# Patient Record
Sex: MALE | Race: WHITE | Employment: FULL TIME | URBAN - NONMETROPOLITAN AREA
[De-identification: names, ages, dates, MRNs, and addresses within clinical notes are randomized per-mention and may not be internally consistent; named-entity substitution may affect disease eponyms.]

---

## 2017-10-27 LAB — PAIN MANAGEMENT DRUG PANEL INTERP, URINE: NORMAL

## 2019-07-08 ENCOUNTER — HOSPITAL ENCOUNTER (OUTPATIENT)
Dept: PHYSICAL THERAPY | Age: 49
Setting detail: THERAPIES SERIES
Discharge: HOME | End: 2019-07-08
Payer: COMMERCIAL

## 2019-07-08 PROCEDURE — 97750 PHYSICAL PERFORMANCE TEST: CPT

## 2019-07-08 NOTE — DISCHARGE SUMMARY
[facility]  DISCHARGE NOTE  PHYSICAL THERAPY      REFERRING PHYSICIAN:  Dr. Josiane Christopher  DIAGNOSIS:  spinal stenosis lumbar region at multiple levels  CLAIM #:  Not apllicable   DATE OF ONSET:  2006    Client in clinic for completion of functional capacity evaluation. Please refer to evaluation in chart for details. Client is discharged at this time. Thank you for allowing me to assist in the care of this patient.     Hernan Rodarte #[id number] 7/8/2019 1:15 PM

## 2019-08-02 PROBLEM — N17.9 ACUTE RENAL FAILURE (ARF) (HCC): Status: ACTIVE | Noted: 2019-08-02

## 2019-08-03 ENCOUNTER — HOSPITAL ENCOUNTER (INPATIENT)
Age: 49
LOS: 1 days | Discharge: HOME | DRG: 684 | End: 2019-08-04
Attending: HOSPITALIST | Admitting: HOSPITALIST
Payer: COMMERCIAL

## 2019-08-03 DIAGNOSIS — N17.9 ACUTE RENAL FAILURE, UNSPECIFIED ACUTE RENAL FAILURE TYPE (HCC): Primary | ICD-10-CM

## 2019-08-03 PROBLEM — F43.10 PTSD (POST-TRAUMATIC STRESS DISORDER): Status: ACTIVE | Noted: 2019-08-03

## 2019-08-03 PROBLEM — M54.50 CHRONIC LOW BACK PAIN: Status: ACTIVE | Noted: 2019-08-03

## 2019-08-03 PROBLEM — G89.29 CHRONIC LOW BACK PAIN: Status: ACTIVE | Noted: 2019-08-03

## 2019-08-03 LAB
ANION GAP SERPL CALCULATED.3IONS-SCNC: 18 MEQ/L (ref 8–16)
BUN BLDV-MCNC: 31 MG/DL (ref 7–22)
CALCIUM SERPL-MCNC: 8.3 MG/DL (ref 8.5–10.5)
CHLORIDE BLD-SCNC: 100 MEQ/L (ref 98–111)
CO2: 21 MEQ/L (ref 23–33)
CREAT SERPL-MCNC: 2.9 MG/DL (ref 0.4–1.2)
GFR SERPL CREATININE-BSD FRML MDRD: 23 ML/MIN/1.73M2
GLUCOSE BLD-MCNC: 196 MG/DL (ref 70–108)
GLUCOSE BLD-MCNC: 199 MG/DL (ref 70–108)
GLUCOSE BLD-MCNC: 213 MG/DL (ref 70–108)
GLUCOSE BLD-MCNC: 236 MG/DL (ref 70–108)
GLUCOSE BLD-MCNC: 244 MG/DL (ref 70–108)
GLUCOSE BLD-MCNC: 257 MG/DL (ref 70–108)
POTASSIUM SERPL-SCNC: 4 MEQ/L (ref 3.5–5.2)
SODIUM BLD-SCNC: 139 MEQ/L (ref 135–145)

## 2019-08-03 PROCEDURE — 94640 AIRWAY INHALATION TREATMENT: CPT

## 2019-08-03 PROCEDURE — 6370000000 HC RX 637 (ALT 250 FOR IP): Performed by: PHYSICIAN ASSISTANT

## 2019-08-03 PROCEDURE — 94760 N-INVAS EAR/PLS OXIMETRY 1: CPT

## 2019-08-03 PROCEDURE — 2580000003 HC RX 258: Performed by: HOSPITALIST

## 2019-08-03 PROCEDURE — 6360000002 HC RX W HCPCS: Performed by: HOSPITALIST

## 2019-08-03 PROCEDURE — 36415 COLL VENOUS BLD VENIPUNCTURE: CPT

## 2019-08-03 PROCEDURE — 6370000000 HC RX 637 (ALT 250 FOR IP): Performed by: HOSPITALIST

## 2019-08-03 PROCEDURE — 6370000000 HC RX 637 (ALT 250 FOR IP): Performed by: INTERNAL MEDICINE

## 2019-08-03 PROCEDURE — 1200000003 HC TELEMETRY R&B

## 2019-08-03 PROCEDURE — 99223 1ST HOSP IP/OBS HIGH 75: CPT | Performed by: HOSPITALIST

## 2019-08-03 PROCEDURE — 82948 REAGENT STRIP/BLOOD GLUCOSE: CPT

## 2019-08-03 PROCEDURE — 80048 BASIC METABOLIC PNL TOTAL CA: CPT

## 2019-08-03 RX ORDER — PANTOPRAZOLE SODIUM 40 MG/1
40 TABLET, DELAYED RELEASE ORAL
Status: DISCONTINUED | OUTPATIENT
Start: 2019-08-03 | End: 2019-08-03

## 2019-08-03 RX ORDER — DULOXETIN HYDROCHLORIDE 60 MG/1
60 CAPSULE, DELAYED RELEASE ORAL DAILY
Status: DISCONTINUED | OUTPATIENT
Start: 2019-08-03 | End: 2019-08-04 | Stop reason: HOSPADM

## 2019-08-03 RX ORDER — HYDROXYZINE HYDROCHLORIDE 25 MG/1
25 TABLET, FILM COATED ORAL 3 TIMES DAILY PRN
COMMUNITY
End: 2021-11-12

## 2019-08-03 RX ORDER — FAMOTIDINE 20 MG/1
20 TABLET, FILM COATED ORAL 2 TIMES DAILY
Status: DISCONTINUED | OUTPATIENT
Start: 2019-08-03 | End: 2019-08-04 | Stop reason: HOSPADM

## 2019-08-03 RX ORDER — ATORVASTATIN CALCIUM 80 MG/1
80 TABLET, FILM COATED ORAL DAILY
Status: DISCONTINUED | OUTPATIENT
Start: 2019-08-03 | End: 2019-08-03 | Stop reason: ALTCHOICE

## 2019-08-03 RX ORDER — GABAPENTIN 600 MG/1
800 TABLET ORAL 3 TIMES DAILY
COMMUNITY
End: 2021-11-12

## 2019-08-03 RX ORDER — GABAPENTIN 400 MG/1
800 CAPSULE ORAL 3 TIMES DAILY
Status: DISCONTINUED | OUTPATIENT
Start: 2019-08-03 | End: 2019-08-04 | Stop reason: HOSPADM

## 2019-08-03 RX ORDER — VILAZODONE HYDROCHLORIDE 40 MG/1
40 TABLET ORAL DAILY
Status: DISCONTINUED | OUTPATIENT
Start: 2019-08-03 | End: 2019-08-04 | Stop reason: HOSPADM

## 2019-08-03 RX ORDER — SODIUM CHLORIDE 0.9 % (FLUSH) 0.9 %
10 SYRINGE (ML) INJECTION EVERY 12 HOURS SCHEDULED
Status: DISCONTINUED | OUTPATIENT
Start: 2019-08-03 | End: 2019-08-04 | Stop reason: HOSPADM

## 2019-08-03 RX ORDER — VIT C/B6/B5/MAGNESIUM/HERB 173 50-5-6-5MG
CAPSULE ORAL DAILY
COMMUNITY
End: 2021-05-11

## 2019-08-03 RX ORDER — LISINOPRIL 20 MG/1
20 TABLET ORAL DAILY
Status: ON HOLD | COMMUNITY
End: 2019-08-04 | Stop reason: SDUPTHER

## 2019-08-03 RX ORDER — HEPARIN SODIUM 5000 [USP'U]/ML
5000 INJECTION, SOLUTION INTRAVENOUS; SUBCUTANEOUS EVERY 8 HOURS SCHEDULED
Status: DISCONTINUED | OUTPATIENT
Start: 2019-08-03 | End: 2019-08-04 | Stop reason: HOSPADM

## 2019-08-03 RX ORDER — PREGABALIN 75 MG/1
75 CAPSULE ORAL 2 TIMES DAILY
Status: DISCONTINUED | OUTPATIENT
Start: 2019-08-03 | End: 2019-08-03 | Stop reason: ALTCHOICE

## 2019-08-03 RX ORDER — TIZANIDINE 4 MG/1
4 TABLET ORAL EVERY 6 HOURS PRN
Status: DISCONTINUED | OUTPATIENT
Start: 2019-08-03 | End: 2019-08-04 | Stop reason: HOSPADM

## 2019-08-03 RX ORDER — HYDROXYZINE HYDROCHLORIDE 25 MG/1
25 TABLET, FILM COATED ORAL 3 TIMES DAILY PRN
Status: DISCONTINUED | OUTPATIENT
Start: 2019-08-03 | End: 2019-08-04 | Stop reason: HOSPADM

## 2019-08-03 RX ORDER — TAMSULOSIN HYDROCHLORIDE 0.4 MG/1
0.4 CAPSULE ORAL DAILY
Status: DISCONTINUED | OUTPATIENT
Start: 2019-08-03 | End: 2019-08-04 | Stop reason: HOSPADM

## 2019-08-03 RX ORDER — CHLORTHALIDONE 25 MG/1
25 TABLET ORAL DAILY
Status: DISCONTINUED | OUTPATIENT
Start: 2019-08-03 | End: 2019-08-03 | Stop reason: ALTCHOICE

## 2019-08-03 RX ORDER — DEXTROSE MONOHYDRATE 25 G/50ML
12.5 INJECTION, SOLUTION INTRAVENOUS PRN
Status: DISCONTINUED | OUTPATIENT
Start: 2019-08-03 | End: 2019-08-04 | Stop reason: HOSPADM

## 2019-08-03 RX ORDER — 0.9 % SODIUM CHLORIDE 0.9 %
2000 INTRAVENOUS SOLUTION INTRAVENOUS ONCE
Status: COMPLETED | OUTPATIENT
Start: 2019-08-03 | End: 2019-08-03

## 2019-08-03 RX ORDER — ACETAMINOPHEN 325 MG/1
650 TABLET ORAL EVERY 6 HOURS PRN
Status: DISCONTINUED | OUTPATIENT
Start: 2019-08-03 | End: 2019-08-04 | Stop reason: HOSPADM

## 2019-08-03 RX ORDER — NICOTINE POLACRILEX 4 MG
15 LOZENGE BUCCAL PRN
Status: DISCONTINUED | OUTPATIENT
Start: 2019-08-03 | End: 2019-08-04 | Stop reason: HOSPADM

## 2019-08-03 RX ORDER — DEXTROSE MONOHYDRATE 50 MG/ML
100 INJECTION, SOLUTION INTRAVENOUS PRN
Status: DISCONTINUED | OUTPATIENT
Start: 2019-08-03 | End: 2019-08-04 | Stop reason: HOSPADM

## 2019-08-03 RX ORDER — HYDROCODONE BITARTRATE AND ACETAMINOPHEN 5; 325 MG/1; MG/1
1 TABLET ORAL EVERY 6 HOURS PRN
Status: DISCONTINUED | OUTPATIENT
Start: 2019-08-03 | End: 2019-08-04 | Stop reason: HOSPADM

## 2019-08-03 RX ORDER — ONDANSETRON 2 MG/ML
4 INJECTION INTRAMUSCULAR; INTRAVENOUS EVERY 6 HOURS PRN
Status: DISCONTINUED | OUTPATIENT
Start: 2019-08-03 | End: 2019-08-04 | Stop reason: HOSPADM

## 2019-08-03 RX ORDER — FUROSEMIDE 40 MG/1
40 TABLET ORAL DAILY
Status: ON HOLD | COMMUNITY
End: 2019-08-04 | Stop reason: SDUPTHER

## 2019-08-03 RX ORDER — DULOXETIN HYDROCHLORIDE 60 MG/1
60 CAPSULE, DELAYED RELEASE ORAL DAILY
COMMUNITY
End: 2021-11-12

## 2019-08-03 RX ORDER — ALBUTEROL SULFATE 90 UG/1
2 AEROSOL, METERED RESPIRATORY (INHALATION) EVERY 4 HOURS PRN
Status: DISCONTINUED | OUTPATIENT
Start: 2019-08-03 | End: 2019-08-04 | Stop reason: HOSPADM

## 2019-08-03 RX ORDER — GLIPIZIDE 10 MG/1
10 TABLET ORAL DAILY
COMMUNITY
End: 2021-05-11

## 2019-08-03 RX ORDER — DIVALPROEX SODIUM 500 MG/1
1500 TABLET, DELAYED RELEASE ORAL 2 TIMES DAILY
Status: DISCONTINUED | OUTPATIENT
Start: 2019-08-03 | End: 2019-08-04 | Stop reason: HOSPADM

## 2019-08-03 RX ORDER — DIVALPROEX SODIUM 500 MG/1
1500 TABLET, DELAYED RELEASE ORAL 2 TIMES DAILY
COMMUNITY
End: 2021-05-11

## 2019-08-03 RX ORDER — SODIUM CHLORIDE 0.9 % (FLUSH) 0.9 %
10 SYRINGE (ML) INJECTION PRN
Status: DISCONTINUED | OUTPATIENT
Start: 2019-08-03 | End: 2019-08-04 | Stop reason: HOSPADM

## 2019-08-03 RX ORDER — VILAZODONE HYDROCHLORIDE 40 MG/1
40 TABLET ORAL DAILY
COMMUNITY
End: 2021-05-11

## 2019-08-03 RX ORDER — SODIUM CHLORIDE 9 MG/ML
INJECTION, SOLUTION INTRAVENOUS CONTINUOUS
Status: DISCONTINUED | OUTPATIENT
Start: 2019-08-03 | End: 2019-08-04 | Stop reason: HOSPADM

## 2019-08-03 RX ORDER — ASPIRIN 81 MG/1
81 TABLET ORAL DAILY
Status: DISCONTINUED | OUTPATIENT
Start: 2019-08-03 | End: 2019-08-04 | Stop reason: HOSPADM

## 2019-08-03 RX ADMIN — INSULIN LISPRO 1 UNITS: 100 INJECTION, SOLUTION INTRAVENOUS; SUBCUTANEOUS at 21:29

## 2019-08-03 RX ADMIN — Medication 2 PUFF: at 18:08

## 2019-08-03 RX ADMIN — GABAPENTIN 800 MG: 400 CAPSULE ORAL at 09:01

## 2019-08-03 RX ADMIN — PANTOPRAZOLE SODIUM 40 MG: 40 TABLET, DELAYED RELEASE ORAL at 05:23

## 2019-08-03 RX ADMIN — HEPARIN SODIUM 5000 UNITS: 5000 INJECTION, SOLUTION INTRAVENOUS; SUBCUTANEOUS at 21:29

## 2019-08-03 RX ADMIN — GABAPENTIN 800 MG: 400 CAPSULE ORAL at 21:27

## 2019-08-03 RX ADMIN — HEPARIN SODIUM 5000 UNITS: 5000 INJECTION, SOLUTION INTRAVENOUS; SUBCUTANEOUS at 15:15

## 2019-08-03 RX ADMIN — VILAZODONE HYDROCHLORIDE 40 MG: 40 TABLET ORAL at 09:02

## 2019-08-03 RX ADMIN — GABAPENTIN 800 MG: 400 CAPSULE ORAL at 15:13

## 2019-08-03 RX ADMIN — ASPIRIN 81 MG: 81 TABLET ORAL at 09:02

## 2019-08-03 RX ADMIN — HYDROCODONE BITARTRATE AND ACETAMINOPHEN 1 TABLET: 5; 325 TABLET ORAL at 21:27

## 2019-08-03 RX ADMIN — INSULIN LISPRO 6 UNITS: 100 INJECTION, SOLUTION INTRAVENOUS; SUBCUTANEOUS at 11:54

## 2019-08-03 RX ADMIN — FAMOTIDINE 20 MG: 20 TABLET ORAL at 21:27

## 2019-08-03 RX ADMIN — SODIUM CHLORIDE: 9 INJECTION, SOLUTION INTRAVENOUS at 06:33

## 2019-08-03 RX ADMIN — SODIUM CHLORIDE 2000 ML: 9 INJECTION, SOLUTION INTRAVENOUS at 03:33

## 2019-08-03 RX ADMIN — INSULIN LISPRO 2 UNITS: 100 INJECTION, SOLUTION INTRAVENOUS; SUBCUTANEOUS at 09:02

## 2019-08-03 RX ADMIN — DIVALPROEX SODIUM 1500 MG: 500 TABLET, DELAYED RELEASE ORAL at 16:19

## 2019-08-03 RX ADMIN — LINAGLIPTIN 5 MG: 5 TABLET, FILM COATED ORAL at 15:14

## 2019-08-03 RX ADMIN — INSULIN LISPRO 4 UNITS: 100 INJECTION, SOLUTION INTRAVENOUS; SUBCUTANEOUS at 16:19

## 2019-08-03 RX ADMIN — Medication 2 PUFF: at 08:04

## 2019-08-03 RX ADMIN — DULOXETINE HYDROCHLORIDE 60 MG: 60 CAPSULE, DELAYED RELEASE ORAL at 09:02

## 2019-08-03 RX ADMIN — DIVALPROEX SODIUM 1500 MG: 500 TABLET, DELAYED RELEASE ORAL at 09:01

## 2019-08-03 RX ADMIN — TAMSULOSIN HYDROCHLORIDE 0.4 MG: 0.4 CAPSULE ORAL at 09:01

## 2019-08-03 ASSESSMENT — PAIN - FUNCTIONAL ASSESSMENT
PAIN_FUNCTIONAL_ASSESSMENT: PREVENTS OR INTERFERES SOME ACTIVE ACTIVITIES AND ADLS
PAIN_FUNCTIONAL_ASSESSMENT: PREVENTS OR INTERFERES SOME ACTIVE ACTIVITIES AND ADLS

## 2019-08-03 ASSESSMENT — PAIN DESCRIPTION - DESCRIPTORS
DESCRIPTORS: STABBING

## 2019-08-03 ASSESSMENT — PAIN DESCRIPTION - PAIN TYPE
TYPE: CHRONIC PAIN

## 2019-08-03 ASSESSMENT — PAIN DESCRIPTION - PROGRESSION
CLINICAL_PROGRESSION: NOT CHANGED

## 2019-08-03 ASSESSMENT — PAIN DESCRIPTION - LOCATION
LOCATION: BACK

## 2019-08-03 ASSESSMENT — PAIN DESCRIPTION - FREQUENCY
FREQUENCY: CONTINUOUS

## 2019-08-03 ASSESSMENT — PAIN DESCRIPTION - DIRECTION
RADIATING_TOWARDS: BUTTOCKS
RADIATING_TOWARDS: BUTTOCKS

## 2019-08-03 ASSESSMENT — PAIN DESCRIPTION - ONSET
ONSET: ON-GOING
ONSET: ON-GOING

## 2019-08-03 ASSESSMENT — PAIN SCALES - GENERAL
PAINLEVEL_OUTOF10: 7
PAINLEVEL_OUTOF10: 7
PAINLEVEL_OUTOF10: 0
PAINLEVEL_OUTOF10: 7

## 2019-08-03 ASSESSMENT — PAIN DESCRIPTION - ORIENTATION
ORIENTATION: MID;LOWER

## 2019-08-03 NOTE — H&P
Take 60 mg by mouth daily   Yes Historical Provider, MD   furosemide (LASIX) 40 MG tablet Take 40 mg by mouth daily   Yes Historical Provider, MD   gabapentin (NEURONTIN) 600 MG tablet Take 800 mg by mouth 3 times daily. Yes Historical Provider, MD   glipiZIDE (GLUCOTROL) 10 MG tablet Take 10 mg by mouth daily   Yes Historical Provider, MD   lisinopril (PRINIVIL;ZESTRIL) 20 MG tablet Take 20 mg by mouth daily   Yes Historical Provider, MD   metFORMIN (GLUCOPHAGE) 1000 MG tablet Take 1,000 mg by mouth 2 times daily (with meals)   Yes Historical Provider, MD   Turmeric 500 MG CAPS Take by mouth daily   Yes Historical Provider, MD   vilazodone HCl (VILAZODONE HCL) 40 MG TABS Take 40 mg by mouth daily   Yes Historical Provider, MD   Mometasone Furo-Formoterol Fum (DULERA IN) Inhale into the lungs   Yes Historical Provider, MD   tiZANidine (ZANAFLEX) 4 MG tablet Take 4 mg by mouth every 6 hours as needed   Yes Historical Provider, MD   tamsulosin (FLOMAX) 0.4 MG capsule Take 0.4 mg by mouth daily   Yes Historical Provider, MD   pantoprazole (PROTONIX) 40 MG tablet Take 40 mg by mouth daily   Yes Historical Provider, MD   Multiple Vitamins-Minerals (MULTIVITAMIN PO) Take 1 tablet by mouth daily. Yes Historical Provider, MD   aspirin 81 MG tablet Take 81 mg by mouth daily. Yes Historical Provider, MD   hydrOXYzine (ATARAX) 25 MG tablet Take 25 mg by mouth 3 times daily as needed for Itching    Historical Provider, MD   OMEGA 3-6-9 FATTY ACIDS PO Take by mouth    Historical Provider, MD   B Complex-C (SUPER B COMPLEX PO) Take by mouth    Historical Provider, MD   pregabalin (LYRICA) 75 MG capsule Take 75 mg by mouth 2 times daily    Historical Provider, MD   chlorthalidone (HYGROTON) 25 MG tablet Take 25 mg by mouth daily    Historical Provider, MD   atorvastatin (LIPITOR) 80 MG tablet Take 80 mg by mouth daily. Historical Provider, MD   Ergocalciferol (VITAMIN D2 PO) Take 50,000 capsules by mouth once a week. Historical Provider, MD   albuterol (VENTOLIN HFA) 108 (90 BASE) MCG/ACT inhaler Inhale 2 puffs into the lungs every 4 hours as needed for Wheezing. Historical Provider, MD       Allergies:  Cleocin [clindamycin]; Ilosone [erythromycin]; Pcn [penicillins]; and Sulfa antibiotics    Social History:      The patient currently lives at home    TOBACCO:   reports that he quit smoking about 12 months ago. His smoking use included cigarettes. He smoked 0.50 packs per day. He has never used smokeless tobacco.  ETOH:   reports that he does not drink alcohol. Family History:      Reviewed in detail and Positive as follows:        Problem Relation Age of Onset    Diabetes Mother     Diabetes Father     Heart Disease Father     High Blood Pressure Father     High Cholesterol Father     Stroke Maternal Grandfather     Heart Disease Maternal Grandfather     Cancer Paternal Grandmother     Heart Disease Paternal Grandfather        Diet:  DIET CARB CONTROL;    REVIEW OF SYSTEMS:   Pertinent positives as noted in the HPI. All other systems reviewed and negative. PHYSICAL EXAM:    /78   Pulse 84   Temp 98.4 °F (36.9 °C) (Oral)   Resp 18   Ht 6' 1\" (1.854 m)   Wt (!) 356 lb 14.4 oz (161.9 kg)   SpO2 98%   BMI 47.09 kg/m²     General appearance:  No apparent distress, appears stated age and cooperative. HEENT:  Normal cephalic, atraumatic without obvious deformity. Pupils equal, round, and reactive to light. Extra ocular muscles intact. Conjunctivae/corneas clear. Neck: Supple, with full range of motion. No jugular venous distention. Trachea midline. Respiratory:  Normal respiratory effort. Clear to auscultation, bilaterally without Rales/Wheezes/Rhonchi. Cardiovascular:  Regular rate and rhythm with normal S1/S2 without murmurs, rubs or gallops. Abdomen: Soft, non-tender, non-distended with normal bowel sounds. Musculoskeletal:  No clubbing, cyanosis or edema bilaterally.   Full range of motion without deformity. Skin: Skin color, texture, turgor normal.  No rashes or lesions. Neurologic:  Neurovascularly intact without any focal sensory/motor deficits. Cranial nerves: II-XII intact, grossly non-focal.  Psychiatric:  Alert and oriented, thought content appropriate, normal insight  Capillary Refill: Brisk,< 3 seconds   Peripheral Pulses: +2 palpable, equal bilaterally       Labs:     No results for input(s): WBC, HGB, HCT, PLT in the last 72 hours. No results for input(s): NA, K, CL, CO2, BUN, CREATININE, CALCIUM, PHOS in the last 72 hours. Invalid input(s): MAGNES  No results for input(s): AST, ALT, BILIDIR, BILITOT, ALKPHOS in the last 72 hours. No results for input(s): INR in the last 72 hours. No results for input(s): Kim Callander in the last 72 hours. Urinalysis:      Lab Results   Component Value Date    NITRU Negative 08/12/2016    BLOODU Negative 08/12/2016    SPECGRAV 1.015 08/12/2016    GLUCOSEU NEGATIVE 07/07/2015       Intake & Output:  No intake/output data recorded. No intake/output data recorded. Radiology:       No orders to display        DVT prophylaxis: SQ Heparin    Code Status: Full Code      Disposition:Home    Active Hospital Problems    Diagnosis Date Noted    [facility] [N17.9] 08/02/2019     Priority: High    Chronic low back pain [M54.5, G89.29] 08/03/2019    PTSD (post-traumatic stress disorder) [F43.10] 08/03/2019    Hypertension [I10]     Hyperlipidemia [E78.5]     CAD (coronary artery disease) [I25.10]        PLAN:    1. Acute renal failure - possibly due to metformin. Hold nephrotoxic medications. IVF, repeat lab in AM.  2. PTSD, chronic back pain - continue home meds  3. HTN, HLD - stable. 4. CAD - chest pain free  5. DM2 - sliding scale insulin, diabetic diet        Thank you DOC Hall for the opportunity to be involved in this patient's care.     Electronically signed by Linda Ellis DO on 8/3/2019 at 3:45 AM

## 2019-08-03 NOTE — PROGRESS NOTES
Pt admitted to  [phone] via direct admit from [facility].   Complaints: Falling. IV none infusing into the antecubital right, condition patent and no redness at a rate of 0 mls/ hour with about 0 mls in the bag still. IV site free of s/s of infection or infiltration. Vital signs obtained. Assessment and data collection initiated. Two nurse skin assessment performed by Kush Salazar and Nasreen Montana RN. Oriented to room. Policies and procedures for 6K explained. All questions answered with no further questions at this time. Fall prevention and safety brochure discussed with patient. Bed alarm on. Call light in reach.

## 2019-08-04 VITALS
HEIGHT: 73 IN | HEART RATE: 87 BPM | OXYGEN SATURATION: 95 % | TEMPERATURE: 98.2 F | RESPIRATION RATE: 16 BRPM | WEIGHT: 315 LBS | BODY MASS INDEX: 41.75 KG/M2 | DIASTOLIC BLOOD PRESSURE: 64 MMHG | SYSTOLIC BLOOD PRESSURE: 107 MMHG

## 2019-08-04 LAB
ALBUMIN SERPL-MCNC: 3.6 G/DL (ref 3.5–5.1)
ALP BLD-CCNC: 58 U/L (ref 38–126)
ALT SERPL-CCNC: 33 U/L (ref 11–66)
ANION GAP SERPL CALCULATED.3IONS-SCNC: 10 MEQ/L (ref 8–16)
AST SERPL-CCNC: 18 U/L (ref 5–40)
BILIRUB SERPL-MCNC: 0.7 MG/DL (ref 0.3–1.2)
BILIRUBIN DIRECT: < 0.2 MG/DL (ref 0–0.3)
BUN BLDV-MCNC: 12 MG/DL (ref 7–22)
CALCIUM SERPL-MCNC: 8.4 MG/DL (ref 8.5–10.5)
CHLORIDE BLD-SCNC: 103 MEQ/L (ref 98–111)
CO2: 28 MEQ/L (ref 23–33)
CREAT SERPL-MCNC: 1.2 MG/DL (ref 0.4–1.2)
ERYTHROCYTE [DISTWIDTH] IN BLOOD BY AUTOMATED COUNT: 13.2 % (ref 11.5–14.5)
ERYTHROCYTE [DISTWIDTH] IN BLOOD BY AUTOMATED COUNT: 45 FL (ref 35–45)
GFR SERPL CREATININE-BSD FRML MDRD: 64 ML/MIN/1.73M2
GLUCOSE BLD-MCNC: 133 MG/DL (ref 70–108)
GLUCOSE BLD-MCNC: 155 MG/DL (ref 70–108)
GLUCOSE BLD-MCNC: 182 MG/DL (ref 70–108)
HCT VFR BLD CALC: 36.6 % (ref 42–52)
HEMOGLOBIN: 12.1 GM/DL (ref 14–18)
MCH RBC QN AUTO: 30.9 PG (ref 26–33)
MCHC RBC AUTO-ENTMCNC: 33.1 GM/DL (ref 32.2–35.5)
MCV RBC AUTO: 93.6 FL (ref 80–94)
PLATELET # BLD: 109 THOU/MM3 (ref 130–400)
PMV BLD AUTO: 9.7 FL (ref 9.4–12.4)
POTASSIUM REFLEX MAGNESIUM: 4.5 MEQ/L (ref 3.5–5.2)
RBC # BLD: 3.91 MILL/MM3 (ref 4.7–6.1)
SODIUM BLD-SCNC: 141 MEQ/L (ref 135–145)
TOTAL PROTEIN: 5.7 G/DL (ref 6.1–8)
WBC # BLD: 4.3 THOU/MM3 (ref 4.8–10.8)

## 2019-08-04 PROCEDURE — 80076 HEPATIC FUNCTION PANEL: CPT

## 2019-08-04 PROCEDURE — 6360000002 HC RX W HCPCS: Performed by: HOSPITALIST

## 2019-08-04 PROCEDURE — 36415 COLL VENOUS BLD VENIPUNCTURE: CPT

## 2019-08-04 PROCEDURE — 6370000000 HC RX 637 (ALT 250 FOR IP): Performed by: INTERNAL MEDICINE

## 2019-08-04 PROCEDURE — 82948 REAGENT STRIP/BLOOD GLUCOSE: CPT

## 2019-08-04 PROCEDURE — 6370000000 HC RX 637 (ALT 250 FOR IP): Performed by: HOSPITALIST

## 2019-08-04 PROCEDURE — 85027 COMPLETE CBC AUTOMATED: CPT

## 2019-08-04 PROCEDURE — 2580000003 HC RX 258: Performed by: HOSPITALIST

## 2019-08-04 PROCEDURE — 80053 COMPREHEN METABOLIC PANEL: CPT

## 2019-08-04 PROCEDURE — 99239 HOSP IP/OBS DSCHRG MGMT >30: CPT | Performed by: INTERNAL MEDICINE

## 2019-08-04 PROCEDURE — 94640 AIRWAY INHALATION TREATMENT: CPT

## 2019-08-04 RX ORDER — LISINOPRIL 20 MG/1
20 TABLET ORAL DAILY
Qty: 30 TABLET | Refills: 3 | Status: SHIPPED | OUTPATIENT
Start: 2019-08-08

## 2019-08-04 RX ORDER — FUROSEMIDE 40 MG/1
40 TABLET ORAL DAILY
Qty: 60 TABLET | Refills: 3 | Status: SHIPPED | OUTPATIENT
Start: 2019-08-06 | End: 2021-05-11

## 2019-08-04 RX ORDER — CHLORTHALIDONE 25 MG/1
25 TABLET ORAL DAILY
Qty: 30 TABLET | Refills: 3 | Status: SHIPPED | OUTPATIENT
Start: 2019-08-07 | End: 2021-05-11

## 2019-08-04 RX ADMIN — LINAGLIPTIN 5 MG: 5 TABLET, FILM COATED ORAL at 07:59

## 2019-08-04 RX ADMIN — TAMSULOSIN HYDROCHLORIDE 0.4 MG: 0.4 CAPSULE ORAL at 07:59

## 2019-08-04 RX ADMIN — DULOXETINE HYDROCHLORIDE 60 MG: 60 CAPSULE, DELAYED RELEASE ORAL at 07:59

## 2019-08-04 RX ADMIN — FAMOTIDINE 20 MG: 20 TABLET ORAL at 07:59

## 2019-08-04 RX ADMIN — SODIUM CHLORIDE: 9 INJECTION, SOLUTION INTRAVENOUS at 07:55

## 2019-08-04 RX ADMIN — GABAPENTIN 800 MG: 400 CAPSULE ORAL at 13:46

## 2019-08-04 RX ADMIN — HEPARIN SODIUM 5000 UNITS: 5000 INJECTION, SOLUTION INTRAVENOUS; SUBCUTANEOUS at 13:46

## 2019-08-04 RX ADMIN — INSULIN LISPRO 2 UNITS: 100 INJECTION, SOLUTION INTRAVENOUS; SUBCUTANEOUS at 11:13

## 2019-08-04 RX ADMIN — GABAPENTIN 800 MG: 400 CAPSULE ORAL at 07:59

## 2019-08-04 RX ADMIN — DIVALPROEX SODIUM 1500 MG: 500 TABLET, DELAYED RELEASE ORAL at 07:59

## 2019-08-04 RX ADMIN — ASPIRIN 81 MG: 81 TABLET ORAL at 07:59

## 2019-08-04 RX ADMIN — Medication 2 PUFF: at 09:12

## 2019-08-04 RX ADMIN — VILAZODONE HYDROCHLORIDE 40 MG: 40 TABLET ORAL at 07:59

## 2019-08-04 RX ADMIN — HEPARIN SODIUM 5000 UNITS: 5000 INJECTION, SOLUTION INTRAVENOUS; SUBCUTANEOUS at 06:53

## 2019-08-04 ASSESSMENT — PAIN DESCRIPTION - FREQUENCY: FREQUENCY: CONTINUOUS

## 2019-08-04 ASSESSMENT — PAIN DESCRIPTION - PROGRESSION

## 2019-08-04 ASSESSMENT — PAIN DESCRIPTION - LOCATION: LOCATION: BACK

## 2019-08-04 ASSESSMENT — PAIN DESCRIPTION - DESCRIPTORS: DESCRIPTORS: ACHING

## 2019-08-04 ASSESSMENT — PAIN DESCRIPTION - ORIENTATION: ORIENTATION: MID;LOWER

## 2019-08-04 ASSESSMENT — PAIN DESCRIPTION - PAIN TYPE: TYPE: CHRONIC PAIN

## 2019-08-04 ASSESSMENT — PAIN SCALES - GENERAL: PAINLEVEL_OUTOF10: 5

## 2019-08-04 NOTE — DISCHARGE SUMMARY
(ARF) ([facility] [N17.9] 08/02/2019    Hypertension [I10]     Hyperlipidemia [E78.5]     CAD (coronary artery disease) [I25.10]        PLAN:    1. Acute renal failure - multifactorial- prerenal sec to very high BS, plus being on lisinopril, lasix chorthalidone and metformin- now resolved- will add back each medicines one by one rather starting all together- pcp f/u closely- bmp added at discharge- results to pcp  2. PTSD, chronic back pain - continue home meds  3. HTN, HLD - stable. 4. CAD - chest pain free  5. DM2 -new onset dx three week ago- changed metformin to linagliptin- ambulatory ref to be made by pcp for DM education- will see pcp in few days time for close follow up    Stable for discharge    Discharge time:- 35mins        Thank you Debra Merchant for the opportunity to be involved in this patient's care.     Electronically signed by Saskia Sanders MD on 8/4/2019 at 2:53 PM

## 2019-08-04 NOTE — PROGRESS NOTES
Discharge teaching and instructions for diagnosis/procedure of ARF completed with patient using teachback method. AVS reviewed. Printed prescriptions given to patient. Patient voiced understanding regarding prescriptions, follow up appointments, and care of self at home.  Discharged ambulatory to  home with support per family

## 2019-11-18 ENCOUNTER — TELEPHONE (OUTPATIENT)
Dept: BARIATRICS/WEIGHT MGMT | Age: 49
End: 2019-11-18

## 2020-05-26 ENCOUNTER — TELEPHONE (OUTPATIENT)
Dept: CARDIOLOGY CLINIC | Age: 50
End: 2020-05-26

## 2020-05-26 NOTE — TELEPHONE ENCOUNTER
Spoke with patient to schedule NP appt for CP and SOB referred by Dr. Claudell Curt. Pt wanted to hold off on appt at this time and will call back if he is interested.

## 2021-05-11 ENCOUNTER — HOSPITAL ENCOUNTER (OUTPATIENT)
Dept: WOUND CARE | Age: 51
Discharge: HOME | End: 2021-05-11
Payer: MEDICAID

## 2021-05-11 VITALS
BODY MASS INDEX: 41.75 KG/M2 | WEIGHT: 315 LBS | HEART RATE: 85 BPM | OXYGEN SATURATION: 96 % | SYSTOLIC BLOOD PRESSURE: 134 MMHG | TEMPERATURE: 98 F | HEIGHT: 73 IN | RESPIRATION RATE: 18 BRPM | DIASTOLIC BLOOD PRESSURE: 80 MMHG

## 2021-05-11 PROCEDURE — 11042 DBRDMT SUBQ TIS 1ST 20SQCM/<: CPT

## 2021-05-11 RX ORDER — VANCOMYCIN HYDROCHLORIDE 250 MG/1
250 CAPSULE ORAL 4 TIMES DAILY
COMMUNITY
End: 2021-05-25

## 2021-05-11 RX ORDER — CEFDINIR 300 MG/1
300 CAPSULE ORAL 2 TIMES DAILY
COMMUNITY
End: 2021-05-25 | Stop reason: ALTCHOICE

## 2021-05-11 RX ORDER — ISOSORBIDE MONONITRATE 60 MG/1
60 TABLET, EXTENDED RELEASE ORAL DAILY
COMMUNITY

## 2021-05-11 RX ORDER — INSULIN GLARGINE 100 [IU]/ML
20 INJECTION, SOLUTION SUBCUTANEOUS DAILY
COMMUNITY
End: 2022-07-12

## 2021-05-11 RX ORDER — CHLORHEXIDINE GLUCONATE 4 G/100ML
SOLUTION TOPICAL DAILY PRN
COMMUNITY
End: 2021-11-12

## 2021-05-11 RX ORDER — LAMOTRIGINE 25 MG/1
25 TABLET ORAL 2 TIMES DAILY
COMMUNITY
End: 2021-11-12

## 2021-05-11 RX ORDER — BUPROPION HYDROCHLORIDE 300 MG/1
TABLET ORAL EVERY MORNING
COMMUNITY
End: 2022-07-12

## 2021-05-11 RX ORDER — NAPROXEN 500 MG/1
500 TABLET ORAL 2 TIMES DAILY WITH MEALS
COMMUNITY
End: 2021-11-12

## 2021-05-11 RX ORDER — CETIRIZINE HYDROCHLORIDE 10 MG/1
10 TABLET ORAL DAILY
COMMUNITY
End: 2022-07-12

## 2021-05-11 ASSESSMENT — PAIN SCALES - GENERAL: PAINLEVEL_OUTOF10: 6

## 2021-05-11 ASSESSMENT — PAIN DESCRIPTION - PAIN TYPE: TYPE: ACUTE PAIN

## 2021-05-11 NOTE — PROGRESS NOTES
Teaching Consultation Note:    I was present with the resident during the visit. I discussed the case with the resident and agree with the findings and the plan as documented in the residents note.     Thank you for the consultation    Artist Briseyda HANSEN, [address] [facility]

## 2021-05-11 NOTE — PLAN OF CARE
Problem: Wound:  Goal: Will show signs of wound healing; wound closure and no evidence of infection  Description: Will show signs of wound healing; wound closure and no evidence of infection  Outcome: Ongoing   Pt. Seen today for right foot wound see AVS for orders. Follow up in 2 weeks. Care plan reviewed with patient. Patient verbalize understanding of the plan of care and contribute to goal setting.

## 2021-05-11 NOTE — PROGRESS NOTES
[facility] [address] f: [phone] f: [phone] p: [phone] [email or web address]      Ordering Center:   [place] [address] [address] [address] [phone]  WOUND CARE Dept: [phone]   Angela Snider NUMBER [phone]    Patient Information:      Yovana Benedict  [address] [facility] [address] [facility] [address] [address] [phone]   : 1970  AGE: 45 y.o. GENDER: male   EPISODE DATE: 2021    Insurance:      PRIMARY INSURANCE:  Plan: BCBS - [place] PPO  Coverage: BCBS  Effective Date: 2015  Group Number: [unfilled]  Subscriber Number: [id number] - (BX Traditional)    Payor/Plan Subscr  Sex Relation Sub. Ins. ID Effective Group Num   1. [id number] Tray Dominguezaham 1970 Male Self [id number] 1/1/15 [id number]                                   [address]       Patient Wound Information:      Problem List Items Addressed This Visit     None          WOUNDS REQUIRING DRESSING SUPPLIES:     Wound 21 Foot Right;Plantar #1 (Active)   Wound Image   21 1354   Wound Etiology Diabetic 21 1354   Dressing Status Intact; Old drainage noted 21 1354   Wound Cleansed Cleansed with saline 21 1354   Wound Length (cm) 1.1 cm 21 1354   Wound Width (cm) 0.9 cm 21 1354   Wound Depth (cm) 0.5 cm 21 1354   Wound Surface Area (cm^2) 0.99 cm^2 21 1354   Wound Volume (cm^3) 0.5 cm^3 21 1354   Undermining Starts ___ O'Clock 12 21 1354   Undermining Ends___ O'Clock 12 21 1354   Undermining Maxium Distance (cm) .2 21   Wound Assessment Pink/red;Granulation tissue;Pale granulation tissue 21   Drainage Amount Moderate 21   Drainage Description Serosanguinous 21   Odor None 21   Julisa-wound Assessment Dry/flaky; Intact; Hyperkeratosis (callous) 21   Margins Unattached edges 05/11/21 1354   Wound Thickness Description not for Pressure Injury Full thickness 05/11/21 1354   Number of days: 0          Supplies Requested :      WOUND #: 1   PRIMARY DRESSING:  Alginate with silver pad   Cover and Secure with: ABD pad  Bulky roll gauze     FREQUENCY OF DRESSING CHANGES:  Daily       ADDITIONAL ITEMS:  [] Gloves Small  [] Gloves Medium [x] Gloves Large [] Gloves XLarge  [] Tape 1\" [x] Tape 2\" [] Tape 3\"  [] Medipore Tape  [x] Saline  [] Skin Prep   [] Adhesive Remover   [] Cotton Tip Applicators   [] Other:    Patient Wound(s) Debrided: [x] Yes if yes please add date 05/11/2021   [] No    Debribement Type: Excisional/Sharp    Patient currently being seen by Home Health: [] Yes   [x] No    Duration for needed supplies:  []15  []30  []60  [x]90 Days    Electronically signed by Nam Collins RN on 5/11/2021 at 2:53 PM     Provider Information:      PROVIDER'S NAME: Narciso Xavier DPM    NPI: [id number]
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
## 2021-05-25 ENCOUNTER — HOSPITAL ENCOUNTER (OUTPATIENT)
Dept: WOUND CARE | Age: 51
Discharge: HOME | End: 2021-05-25
Payer: MEDICAID

## 2021-05-25 VITALS
DIASTOLIC BLOOD PRESSURE: 73 MMHG | SYSTOLIC BLOOD PRESSURE: 132 MMHG | TEMPERATURE: 98.6 F | RESPIRATION RATE: 18 BRPM | HEART RATE: 94 BPM | OXYGEN SATURATION: 95 %

## 2021-05-25 DIAGNOSIS — Z79.4 TYPE 2 DIABETES MELLITUS WITHOUT COMPLICATION, WITH LONG-TERM CURRENT USE OF INSULIN (HCC): ICD-10-CM

## 2021-05-25 DIAGNOSIS — E11.9 TYPE 2 DIABETES MELLITUS WITHOUT COMPLICATION, WITH LONG-TERM CURRENT USE OF INSULIN (HCC): ICD-10-CM

## 2021-05-25 DIAGNOSIS — L97.512 FOOT ULCERATION, RIGHT, WITH FAT LAYER EXPOSED (HCC): Primary | ICD-10-CM

## 2021-05-25 PROCEDURE — 11042 DBRDMT SUBQ TIS 1ST 20SQCM/<: CPT

## 2021-05-25 RX ORDER — GINSENG 100 MG
CAPSULE ORAL ONCE
Status: CANCELLED | OUTPATIENT
Start: 2021-05-25 | End: 2021-05-25

## 2021-05-25 RX ORDER — BETAMETHASONE DIPROPIONATE 0.05 %
OINTMENT (GRAM) TOPICAL ONCE
Status: CANCELLED | OUTPATIENT
Start: 2021-05-25 | End: 2021-05-25

## 2021-05-25 RX ORDER — CLOBETASOL PROPIONATE 0.5 MG/G
OINTMENT TOPICAL ONCE
Status: CANCELLED | OUTPATIENT
Start: 2021-05-25 | End: 2021-05-25

## 2021-05-25 RX ORDER — LIDOCAINE HYDROCHLORIDE 20 MG/ML
JELLY TOPICAL ONCE
Status: CANCELLED | OUTPATIENT
Start: 2021-05-25 | End: 2021-05-25

## 2021-05-25 RX ORDER — BACITRACIN, NEOMYCIN, POLYMYXIN B 400; 3.5; 5 [USP'U]/G; MG/G; [USP'U]/G
OINTMENT TOPICAL ONCE
Status: CANCELLED | OUTPATIENT
Start: 2021-05-25 | End: 2021-05-25

## 2021-05-25 RX ORDER — BACITRACIN ZINC AND POLYMYXIN B SULFATE 500; 1000 [USP'U]/G; [USP'U]/G
OINTMENT TOPICAL ONCE
Status: CANCELLED | OUTPATIENT
Start: 2021-05-25 | End: 2021-05-25

## 2021-05-25 RX ORDER — LIDOCAINE 40 MG/G
CREAM TOPICAL ONCE
Status: CANCELLED | OUTPATIENT
Start: 2021-05-25 | End: 2021-05-25

## 2021-05-25 RX ORDER — GENTAMICIN SULFATE 1 MG/G
OINTMENT TOPICAL ONCE
Status: CANCELLED | OUTPATIENT
Start: 2021-05-25 | End: 2021-05-25

## 2021-05-25 RX ORDER — LIDOCAINE 50 MG/G
OINTMENT TOPICAL ONCE
Status: CANCELLED | OUTPATIENT
Start: 2021-05-25 | End: 2021-05-25

## 2021-05-25 RX ORDER — LIDOCAINE HYDROCHLORIDE 40 MG/ML
SOLUTION TOPICAL ONCE
Status: CANCELLED | OUTPATIENT
Start: 2021-05-25 | End: 2021-05-25

## 2021-05-25 ASSESSMENT — PAIN DESCRIPTION - PAIN TYPE: TYPE: ACUTE PAIN

## 2021-05-25 NOTE — PLAN OF CARE
Problem: Wound:  Goal: Will show signs of wound healing; wound closure and no evidence of infection  Description: Will show signs of wound healing; wound closure and no evidence of infection  Outcome: Ongoing   Pt. Seen today for right foot wound see AVS for new orders. Pre-op paperwork signed. Follow up in 2 weeks. Care plan reviewed with patient. Patient verbalize understanding of the plan of care and contribute to goal setting.
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
## 2021-06-29 ENCOUNTER — TELEPHONE (OUTPATIENT)
Dept: WOUND CARE | Age: 51
End: 2021-06-29

## 2021-06-29 ENCOUNTER — HOSPITAL ENCOUNTER (OUTPATIENT)
Dept: WOUND CARE | Age: 51
Discharge: HOME | End: 2021-06-29
Payer: MEDICAID

## 2021-06-29 VITALS
SYSTOLIC BLOOD PRESSURE: 148 MMHG | HEART RATE: 82 BPM | OXYGEN SATURATION: 95 % | TEMPERATURE: 97.4 F | DIASTOLIC BLOOD PRESSURE: 74 MMHG | RESPIRATION RATE: 18 BRPM

## 2021-06-29 DIAGNOSIS — L97.512 FOOT ULCERATION, RIGHT, WITH FAT LAYER EXPOSED (HCC): ICD-10-CM

## 2021-06-29 DIAGNOSIS — Z79.4 TYPE 2 DIABETES MELLITUS WITHOUT COMPLICATION, WITH LONG-TERM CURRENT USE OF INSULIN (HCC): ICD-10-CM

## 2021-06-29 DIAGNOSIS — T81.31XA DEHISCENCE OF OPERATIVE WOUND, INITIAL ENCOUNTER: ICD-10-CM

## 2021-06-29 DIAGNOSIS — E11.9 TYPE 2 DIABETES MELLITUS WITHOUT COMPLICATION, WITH LONG-TERM CURRENT USE OF INSULIN (HCC): ICD-10-CM

## 2021-06-29 DIAGNOSIS — S90.129A CONTUSION OF FIFTH TOE: Primary | ICD-10-CM

## 2021-06-29 PROCEDURE — 87186 SC STD MICRODIL/AGAR DIL: CPT

## 2021-06-29 PROCEDURE — 87070 CULTURE OTHR SPECIMN AEROBIC: CPT

## 2021-06-29 PROCEDURE — 87147 CULTURE TYPE IMMUNOLOGIC: CPT

## 2021-06-29 PROCEDURE — 11042 DBRDMT SUBQ TIS 1ST 20SQCM/<: CPT

## 2021-06-29 PROCEDURE — 87077 CULTURE AEROBIC IDENTIFY: CPT

## 2021-06-29 PROCEDURE — 87205 SMEAR GRAM STAIN: CPT

## 2021-06-29 PROCEDURE — 87075 CULTR BACTERIA EXCEPT BLOOD: CPT

## 2021-06-29 RX ORDER — DOXYCYCLINE HYCLATE 100 MG
100 TABLET ORAL 2 TIMES DAILY
Qty: 28 TABLET | Refills: 0 | Status: SHIPPED | OUTPATIENT
Start: 2021-06-29 | End: 2021-07-13

## 2021-06-29 RX ORDER — LIDOCAINE 40 MG/G
CREAM TOPICAL ONCE
Status: CANCELLED | OUTPATIENT
Start: 2021-06-29 | End: 2021-06-29

## 2021-06-29 RX ORDER — GENTAMICIN SULFATE 1 MG/G
OINTMENT TOPICAL ONCE
Status: CANCELLED | OUTPATIENT
Start: 2021-06-29 | End: 2021-06-29

## 2021-06-29 RX ORDER — SODIUM HYPOCHLORITE 1.25 MG/ML
SOLUTION TOPICAL DAILY
Qty: 1 BOTTLE | Refills: 2 | Status: SHIPPED | OUTPATIENT
Start: 2021-06-29 | End: 2022-07-12

## 2021-06-29 RX ORDER — BACITRACIN ZINC AND POLYMYXIN B SULFATE 500; 1000 [USP'U]/G; [USP'U]/G
OINTMENT TOPICAL ONCE
Status: CANCELLED | OUTPATIENT
Start: 2021-06-29 | End: 2021-06-29

## 2021-06-29 RX ORDER — BETAMETHASONE DIPROPIONATE 0.05 %
OINTMENT (GRAM) TOPICAL ONCE
Status: CANCELLED | OUTPATIENT
Start: 2021-06-29 | End: 2021-06-29

## 2021-06-29 RX ORDER — LIDOCAINE HYDROCHLORIDE 20 MG/ML
JELLY TOPICAL ONCE
Status: CANCELLED | OUTPATIENT
Start: 2021-06-29 | End: 2021-06-29

## 2021-06-29 RX ORDER — BACITRACIN, NEOMYCIN, POLYMYXIN B 400; 3.5; 5 [USP'U]/G; MG/G; [USP'U]/G
OINTMENT TOPICAL ONCE
Status: CANCELLED | OUTPATIENT
Start: 2021-06-29 | End: 2021-06-29

## 2021-06-29 RX ORDER — LIDOCAINE 50 MG/G
OINTMENT TOPICAL ONCE
Status: CANCELLED | OUTPATIENT
Start: 2021-06-29 | End: 2021-06-29

## 2021-06-29 RX ORDER — GINSENG 100 MG
CAPSULE ORAL ONCE
Status: CANCELLED | OUTPATIENT
Start: 2021-06-29 | End: 2021-06-29

## 2021-06-29 RX ORDER — LIDOCAINE HYDROCHLORIDE 40 MG/ML
SOLUTION TOPICAL ONCE
Status: CANCELLED | OUTPATIENT
Start: 2021-06-29 | End: 2021-06-29

## 2021-06-29 RX ORDER — CLOBETASOL PROPIONATE 0.5 MG/G
OINTMENT TOPICAL ONCE
Status: CANCELLED | OUTPATIENT
Start: 2021-06-29 | End: 2021-06-29

## 2021-06-29 NOTE — TELEPHONE ENCOUNTER
Spoke to [address] [place] [address] regarding home health referral. Unable to accept patient due to insurance coverage.  [facility] looking into if they can accept patient. Will return call in morning. Will await their return call.

## 2021-06-29 NOTE — PROGRESS NOTES
Teaching Note:    I was present with the resident during the visit. I discussed the case with the resident and agree with the findings and the plan as documented in the residents note.     JADE Albright DPNICO, [address] [facility] Residency [facility]

## 2021-06-29 NOTE — PLAN OF CARE
Problem: Wound:  Goal: Will show signs of wound healing; wound closure and no evidence of infection  Description: Will show signs of wound healing; wound closure and no evidence of infection  Outcome: Ongoing     Patient presents to wound clinic for right foot wound. See AVS for discharge instructions. Follow up visit:   1 Week on Tuesday July 6th at 3:00 pm     Care plan reviewed with patient. Patient verbalize understanding of the plan of care and contribute to goal setting.
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
## 2021-06-30 ENCOUNTER — TELEPHONE (OUTPATIENT)
Dept: WOUND CARE | Age: 51
End: 2021-06-30

## 2021-07-03 LAB
AEROBIC CULTURE: ABNORMAL
ANAEROBIC CULTURE: ABNORMAL
GRAM STAIN RESULT: ABNORMAL
ORGANISM: ABNORMAL

## 2021-07-06 ENCOUNTER — HOSPITAL ENCOUNTER (OUTPATIENT)
Dept: WOUND CARE | Age: 51
Discharge: HOME | End: 2021-07-06
Payer: MEDICAID

## 2021-07-06 VITALS
RESPIRATION RATE: 16 BRPM | TEMPERATURE: 98.3 F | HEART RATE: 97 BPM | DIASTOLIC BLOOD PRESSURE: 87 MMHG | OXYGEN SATURATION: 97 % | SYSTOLIC BLOOD PRESSURE: 145 MMHG

## 2021-07-06 DIAGNOSIS — E11.9 TYPE 2 DIABETES MELLITUS WITHOUT COMPLICATION, WITH LONG-TERM CURRENT USE OF INSULIN (HCC): ICD-10-CM

## 2021-07-06 DIAGNOSIS — Z79.4 TYPE 2 DIABETES MELLITUS WITHOUT COMPLICATION, WITH LONG-TERM CURRENT USE OF INSULIN (HCC): ICD-10-CM

## 2021-07-06 DIAGNOSIS — L97.512 FOOT ULCERATION, RIGHT, WITH FAT LAYER EXPOSED (HCC): Primary | ICD-10-CM

## 2021-07-06 PROCEDURE — 11042 DBRDMT SUBQ TIS 1ST 20SQCM/<: CPT

## 2021-07-06 PROCEDURE — 11045 DBRDMT SUBQ TISS EACH ADDL: CPT

## 2021-07-06 RX ORDER — BACITRACIN, NEOMYCIN, POLYMYXIN B 400; 3.5; 5 [USP'U]/G; MG/G; [USP'U]/G
OINTMENT TOPICAL ONCE
Status: CANCELLED | OUTPATIENT
Start: 2021-07-06 | End: 2021-07-06

## 2021-07-06 RX ORDER — CLOBETASOL PROPIONATE 0.5 MG/G
OINTMENT TOPICAL ONCE
Status: CANCELLED | OUTPATIENT
Start: 2021-07-06 | End: 2021-07-06

## 2021-07-06 RX ORDER — GINSENG 100 MG
CAPSULE ORAL ONCE
Status: CANCELLED | OUTPATIENT
Start: 2021-07-06 | End: 2021-07-06

## 2021-07-06 RX ORDER — GENTAMICIN SULFATE 1 MG/G
OINTMENT TOPICAL ONCE
Status: CANCELLED | OUTPATIENT
Start: 2021-07-06 | End: 2021-07-06

## 2021-07-06 RX ORDER — CIPROFLOXACIN 500 MG/1
500 TABLET, FILM COATED ORAL 2 TIMES DAILY
Qty: 14 TABLET | Refills: 0 | Status: SHIPPED | OUTPATIENT
Start: 2021-07-06 | End: 2021-07-13 | Stop reason: ALTCHOICE

## 2021-07-06 RX ORDER — BETAMETHASONE DIPROPIONATE 0.05 %
OINTMENT (GRAM) TOPICAL ONCE
Status: CANCELLED | OUTPATIENT
Start: 2021-07-06 | End: 2021-07-06

## 2021-07-06 RX ORDER — DOXYCYCLINE HYCLATE 100 MG
100 TABLET ORAL 2 TIMES DAILY
Qty: 14 TABLET | Refills: 0 | Status: SHIPPED | OUTPATIENT
Start: 2021-07-06 | End: 2021-07-13

## 2021-07-06 RX ORDER — LIDOCAINE HYDROCHLORIDE 20 MG/ML
JELLY TOPICAL ONCE
Status: CANCELLED | OUTPATIENT
Start: 2021-07-06 | End: 2021-07-06

## 2021-07-06 RX ORDER — LIDOCAINE 50 MG/G
OINTMENT TOPICAL ONCE
Status: CANCELLED | OUTPATIENT
Start: 2021-07-06 | End: 2021-07-06

## 2021-07-06 RX ORDER — LIDOCAINE 40 MG/G
CREAM TOPICAL ONCE
Status: CANCELLED | OUTPATIENT
Start: 2021-07-06 | End: 2021-07-06

## 2021-07-06 RX ORDER — LIDOCAINE HYDROCHLORIDE 40 MG/ML
SOLUTION TOPICAL ONCE
Status: CANCELLED | OUTPATIENT
Start: 2021-07-06 | End: 2021-07-06

## 2021-07-06 RX ORDER — BACITRACIN ZINC AND POLYMYXIN B SULFATE 500; 1000 [USP'U]/G; [USP'U]/G
OINTMENT TOPICAL ONCE
Status: CANCELLED | OUTPATIENT
Start: 2021-07-06 | End: 2021-07-06

## 2021-07-06 ASSESSMENT — PAIN DESCRIPTION - ORIENTATION: ORIENTATION: RIGHT

## 2021-07-06 ASSESSMENT — PAIN DESCRIPTION - PAIN TYPE: TYPE: ACUTE PAIN

## 2021-07-06 ASSESSMENT — PAIN SCALES - GENERAL: PAINLEVEL_OUTOF10: 2

## 2021-07-06 ASSESSMENT — PAIN DESCRIPTION - LOCATION: LOCATION: FOOT

## 2021-07-06 NOTE — PROGRESS NOTES
Teaching Note:    I was present with the resident during the visit. I discussed the case with the resident and agree with the findings and the plan as documented in the residents note.     Yady Smith DPM DPM, [address] [facility] Residency [facility]
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
## 2021-07-06 NOTE — PLAN OF CARE
Problem: Wound:  Goal: Will show signs of wound healing; wound closure and no evidence of infection  Description: Will show signs of wound healing; wound closure and no evidence of infection  Outcome: Ongoing   Patient presents to wound clinic for follow up of right foot wound. Prescription for Bactrim antibiotic sent into pharmacy per provider, patient to take this in addition to the Doxycycline. Debridement done today. Non-weight bearing to right foot. Wound vac ordered today- home health to apply once received. Discharge instructions/dressing orders faxed to [facility]. Right foot- Paint incision line with betadine. Apply dakins moistened gauze to wound bed. Cover with ABD. Wrap with kerlix then ace wrap. Change daily. Follow up visit:  1 Week on Tuesday July 13th at 1:00 pm.    Care plan reviewed with patient and . Patient and  verbalize understanding of the plan of care and contribute to goal setting.

## 2021-07-07 ENCOUNTER — TELEPHONE (OUTPATIENT)
Dept: WOUND CARE | Age: 51
End: 2021-07-07

## 2021-07-07 NOTE — TELEPHONE ENCOUNTER
[employer] called and said they [employer] is not in-network. Called [facility] and negative pressure wound therapy in not covered in [place]. Notified Dr. Vincent Barber and he wanted [employer] contacted to see if they could do a hayde case for coverage. Called Mita back from [facility] and asked regarding hayde case. She will reach out to her supervisor and call back. Notified patient.

## 2021-07-09 ENCOUNTER — TELEPHONE (OUTPATIENT)
Dept: WOUND CARE | Age: 51
End: 2021-07-09

## 2021-07-09 NOTE — TELEPHONE ENCOUNTER
----- Message from Lisset Jin sent at 7/9/2021  1:21 PM EDT -----  Regarding: Update  Rodrigo Jimenez from [facility] called and satted the patient reported he had fallen last night and now has discomfort in his leg.  [address] # for [facility] [phone]

## 2021-07-09 NOTE — TELEPHONE ENCOUNTER
Telephone call placed to [facility], and she stated patient had a fall. He felt weak and nauseated. He also reported to [facility] that his calf hurt. Updated [facility] that we have a scheduled visit with Dr. Shayla Daniel on Tuesday 7/13/21, also explained if the patient needs to be monitored and if continues to worsen he needs to go to urgent care. Dr Shayla Daniel is not able to address his weakness and Nausea.

## 2021-07-13 ENCOUNTER — HOSPITAL ENCOUNTER (OUTPATIENT)
Dept: WOUND CARE | Age: 51
Discharge: HOME | End: 2021-07-13
Payer: MEDICAID

## 2021-07-13 VITALS
SYSTOLIC BLOOD PRESSURE: 148 MMHG | TEMPERATURE: 97.2 F | HEART RATE: 83 BPM | OXYGEN SATURATION: 97 % | RESPIRATION RATE: 16 BRPM | DIASTOLIC BLOOD PRESSURE: 83 MMHG

## 2021-07-13 PROCEDURE — 11042 DBRDMT SUBQ TIS 1ST 20SQCM/<: CPT

## 2021-07-13 PROCEDURE — 11045 DBRDMT SUBQ TISS EACH ADDL: CPT

## 2021-07-13 PROCEDURE — 99213 OFFICE O/P EST LOW 20 MIN: CPT

## 2021-07-13 ASSESSMENT — PAIN SCALES - GENERAL: PAINLEVEL_OUTOF10: 3

## 2021-07-13 NOTE — PLAN OF CARE
Problem: Wound:  Goal: Will show signs of wound healing; wound closure and no evidence of infection  Description: Will show signs of wound healing; wound closure and no evidence of infection  Outcome: Ongoing  Note: Patient seen for right foot wound. Patient is current with homehealth. Paper work for cardinal wound vac faxed today. Home health to apply when patient receives wound vac. See AVS for discharge instructions. Follow up 2 weeks. Care plan reviewed with patient. Patient verbalize understanding of the plan of care and contribute to goal setting.

## 2021-07-13 NOTE — PROGRESS NOTES
Teaching Note:    I was present with the resident during the visit. I discussed the case with the resident and agree with the findings and the plan as documented in the residents note.     JADE Albright, [address] [place] [address] Surgery       [facility]
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
## 2021-07-19 ENCOUNTER — TELEPHONE (OUTPATIENT)
Dept: WOUND CARE | Age: 51
End: 2021-07-19

## 2021-07-19 NOTE — TELEPHONE ENCOUNTER
----- Message from Chapo Coburn sent at 7/19/2021  7:52 AM EDT -----  Regarding: Update  [facility] called and stated the cardinal wound vac came in, but only has three options for suction. She put it on 120 continuous and wanted to let us know.  [facility] # [phone]

## 2021-07-19 NOTE — TELEPHONE ENCOUNTER
Attempt made to contact Prachi Haro regarding wound vac setting. Okay to run wound vac at 120 instead of 125.

## 2021-07-27 ENCOUNTER — HOSPITAL ENCOUNTER (OUTPATIENT)
Dept: WOUND CARE | Age: 51
Discharge: HOME | End: 2021-07-27
Payer: MEDICAID

## 2021-07-27 VITALS
HEART RATE: 100 BPM | RESPIRATION RATE: 18 BRPM | DIASTOLIC BLOOD PRESSURE: 96 MMHG | SYSTOLIC BLOOD PRESSURE: 145 MMHG | TEMPERATURE: 96.6 F | OXYGEN SATURATION: 97 %

## 2021-07-27 DIAGNOSIS — L97.512 FOOT ULCERATION, RIGHT, WITH FAT LAYER EXPOSED (HCC): ICD-10-CM

## 2021-07-27 DIAGNOSIS — Z79.4 TYPE 2 DIABETES MELLITUS WITHOUT COMPLICATION, WITH LONG-TERM CURRENT USE OF INSULIN (HCC): Primary | ICD-10-CM

## 2021-07-27 DIAGNOSIS — E11.9 TYPE 2 DIABETES MELLITUS WITHOUT COMPLICATION, WITH LONG-TERM CURRENT USE OF INSULIN (HCC): Primary | ICD-10-CM

## 2021-07-27 PROCEDURE — 99213 OFFICE O/P EST LOW 20 MIN: CPT

## 2021-07-27 PROCEDURE — 97597 DBRDMT OPN WND 1ST 20 CM/<: CPT

## 2021-07-27 RX ORDER — GENTAMICIN SULFATE 1 MG/G
OINTMENT TOPICAL ONCE
Status: CANCELLED | OUTPATIENT
Start: 2021-07-27 | End: 2021-07-27

## 2021-07-27 RX ORDER — BETAMETHASONE DIPROPIONATE 0.05 %
OINTMENT (GRAM) TOPICAL ONCE
Status: CANCELLED | OUTPATIENT
Start: 2021-07-27 | End: 2021-07-27

## 2021-07-27 RX ORDER — LIDOCAINE 50 MG/G
OINTMENT TOPICAL ONCE
Status: CANCELLED | OUTPATIENT
Start: 2021-07-27 | End: 2021-07-27

## 2021-07-27 RX ORDER — BACITRACIN ZINC AND POLYMYXIN B SULFATE 500; 1000 [USP'U]/G; [USP'U]/G
OINTMENT TOPICAL ONCE
Status: CANCELLED | OUTPATIENT
Start: 2021-07-27 | End: 2021-07-27

## 2021-07-27 RX ORDER — LIDOCAINE HYDROCHLORIDE 20 MG/ML
JELLY TOPICAL ONCE
Status: CANCELLED | OUTPATIENT
Start: 2021-07-27 | End: 2021-07-27

## 2021-07-27 RX ORDER — BACITRACIN, NEOMYCIN, POLYMYXIN B 400; 3.5; 5 [USP'U]/G; MG/G; [USP'U]/G
OINTMENT TOPICAL ONCE
Status: CANCELLED | OUTPATIENT
Start: 2021-07-27 | End: 2021-07-27

## 2021-07-27 RX ORDER — LIDOCAINE 40 MG/G
CREAM TOPICAL ONCE
Status: CANCELLED | OUTPATIENT
Start: 2021-07-27 | End: 2021-07-27

## 2021-07-27 RX ORDER — CLOBETASOL PROPIONATE 0.5 MG/G
OINTMENT TOPICAL ONCE
Status: CANCELLED | OUTPATIENT
Start: 2021-07-27 | End: 2021-07-27

## 2021-07-27 RX ORDER — LIDOCAINE HYDROCHLORIDE 40 MG/ML
SOLUTION TOPICAL ONCE
Status: CANCELLED | OUTPATIENT
Start: 2021-07-27 | End: 2021-07-27

## 2021-07-27 RX ORDER — GINSENG 100 MG
CAPSULE ORAL ONCE
Status: CANCELLED | OUTPATIENT
Start: 2021-07-27 | End: 2021-07-27

## 2021-07-27 ASSESSMENT — PAIN DESCRIPTION - ORIENTATION: ORIENTATION: RIGHT

## 2021-07-27 ASSESSMENT — PAIN DESCRIPTION - PAIN TYPE: TYPE: ACUTE PAIN

## 2021-07-27 ASSESSMENT — PAIN DESCRIPTION - LOCATION: LOCATION: FOOT

## 2021-07-27 ASSESSMENT — PAIN SCALES - GENERAL: PAINLEVEL_OUTOF10: 3

## 2021-07-27 NOTE — PROGRESS NOTES
 TONSILLECTOMY         FAMILY HISTORY    Family History   Problem Relation Age of Onset    Diabetes Mother     Diabetes Father     Heart Disease Father     High Blood Pressure Father     High Cholesterol Father     Stroke Maternal Grandfather     Heart Disease Maternal Grandfather     Cancer Paternal Grandmother     Heart Disease Paternal Grandfather        SOCIAL HISTORY    Social History     Tobacco Use    Smoking status: Current Every Day Smoker     Packs/day: 1.00     Types: Cigarettes    Smokeless tobacco: Never Used   Vaping Use    Vaping Use: Never used   Substance Use Topics    Alcohol use: No     Alcohol/week: 30.0 standard drinks     Types: 30 Cans of beer per week     Comment: 1x Week; quit 7/8/2015    Drug use: No       ALLERGIES    Allergies   Allergen Reactions    Metformin And Related      Kidney failure    Cleocin [Clindamycin]     Ilosone [Erythromycin]     Pcn [Penicillins]     Sulfa Antibiotics        MEDICATIONS    Current Outpatient Medications on File Prior to Encounter   Medication Sig Dispense Refill    buPROPion (WELLBUTRIN XL) 300 MG extended release tablet Take 300 mg by mouth every morning      cetirizine (ZYRTEC) 10 MG tablet Take 10 mg by mouth daily      insulin aspart (NOVOLOG) 100 UNIT/ML injection cartridge Inject into the skin 3 times daily (before meals) Sliding scale      insulin glargine (LANTUS) 100 UNIT/ML injection vial Inject 50 Units into the skin daily      isosorbide mononitrate (IMDUR) 60 MG extended release tablet Take 60 mg by mouth daily      lamoTRIgine (LAMICTAL) 25 MG tablet Take 25 mg by mouth 2 times daily      naproxen (NAPROSYN) 500 MG tablet Take 500 mg by mouth 2 times daily (with meals)      lisinopril (PRINIVIL;ZESTRIL) 20 MG tablet Take 1 tablet by mouth daily (Patient taking differently: Take 30 mg by mouth daily ) 30 tablet 3    DULoxetine (CYMBALTA) 60 MG extended release capsule Take 60 mg by mouth daily      gabapentin (NEURONTIN) 600 MG tablet Take 800 mg by mouth 3 times daily.  tamsulosin (FLOMAX) 0.4 MG capsule Take 0.4 mg by mouth daily      pantoprazole (PROTONIX) 40 MG tablet Take 40 mg by mouth daily      atorvastatin (LIPITOR) 80 MG tablet Take 80 mg by mouth daily.  aspirin 81 MG tablet Take 81 mg by mouth daily.  sodium hypochlorite (DAKINS) 0.125 % SOLN external solution Apply topically daily 1 Bottle 2    chlorhexidine (HIBICLENS) 4 % external liquid Apply topically daily as needed Apply topically daily as needed.  hydrOXYzine (ATARAX) 25 MG tablet Take 25 mg by mouth 3 times daily as needed for Itching      albuterol (VENTOLIN HFA) 108 (90 BASE) MCG/ACT inhaler Inhale 2 puffs into the lungs every 4 hours as needed for Wheezing. No current facility-administered medications on file prior to encounter. REVIEW OF SYSTEMS    Pertinent items are noted in HPI. Objective:      BP (!) 145/96   Pulse 100   Temp 96.6 °F (35.9 °C) (Infrared)   Resp 18   SpO2 97%     Wt Readings from Last 3 Encounters:   05/11/21 (!) 360 lb (163.3 kg)   08/04/19 (!) 362 lb 6.4 oz (164.4 kg)   08/23/16 (!) 305 lb (138.3 kg)       PHYSICAL EXAM    General Appearance: alert and oriented to person, place and time, well-developed and well-nourished, in no acute distress    Vascular: Dorsalis pedis and posterior tibial pulses are palpable bilaterally. Skin temperature is warm to warm from proximal tibial tuberosity to distal digits. CFT brisk to exposed digits. Edema present to bilateral LE.      Dermatologic:  Presence of debrided right foot TMA dehiscence wound noted to the distal aspect of the right foot. Base is comprised of bright red granulation; scant serous drainage noted. Some epibole noted to the wound edges, but the edges appear viable. Extensive amount of maceration noted to the periwound area, mostly to the plantar flap. No erythema noted.  No purulent drainage, malodor, or probe to bone noted. Upon debridement there is healthy, bleeding tissue present. Some moderate nonpitting edema noted to the right foot.     Neurovascular: Light touch sensation grossly diminished at the feet bilaterally.      Musculoskeletal: Muscle strength deferred. Gastro-soleal equinus present bilaterally.  Transmetatarsal amputation to right.  Pain on palpation to fifth digit on the left. Wound 06/16/21 Foot Anterior;Right (Active)   Wound Image   07/27/21 1341   Wound Etiology Non-Healing Surgical 07/27/21 1341   Dressing Status Intact; Old drainage noted 07/27/21 1341   Wound Cleansed Cleansed with saline 07/27/21 1341   Dressing/Treatment Gauze dressing/dressing sponge;Moisten with saline;ABD;Roll gauze; Ace wrap 07/06/21 1507   Offloading for Diabetic Foot Ulcers Yes (type) 07/27/21 1341   Wound Length (cm) 1.8 cm 07/27/21 1341   Wound Width (cm) 3.8 cm 07/27/21 1341   Wound Depth (cm) 0.7 cm 07/27/21 1341   Wound Surface Area (cm^2) 6.84 cm^2 07/27/21 1341   Change in Wound Size % (l*w) 90.79 07/27/21 1341   Wound Volume (cm^3) 4.788 cm^3 07/27/21 1341   Wound Healing % 96 07/27/21 1341   Wound Assessment Slough;Granulation tissue 07/27/21 1341   Drainage Amount Large 07/27/21 1341   Drainage Description Yellow;Serosanguinous 07/27/21 1341   Odor Malodorous/putrid 07/27/21 1341   Julisa-wound Assessment Maceration 07/27/21 1341   Margins Attached edges 07/27/21 1341   Wound Thickness Description not for Pressure Injury Full thickness 07/27/21 1341   Number of days: 41       LABS      CBC:   Lab Results   Component Value Date    WBC 4.3 08/04/2019    HGB 12.1 08/04/2019    HCT 36.6 08/04/2019    MCV 93.6 08/04/2019     08/04/2019     BMP:   Lab Results   Component Value Date     08/04/2019    K 4.0 06/16/2021    K 4.5 08/04/2019     08/04/2019    CO2 28 08/04/2019    BUN 12 08/04/2019    CREATININE 1.2 08/04/2019     PT/INR:   Lab Results   Component Value Date    INR 1.04 04/21/2017 Prealbumin: No results found for: PREALBUMIN  Albumin:  Lab Results   Component Value Date    LABALBU 3.6 08/04/2019     Sed Rate:No results found for: Camillo Cogan  CRP: No results found for: CRP  Micro:   Lab Results   Component Value Date    BC No growth-preliminary  No growth   04/21/2017      Hemoglobin A1C: No results found for: LABA1C    Assessment:     Ulcer Identification:  Ulcer Type: diabetic and non-healing surgical  Contributing Factors: edema, diabetes, poor glucose control, chronic pressure, shear force, obesity and non-adherence    Wound: External surgical dehiscence    Depth of Diabetic/Pressure/Non Pressure Ulcers or Wound:  Wound, full thickness    Patient Active Problem List   Diagnosis Code    Hypertension I10    Hyperlipidemia E78.5    CAD (coronary artery disease) I25.10    Family history of premature coronary heart disease Z82.49    Dizziness R42    Acute renal failure (ARF) ([facility]) N17.9    Chronic low back pain M54.5, G89.29    PTSD (post-traumatic stress disorder) F43.10    Foot ulceration, right, with fat layer exposed ([facility] [address] L97.512    Type 2 diabetes mellitus without complication, with long-term current use of insulin ([facility]) E11.9, Z79.4       Procedure Note  Indications:  Based on my examination of this patient's wound(s)/ulcer(s) today, debridement is required to promote healing and evaluate the extent healing. Performed by: Aleksnadar Lindo DPM    Consent obtained: Yes    Time out taken:  Yes    Pain control: None needed      Debridement:Non-excisional Debridement    Using curette the wound(s)/ulcer(s) was/were sharply debrided down through and including the removal of epidermis, dermis, subcutaneous tissue and muscle/fascia.         Devitalized Tissue Debrided:  fibrin, biofilm, slough and callus    Pre Debridement Measurements:  Are located in the Wound/Ulcer Documentation Flow Sheet    Wound/Ulcer #: 1    Post Debridement Measurements:    Wound/Ulcer Descriptions are listed under Physical Exam above. Wound/Ulcer Descriptions are Pre Debridement except measurements    Percent of Wound/Ulcer Debrided: 100%    Total Surface Area Debrided:  6.84 sq cm     Bleeding:  None    Hemostasis Achieved:  not needed    Procedural Pain:  0  / 10     Post Procedural Pain:  0 / 10     Response to treatment:  Well tolerated by patient. Plan:     Patient examined and evaluated  Debrided wound today; see procedure note above  Discussed with the patient that he has excellent granulation tissue, but that he also has a very macerated periwound area; we will have him on a wound VAC vacation for one week to allow maceration to subside. Nursing will apply Betadine to the periwound area to dry it out and apply collagen to the wound every Monday, Wednesday, and Friday. Continue with NWB to RLE  Elevate the RLE above the level of the heart to minimize edema  Discussed with the patient to loosen the top strap of his CAM boot to prevent irritation to the anterior lower leg    Treatment: No orders of the defined types were placed in this encounter. Antibiotics: No    Follow up: 1 week    Please see attached Discharge Instructions    Written patient dismissal instructions given to patient and signed by patient or POA. Discharge Instructions       Visit Discharge/Physician Orders:  - No weight to right foot at all. Use knee scooter or wheelchair to help get around. - Wound vac through [employer]. Bring one canister and one dressing change kit to each appointment. - Keep right foot elevated above level of your heart throughout the day. - Hold wound vac for 1 week. - Wound debrided today.     Home Care: [facility]     Wound Location: Right foot      Dressing orders:      1) Gather wound care supplies and arrange on clean table.      2) Wash your hands with soap and water or use alcohol based hand  for 20 seconds (sing \"Happy Birthday\" twice).      3) Cleanse wounds with normal saline or wound cleanser and gauze. Pat dry with clean gauze.    4) Right foot- Apply betadine to angel-wound macerated skin. Apply collagen to wound bed and moisten with 1-2 drops of saline. Cover with dry gauze and ABD pad. Wrap with kerlix and ace bandage from base of toes to 1-2 inches below bend of knee. Home Health to change every other day.    -Wear offloading boot at all times, even at bedtime (okay to loosen straps at bedtime)     Keep all dressings clean & dry.     Do not shower, take baths or get wound wet, unless otherwise instructed by your Wound Care doctor.      Follow up visit:  1 Week on Tuesday August 3rd at 3:15 pm     Keep next scheduled appointment. Please give 24 hour notice if unable to keep appointment. [phone]     If you experience any of the following, please call the Wound Care Service during business hours: Monday through Friday 8:00 am - 4:30 pm  ([phone]). *Increase in pain              *Temperature over 101              *Increase in drainage from your wound or a foul odor              *Uncontrolled swelling              *Need for compression bandage changes due to slippage, breakthrough drainage     If you need medical attention outside of business hours, please contact your Primary Care Doctor or go to the nearest emergency room.          Electronically signed by Herbert James DPM on 7/27/2021 at 2:25 PM

## 2021-07-27 NOTE — PROGRESS NOTES
Teaching Note:    I was present with the resident during the visit. I discussed the case with the resident and agree with the findings and the plan as documented in the residents note.     JADE Albright, [address] [place] [address] Surgery       [facility]

## 2021-08-03 ENCOUNTER — HOSPITAL ENCOUNTER (OUTPATIENT)
Dept: WOUND CARE | Age: 51
Discharge: HOME | End: 2021-08-03
Payer: MEDICAID

## 2021-08-03 VITALS
OXYGEN SATURATION: 96 % | TEMPERATURE: 96.5 F | SYSTOLIC BLOOD PRESSURE: 143 MMHG | RESPIRATION RATE: 18 BRPM | HEART RATE: 110 BPM | DIASTOLIC BLOOD PRESSURE: 65 MMHG

## 2021-08-03 DIAGNOSIS — L97.512 FOOT ULCERATION, RIGHT, WITH FAT LAYER EXPOSED (HCC): ICD-10-CM

## 2021-08-03 DIAGNOSIS — E11.9 TYPE 2 DIABETES MELLITUS WITHOUT COMPLICATION, WITH LONG-TERM CURRENT USE OF INSULIN (HCC): ICD-10-CM

## 2021-08-03 DIAGNOSIS — Z79.4 TYPE 2 DIABETES MELLITUS WITHOUT COMPLICATION, WITH LONG-TERM CURRENT USE OF INSULIN (HCC): ICD-10-CM

## 2021-08-03 PROCEDURE — 97597 DBRDMT OPN WND 1ST 20 CM/<: CPT

## 2021-08-03 PROCEDURE — 6370000000 HC RX 637 (ALT 250 FOR IP): Performed by: PODIATRIST

## 2021-08-03 RX ORDER — LIDOCAINE HYDROCHLORIDE 20 MG/ML
JELLY TOPICAL ONCE
Status: COMPLETED | OUTPATIENT
Start: 2021-08-03 | End: 2021-08-03

## 2021-08-03 RX ADMIN — LIDOCAINE HYDROCHLORIDE: 20 JELLY TOPICAL at 14:19
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
## 2021-08-03 NOTE — PLAN OF CARE
Problem: Wound:  Goal: Will show signs of wound healing; wound closure and no evidence of infection  Description: Will show signs of wound healing; wound closure and no evidence of infection  Outcome: Ongoing   Pt. Seen today for right foot wound see AVS for new orders. Follow up in 4 weeks. Care plan reviewed with patient. Patient verbalize understanding of the plan of care and contribute to goal setting.

## 2021-08-03 NOTE — PROGRESS NOTES
Teaching Note:    I was present with the resident during the visit. I discussed the case with the resident and agree with the findings and the plan as documented in the residents note.     JADE Albright, [address] [place] [address] Surgery       [facility]

## 2021-08-31 ENCOUNTER — HOSPITAL ENCOUNTER (OUTPATIENT)
Dept: WOUND CARE | Age: 51
Discharge: HOME | End: 2021-08-31
Payer: MEDICAID

## 2021-08-31 VITALS
SYSTOLIC BLOOD PRESSURE: 116 MMHG | RESPIRATION RATE: 18 BRPM | TEMPERATURE: 96.3 F | HEART RATE: 81 BPM | DIASTOLIC BLOOD PRESSURE: 62 MMHG | OXYGEN SATURATION: 95 %

## 2021-08-31 DIAGNOSIS — E11.9 TYPE 2 DIABETES MELLITUS WITHOUT COMPLICATION, WITH LONG-TERM CURRENT USE OF INSULIN (HCC): ICD-10-CM

## 2021-08-31 DIAGNOSIS — Z79.4 TYPE 2 DIABETES MELLITUS WITHOUT COMPLICATION, WITH LONG-TERM CURRENT USE OF INSULIN (HCC): ICD-10-CM

## 2021-08-31 DIAGNOSIS — L97.512 FOOT ULCERATION, RIGHT, WITH FAT LAYER EXPOSED (HCC): Primary | ICD-10-CM

## 2021-08-31 PROCEDURE — 97597 DBRDMT OPN WND 1ST 20 CM/<: CPT

## 2021-08-31 RX ORDER — VILAZODONE HYDROCHLORIDE 40 MG/1
10 TABLET ORAL DAILY
COMMUNITY
End: 2021-09-14

## 2021-08-31 RX ORDER — DOXYCYCLINE HYCLATE 100 MG
100 TABLET ORAL 2 TIMES DAILY
COMMUNITY
End: 2021-11-12
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
## 2021-08-31 NOTE — PLAN OF CARE
Problem: Wound:  Goal: Will show signs of wound healing; wound closure and no evidence of infection  Description: Will show signs of wound healing; wound closure and no evidence of infection  Outcome: Ongoing   Pt. Seen today for right foot wound see AVS for new orders. Follow up in 2 weeks. Care plan reviewed with patient and friend. Patient and friend verbalize understanding of the plan of care and contribute to goal setting.

## 2021-08-31 NOTE — PROGRESS NOTES
Teaching Note:    I was present with the resident during the visit. I discussed the case with the resident and agree with the findings and the plan as documented in the residents note.     JADE Albright, [address] [place] [address] Surgery       [facility]

## 2021-09-01 ENCOUNTER — TELEPHONE (OUTPATIENT)
Dept: WOUND CARE | Age: 51
End: 2021-09-01

## 2021-09-01 NOTE — TELEPHONE ENCOUNTER
----- Message from Margurite Hodgkins sent at 9/1/2021  8:18 AM EDT -----  Regarding: [facility] Referral  Evita Alford from [facility] said they will need information as the patient was discharged from them while in the [facility] and he will be a new referral. [address] # [phone]

## 2021-09-14 ENCOUNTER — HOSPITAL ENCOUNTER (OUTPATIENT)
Dept: WOUND CARE | Age: 51
Discharge: HOME | End: 2021-09-14
Payer: MEDICAID

## 2021-09-14 VITALS
SYSTOLIC BLOOD PRESSURE: 151 MMHG | RESPIRATION RATE: 18 BRPM | OXYGEN SATURATION: 94 % | TEMPERATURE: 97.2 F | HEART RATE: 82 BPM | DIASTOLIC BLOOD PRESSURE: 77 MMHG

## 2021-09-14 DIAGNOSIS — E11.9 TYPE 2 DIABETES MELLITUS WITHOUT COMPLICATION, WITH LONG-TERM CURRENT USE OF INSULIN (HCC): ICD-10-CM

## 2021-09-14 DIAGNOSIS — Z79.4 TYPE 2 DIABETES MELLITUS WITHOUT COMPLICATION, WITH LONG-TERM CURRENT USE OF INSULIN (HCC): ICD-10-CM

## 2021-09-14 DIAGNOSIS — L97.512 FOOT ULCERATION, RIGHT, WITH FAT LAYER EXPOSED (HCC): ICD-10-CM

## 2021-09-14 PROCEDURE — 99213 OFFICE O/P EST LOW 20 MIN: CPT

## 2021-09-14 RX ORDER — VILAZODONE HYDROCHLORIDE 10 MG/1
40 TABLET ORAL DAILY
COMMUNITY
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
## 2021-09-14 NOTE — PLAN OF CARE
Problem: Wound:  Goal: Will show signs of wound healing; wound closure and no evidence of infection  Description: Will show signs of wound healing; wound closure and no evidence of infection  Outcome: Ongoing     Patient presents to wound clinic for foot wound. No s/s of infection noted. See avs for discharge instructions. Follow up visit: Discharge from wound clinic. Follow up in [address] in 3 weeks, Call for an appointment. Bring wound care supplies with you to your appointment there    Care plan reviewed with patient. Patient verbalize understanding of the plan of care and contribute to goal setting.

## 2021-09-14 NOTE — PROGRESS NOTES
Teaching Note:    I was present with the resident during the visit. I discussed the case with the resident and agree with the findings and the plan as documented in the residents note.     Merari Mckeon DPM, [address] [place] [address] Surgery       [facility]

## 2021-10-07 ENCOUNTER — OFFICE VISIT (OUTPATIENT)
Dept: ENT CLINIC | Age: 51
End: 2021-10-07
Payer: MEDICAID

## 2021-10-07 VITALS
WEIGHT: 315 LBS | HEART RATE: 60 BPM | TEMPERATURE: 98.4 F | RESPIRATION RATE: 14 BRPM | HEIGHT: 73 IN | BODY MASS INDEX: 41.75 KG/M2 | DIASTOLIC BLOOD PRESSURE: 64 MMHG | SYSTOLIC BLOOD PRESSURE: 130 MMHG

## 2021-10-07 DIAGNOSIS — H69.83 DYSFUNCTION OF BOTH EUSTACHIAN TUBES: ICD-10-CM

## 2021-10-07 DIAGNOSIS — H60.333 ACUTE SWIMMER'S EAR OF BOTH SIDES: Primary | ICD-10-CM

## 2021-10-07 DIAGNOSIS — H61.23 BILATERAL IMPACTED CERUMEN: ICD-10-CM

## 2021-10-07 DIAGNOSIS — H91.90 HEARING LOSS, UNSPECIFIED HEARING LOSS TYPE, UNSPECIFIED LATERALITY: ICD-10-CM

## 2021-10-07 PROCEDURE — G8484 FLU IMMUNIZE NO ADMIN: HCPCS | Performed by: OTOLARYNGOLOGY

## 2021-10-07 PROCEDURE — G8417 CALC BMI ABV UP PARAM F/U: HCPCS | Performed by: OTOLARYNGOLOGY

## 2021-10-07 PROCEDURE — 3017F COLORECTAL CA SCREEN DOC REV: CPT | Performed by: OTOLARYNGOLOGY

## 2021-10-07 PROCEDURE — 99203 OFFICE O/P NEW LOW 30 MIN: CPT | Performed by: OTOLARYNGOLOGY

## 2021-10-07 PROCEDURE — G8427 DOCREV CUR MEDS BY ELIG CLIN: HCPCS | Performed by: OTOLARYNGOLOGY

## 2021-10-07 PROCEDURE — 4004F PT TOBACCO SCREEN RCVD TLK: CPT | Performed by: OTOLARYNGOLOGY

## 2021-10-07 PROCEDURE — 69210 REMOVE IMPACTED EAR WAX UNI: CPT | Performed by: OTOLARYNGOLOGY

## 2021-10-07 PROCEDURE — 4130F TOPICAL PREP RX AOE: CPT | Performed by: OTOLARYNGOLOGY

## 2021-10-07 RX ORDER — TRIAMCINOLONE ACETONIDE 0.25 MG/G
OINTMENT TOPICAL
Qty: 15 G | Refills: 1 | Status: SHIPPED | OUTPATIENT
Start: 2021-10-07 | End: 2021-10-14
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
## 2021-10-14 ENCOUNTER — TELEPHONE (OUTPATIENT)
Dept: ENT CLINIC | Age: 51
End: 2021-10-14

## 2021-10-14 DIAGNOSIS — H69.83 DYSFUNCTION OF BOTH EUSTACHIAN TUBES: ICD-10-CM

## 2021-10-14 DIAGNOSIS — H91.90 HEARING LOSS, UNSPECIFIED HEARING LOSS TYPE, UNSPECIFIED LATERALITY: Primary | ICD-10-CM

## 2021-10-14 NOTE — TELEPHONE ENCOUNTER
I spoke with Dr. Hilton Priest. This is something that can take time to resolve and frequently resolves without treatment. We would like to get an audiogram in the next couple of weeks to further assess. Follow up afterward for results.  Order placed

## 2021-10-14 NOTE — TELEPHONE ENCOUNTER
Called patient and informed. He verbalized understanding. He would like to get that scheduled. Informed patient we will get the hearing test and follow up scheduled and give him a call. He stated he would like a Thursday afternoon if possible.

## 2021-10-14 NOTE — TELEPHONE ENCOUNTER
Patient called and stated that he is still having ear pain. He stated that the left ear pain is going down into the neck. Patient stated that it feels about the same, plugged and popping.    Asked patient if he was using the cream and he stated he has been using the cream.

## 2021-11-12 ENCOUNTER — OFFICE VISIT (OUTPATIENT)
Dept: BARIATRICS/WEIGHT MGMT | Age: 51
End: 2021-11-12
Payer: MEDICAID

## 2021-11-12 VITALS
SYSTOLIC BLOOD PRESSURE: 120 MMHG | HEIGHT: 74 IN | BODY MASS INDEX: 40.43 KG/M2 | HEART RATE: 80 BPM | TEMPERATURE: 97.9 F | WEIGHT: 315 LBS | DIASTOLIC BLOOD PRESSURE: 84 MMHG

## 2021-11-12 DIAGNOSIS — F43.10 PTSD (POST-TRAUMATIC STRESS DISORDER): ICD-10-CM

## 2021-11-12 DIAGNOSIS — F10.11 HISTORY OF ALCOHOL ABUSE: ICD-10-CM

## 2021-11-12 DIAGNOSIS — F41.9 ANXIETY: ICD-10-CM

## 2021-11-12 DIAGNOSIS — F32.A DEPRESSION, UNSPECIFIED DEPRESSION TYPE: ICD-10-CM

## 2021-11-12 DIAGNOSIS — I10 HYPERTENSION, UNSPECIFIED TYPE: ICD-10-CM

## 2021-11-12 DIAGNOSIS — E11.9 TYPE 2 DIABETES MELLITUS WITHOUT COMPLICATION, WITH LONG-TERM CURRENT USE OF INSULIN (HCC): ICD-10-CM

## 2021-11-12 DIAGNOSIS — J44.9 CHRONIC OBSTRUCTIVE PULMONARY DISEASE, UNSPECIFIED COPD TYPE (HCC): ICD-10-CM

## 2021-11-12 DIAGNOSIS — G47.30 SLEEP APNEA, UNSPECIFIED TYPE: ICD-10-CM

## 2021-11-12 DIAGNOSIS — J45.909 UNCOMPLICATED ASTHMA, UNSPECIFIED ASTHMA SEVERITY, UNSPECIFIED WHETHER PERSISTENT: ICD-10-CM

## 2021-11-12 DIAGNOSIS — M54.50 CHRONIC BILATERAL LOW BACK PAIN, UNSPECIFIED WHETHER SCIATICA PRESENT: ICD-10-CM

## 2021-11-12 DIAGNOSIS — I25.10 CORONARY ARTERY DISEASE INVOLVING NATIVE CORONARY ARTERY OF NATIVE HEART WITHOUT ANGINA PECTORIS: ICD-10-CM

## 2021-11-12 DIAGNOSIS — Z79.4 TYPE 2 DIABETES MELLITUS WITHOUT COMPLICATION, WITH LONG-TERM CURRENT USE OF INSULIN (HCC): ICD-10-CM

## 2021-11-12 DIAGNOSIS — E78.5 HYPERLIPIDEMIA, UNSPECIFIED HYPERLIPIDEMIA TYPE: ICD-10-CM

## 2021-11-12 DIAGNOSIS — G89.29 CHRONIC BILATERAL LOW BACK PAIN, UNSPECIFIED WHETHER SCIATICA PRESENT: ICD-10-CM

## 2021-11-12 DIAGNOSIS — E66.01 MORBID OBESITY (HCC): Primary | ICD-10-CM

## 2021-11-12 PROCEDURE — G8417 CALC BMI ABV UP PARAM F/U: HCPCS | Performed by: SURGERY

## 2021-11-12 PROCEDURE — G8926 SPIRO NO PERF OR DOC: HCPCS | Performed by: SURGERY

## 2021-11-12 PROCEDURE — 3046F HEMOGLOBIN A1C LEVEL >9.0%: CPT | Performed by: SURGERY

## 2021-11-12 PROCEDURE — 3017F COLORECTAL CA SCREEN DOC REV: CPT | Performed by: SURGERY

## 2021-11-12 PROCEDURE — 99203 OFFICE O/P NEW LOW 30 MIN: CPT | Performed by: SURGERY

## 2021-11-12 PROCEDURE — 3023F SPIROM DOC REV: CPT | Performed by: SURGERY

## 2021-11-12 PROCEDURE — 2022F DILAT RTA XM EVC RTNOPTHY: CPT | Performed by: SURGERY

## 2021-11-12 PROCEDURE — G8484 FLU IMMUNIZE NO ADMIN: HCPCS | Performed by: SURGERY

## 2021-11-12 PROCEDURE — 4004F PT TOBACCO SCREEN RCVD TLK: CPT | Performed by: SURGERY

## 2021-11-12 PROCEDURE — G8427 DOCREV CUR MEDS BY ELIG CLIN: HCPCS | Performed by: SURGERY

## 2021-11-12 RX ORDER — RAMELTEON 8 MG/1
8 TABLET ORAL NIGHTLY
COMMUNITY

## 2021-11-12 RX ORDER — UMECLIDINIUM BROMIDE AND VILANTEROL TRIFENATATE 62.5; 25 UG/1; UG/1
1 POWDER RESPIRATORY (INHALATION) DAILY
COMMUNITY
End: 2022-07-12

## 2021-11-12 RX ORDER — MULTIVITAMIN,THERAPEUTIC
1 TABLET ORAL DAILY
COMMUNITY

## 2021-11-13 ASSESSMENT — ENCOUNTER SYMPTOMS
CHEST TIGHTNESS: 1
RHINORRHEA: 0
FACIAL SWELLING: 0
SHORTNESS OF BREATH: 1
STRIDOR: 0
NAUSEA: 0
RECTAL PAIN: 0
ABDOMINAL PAIN: 1
APNEA: 1
EYE DISCHARGE: 0
BLOOD IN STOOL: 0
EYE REDNESS: 0
EYE ITCHING: 1
ABDOMINAL DISTENTION: 0
BACK PAIN: 1
COUGH: 1
EYE PAIN: 0
VOICE CHANGE: 0
DIARRHEA: 1
WHEEZING: 1
VOMITING: 0
SINUS PRESSURE: 0
TROUBLE SWALLOWING: 1
ANAL BLEEDING: 0
SORE THROAT: 0
PHOTOPHOBIA: 0
CONSTIPATION: 0
COLOR CHANGE: 0
ALLERGIC/IMMUNOLOGIC NEGATIVE: 1
CHOKING: 1
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
## 2021-12-21 NOTE — PROGRESS NOTES
Patient is a 45 y.o. male seen for   initial  MNT visit for  pre op bariatric surgery desires sleeve     BMI: Body mass index is 48.28 kg/m². Obesity Classification: Class III    Weight History: Wt Readings from Last 3 Encounters:   12/22/21 (!) 371 lb (168.3 kg)   11/12/21 (!) 360 lb (163.3 kg)   10/07/21 (!) 370 lb 9.6 oz (168.1 kg)     Pt is up 11 lbs from initial visit with Dr Edilia Fish last month  (+)Smoker- has started patches last couple days- states plans to quit after first year, Requires heart and pulmonary clearances. Has CPAP machine seen in July 2021 by Dr Maura Gan and compliant, hx of COPD. Dr Ignacio Daniel is heart doctor- states needs a heart cath but states needs foot infection healed first before MD will do this  Colonoscopy with Dr Primo Jordan January 26th, 2022  Pt going to [facility] [place] \"as needed\" and is in between psychiatrist thru [address] [place] [address] according to  Yocasta Farnsworth that was in office visit with patient  Involved with Pain Management at [facility] for neuropathy, and  back  Hx of DM and checks glucose once a day and average is ~ 110-130    Patient is taking a Multivitamin daily:  Pt taking MVI pill form once a day- Mens One A Day, Vitamin C 1,000 mg BID    Describe your current weight: increase pain cant move as much. How does your weight affect your daily activities? concern over medical problems, lack of energy . Patient's lowest adult weight was 225 lbs at age early 28's. The lowest weight was achieved through younger age, alcoholism, poor eating.   ~ 250 lbs six years ago and then quit drinking gained 100 lbs in three months    Patient's highest adult weight was 394 lbs at age 47. Patient was at his highest weight for 1 year then started drinking again June 2020 and lost 35 lbs. Patient's triggers/known causes to his highest weight are eating habits, addiction history. Patient has participated in the following weight loss programs: attempts on own only. Patient has not participated in meal replacement/liquid diets. Patient has not participated in weight loss medications. Patient is not lactose intolerant. Patient does not have Nondenominational/cultural food concerns. Patient does not have food allergies. Patient dines out to a sit down restaurant 0 times per month. Patient has fast food or picks up carry out 1-2 times per month. Grocery Shopping in household done by: self  Cooking in household done by: self  . No food stamps. Lives by self. Goes to [facility] five days a week 9a- 3:00pm  \"I don't have a set time\"- no sleep schedule- may wake up between 2am-7am  24 hour recall/food frequency chart:  Breakfast: yes. 8am- didn't eat breakfast this am- usually has  bowl of oatmeal with butter and raspberry preserves in water  Snack: no.   Lunch: yes. ~ 12:00pm- yesterday had pizza at center or makes foods at home- frozen meals  Snack: no.   Dinner: yes. 5-9p- Chicken thighs, roasted red potatoes or steak, hamburger, green beans. Does like to cook  Snack: yes. Increased snacking \"it is when I eat a lot\"- frozen pizza and bowl of cereal- states started this two weeks ago    Drinks throughout the day: water- one gallon or greater per day with sugar free flavor packets,  One mountain dew can per day, has a soda stream at home. No coffee or tea    Do you drink alcohol? Hx of Alcohol Abuse- alcoholic since age 22 - now three months sober- two years prior was 5 years sober. Patient does not meet the criteria for binge eating disorder. Patient does not have grazing. Patient does not have night eating. Patient does have a history of emotional eating or eating out of boredom- pt thinks may eat out of boredom    It does take an unusually large amount of food for the patient to feel comfortably full.   Patient describes self as average eater      Patient describes level of activity as sedentary- supposed to be in wheel chair d/t foot infection but is walking in office. Provided pt with chair exercise booklet      Assessment  Nutritional Needs: Ramo Echols = 2556 kcal x 1.2 (activity factor)= 3067 kcal - 500-1000 calories/day  (for 1-2 lb weight loss/week)= 9281-2379 calories per day  Food recall reveals increased processed foods later in evening. Anticipate increase oral intake as now sober. Pt shows openness to working on nutrition changes but also recognizes challenges with smoking and addiction hx    PES Statement:  Overweight/Obesity related to lack of exercise, sedentary lifestyle, unhealthy eating habits, and unsuccessful diet attempts as evidenced by  Body mass index is 48.28 kg/m². .    Surgery  Surgical procedure desired: gastric sleeve  Patient's greatest concern about having surgery is: No concerns. Patient describes the motivation for weight loss surgery to be: \"all health stuff\"  , less pain, reduce DM. The expectations following the surgery were reviewed in detail with patient today and patient made aware will require to eliminate carbonated beverages, aim for regular meal times, monitor portion sizes, and balanced meals. .   he does feel he can deal with the dietary restrictions. Patient states he does understand the consequences of not complying with post-op food guidelines. Patient states he understands the long term changes in food intake that will be necessary for all occasions after surgery for the rest of her life. Patient is deemed nutritionally appropriate to proceed if able to show progress towards nutrition behavior changes discussed today. Other concerns including smoking and hx of alcohol abuse as may determine if patient candidate for bariatric surgery. Further evaluation with Dr Stanly Scheuermann pending yet. Plan  Patient will begin working on recommendations from Pre-Weight Loss Surgery Behavior Change Goal Sheet that was reviewed today to assist with weight loss and establish a  long term healthy eating pattern.   Individual goals set with patient to be reviewed at monthly office visits. Weight loss goal of 3-5% from initial weight at first visit with Dr Sarah Mir reviewed with patient to achieve over 6 month period per insurance requirements. Initial weight was: 360 lbs   3-5% Weight Loss goal will be minimum of: 11-18 lbs weight loss. Plan/Recommendations:    Educational handouts provided and reviewed with patient as follows: Online Support Groups, My Plate Picture Method, Portion Size Guide, Food Journal    -  Patient Instructions   Goals:  1. I will get the lab work done that is mailed to my mailing address before next office visit. You will need to fast 10-12 hours for this (no food or drink besides water). 2  I will aim for at least 64 oz of water each day (= 8 cups per day or four bottled morrissey)  3. I will use my food journal to record meal times, serving sizes and bring back to next dietitian visit. 4   I will increase my physical activity by starting chair exercises 15-20 minutes every day- see booklet given to you today    Initial weight loss goal of 3-5% is recommended over 6 month period. This is a minimum of: 11-18 lbs from your weight when initially met with physician of: 360 lbs. -Followup visit: 4 weeks with dietitian.        Sherry Young, NISHA, LD   Dietitian- Weight Management [address] [place] [address]

## 2021-12-22 ENCOUNTER — OFFICE VISIT (OUTPATIENT)
Dept: BARIATRICS/WEIGHT MGMT | Age: 51
End: 2021-12-22

## 2021-12-22 VITALS — BODY MASS INDEX: 40.43 KG/M2 | HEIGHT: 74 IN | WEIGHT: 315 LBS

## 2021-12-22 DIAGNOSIS — E66.01 MORBID OBESITY WITH BMI OF 45.0-49.9, ADULT (HCC): Primary | ICD-10-CM

## 2021-12-22 NOTE — PATIENT INSTRUCTIONS
Goals: 1. I will get the lab work done that is mailed to my mailing address before next office visit. You will need to fast 10-12 hours for this (no food or drink besides water). 2  I will aim for at least 64 oz of water each day (= 8 cups per day or four bottled morrissey)  3. I will use my food journal to record meal times, serving sizes and bring back to next dietitian visit. 4   I will increase my physical activity by starting chair exercises 15-20 minutes every day- see booklet given to you today    Initial weight loss goal of 3-5% is recommended over 6 month period. This is a minimum of: 11-18 lbs from your weight when initially met with physician of: 360 lbs.

## 2021-12-23 DIAGNOSIS — Z01.818 PRE-OP TESTING: ICD-10-CM

## 2021-12-23 DIAGNOSIS — E11.9 TYPE 2 DIABETES MELLITUS WITHOUT COMPLICATION, WITH LONG-TERM CURRENT USE OF INSULIN (HCC): ICD-10-CM

## 2021-12-23 DIAGNOSIS — E78.5 HYPERLIPIDEMIA, UNSPECIFIED HYPERLIPIDEMIA TYPE: ICD-10-CM

## 2021-12-23 DIAGNOSIS — Z13.21 MALNUTRITION SCREEN: ICD-10-CM

## 2021-12-23 DIAGNOSIS — E66.01 MORBID OBESITY WITH BMI OF 45.0-49.9, ADULT (HCC): Primary | ICD-10-CM

## 2021-12-23 DIAGNOSIS — Z79.4 TYPE 2 DIABETES MELLITUS WITHOUT COMPLICATION, WITH LONG-TERM CURRENT USE OF INSULIN (HCC): ICD-10-CM

## 2021-12-23 DIAGNOSIS — I10 HYPERTENSION, UNSPECIFIED TYPE: ICD-10-CM

## 2022-01-07 LAB
ALBUMIN SERPL-MCNC: 4.1 G/DL (ref 3.5–5)
ALBUMIN/GLOBULIN RATIO: 1.6 (ref 1.2–1.5)
ALK PHOSPHATASE: 118 U/L (ref 38–126)
ALT SERPL-CCNC: 27 U/L (ref 10–40)
AST SERPL-CCNC: 20 U/L (ref 10–42)
BASOPHILS # BLD: 0.8 % (ref 0–3)
BILIRUB SERPL-MCNC: 0.9 MG/DL (ref 0.3–1.2)
BUN BLDV-MCNC: 14 MG/DL (ref 7–22)
CALCIUM SERPL-MCNC: 9.5 MG/DL (ref 8.4–10.2)
CHLORIDE BLD-SCNC: 100 MEQ/L (ref 98–107)
CHOLESTEROL: 162 MG/DL (ref 0–200)
CO2: 27 MEQ/L (ref 22–31)
CREAT SERPL-MCNC: 0.9 MG/DL (ref 0.4–1.1)
EOSINOPHIL # BLD: 2.6 % (ref 0–4)
GFR SERPL CREATININE-BSD FRML MDRD: >=60 ML/MIN/{1.73_M2}
GLOBULIN: 2.6 G/DL (ref 2.9–3.3)
GLUCOSE: 107 MG/DL (ref 70–126)
HCT VFR BLD CALC: 43.6 % (ref 42–52)
HDLC SERPL-MCNC: 57 MG/DL (ref 40–60)
HEMOGLOBIN: 15 G/DL (ref 14–18)
INR BLD: 1 (ref 0.8–1.2)
IRON SATURATION: 26 % (ref 20–55)
IRON: 90 UG/DL (ref 65–175)
LDL CHOLESTEROL: 89 MG/DL (ref 0–100)
LYMPHOCYTES # BLD: 17.5 % (ref 17.6–49.6)
MCH RBC QN AUTO: 30.1 PG (ref 28–32)
MCHC RBC AUTO-ENTMCNC: 34.3 G/DL (ref 33–37)
MCV RBC AUTO: 87.6 FL (ref 80–94)
MONOCYTES # BLD: 6.6 % (ref 4.1–12.4)
MORPHOLOGY: NORMAL
NON-HDL CHOLESTEROL: 105 MG/DL (ref 0–130)
PARTIAL THROMBOPLASTIN TIME: 31.1 SEC (ref 21.1–34.3)
PDW BLD-RTO: 14 % (ref 11.5–14.5)
PLATELET # BLD: 210 THOU/CUMM (ref 130–400)
PLATELET ESTIMATE: ADEQUATE
POTASSIUM SERPL-SCNC: 4.4 MEQ/L (ref 3.5–5.1)
PT: 12.2 SEC (ref 9.8–13.6)
RBC: 4.97 MIL/CUMM (ref 4.7–6.1)
SCAN OF BLOOD SMEAR: YES
SEG NEUTROPHILS: 72.5 % (ref 39.4–72.5)
SODIUM BLD-SCNC: 138 MEQ/L (ref 136–145)
TOTAL IRON BINDING CAPACITY: 349 UG/DL (ref 250–450)
TOTAL PROTEIN: 6.7 G/DL (ref 6.4–8.3)
TRIGL SERPL-MCNC: 81 MG/DL (ref 40–150)
TSH SERPL DL<=0.05 MIU/L-ACNC: 1.35 UIU/ML (ref 0.49–4.67)
WBC: 8.5 THOU/CUMM (ref 4.8–10.8)

## 2022-01-09 LAB
AVERAGE GLUCOSE: 123 MG/DL (ref 70–126)
FERRITIN: 82 NG/ML (ref 22–322)
HBA1C MFR BLD: 6.1 % (ref 4.4–6.4)
PREALBUMIN: 20.6 MG/DL (ref 20–40)
VITAMIN B-12: 351 PG/ML (ref 211–911)
VITAMIN D 25-HYDROXY: 26 NG/ML (ref 30–100)

## 2022-01-10 LAB — PARATHYROID HORMONE INTACT: 49.2 PG/ML (ref 15–65)

## 2022-01-16 LAB
3-OH-COTININE: 88 NG/ML
COTININE: 288 NG/ML
Lab: <0.02 MG/L (ref 0–0.1)
NICOTINE AND METABOLITES: 5 NG/ML
PROSTATE SPECIFIC ANTIGEN FREE: NORMAL NG/ML
PROSTATE SPECIFIC ANTIGEN PERCENT FREE: NORMAL %
PROSTATE SPECIFIC ANTIGEN: 0.7 NG/ML (ref 0–4)
VITAMIN A LEVEL: 0.53 MG/L (ref 0.3–1.2)
VITAMIN A, INTERP: NORMAL

## 2022-01-18 NOTE — PROGRESS NOTES
Assessment: Patient is a 45 y.o. male seen for   month one  follow up MNT visit for  pre op bariatric surgery desires sleeve    Vitals from current and previous visits:  Vitals 6/61/3655   SYSTOLIC 384   DIASTOLIC 60   Site Right Upper Arm   Position Sitting   Cuff Size Large Adult   Pulse 88   Temp 97.9   Resp    SpO2    Weight 369 lb   Height 6' 1.5\"   Body mass index 48.02 kg/m2   Pain Level        Initial weight at start of Weight Management Program was: 360 lbs     Victor Manuel Stewart lost 2 lbs from RD visit  -Weight goal: lose weight.     -Nutritionally relevant labs: initial labs- HA1C-6.1%, Ferritin-82, ,iron-90. Folate level? Urine Drug Screen? Lab Results   Component Value Date/Time    LABA1C 6.1 01/07/2022 09:51 AM    GLUCOSE 107 01/07/2022 09:51 AM    GLUCOSE 155 (H) 08/04/2019 05:46 AM    GLUCOSE 236 (H) 08/03/2019 03:38 AM    CHOL 162 01/07/2022 09:51 AM    CHOL 176 09/24/2014 04:46 AM    HDL 57 01/07/2022 09:51 AM    LDLCALC 83 09/24/2014 04:46 AM    TRIG 81 01/07/2022 09:51 AM                  Lab Results   Component Value Date    VITD25 26 01/07/2022     - Is patient taking daily Multivitamin:   Pt taking MVI pill form once a day- Mens One A Day, Vitamin C 1,000 mg BID  5,000IUs D3 for low Vitamin D level- pt taking this     Lives by self. Goes to [facility] five days a week 9a- 3:00pm  Reports meal times still difficult. no sleep schedule- may wake up between 2am-7am    -Food Recall: Pt had food journal in office for review today  Breakfast: 8am- oatmeal with strawberry perserves.   Lunch: chicken sandwich or chicken thighs, mashed potatoes, green beans or three slices pizza    Dinner: corn chowder soup or chicken and rice or beef and bean burritos or salmon burger times two with mashed potatoes  Snacks: two butterscotch cookies in evening or no snacks  Main Beverages: water- one gallon or greater per day with sugar free flavor packets,  Stopped the soda stream.  One regular mountain dew can per day or two cans ,No coffee or tea   Last visit: Hx of Alcohol Abuse- alcoholic since age 22 - now three months sober- two years prior was 5 years sober. Today patient reports relapse with alcohol and has drank 15 beers at one time since last in office.    -Impression of Dietary Intake: increased carbohydrate foods at times, limiting snacks. - Pt  is working towards avoiding high fat/high sugar foods. Pt  is working towards  including protein at meals and snacks. Exercise:    -Physical Activity is: Today patient in wheel chair as has ongoing foot infection. States should be finding out about having foot surgery in the next week. May end up in a SNF after surgery. Pt was able to start some chair exercises that was reviewed last visit. Encouraged pt to continue these 10 minutes twice a day      Nutrition Diagnosis: Not ready for diet/lifestyle change related to competing values for behavior change as evidenced by relapse with alcohol since seen by RD last visit. Intervention:  Reviewed with patient and  in room today that primary focus of patient's  health at this time is to abstain from alcohol and quit smoking to be considered a bariatric surgery candidate. Pt must demonstrate alcohol free for 12 months prior to rejoining program.  Must have documentation from program/cousnelor of completing program.    Did review importance of three regular meal times, and including protein foods with meals. Pt able to join any future support groups in Weight Management if desires and info on February and March Virtual support group given to patient. Questions answered. -  Patient Instructions   Goals:  1. Nutrition Goal:  Continue to aim for regular meal times- choose lean protein foods first at meals, followed by vegetables and fruit, then starch food last if still hungry   2. Water Goal:  Great job with water intake at least 64 oz or greater.     3. Exercise Goal:  Continue chair exercises 10 minutes twice a day  Can join [employer] as you want to.        Vaughn Molina, NISHA, LD,   Dietitian- Weight Management [address]

## 2022-01-19 ENCOUNTER — OFFICE VISIT (OUTPATIENT)
Dept: BARIATRICS/WEIGHT MGMT | Age: 52
End: 2022-01-19
Payer: MEDICAID

## 2022-01-19 ENCOUNTER — OFFICE VISIT (OUTPATIENT)
Dept: BARIATRICS/WEIGHT MGMT | Age: 52
End: 2022-01-19

## 2022-01-19 VITALS
TEMPERATURE: 97.9 F | WEIGHT: 315 LBS | SYSTOLIC BLOOD PRESSURE: 122 MMHG | BODY MASS INDEX: 40.43 KG/M2 | HEART RATE: 88 BPM | DIASTOLIC BLOOD PRESSURE: 60 MMHG | HEIGHT: 74 IN

## 2022-01-19 DIAGNOSIS — I25.10 CORONARY ARTERY DISEASE INVOLVING NATIVE CORONARY ARTERY OF NATIVE HEART WITHOUT ANGINA PECTORIS: ICD-10-CM

## 2022-01-19 DIAGNOSIS — I10 HYPERTENSION, UNSPECIFIED TYPE: ICD-10-CM

## 2022-01-19 DIAGNOSIS — Z79.4 TYPE 2 DIABETES MELLITUS WITHOUT COMPLICATION, WITH LONG-TERM CURRENT USE OF INSULIN (HCC): ICD-10-CM

## 2022-01-19 DIAGNOSIS — E66.01 MORBID OBESITY WITH BMI OF 45.0-49.9, ADULT (HCC): Primary | ICD-10-CM

## 2022-01-19 DIAGNOSIS — E11.9 TYPE 2 DIABETES MELLITUS WITHOUT COMPLICATION, WITH LONG-TERM CURRENT USE OF INSULIN (HCC): ICD-10-CM

## 2022-01-19 DIAGNOSIS — M54.50 CHRONIC BILATERAL LOW BACK PAIN, UNSPECIFIED WHETHER SCIATICA PRESENT: ICD-10-CM

## 2022-01-19 DIAGNOSIS — E78.5 HYPERLIPIDEMIA, UNSPECIFIED HYPERLIPIDEMIA TYPE: ICD-10-CM

## 2022-01-19 DIAGNOSIS — F41.9 ANXIETY: ICD-10-CM

## 2022-01-19 DIAGNOSIS — G89.29 CHRONIC BILATERAL LOW BACK PAIN, UNSPECIFIED WHETHER SCIATICA PRESENT: ICD-10-CM

## 2022-01-19 DIAGNOSIS — F10.11 HISTORY OF ALCOHOL ABUSE: ICD-10-CM

## 2022-01-19 DIAGNOSIS — F43.10 PTSD (POST-TRAUMATIC STRESS DISORDER): ICD-10-CM

## 2022-01-19 DIAGNOSIS — G47.30 SLEEP APNEA, UNSPECIFIED TYPE: ICD-10-CM

## 2022-01-19 DIAGNOSIS — F32.A DEPRESSION, UNSPECIFIED DEPRESSION TYPE: ICD-10-CM

## 2022-01-19 DIAGNOSIS — F17.200 SMOKER: ICD-10-CM

## 2022-01-19 DIAGNOSIS — J44.9 CHRONIC OBSTRUCTIVE PULMONARY DISEASE, UNSPECIFIED COPD TYPE (HCC): ICD-10-CM

## 2022-01-19 DIAGNOSIS — J45.909 UNCOMPLICATED ASTHMA, UNSPECIFIED ASTHMA SEVERITY, UNSPECIFIED WHETHER PERSISTENT: ICD-10-CM

## 2022-01-19 PROCEDURE — 99214 OFFICE O/P EST MOD 30 MIN: CPT | Performed by: PHYSICIAN ASSISTANT

## 2022-01-19 PROCEDURE — G8484 FLU IMMUNIZE NO ADMIN: HCPCS | Performed by: PHYSICIAN ASSISTANT

## 2022-01-19 PROCEDURE — 3044F HG A1C LEVEL LT 7.0%: CPT | Performed by: PHYSICIAN ASSISTANT

## 2022-01-19 PROCEDURE — 3023F SPIROM DOC REV: CPT | Performed by: PHYSICIAN ASSISTANT

## 2022-01-19 PROCEDURE — G8417 CALC BMI ABV UP PARAM F/U: HCPCS | Performed by: PHYSICIAN ASSISTANT

## 2022-01-19 PROCEDURE — 2022F DILAT RTA XM EVC RTNOPTHY: CPT | Performed by: PHYSICIAN ASSISTANT

## 2022-01-19 PROCEDURE — 4004F PT TOBACCO SCREEN RCVD TLK: CPT | Performed by: PHYSICIAN ASSISTANT

## 2022-01-19 PROCEDURE — G8427 DOCREV CUR MEDS BY ELIG CLIN: HCPCS | Performed by: PHYSICIAN ASSISTANT

## 2022-01-19 PROCEDURE — 3017F COLORECTAL CA SCREEN DOC REV: CPT | Performed by: PHYSICIAN ASSISTANT

## 2022-01-19 RX ORDER — FUROSEMIDE 20 MG/1
TABLET ORAL
COMMUNITY
Start: 2021-12-28

## 2022-01-19 RX ORDER — POTASSIUM CHLORIDE 1500 MG/1
TABLET, FILM COATED, EXTENDED RELEASE ORAL
COMMUNITY
Start: 2021-12-28

## 2022-01-19 RX ORDER — MULTIVIT WITH MINERALS/LUTEIN
1000 TABLET ORAL 2 TIMES DAILY
COMMUNITY
End: 2022-07-12

## 2022-01-19 RX ORDER — PREGABALIN 150 MG/1
CAPSULE ORAL
COMMUNITY
Start: 2022-01-06

## 2022-01-19 RX ORDER — NICOTINE 21 MG/24HR
PATCH, TRANSDERMAL 24 HOURS TRANSDERMAL
COMMUNITY
Start: 2021-10-14 | End: 2022-07-12

## 2022-01-19 RX ORDER — NICOTINE POLACRILEX 2 MG/1
LOZENGE ORAL
COMMUNITY
Start: 2021-11-21 | End: 2022-07-12

## 2022-01-19 RX ORDER — DICYCLOMINE HYDROCHLORIDE 10 MG/1
CAPSULE ORAL
COMMUNITY
Start: 2021-11-30
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
## 2022-01-19 NOTE — PATIENT INSTRUCTIONS
Goals: 1. Nutrition Goal:  Continue to aim for regular meal times- choose lean protein foods first at meals, followed by vegetables and fruit, then starch food last if still hungry   2. Water Goal:  Great job with water intake at least 64 oz or greater. 3. Exercise Goal:  Continue chair exercises 10 minutes twice a day  Can join [employer] as you want to.

## 2022-01-19 NOTE — PATIENT INSTRUCTIONS
· Behavior modification discussed in detail in regards to dietary habits. · Nutritional education occurred during visit. Continue following recommendations  per dietitian. · Improvement in fitness/exercise discussed with patient and the need for this  with/without surgery. · [address]- 2/8/22- to evaluate for lymphedema. · Cardiac Clearance needed prior to surgery- Mariya 1/24/22  · Pulmonary Clearance needed prior to surgery- 2/24/22 with Dr. Andrade Quinones  · Psychology evaluation with Dr. Nati Marina  · Physical Therapy referral  · EGD prior to any surgical intervention- Dr. Kathy Jacobs- will send referral  · Colonoscopy scheduled for 1/26/22- Dr. Kathy Jacobs  · Encouraged to attend support groups. · Goal to lose 3-5% TBW prior to surgery. · Seca scale next month  · Initial labs completed and reviewed   · Vit D 26- to start 5,000IU Vit D3 daily  · A1C 6.1- must remain < 8 prior to surgery  · Drug screen to be completed asap  · Nicotine pending. Discussed risks of nicotine a/w bariatric surgery. Must be nicotine free at least 90 days prior to surgery. Avoid nicotine life long post-op. · [facility] completed at [facility]- will call for result. · Will need to be off work for 3-4 weeks post-bariatric surgery. · No lifting/pushing/pulling over 20# for 4 weeks post-op  · No NSAIDS 10 days prior to bariatric surgery. Avoid NSAID use post-op  · Discussed Lovenox post-op bariatric surgery  · Awaiting LOMN- patient to call PCP  · Discuss getting back on medication for alcoholism  · Will continue following with multi-disciplinary team in preparation for bariatric surgery with the expectation of lifelong follow-up post-operatively.

## 2022-07-06 ENCOUNTER — TELEPHONE (OUTPATIENT)
Dept: WOUND CARE | Age: 52
End: 2022-07-06

## 2022-07-06 NOTE — TELEPHONE ENCOUNTER
----- Message from Cathie Kent sent at 7/6/2022  1:05 PM EDT -----  [phone] PADMINI @ DR MARGO FAROO[facility] OFFICE LEFT VOICE MAIL THAT SIMI WILL NEED TO BE PUT ON FOR POST SURGERY WOUND. SAYS SHE WILL FAX OVER THE NOTE WHEN DR CHAO IS DONE WITH IT. PT CAN BE CALLED TO SCHEDULE [phone]

## 2022-07-12 ENCOUNTER — HOSPITAL ENCOUNTER (OUTPATIENT)
Dept: WOUND CARE | Age: 52
Discharge: HOME | End: 2022-07-12
Payer: MEDICARE

## 2022-07-12 VITALS
SYSTOLIC BLOOD PRESSURE: 132 MMHG | DIASTOLIC BLOOD PRESSURE: 72 MMHG | HEART RATE: 84 BPM | OXYGEN SATURATION: 95 % | TEMPERATURE: 98.2 F

## 2022-07-12 DIAGNOSIS — E11.9 TYPE 2 DIABETES MELLITUS WITHOUT COMPLICATION, WITH LONG-TERM CURRENT USE OF INSULIN (HCC): Primary | ICD-10-CM

## 2022-07-12 DIAGNOSIS — L97.512 FOOT ULCERATION, RIGHT, WITH FAT LAYER EXPOSED (HCC): ICD-10-CM

## 2022-07-12 DIAGNOSIS — Z79.4 TYPE 2 DIABETES MELLITUS WITHOUT COMPLICATION, WITH LONG-TERM CURRENT USE OF INSULIN (HCC): Primary | ICD-10-CM

## 2022-07-12 PROCEDURE — 97597 DBRDMT OPN WND 1ST 20 CM/<: CPT

## 2022-07-12 RX ORDER — DOXYCYCLINE HYCLATE 100 MG/1
100 CAPSULE ORAL 2 TIMES DAILY
Status: ON HOLD | COMMUNITY
End: 2022-07-26 | Stop reason: HOSPADM

## 2022-07-12 RX ORDER — CEPHALEXIN 250 MG/1
250 CAPSULE ORAL DAILY
Status: ON HOLD | COMMUNITY
End: 2022-07-26 | Stop reason: HOSPADM

## 2022-07-12 RX ORDER — BUPROPION HYDROCHLORIDE 200 MG/1
200 TABLET, EXTENDED RELEASE ORAL 2 TIMES DAILY
COMMUNITY

## 2022-07-12 RX ORDER — AMITRIPTYLINE HYDROCHLORIDE 25 MG/1
25 TABLET, FILM COATED ORAL NIGHTLY
COMMUNITY

## 2022-07-12 RX ORDER — MULTIVIT WITH MINERALS/LUTEIN
1000 TABLET ORAL 2 TIMES DAILY
COMMUNITY

## 2022-07-12 NOTE — PLAN OF CARE
Problem: Wound:  Goal: Will show signs of wound healing; wound closure and no evidence of infection  Description: Will show signs of wound healing; wound closure and no evidence of infection  Outcome: Progressing   Patient presents to wound clinic for follow up of right foot wound. Wound debrided today. Instructed patient to stay off of right foot and continue surgical boot. Patient to complete antibiotics as prescribed. Dressing supplies ordered through [facility]. Referral to [facility] for dressing changes. Right foot- Apply Afsaneh collagen to wound and moisten with saline. Pack with saline moist gauze. Cover with ABD pad. Wrap with roll gauze and ace bandage. Change every other day. Follow up visit: Schedule appointment at [facility] office next week. Care plan reviewed with patient. Patient verbalize understanding of the plan of care and contribute to goal setting.

## 2022-07-12 NOTE — PROGRESS NOTES
Foot Ulcers Offloading ordered; Offloading boot 07/12/22 1435   Wound Length (cm) 3 cm 07/12/22 1435   Wound Width (cm) 0.6 cm 07/12/22 1435   Wound Depth (cm) 2.7 cm 07/12/22 1435   Wound Surface Area (cm^2) 1.8 cm^2 07/12/22 1435   Change in Wound Size % (l*w) 97.58 07/12/22 1435   Wound Volume (cm^3) 4.86 cm^3 07/12/22 1435   Wound Healing % 96 07/12/22 1435   Wound Assessment Granulation tissue;Pale granulation tissue;Slough 07/12/22 1435   Drainage Amount Moderate 07/12/22 1435   Drainage Description Serosanguinous 07/12/22 1435   Odor None 07/12/22 1435   Julisa-wound Assessment Maceration; Hyperkeratosis (callous) 07/12/22 1435   Margins Attached edges 07/12/22 1435   Wound Thickness Description not for Pressure Injury Full thickness 07/12/22 1435   Number of days: 391          Supplies Requested :      WOUND #: 1   PRIMARY DRESSING:  Collagen - Afsaneh   Cover and Secure with: 2X2 gauze pad  ABD pad  Bulky roll gauze  Ace wrap     FREQUENCY OF DRESSING CHANGES:  Three times per week       ADDITIONAL ITEMS:  [] Gloves Small  [x] Gloves Medium [] Gloves Large [] Gloves XLarge  [] Tape 1\" [x] Tape 2\" [] Tape 3\"  [x] Medipore Tape  [x] Saline  [] Skin Prep   [] Adhesive Remover   [x] Cotton Tip Applicators   [] Other:    Patient Wound(s) Debrided: [x] Yes 7/12/22   [] No    Debribement Type: Excisional/Sharp    Patient currently being seen by Home Health: [] Yes   [x] No    Duration for needed supplies:  []15  [x]30  []60  []90 Days    Electronically signed by Shayy Moreira RN on 7/12/2022 at 3:46 PM     Provider Information:      PROVIDER'S NAME: Adrian Benavides DPM    NPI: [id number]
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
## 2022-07-12 NOTE — PROGRESS NOTES
Teaching Note:    I was present with the resident during the visit. I discussed the case with the resident and agree with the findings and the plan as documented in the residents note.     Eliecer Zazueta DPM, [address] [place] [address] Surgery       [facility]

## 2022-07-14 ENCOUNTER — TELEPHONE (OUTPATIENT)
Dept: WOUND CARE | Age: 52
End: 2022-07-14

## 2022-07-14 DIAGNOSIS — Z79.4 TYPE 2 DIABETES MELLITUS WITHOUT COMPLICATION, WITH LONG-TERM CURRENT USE OF INSULIN (HCC): ICD-10-CM

## 2022-07-14 DIAGNOSIS — L97.512 FOOT ULCERATION, RIGHT, WITH FAT LAYER EXPOSED (HCC): ICD-10-CM

## 2022-07-14 DIAGNOSIS — E11.9 TYPE 2 DIABETES MELLITUS WITHOUT COMPLICATION, WITH LONG-TERM CURRENT USE OF INSULIN (HCC): ICD-10-CM

## 2022-07-14 NOTE — TELEPHONE ENCOUNTER
[facility] home health called regarding patients referral and wanted to clarify when patient will be seen in the office. Instructed nurse that [facility] and phone number given to call there to clarify what date the patient will be seen. [facility] wants to cordinate the visits with the date of service ie: M,W,F or T,TH,SAT.

## 2022-07-18 ENCOUNTER — TELEPHONE (OUTPATIENT)
Dept: WOUND CARE | Age: 52
End: 2022-07-18

## 2022-07-19 ENCOUNTER — APPOINTMENT (OUTPATIENT)
Dept: GENERAL RADIOLOGY | Age: 52
DRG: 617 | End: 2022-07-19
Attending: INTERNAL MEDICINE
Payer: MEDICARE

## 2022-07-19 ENCOUNTER — APPOINTMENT (OUTPATIENT)
Dept: MRI IMAGING | Age: 52
DRG: 617 | End: 2022-07-19
Attending: INTERNAL MEDICINE
Payer: MEDICARE

## 2022-07-19 ENCOUNTER — HOSPITAL ENCOUNTER (INPATIENT)
Dept: WOUND CARE | Age: 52
LOS: 6 days | Discharge: HOME HEALTH | DRG: 617 | End: 2022-07-26
Attending: INTERNAL MEDICINE | Admitting: INTERNAL MEDICINE
Payer: MEDICARE

## 2022-07-19 DIAGNOSIS — M86.9 OSTEOMYELITIS OF RIGHT FOOT, UNSPECIFIED TYPE (HCC): ICD-10-CM

## 2022-07-19 PROBLEM — L03.115 CELLULITIS OF RIGHT FOOT: Status: ACTIVE | Noted: 2022-07-19

## 2022-07-19 LAB
ANION GAP SERPL CALCULATED.3IONS-SCNC: 15 MEQ/L (ref 8–16)
BASOPHILS # BLD: 0.6 %
BASOPHILS ABSOLUTE: 0.1 THOU/MM3 (ref 0–0.1)
BUN BLDV-MCNC: 13 MG/DL (ref 7–22)
CALCIUM SERPL-MCNC: 9.8 MG/DL (ref 8.5–10.5)
CHLORIDE BLD-SCNC: 99 MEQ/L (ref 98–111)
CO2: 25 MEQ/L (ref 23–33)
CREAT SERPL-MCNC: 0.8 MG/DL (ref 0.4–1.2)
EOSINOPHIL # BLD: 2.7 %
EOSINOPHILS ABSOLUTE: 0.3 THOU/MM3 (ref 0–0.4)
ERYTHROCYTE [DISTWIDTH] IN BLOOD BY AUTOMATED COUNT: 12.7 % (ref 11.5–14.5)
ERYTHROCYTE [DISTWIDTH] IN BLOOD BY AUTOMATED COUNT: 43.6 FL (ref 35–45)
GFR SERPL CREATININE-BSD FRML MDRD: > 90 ML/MIN/1.73M2
GLUCOSE BLD-MCNC: 104 MG/DL (ref 70–108)
GLUCOSE BLD-MCNC: 98 MG/DL (ref 70–108)
HCT VFR BLD CALC: 49.5 % (ref 42–52)
HEMOGLOBIN: 16.5 GM/DL (ref 14–18)
IMMATURE GRANS (ABS): 0.04 THOU/MM3 (ref 0–0.07)
IMMATURE GRANULOCYTES: 0.4 %
LYMPHOCYTES # BLD: 21.3 %
LYMPHOCYTES ABSOLUTE: 2 THOU/MM3 (ref 1–4.8)
MCH RBC QN AUTO: 31.4 PG (ref 26–33)
MCHC RBC AUTO-ENTMCNC: 33.3 GM/DL (ref 32.2–35.5)
MCV RBC AUTO: 94.3 FL (ref 80–94)
MONOCYTES # BLD: 6.3 %
MONOCYTES ABSOLUTE: 0.6 THOU/MM3 (ref 0.4–1.3)
NUCLEATED RED BLOOD CELLS: 0 /100 WBC
PLATELET # BLD: 232 THOU/MM3 (ref 130–400)
PMV BLD AUTO: 10.4 FL (ref 9.4–12.4)
POTASSIUM SERPL-SCNC: 4.5 MEQ/L (ref 3.5–5.2)
RBC # BLD: 5.25 MILL/MM3 (ref 4.7–6.1)
SEG NEUTROPHILS: 68.7 %
SEGMENTED NEUTROPHILS ABSOLUTE COUNT: 6.5 THOU/MM3 (ref 1.8–7.7)
SODIUM BLD-SCNC: 139 MEQ/L (ref 135–145)
WBC # BLD: 9.4 THOU/MM3 (ref 4.8–10.8)

## 2022-07-19 PROCEDURE — 82948 REAGENT STRIP/BLOOD GLUCOSE: CPT

## 2022-07-19 PROCEDURE — 6360000002 HC RX W HCPCS: Performed by: STUDENT IN AN ORGANIZED HEALTH CARE EDUCATION/TRAINING PROGRAM

## 2022-07-19 PROCEDURE — 97597 DBRDMT OPN WND 1ST 20 CM/<: CPT

## 2022-07-19 PROCEDURE — A9579 GAD-BASE MR CONTRAST NOS,1ML: HCPCS

## 2022-07-19 PROCEDURE — 36415 COLL VENOUS BLD VENIPUNCTURE: CPT

## 2022-07-19 PROCEDURE — 87205 SMEAR GRAM STAIN: CPT

## 2022-07-19 PROCEDURE — 87075 CULTR BACTERIA EXCEPT BLOOD: CPT

## 2022-07-19 PROCEDURE — 87077 CULTURE AEROBIC IDENTIFY: CPT

## 2022-07-19 PROCEDURE — 2580000003 HC RX 258: Performed by: STUDENT IN AN ORGANIZED HEALTH CARE EDUCATION/TRAINING PROGRAM

## 2022-07-19 PROCEDURE — 87070 CULTURE OTHR SPECIMN AEROBIC: CPT

## 2022-07-19 PROCEDURE — G0379 DIRECT REFER HOSPITAL OBSERV: HCPCS

## 2022-07-19 PROCEDURE — G0378 HOSPITAL OBSERVATION PER HR: HCPCS

## 2022-07-19 PROCEDURE — 87186 SC STD MICRODIL/AGAR DIL: CPT

## 2022-07-19 PROCEDURE — 87147 CULTURE TYPE IMMUNOLOGIC: CPT

## 2022-07-19 PROCEDURE — 96365 THER/PROPH/DIAG IV INF INIT: CPT | Performed by: PODIATRIST

## 2022-07-19 PROCEDURE — 99223 1ST HOSP IP/OBS HIGH 75: CPT | Performed by: INTERNAL MEDICINE

## 2022-07-19 PROCEDURE — 96366 THER/PROPH/DIAG IV INF ADDON: CPT | Performed by: PODIATRIST

## 2022-07-19 PROCEDURE — 73720 MRI LWR EXTREMITY W/O&W/DYE: CPT

## 2022-07-19 PROCEDURE — 85025 COMPLETE CBC W/AUTO DIFF WBC: CPT

## 2022-07-19 PROCEDURE — 80048 BASIC METABOLIC PNL TOTAL CA: CPT

## 2022-07-19 PROCEDURE — 6370000000 HC RX 637 (ALT 250 FOR IP)

## 2022-07-19 PROCEDURE — 6370000000 HC RX 637 (ALT 250 FOR IP): Performed by: STUDENT IN AN ORGANIZED HEALTH CARE EDUCATION/TRAINING PROGRAM

## 2022-07-19 PROCEDURE — 6360000004 HC RX CONTRAST MEDICATION

## 2022-07-19 PROCEDURE — 73630 X-RAY EXAM OF FOOT: CPT

## 2022-07-19 RX ORDER — SODIUM CHLORIDE 9 MG/ML
INJECTION, SOLUTION INTRAVENOUS PRN
Status: DISCONTINUED | OUTPATIENT
Start: 2022-07-19 | End: 2022-07-26 | Stop reason: HOSPADM

## 2022-07-19 RX ORDER — BUPROPION HYDROCHLORIDE 100 MG/1
200 TABLET, EXTENDED RELEASE ORAL 2 TIMES DAILY
Status: DISCONTINUED | OUTPATIENT
Start: 2022-07-19 | End: 2022-07-26 | Stop reason: HOSPADM

## 2022-07-19 RX ORDER — INSULIN LISPRO 100 [IU]/ML
0-3 INJECTION, SOLUTION INTRAVENOUS; SUBCUTANEOUS NIGHTLY
Status: DISCONTINUED | OUTPATIENT
Start: 2022-07-19 | End: 2022-07-26 | Stop reason: HOSPADM

## 2022-07-19 RX ORDER — DEXTROSE MONOHYDRATE 50 MG/ML
100 INJECTION, SOLUTION INTRAVENOUS PRN
Status: DISCONTINUED | OUTPATIENT
Start: 2022-07-19 | End: 2022-07-26 | Stop reason: HOSPADM

## 2022-07-19 RX ORDER — PREGABALIN 75 MG/1
150 CAPSULE ORAL 2 TIMES DAILY
Status: DISCONTINUED | OUTPATIENT
Start: 2022-07-19 | End: 2022-07-26 | Stop reason: HOSPADM

## 2022-07-19 RX ORDER — AMITRIPTYLINE HYDROCHLORIDE 25 MG/1
25 TABLET, FILM COATED ORAL NIGHTLY
Status: DISCONTINUED | OUTPATIENT
Start: 2022-07-19 | End: 2022-07-26 | Stop reason: HOSPADM

## 2022-07-19 RX ORDER — SODIUM CHLORIDE 0.9 % (FLUSH) 0.9 %
5-40 SYRINGE (ML) INJECTION PRN
Status: DISCONTINUED | OUTPATIENT
Start: 2022-07-19 | End: 2022-07-26 | Stop reason: HOSPADM

## 2022-07-19 RX ORDER — SODIUM CHLORIDE 9 MG/ML
INJECTION, SOLUTION INTRAVENOUS CONTINUOUS
Status: DISCONTINUED | OUTPATIENT
Start: 2022-07-19 | End: 2022-07-26 | Stop reason: HOSPADM

## 2022-07-19 RX ORDER — OXYCODONE HYDROCHLORIDE AND ACETAMINOPHEN 5; 325 MG/1; MG/1
1 TABLET ORAL EVERY 4 HOURS PRN
Status: DISCONTINUED | OUTPATIENT
Start: 2022-07-19 | End: 2022-07-26 | Stop reason: HOSPADM

## 2022-07-19 RX ORDER — POLYETHYLENE GLYCOL 3350 17 G/17G
17 POWDER, FOR SOLUTION ORAL DAILY PRN
Status: DISCONTINUED | OUTPATIENT
Start: 2022-07-19 | End: 2022-07-26 | Stop reason: HOSPADM

## 2022-07-19 RX ORDER — DICYCLOMINE HYDROCHLORIDE 10 MG/1
10 CAPSULE ORAL DAILY PRN
Status: DISCONTINUED | OUTPATIENT
Start: 2022-07-19 | End: 2022-07-26 | Stop reason: HOSPADM

## 2022-07-19 RX ORDER — ACETAMINOPHEN 325 MG/1
650 TABLET ORAL EVERY 6 HOURS PRN
Status: DISCONTINUED | OUTPATIENT
Start: 2022-07-19 | End: 2022-07-26 | Stop reason: HOSPADM

## 2022-07-19 RX ORDER — ONDANSETRON 4 MG/1
4 TABLET, ORALLY DISINTEGRATING ORAL EVERY 8 HOURS PRN
Status: DISCONTINUED | OUTPATIENT
Start: 2022-07-19 | End: 2022-07-26 | Stop reason: HOSPADM

## 2022-07-19 RX ORDER — ISOSORBIDE MONONITRATE 60 MG/1
60 TABLET, EXTENDED RELEASE ORAL DAILY
Status: DISCONTINUED | OUTPATIENT
Start: 2022-07-20 | End: 2022-07-26 | Stop reason: HOSPADM

## 2022-07-19 RX ORDER — ACETAMINOPHEN 650 MG/1
650 SUPPOSITORY RECTAL EVERY 6 HOURS PRN
Status: DISCONTINUED | OUTPATIENT
Start: 2022-07-19 | End: 2022-07-26 | Stop reason: HOSPADM

## 2022-07-19 RX ORDER — ENOXAPARIN SODIUM 100 MG/ML
60 INJECTION SUBCUTANEOUS 2 TIMES DAILY
Status: DISCONTINUED | OUTPATIENT
Start: 2022-07-19 | End: 2022-07-23

## 2022-07-19 RX ORDER — ATORVASTATIN CALCIUM 40 MG/1
40 TABLET, FILM COATED ORAL NIGHTLY
Status: DISCONTINUED | OUTPATIENT
Start: 2022-07-20 | End: 2022-07-26 | Stop reason: HOSPADM

## 2022-07-19 RX ORDER — ASPIRIN 81 MG/1
81 TABLET ORAL DAILY
Status: DISCONTINUED | OUTPATIENT
Start: 2022-07-20 | End: 2022-07-26 | Stop reason: HOSPADM

## 2022-07-19 RX ORDER — TAMSULOSIN HYDROCHLORIDE 0.4 MG/1
0.4 CAPSULE ORAL DAILY
Status: DISCONTINUED | OUTPATIENT
Start: 2022-07-20 | End: 2022-07-26 | Stop reason: HOSPADM

## 2022-07-19 RX ORDER — VILAZODONE HYDROCHLORIDE 40 MG/1
40 TABLET ORAL DAILY
Status: DISCONTINUED | OUTPATIENT
Start: 2022-07-20 | End: 2022-07-26 | Stop reason: HOSPADM

## 2022-07-19 RX ORDER — SODIUM CHLORIDE 0.9 % (FLUSH) 0.9 %
5-40 SYRINGE (ML) INJECTION EVERY 12 HOURS SCHEDULED
Status: DISCONTINUED | OUTPATIENT
Start: 2022-07-19 | End: 2022-07-26 | Stop reason: HOSPADM

## 2022-07-19 RX ORDER — INSULIN LISPRO 100 [IU]/ML
0-6 INJECTION, SOLUTION INTRAVENOUS; SUBCUTANEOUS
Status: DISCONTINUED | OUTPATIENT
Start: 2022-07-20 | End: 2022-07-26 | Stop reason: HOSPADM

## 2022-07-19 RX ORDER — LANOLIN ALCOHOL/MO/W.PET/CERES
5 CREAM (GRAM) TOPICAL NIGHTLY PRN
Status: DISCONTINUED | OUTPATIENT
Start: 2022-07-19 | End: 2022-07-26 | Stop reason: HOSPADM

## 2022-07-19 RX ORDER — ONDANSETRON 2 MG/ML
4 INJECTION INTRAMUSCULAR; INTRAVENOUS EVERY 6 HOURS PRN
Status: DISCONTINUED | OUTPATIENT
Start: 2022-07-19 | End: 2022-07-26 | Stop reason: HOSPADM

## 2022-07-19 RX ORDER — ALBUTEROL SULFATE 90 UG/1
2 AEROSOL, METERED RESPIRATORY (INHALATION) EVERY 4 HOURS PRN
Status: DISCONTINUED | OUTPATIENT
Start: 2022-07-19 | End: 2022-07-26 | Stop reason: HOSPADM

## 2022-07-19 RX ORDER — OXYCODONE HYDROCHLORIDE AND ACETAMINOPHEN 5; 325 MG/1; MG/1
2 TABLET ORAL EVERY 4 HOURS PRN
Status: DISCONTINUED | OUTPATIENT
Start: 2022-07-19 | End: 2022-07-26 | Stop reason: HOSPADM

## 2022-07-19 RX ORDER — PANTOPRAZOLE SODIUM 40 MG/1
40 TABLET, DELAYED RELEASE ORAL DAILY
Status: DISCONTINUED | OUTPATIENT
Start: 2022-07-20 | End: 2022-07-26 | Stop reason: HOSPADM

## 2022-07-19 RX ADMIN — SODIUM CHLORIDE, PRESERVATIVE FREE 10 ML: 5 INJECTION INTRAVENOUS at 20:20

## 2022-07-19 RX ADMIN — ACETAMINOPHEN 325MG 650 MG: 325 TABLET ORAL at 20:17

## 2022-07-19 RX ADMIN — CEFEPIME 2000 MG: 2 INJECTION, POWDER, FOR SOLUTION INTRAVENOUS at 20:27

## 2022-07-19 RX ADMIN — AMITRIPTYLINE HYDROCHLORIDE 25 MG: 25 TABLET, FILM COATED ORAL at 22:30

## 2022-07-19 RX ADMIN — SODIUM CHLORIDE: 9 INJECTION, SOLUTION INTRAVENOUS at 20:23

## 2022-07-19 RX ADMIN — PREGABALIN 150 MG: 75 CAPSULE ORAL at 20:20

## 2022-07-19 RX ADMIN — VANCOMYCIN HYDROCHLORIDE 1000 MG: 1 INJECTION, POWDER, LYOPHILIZED, FOR SOLUTION INTRAVENOUS at 22:35

## 2022-07-19 RX ADMIN — BUPROPION HYDROCHLORIDE 200 MG: 100 TABLET, FILM COATED, EXTENDED RELEASE ORAL at 22:29

## 2022-07-19 RX ADMIN — OXYCODONE AND ACETAMINOPHEN 2 TABLET: 5; 325 TABLET ORAL at 22:29

## 2022-07-19 RX ADMIN — GADOTERIDOL 20 ML: 279.3 INJECTION, SOLUTION INTRAVENOUS at 19:57

## 2022-07-19 ASSESSMENT — PAIN SCALES - GENERAL
PAINLEVEL_OUTOF10: 2
PAINLEVEL_OUTOF10: 8
PAINLEVEL_OUTOF10: 7
PAINLEVEL_OUTOF10: 8
PAINLEVEL_OUTOF10: 8

## 2022-07-19 ASSESSMENT — PAIN DESCRIPTION - PAIN TYPE
TYPE: SURGICAL PAIN;CHRONIC PAIN

## 2022-07-19 ASSESSMENT — PAIN DESCRIPTION - FREQUENCY
FREQUENCY: INTERMITTENT

## 2022-07-19 ASSESSMENT — PAIN DESCRIPTION - LOCATION
LOCATION: FOOT;BACK
LOCATION: FOOT;BACK
LOCATION: BACK
LOCATION: FOOT;BACK

## 2022-07-19 ASSESSMENT — PAIN DESCRIPTION - DESCRIPTORS
DESCRIPTORS: ACHING

## 2022-07-19 ASSESSMENT — PAIN DESCRIPTION - ORIENTATION
ORIENTATION: RIGHT;LOWER

## 2022-07-19 ASSESSMENT — PAIN DESCRIPTION - ONSET
ONSET: GRADUAL

## 2022-07-19 NOTE — PLAN OF CARE
Problem: Wound:  Goal: Will show signs of wound healing; wound closure and no evidence of infection  Description: Will show signs of wound healing; wound closure and no evidence of infection  Outcome: Progressing   Patient seen for right foot wound. Patient presents with increased green/brown drainage, increased redness and swelling. Patient admitted for IV antibiotics. Follow up to be determined after discharge. See AVS for order changes. Care plan reviewed with patient. Patient verbalize understanding of the plan of care and contribute to goal setting.

## 2022-07-19 NOTE — H&P
Teaching H&P Note:    I was present with the resident during the visit. Patient not doing well and failing Out patient therapy. Having Fever and Chills and increased drainage the patient is needing  admission for IV antibiotics and work up for Osteomyelitis. I discussed the case with the resident and agree with the findings and the plan as documented in the residents note.     JADE Albright, [address] [place] [address] Surgery       [facility]
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
## 2022-07-19 NOTE — PROGRESS NOTES
Indication:  Bone and Joint Infection  Current regimen:  cefepime 1000 mg every 12 hours    Recent Labs     07/19/22  1449   WBC 9.4   BUN 13     Est CrCl: 183 ml/min  Plan: Change to cefepime 2000 mg every 8 hours  per Clinical Guidance Document.     Cristofer ParksD, [facility] 7/19/2022 6:11 PM

## 2022-07-19 NOTE — PROGRESS NOTES
Pt admitted to  5K5 via in a wheelchair from direct admit: from wound care . Complaints: foot wound  . IV none. Vital signs obtained. Assessment and data collection initiated. Two nurse skin assessment performed by Shantell Barillas and Carlos GRIFFIN. Oriented to room. Policies and procedures for 5 explained. All questions answered with no further questions at this time. Fall prevention and safety brochure discussed with patient. Bed alarm on. Call light in reach. Oriented to room. Fede Cardenas RN, RN 7/19/2022 3:05 PM     Explained patients right to have family, representative or physician notified of their admission. Patient has Declined for physician to be notified. Patient has Declined for family/representative to be notified.

## 2022-07-19 NOTE — DISCHARGE INSTRUCTIONS
Visit Discharge/Physician Orders: - Patient to be admitted today 07/19/2022   - Keep blood sugars well controlled to help with wound healing.  - Elevate lower leg/feet to decrease swelling.  - Wound debrided today. - Stay off of right foot. Continue surgical boot. - Dressing supplies ordered through [facility] and will be delivered to your home. Call if you do not receive. Home Care: [facility]     Wound Location: Right foot     Dressing orders:     1) Gather wound care supplies and arrange on clean table. 2) Wash your hands with soap and water or use alcohol based hand  for 20 seconds (sing \"Happy Birthday\" twice). 3) Cleanse wounds with normal saline or wound cleanser and gauze. Pat dry with clean gauze. 4) Right foot-  * in clinic today mesalt packed into wound, covered with ABD and wrapped with kerlix and secures with tape    -You can transition into the juxta lite once received and discontinue ace bandage to the leg. Keep all dressings clean & dry. Do not shower, take baths or get wound wet, unless otherwise instructed by your Wound Care doctor. Follow up visit: after discharged from hospital     Keep next scheduled appointment. Please give 24 hour notice if unable to keep appointment. [phone]     If you experience any of the following, please call the Wound Care Service during business hours: Monday through Friday 8:00 am - 4:30 pm  ([phone]). *Increase in pain              *Temperature over 101              *Increase in drainage from your wound or a foul odor              *Uncontrolled swelling              *Need for compression bandage changes due to slippage, breakthrough drainage     If you need medical attention outside of business hours, please contact your Primary Care Doctor or go to the nearest emergency room.

## 2022-07-19 NOTE — PROGRESS NOTES
Frances Bro R.N. has reviewed and agrees with Milton Hamilton LPN's shift   Assessment.     Pat Mauricio RN  7/19/2022
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
## 2022-07-20 LAB
ANION GAP SERPL CALCULATED.3IONS-SCNC: 10 MEQ/L (ref 8–16)
BASOPHILS # BLD: 0.6 %
BASOPHILS ABSOLUTE: 0 THOU/MM3 (ref 0–0.1)
BUN BLDV-MCNC: 14 MG/DL (ref 7–22)
CALCIUM SERPL-MCNC: 9.1 MG/DL (ref 8.5–10.5)
CHLORIDE BLD-SCNC: 101 MEQ/L (ref 98–111)
CO2: 25 MEQ/L (ref 23–33)
CREAT SERPL-MCNC: 0.9 MG/DL (ref 0.4–1.2)
EOSINOPHIL # BLD: 3.8 %
EOSINOPHILS ABSOLUTE: 0.3 THOU/MM3 (ref 0–0.4)
ERYTHROCYTE [DISTWIDTH] IN BLOOD BY AUTOMATED COUNT: 12.6 % (ref 11.5–14.5)
ERYTHROCYTE [DISTWIDTH] IN BLOOD BY AUTOMATED COUNT: 43.6 FL (ref 35–45)
GFR SERPL CREATININE-BSD FRML MDRD: 88 ML/MIN/1.73M2
GLUCOSE BLD-MCNC: 105 MG/DL (ref 70–108)
GLUCOSE BLD-MCNC: 110 MG/DL (ref 70–108)
GLUCOSE BLD-MCNC: 118 MG/DL (ref 70–108)
GLUCOSE BLD-MCNC: 94 MG/DL (ref 70–108)
GLUCOSE BLD-MCNC: 95 MG/DL (ref 70–108)
HCT VFR BLD CALC: 48.4 % (ref 42–52)
HEMOGLOBIN: 15.9 GM/DL (ref 14–18)
IMMATURE GRANS (ABS): 0.03 THOU/MM3 (ref 0–0.07)
IMMATURE GRANULOCYTES: 0.4 %
LYMPHOCYTES # BLD: 26.2 %
LYMPHOCYTES ABSOLUTE: 1.9 THOU/MM3 (ref 1–4.8)
MCH RBC QN AUTO: 30.9 PG (ref 26–33)
MCHC RBC AUTO-ENTMCNC: 32.9 GM/DL (ref 32.2–35.5)
MCV RBC AUTO: 94 FL (ref 80–94)
MONOCYTES # BLD: 8.6 %
MONOCYTES ABSOLUTE: 0.6 THOU/MM3 (ref 0.4–1.3)
NUCLEATED RED BLOOD CELLS: 0 /100 WBC
PLATELET # BLD: 202 THOU/MM3 (ref 130–400)
PMV BLD AUTO: 10.1 FL (ref 9.4–12.4)
POTASSIUM REFLEX MAGNESIUM: 4.1 MEQ/L (ref 3.5–5.2)
RBC # BLD: 5.15 MILL/MM3 (ref 4.7–6.1)
SEG NEUTROPHILS: 60.4 %
SEGMENTED NEUTROPHILS ABSOLUTE COUNT: 4.3 THOU/MM3 (ref 1.8–7.7)
SODIUM BLD-SCNC: 136 MEQ/L (ref 135–145)
WBC # BLD: 7.2 THOU/MM3 (ref 4.8–10.8)

## 2022-07-20 PROCEDURE — 85025 COMPLETE CBC W/AUTO DIFF WBC: CPT

## 2022-07-20 PROCEDURE — 6360000002 HC RX W HCPCS: Performed by: STUDENT IN AN ORGANIZED HEALTH CARE EDUCATION/TRAINING PROGRAM

## 2022-07-20 PROCEDURE — 6370000000 HC RX 637 (ALT 250 FOR IP)

## 2022-07-20 PROCEDURE — 99232 SBSQ HOSP IP/OBS MODERATE 35: CPT | Performed by: PHYSICIAN ASSISTANT

## 2022-07-20 PROCEDURE — 2580000003 HC RX 258: Performed by: STUDENT IN AN ORGANIZED HEALTH CARE EDUCATION/TRAINING PROGRAM

## 2022-07-20 PROCEDURE — 96372 THER/PROPH/DIAG INJ SC/IM: CPT | Performed by: PODIATRIST

## 2022-07-20 PROCEDURE — 87040 BLOOD CULTURE FOR BACTERIA: CPT

## 2022-07-20 PROCEDURE — 96376 TX/PRO/DX INJ SAME DRUG ADON: CPT | Performed by: PODIATRIST

## 2022-07-20 PROCEDURE — 82948 REAGENT STRIP/BLOOD GLUCOSE: CPT

## 2022-07-20 PROCEDURE — 80048 BASIC METABOLIC PNL TOTAL CA: CPT

## 2022-07-20 PROCEDURE — 36415 COLL VENOUS BLD VENIPUNCTURE: CPT

## 2022-07-20 PROCEDURE — 6370000000 HC RX 637 (ALT 250 FOR IP): Performed by: STUDENT IN AN ORGANIZED HEALTH CARE EDUCATION/TRAINING PROGRAM

## 2022-07-20 PROCEDURE — 1200000000 HC SEMI PRIVATE

## 2022-07-20 RX ADMIN — ENOXAPARIN SODIUM 60 MG: 100 INJECTION SUBCUTANEOUS at 21:59

## 2022-07-20 RX ADMIN — LISINOPRIL 30 MG: 20 TABLET ORAL at 08:02

## 2022-07-20 RX ADMIN — ATORVASTATIN CALCIUM 40 MG: 40 TABLET, FILM COATED ORAL at 19:50

## 2022-07-20 RX ADMIN — PANTOPRAZOLE SODIUM 40 MG: 40 TABLET, DELAYED RELEASE ORAL at 08:03

## 2022-07-20 RX ADMIN — METOPROLOL TARTRATE 25 MG: 25 TABLET, FILM COATED ORAL at 08:03

## 2022-07-20 RX ADMIN — OXYCODONE AND ACETAMINOPHEN 2 TABLET: 5; 325 TABLET ORAL at 10:25

## 2022-07-20 RX ADMIN — PREGABALIN 150 MG: 75 CAPSULE ORAL at 08:02

## 2022-07-20 RX ADMIN — ENOXAPARIN SODIUM 60 MG: 100 INJECTION SUBCUTANEOUS at 08:01

## 2022-07-20 RX ADMIN — HYOSCYAMINE SULFATE: 16 SOLUTION at 17:16

## 2022-07-20 RX ADMIN — OXYCODONE AND ACETAMINOPHEN 2 TABLET: 5; 325 TABLET ORAL at 19:50

## 2022-07-20 RX ADMIN — ASPIRIN 81 MG: 81 TABLET, COATED ORAL at 08:03

## 2022-07-20 RX ADMIN — TAMSULOSIN HYDROCHLORIDE 0.4 MG: 0.4 CAPSULE ORAL at 08:03

## 2022-07-20 RX ADMIN — VILAZODONE HYDROCHLORIDE 40 MG: 40 TABLET ORAL at 08:03

## 2022-07-20 RX ADMIN — CEFEPIME 2000 MG: 2 INJECTION, POWDER, FOR SOLUTION INTRAVENOUS at 21:54

## 2022-07-20 RX ADMIN — BUPROPION HYDROCHLORIDE 200 MG: 100 TABLET, FILM COATED, EXTENDED RELEASE ORAL at 21:54

## 2022-07-20 RX ADMIN — CEFEPIME 2000 MG: 2 INJECTION, POWDER, FOR SOLUTION INTRAVENOUS at 04:28

## 2022-07-20 RX ADMIN — CEFEPIME 2000 MG: 2 INJECTION, POWDER, FOR SOLUTION INTRAVENOUS at 14:20

## 2022-07-20 RX ADMIN — BUPROPION HYDROCHLORIDE 200 MG: 100 TABLET, FILM COATED, EXTENDED RELEASE ORAL at 08:02

## 2022-07-20 RX ADMIN — PREGABALIN 150 MG: 75 CAPSULE ORAL at 19:49

## 2022-07-20 RX ADMIN — OXYCODONE AND ACETAMINOPHEN 2 TABLET: 5; 325 TABLET ORAL at 04:24

## 2022-07-20 RX ADMIN — ISOSORBIDE MONONITRATE 60 MG: 60 TABLET, EXTENDED RELEASE ORAL at 08:03

## 2022-07-20 RX ADMIN — AMITRIPTYLINE HYDROCHLORIDE 25 MG: 25 TABLET, FILM COATED ORAL at 19:49

## 2022-07-20 ASSESSMENT — PAIN DESCRIPTION - ORIENTATION
ORIENTATION: RIGHT;LOWER

## 2022-07-20 ASSESSMENT — PAIN DESCRIPTION - ONSET
ONSET: GRADUAL
ONSET: GRADUAL

## 2022-07-20 ASSESSMENT — PAIN DESCRIPTION - PAIN TYPE
TYPE: SURGICAL PAIN;CHRONIC PAIN
TYPE: SURGICAL PAIN;CHRONIC PAIN

## 2022-07-20 ASSESSMENT — PAIN DESCRIPTION - DESCRIPTORS
DESCRIPTORS: ACHING
DESCRIPTORS: ACHING

## 2022-07-20 ASSESSMENT — PAIN DESCRIPTION - LOCATION
LOCATION: FOOT;BACK

## 2022-07-20 ASSESSMENT — PAIN DESCRIPTION - FREQUENCY
FREQUENCY: INTERMITTENT
FREQUENCY: INTERMITTENT

## 2022-07-20 ASSESSMENT — PAIN SCALES - GENERAL
PAINLEVEL_OUTOF10: 7
PAINLEVEL_OUTOF10: 2
PAINLEVEL_OUTOF10: 2
PAINLEVEL_OUTOF10: 7
PAINLEVEL_OUTOF10: 2

## 2022-07-20 NOTE — CARE COORDINATION
7/20/22, 12:59 PM EDT  DISCHARGE PLANNING EVALUATION:    Galileo Leong       Admitted: 7/19/2022/ [address] [place] [address] day: 0   Location: [facility] Reason for admit: Cellulitis of right foot [L03.115]  Osteomyelitis (HCC) [M86.9]   PMH:  has a past medical history of Acute renal failure (ARF) ([facility] Alcohol abuse, Anxiety, Arthritis, Asthma, CAD (coronary artery disease), COPD (chronic obstructive pulmonary disease) ([facility] Depression, Diabetes mellitus ([facility] Family history of premature coronary heart disease, Hyperlipidemia, Hypertension, [facility] and Psychiatric problem. Procedure: N/A  MRI of right foot:  1. Osteomyelitis in the 5th metatarsal stump. Mild osteomyelitis at the    distal end of the 4th metatarsal stump. 2. Cellulitis. Soft tissue ulcer at the stump. No drainable rim-enhancing    fluid collection. Barriers to Discharge:  Patient was a direct admit from the [address] [place] [address]. Podiatry and ID consulted, IV fluids, Maxipime q 8 hr., Lovenox, DM management, IV Vancomycin x 1 dose, wound care with Dakins solution, prn pain medications and Zofran, blood cultures, heel weight bearing to right lower extremity. PCP: MICHELLE Wallace CNP  Readmission Risk Score: 11.5%  Patient's Healthcare Decision Maker: Named in [address] [place] [address]    Patient Goals/Plan/Treatment Preferences: Met with Lucy Ling. He resides at home alone. Lucy Ling verifies his insurance and PCP. He is able to afford his medications and has the DME needed-walker, wheelchair, electric scooter, crutches and waking boot. He will have transportation to home. Barbara Burnett states he is current with [facility]. Barbara Burnett states he plans to return home at discharge and resume services through [address] [place] [address]. [facility] [address] [facility].   Transportation/Food Security/Housekeeping Addressed:  No issues identified.

## 2022-07-20 NOTE — PROGRESS NOTES
Hospitalist Progress Note      Patient:  Sonia Del Rio    Unit/Bed:5K-05/005-A  YOB: 1970  MRN: [id number]   Acct: [de-identified]   PCP: MICHELLE Hughes CNP  Date of Admission: 7/19/2022    Assessment/Plan:    Osteomyelitis, Right Foot: Podiatry & ID consulted. A&A growing Staph Aureus (coag positive). IV ABX continued. MRI right foot showed OM of stump. Likely will need surgery. Essential HTN: BP controlled. Continue home medications. NIDDMII: Controlled. Lastest Hgb A1c 5.5, continue SSI in hospital and will restart home regimen at [place]. Polyneuropathy: Lyrica. HLD: Lipitor. GERD: PPI. Anxiety: Antonio Cook, Bupropion    Chief Complaint: Fever, chills, foot ulcer     Initial H and P:-    Initial H&P \"52 y.o. male with a PMHx of HTN, DM2, HLD, peripheral neuropathy, GERD, depression, and anxiety who presented to [address] [place] [address] for worsening right foot wound with associated fever, chills, and feeling sick over the weekend. Patient has a history of diabetic foot ulcer requiring right transmetatarsal amputation. Patient has had open wound since his surgery about a year ago. He was treated with Keflex/doxycycline however he had persistent wound that failed to close. Over the weekend, he developed fever, chills and nausea. He states that yesterday he noticed purulent greenish discharge from his right foot wound. He was seen today at the wound clinic and decision was made to admit patient for further treatment. Hospitalist was contacted for admission. Please refer to A&P for further details. \"     Subjective (past 24 hours):   No acute events overnight. Pt states that his pain is relatively controlled. Pt has no issues or concerns at this time. Pt is anticipating surgery. Past medical history, family history, social history and allergies reviewed again and is unchanged since admission.     ROS (All review of systems completed. Pertinent positives noted. Otherwise All other systems reviewed and negative.)     Medications:  Reviewed    Infusion Medications    sodium chloride      sodium chloride 75 mL/hr at 07/19/22 2023    dextrose       Scheduled Medications    Sodium Hypochlorite   Irrigation Daily    sodium chloride flush  5-40 mL IntraVENous 2 times per day    enoxaparin  60 mg SubCUTAneous BID    cefepime  2,000 mg IntraVENous Q8H    amitriptyline  25 mg Oral Nightly    aspirin  81 mg Oral Daily    atorvastatin  40 mg Oral Nightly    buPROPion  200 mg Oral BID    isosorbide mononitrate  60 mg Oral Daily    lisinopril  30 mg Oral Daily    metoprolol tartrate  25 mg Oral Daily    pantoprazole  40 mg Oral Daily    pregabalin  150 mg Oral BID    tamsulosin  0.4 mg Oral Daily    vilazodone HCl  40 mg Oral Daily    insulin lispro  0-6 Units SubCUTAneous TID WC    insulin lispro  0-3 Units SubCUTAneous Nightly     PRN Meds: sodium chloride flush, sodium chloride, ondansetron **OR** ondansetron, polyethylene glycol, acetaminophen **OR** acetaminophen, albuterol sulfate HFA, dicyclomine, melatonin, glucose, dextrose bolus **OR** dextrose bolus, glucagon (rDNA), dextrose, oxyCODONE-acetaminophen **OR** oxyCODONE-acetaminophen      Intake/Output Summary (Last 24 hours) at 7/20/2022 1259  Last data filed at 7/20/2022 0603  Gross per 24 hour   Intake 260 ml   Output 1250 ml   Net -990 ml       Diet:  ADULT DIET; Regular; 4 carb choices (60 gm/meal)    Exam:  /60   Pulse 77   Temp 98.1 °F (36.7 °C) (Oral)   Resp 16   Ht 6' 1\" (1.854 m)   Wt (!) 396 lb (179.6 kg)   SpO2 95%   BMI 52.25 kg/m²   General appearance: No apparent distress, appears stated age and cooperative. HEENT: Pupils equal, round, and reactive to light. Conjunctivae/corneas clear. Neck: Supple, with full range of motion. No jugular venous distention. Trachea midline. Respiratory:  Normal respiratory effort.  Clear to auscultation, bilaterally without Rales/Wheezes/Rhonchi. Cardiovascular: Regular rate and rhythm with normal S1/S2 without murmurs, rubs or gallops. Abdomen: Soft, non-tender, non-distended with normal bowel sounds. Musculoskeletal: passive and active ROM x 4 extremities. Skin: Skin color, texture, turgor normal.  No rashes or lesions. Neurologic:  Neurovascularly intact without any focal sensory/motor deficits. Cranial nerves: II-XII intact, grossly non-focal.  Psychiatric: Alert and oriented, thought content appropriate, normal insight  Capillary Refill: Brisk,< 3 seconds   Peripheral Pulses: +2 palpable, equal bilaterally     Right Foot:            Labs:   Recent Labs     07/19/22  1449 07/20/22  0540   WBC 9.4 7.2   HGB 16.5 15.9   HCT 49.5 48.4    202     Recent Labs     07/19/22  1449 07/20/22  0540    136   K 4.5 4.1   CL 99 101   CO2 25 25   BUN 13 14   CREATININE 0.8 0.9   CALCIUM 9.8 9.1       Microbiology:    Blood culture #1:   Lab Results   Component Value Date/Time    BC No growth-preliminary  No growth   04/21/2017 04:48 PM       Blood culture #2:No results found for: Georges Sethi    Organism:  Lab Results   Component Value Date/Time    ORG Staphylococcus (coagulase positive) 07/19/2022 01:40 PM         Lab Results   Component Value Date/Time    LABGRAM  07/19/2022 01:40 PM     Rare segmented neutrophils observed. Rare epithelial cells observed. Rare gram positive cocci occurring singly and in pairs. Aerobic and Anaerobic :  Lab Results   Component Value Date/Time    LABAERO moderate growth  07/19/2022 01:40 PM     Lab Results   Component Value Date/Time    LABANAE  06/29/2021 01:53 PM     Culture yielded heavy mixed growth which included multiple colony types of anaerobic gram positive cocci, anaerobic gram negative bacilli, and anaerobic gram positive bacilli most consistent with Lactobacillus species.  If a true mixed aerobic and anaerobic infection is suspected, then broad spectrum empiric antibiotic therapy is indicated and should include coverage for anaerobic organisms. Urinalysis:      Lab Results   Component Value Date/Time    NITRU Negative 08/12/2016 06:59 PM    BLOODU Negative 08/12/2016 06:59 PM    SPECGRAV 1.015 08/12/2016 06:59 PM    GLUCOSEU NEGATIVE 07/07/2015 12:48 AM       Radiology:  MRI FOOT RIGHT W WO CONTRAST   Final Result   Impression:   1. Osteomyelitis in the 5th metatarsal stump. Mild osteomyelitis at the    distal end of the 4th metatarsal stump. 2. Cellulitis. Soft tissue ulcer at the stump. No drainable rim-enhancing    fluid collection. This document has been electronically signed by: Laureen Hopper MD on    07/19/2022 10:08 PM      XR FOOT RIGHT (MIN 3 VIEWS)   Final Result   Post operative changes related to transmetatarsal amputation are noted. Periarticular calcification and fragmented appearance of the distal fifth metatarsal diaphysis is nonspecific and could be postoperative. There is a suggestion of soft tissue    swelling at the fourth and fifth metatarsals and osteomyelitis at this location is not excluded. **This report has been created using voice recognition software. It may contain minor errors which are inherent in voice recognition technology. **      Final report electronically signed by Dr Shine Pelayo on 7/19/2022 4:56 PM          Electronically signed by LATISHA Conde on 7/20/2022 at 12:59 PM

## 2022-07-20 NOTE — PLAN OF CARE
Problem: Pain  Goal: Verbalizes/displays adequate comfort level or baseline comfort level  7/20/2022 0147 by Get Burks RN  Outcome: Progressing  Note: Patient's post op and chronic back pain relieved. Pain medications given as needed. Rated pain as 8/10; verbalized satisfaction of interventions implemented. Elevation, cold pack applied. Rest and repositioning provided. Pain assessment ongoing. Problem: Safety - Adult  Goal: Free from fall injury  Outcome: Progressing  Note:  Patient's safety maintained. Fall precautions in place. Call light and possessions within reach, bed alarm on and placed in low position, side rails upx2, non skid socks on, up with assistance and calls out appropriately; hourly rounding. Problem: Chronic Conditions and Co-morbidities  Goal: Patient's chronic conditions and co-morbidity symptoms are monitored and maintained or improved  7/20/2022 0140 by Get Burks RN  Outcome: Progressing  Care Plan - Patient's Chronic Conditions and Co-Morbidity Symptoms are Monitored and Maintained or Improved:   Monitor and assess patient's chronic conditions and comorbid symptoms for stability, deterioration, or improvement   Collaborate with multidisciplinary team to address chronic and comorbid conditions and prevent exacerbation or deterioration   Update acute care plan with appropriate goals if chronic or comorbid symptoms are exacerbated and prevent overall improvement and discharge       Problem: Skin/Tissue Integrity  Goal: Absence of new skin breakdown  Description: 1. Monitor for areas of redness and/or skin breakdown  2. Assess vascular access sites hourly  3. Every 4-6 hours minimum:  Change oxygen saturation probe site  4. Every 4-6 hours:  If on nasal continuous positive airway pressure, respiratory therapy assess nares and determine need for appliance change or resting period.   7/20/2022 0147 by Get Burks RN  Outcome: Progressing  Note: Skin protocols

## 2022-07-20 NOTE — PROGRESS NOTES
mg Oral Daily    vilazodone HCl  40 mg Oral Daily    insulin lispro  0-6 Units SubCUTAneous TID     insulin lispro  0-3 Units SubCUTAneous Nightly      sodium chloride      sodium chloride 75 mL/hr at 07/19/22 2023    dextrose       sodium chloride flush, sodium chloride, ondansetron **OR** ondansetron, polyethylene glycol, acetaminophen **OR** acetaminophen, albuterol sulfate HFA, dicyclomine, melatonin, glucose, dextrose bolus **OR** dextrose bolus, glucagon (rDNA), dextrose, oxyCODONE-acetaminophen **OR** oxyCODONE-acetaminophen      LABS:     CBC:   Recent Labs     07/19/22  1449 07/20/22  0540   WBC 9.4 7.2   HGB 16.5 15.9    202     BMP:    Recent Labs     07/19/22  1449 07/20/22  0540    136   K 4.5 4.1   CL 99 101   CO2 25 25   BUN 13 14   CREATININE 0.8 0.9   GLUCOSE 104 105     Calcium:  Recent Labs     07/20/22  0540   CALCIUM 9.1     Ionized Calcium:No results for input(s): IONCA in the last 72 hours. Magnesium:No results for input(s): MG in the last 72 hours. Phosphorus:No results for input(s): PHOS in the last 72 hours. BNP:No results for input(s): BNP in the last 72 hours. Glucose:  Recent Labs     07/19/22 2015 07/20/22  0802 07/20/22  1213   POCGLU 98 95 118*      CULTURES:   UA: No results for input(s): SPECGRAV, PHUR, COLORU, CLARITYU, MUCUS, PROTEINU, BLOODU, RBCUA, WBCUA, BACTERIA, NITRU, GLUCOSEU, BILIRUBINUR, UROBILINOGEN, KETUA, LABCAST, LABCASTTY, AMORPHOS in the last 72 hours.     Invalid input(s): CRYSTALS  Micro:   Lab Results   Component Value Date/Time    BC No growth-preliminary  No growth   04/21/2017 04:48 PM          Problem list of patient:     Patient Active Problem List   Diagnosis Code    Hypertension I10    Hyperlipidemia E78.5    CAD (coronary artery disease) I25.10    Family history of premature coronary heart disease Z82.49    Dizziness R42    Acute renal failure (ARF) (HCC) N17.9    Chronic low back pain M54.50, G89.29    PTSD (post-traumatic stress disorder) F43.10    Foot ulceration, right, with fat layer exposed ([address] L97.512    Type 2 diabetes mellitus without complication, with long-term current use of insulin (HCC) E11.9, Z79.4    Cellulitis of right foot L03.115    Osteomyelitis ([facility]) M86.9         ASSESSMENT/PLAN   Right foot diabetic ulcer with OM: recommend to consider resection of the involved bone  Continue current treatment  Will discuss with podiatry      Frnak Hidalgo MD, MD, FACP 7/20/2022 5:00 PM

## 2022-07-21 ENCOUNTER — APPOINTMENT (OUTPATIENT)
Dept: GENERAL RADIOLOGY | Age: 52
DRG: 617 | End: 2022-07-21
Attending: INTERNAL MEDICINE
Payer: MEDICARE

## 2022-07-21 LAB
EKG ATRIAL RATE: 65 BPM
EKG P AXIS: 58 DEGREES
EKG P-R INTERVAL: 150 MS
EKG Q-T INTERVAL: 372 MS
EKG QRS DURATION: 94 MS
EKG QTC CALCULATION (BAZETT): 386 MS
EKG R AXIS: 0 DEGREES
EKG T AXIS: 46 DEGREES
EKG VENTRICULAR RATE: 65 BPM
GLUCOSE BLD-MCNC: 103 MG/DL (ref 70–108)
GLUCOSE BLD-MCNC: 114 MG/DL (ref 70–108)
GLUCOSE BLD-MCNC: 125 MG/DL (ref 70–108)
GLUCOSE BLD-MCNC: 94 MG/DL (ref 70–108)

## 2022-07-21 PROCEDURE — 93005 ELECTROCARDIOGRAM TRACING: CPT | Performed by: PHYSICIAN ASSISTANT

## 2022-07-21 PROCEDURE — 6370000000 HC RX 637 (ALT 250 FOR IP)

## 2022-07-21 PROCEDURE — 1200000000 HC SEMI PRIVATE

## 2022-07-21 PROCEDURE — 71045 X-RAY EXAM CHEST 1 VIEW: CPT

## 2022-07-21 PROCEDURE — 6370000000 HC RX 637 (ALT 250 FOR IP): Performed by: STUDENT IN AN ORGANIZED HEALTH CARE EDUCATION/TRAINING PROGRAM

## 2022-07-21 PROCEDURE — 2580000003 HC RX 258: Performed by: STUDENT IN AN ORGANIZED HEALTH CARE EDUCATION/TRAINING PROGRAM

## 2022-07-21 PROCEDURE — 2580000003 HC RX 258: Performed by: INTERNAL MEDICINE

## 2022-07-21 PROCEDURE — 6360000002 HC RX W HCPCS: Performed by: STUDENT IN AN ORGANIZED HEALTH CARE EDUCATION/TRAINING PROGRAM

## 2022-07-21 PROCEDURE — 6360000002 HC RX W HCPCS: Performed by: INTERNAL MEDICINE

## 2022-07-21 PROCEDURE — 99232 SBSQ HOSP IP/OBS MODERATE 35: CPT | Performed by: PHYSICIAN ASSISTANT

## 2022-07-21 PROCEDURE — 82948 REAGENT STRIP/BLOOD GLUCOSE: CPT

## 2022-07-21 PROCEDURE — 93010 ELECTROCARDIOGRAM REPORT: CPT | Performed by: INTERNAL MEDICINE

## 2022-07-21 RX ORDER — LIDOCAINE 4 G/G
2 PATCH TOPICAL DAILY
Status: DISCONTINUED | OUTPATIENT
Start: 2022-07-22 | End: 2022-07-26 | Stop reason: HOSPADM

## 2022-07-21 RX ADMIN — OXYCODONE AND ACETAMINOPHEN 2 TABLET: 5; 325 TABLET ORAL at 15:37

## 2022-07-21 RX ADMIN — ATORVASTATIN CALCIUM 40 MG: 40 TABLET, FILM COATED ORAL at 21:08

## 2022-07-21 RX ADMIN — METOPROLOL TARTRATE 25 MG: 25 TABLET, FILM COATED ORAL at 10:14

## 2022-07-21 RX ADMIN — SODIUM CHLORIDE: 9 INJECTION, SOLUTION INTRAVENOUS at 00:24

## 2022-07-21 RX ADMIN — AMITRIPTYLINE HYDROCHLORIDE 25 MG: 25 TABLET, FILM COATED ORAL at 21:08

## 2022-07-21 RX ADMIN — PANTOPRAZOLE SODIUM 40 MG: 40 TABLET, DELAYED RELEASE ORAL at 10:15

## 2022-07-21 RX ADMIN — VILAZODONE HYDROCHLORIDE 40 MG: 40 TABLET ORAL at 10:14

## 2022-07-21 RX ADMIN — ASPIRIN 81 MG: 81 TABLET, COATED ORAL at 10:15

## 2022-07-21 RX ADMIN — TAMSULOSIN HYDROCHLORIDE 0.4 MG: 0.4 CAPSULE ORAL at 10:15

## 2022-07-21 RX ADMIN — CEFAZOLIN 2000 MG: 10 INJECTION, POWDER, FOR SOLUTION INTRAVENOUS at 13:02

## 2022-07-21 RX ADMIN — LISINOPRIL 30 MG: 20 TABLET ORAL at 10:15

## 2022-07-21 RX ADMIN — PREGABALIN 150 MG: 75 CAPSULE ORAL at 10:15

## 2022-07-21 RX ADMIN — OXYCODONE AND ACETAMINOPHEN 2 TABLET: 5; 325 TABLET ORAL at 21:07

## 2022-07-21 RX ADMIN — CEFEPIME 2000 MG: 2 INJECTION, POWDER, FOR SOLUTION INTRAVENOUS at 04:24

## 2022-07-21 RX ADMIN — ENOXAPARIN SODIUM 60 MG: 100 INJECTION SUBCUTANEOUS at 10:15

## 2022-07-21 RX ADMIN — SODIUM CHLORIDE: 9 INJECTION, SOLUTION INTRAVENOUS at 15:36

## 2022-07-21 RX ADMIN — ISOSORBIDE MONONITRATE 60 MG: 60 TABLET, EXTENDED RELEASE ORAL at 10:15

## 2022-07-21 RX ADMIN — BUPROPION HYDROCHLORIDE 200 MG: 100 TABLET, FILM COATED, EXTENDED RELEASE ORAL at 21:08

## 2022-07-21 RX ADMIN — BUPROPION HYDROCHLORIDE 200 MG: 100 TABLET, FILM COATED, EXTENDED RELEASE ORAL at 10:15

## 2022-07-21 RX ADMIN — PREGABALIN 150 MG: 75 CAPSULE ORAL at 21:07

## 2022-07-21 RX ADMIN — HYOSCYAMINE SULFATE: 16 SOLUTION at 10:18

## 2022-07-21 RX ADMIN — CEFAZOLIN 2000 MG: 10 INJECTION, POWDER, FOR SOLUTION INTRAVENOUS at 21:11

## 2022-07-21 ASSESSMENT — PAIN SCALES - GENERAL
PAINLEVEL_OUTOF10: 9
PAINLEVEL_OUTOF10: 7
PAINLEVEL_OUTOF10: 2

## 2022-07-21 ASSESSMENT — PAIN DESCRIPTION - FREQUENCY: FREQUENCY: INTERMITTENT

## 2022-07-21 ASSESSMENT — PAIN DESCRIPTION - ORIENTATION
ORIENTATION: LOWER

## 2022-07-21 ASSESSMENT — PAIN DESCRIPTION - LOCATION
LOCATION: BACK

## 2022-07-21 ASSESSMENT — PAIN DESCRIPTION - DIRECTION: RADIATING_TOWARDS: LEFT THIGH

## 2022-07-21 ASSESSMENT — PAIN DESCRIPTION - DESCRIPTORS
DESCRIPTORS: THROBBING
DESCRIPTORS: SHARP;THROBBING

## 2022-07-21 ASSESSMENT — PAIN DESCRIPTION - ONSET: ONSET: GRADUAL

## 2022-07-21 ASSESSMENT — PAIN DESCRIPTION - PAIN TYPE: TYPE: CHRONIC PAIN

## 2022-07-21 NOTE — PLAN OF CARE
Problem: Pain  Goal: Verbalizes/displays adequate comfort level or baseline comfort level  Outcome: Progressing  Note: Patient's post op and chronic back pain relieved. Pain medications given as needed. Rated pain as 8/10; verbalized satisfaction of interventions implemented. Elevation, cold pack applied. Rest and repositioning provided. Pain assessment ongoing. Problem: Safety - Adult  Goal: Free from fall injury  Outcome: Progressing  Note: Patient's safety maintained. Fall precautions in place. Call light and possessions within reach, bed alarm on and placed in low position, side rails upx2, non skid socks on, up with assistance and calls out appropriately; hourly rounding. Problem: Chronic Conditions and Co-morbidities  Goal: Patient's chronic conditions and co-morbidity symptoms are monitored and maintained or improved  Outcome: Progressing  Flowsheets (Taken 7/20/2022 1945)  Care Plan - Patient's Chronic Conditions and Co-Morbidity Symptoms are Monitored and Maintained or Improved:   Monitor and assess patient's chronic conditions and comorbid symptoms for stability, deterioration, or improvement   Collaborate with multidisciplinary team to address chronic and comorbid conditions and prevent exacerbation or deterioration   Update acute care plan with appropriate goals if chronic or comorbid symptoms are exacerbated and prevent overall improvement and discharge     Problem: Skin/Tissue Integrity  Goal: Absence of new skin breakdown  Description: 1. Monitor for areas of redness and/or skin breakdown  2. Assess vascular access sites hourly  3. Every 4-6 hours minimum:  Change oxygen saturation probe site  4. Every 4-6 hours:  If on nasal continuous positive airway pressure, respiratory therapy assess nares and determine need for appliance change or resting period. Outcome: Progressing  Note: Skin protocols continued. No skin breakdown noted. Elevation and repositioning encouraged.      Problem: Skin/Tissue Integrity - Adult  Goal: Skin integrity remains intact  Outcome: Progressing  Flowsheets (Taken 7/20/2022 1945)  Skin Integrity Remains Intact: Monitor for areas of redness and/or skin breakdown  Goal: Incisions, wounds, or drain sites healing without S/S of infection  Outcome: Progressing  Flowsheets (Taken 7/20/2022 1945)  Incisions, Wounds, or Drain Sites Healing Without Sign and Symptoms of Infection: TWICE DAILY: Assess and document skin integrity  Note:  Post op dressing on right foot CDI. On IV Maxipime and Vancomycin for antibiotic treatment. Goal: Oral mucous membranes remain intact  Outcome: Progressing  Flowsheets (Taken 7/20/2022 1945)  Oral Mucous Membranes Remain Intact: Assess oral mucosa and hygiene practices  Note: Oral mucous membranes remains intact.     Problem: Musculoskeletal - Adult  Goal: Return mobility to safest level of function  Outcome: Progressing  Flowsheets (Taken 7/20/2022 1945)  Return Mobility to Safest Level of Function:   Assess patient stability and activity tolerance for standing, transferring and ambulating with or without assistive devices   Ensure adequate protection for wounds/incisions during mobilization   Assist with transfers and ambulation using safe patient handling equipment as needed   Instruct patient/family in ordered activity level  Goal: Maintain proper alignment of affected body part  Outcome: Progressing  Flowsheets (Taken 7/20/2022 1945)  Maintain proper alignment of affected body part:   Support and protect limb and body alignment per provider's orders   Instruct and reinforce with patient and family use of appropriate assistive device and precautions (e.g. spinal or hip dislocation precautions)  Goal: Return ADL status to a safe level of function  Outcome: Progressing  Flowsheets (Taken 7/20/2022 1945)  Return ADL Status to a Safe Level of Function:   Administer medication as ordered   Assess activities of daily living deficits and provide assistive devices as needed   Assist and instruct patient to increase activity and self care as tolerated    Problem: Discharge Planning  Goal: Discharge to home or other facility with appropriate resources  Outcome: Progressing  Note: Patient to return home at discharge and resume services through [address] [place] [address]. Care plan reviewed with patient. Patient verbalize understanding of the plan of care and contribute to goal setting.

## 2022-07-21 NOTE — PROGRESS NOTES
Progress note: Infectious diseases    Patient - Pamela Hill,  Age - 45 y.o.    - 1970      Room Number - 5K-05/005-A   MRN -  [id number]   Acct # - [de-identified]  Date of Admission -  2022  2:38 PM    SUBJECTIVE:   No new issues. OBJECTIVE   VITALS    height is 6' 1\" (1.854 m) and weight is 376 lb 5.2 oz (170.7 kg) (abnormal). His oral temperature is 97.6 °F (36.4 °C). His blood pressure is 127/75 and his pulse is 69. His respiration is 16 and oxygen saturation is 95%. Wt Readings from Last 3 Encounters:   22 (!) 376 lb 5.2 oz (170.7 kg)   22 (!) 369 lb (167.4 kg)   21 (!) 371 lb (168.3 kg)       I/O (24 Hours)    Intake/Output Summary (Last 24 hours) at 2022 1133  Last data filed at 2022 0606  Gross per 24 hour   Intake 3450.76 ml   Output 2450 ml   Net 1000.76 ml         General Appearance  Awake, alert, oriented,  not  In acute distress  HEENT - normocephalic, atraumatic, pink conjunctiva,  anicteric sclera  Neck - Supple, no mass  Lungs -  Bilateral good air entry, no rhonchi, no wheeze  Cardiovascular - Heart sounds are normal.  Regular rate and rhythm without murmur, gallop or rub. Abdomen - soft, not distended, nontender,   Neurologic -oriented  Skin - No bruising or bleeding  Extremities - + edema,   Open wound on the right foot probes to the bone.   MEDICATIONS:      Sodium Hypochlorite   Irrigation Daily    sodium chloride flush  5-40 mL IntraVENous 2 times per day    enoxaparin  60 mg SubCUTAneous BID    cefepime  2,000 mg IntraVENous Q8H    amitriptyline  25 mg Oral Nightly    aspirin  81 mg Oral Daily    atorvastatin  40 mg Oral Nightly    buPROPion  200 mg Oral BID    isosorbide mononitrate  60 mg Oral Daily    lisinopril  30 mg Oral Daily    metoprolol tartrate  25 mg Oral Daily    pantoprazole  40 mg Oral Daily    pregabalin  150 mg Oral BID    tamsulosin  0.4 mg Oral Daily    vilazodone HCl  40 mg Oral Daily    insulin lispro  0-6 Units SubCUTAneous TID WC    insulin lispro  0-3 Units SubCUTAneous Nightly      sodium chloride      sodium chloride 75 mL/hr at 07/21/22 0024    dextrose       sodium chloride flush, sodium chloride, ondansetron **OR** ondansetron, polyethylene glycol, acetaminophen **OR** acetaminophen, albuterol sulfate HFA, dicyclomine, melatonin, glucose, dextrose bolus **OR** dextrose bolus, glucagon (rDNA), dextrose, oxyCODONE-acetaminophen **OR** oxyCODONE-acetaminophen      LABS:     CBC:   Recent Labs     07/19/22  1449 07/20/22  0540   WBC 9.4 7.2   HGB 16.5 15.9    202       BMP:    Recent Labs     07/19/22  1449 07/20/22  0540    136   K 4.5 4.1   CL 99 101   CO2 25 25   BUN 13 14   CREATININE 0.8 0.9   GLUCOSE 104 105       Calcium:  Recent Labs     07/20/22  0540   CALCIUM 9.1       Ionized Calcium:No results for input(s): IONCA in the last 72 hours. Magnesium:No results for input(s): MG in the last 72 hours. Phosphorus:No results for input(s): PHOS in the last 72 hours. BNP:No results for input(s): BNP in the last 72 hours. Glucose:  Recent Labs     07/20/22  1658 07/20/22  1936 07/21/22  0807   POCGLU 110* 94 125*        CULTURES:   UA: No results for input(s): SPECGRAV, PHUR, COLORU, CLARITYU, MUCUS, PROTEINU, BLOODU, RBCUA, WBCUA, BACTERIA, NITRU, GLUCOSEU, BILIRUBINUR, UROBILINOGEN, KETUA, LABCAST, LABCASTTY, AMORPHOS in the last 72 hours.     Invalid input(s): CRYSTALS  Micro:   Lab Results   Component Value Date/Time    BC No growth-preliminary  No growth   04/21/2017 04:48 PM          Problem list of patient:     Patient Active Problem List   Diagnosis Code    Hypertension I10    Hyperlipidemia E78.5    CAD (coronary artery disease) I25.10    Family history of premature coronary heart disease Z82.49    Dizziness R42    Acute renal failure (ARF) (HCC) N17.9    Chronic low back pain M54.50, G89.29    PTSD (post-traumatic stress disorder) F43.10    Foot ulceration, right, with fat layer exposed ([facility] L97.512    Type 2 diabetes mellitus without complication, with long-term current use of insulin (HCC) E11.9, Z79.4    Cellulitis of right foot L03.115    Osteomyelitis (HCC) M86.9         ASSESSMENT/PLAN   Right foot diabetic ulcer with OM: recommend to consider resection of the involved bone  Continue current treatment  Discussed with podiatry      Lavelle Burch MD, MD, FACP 7/21/2022 11:33 AM

## 2022-07-21 NOTE — PLAN OF CARE
Problem: Chronic Conditions and Co-morbidities  Goal: Patient's chronic conditions and co-morbidity symptoms are monitored and maintained or improved  Recent Flowsheet Documentation  Taken 7/21/2022 0800 by Irma Hernandes 34 - Patient's Chronic Conditions and Co-Morbidity Symptoms are Monitored and Maintained or Improved:   Monitor and assess patient's chronic conditions and comorbid symptoms for stability, deterioration, or improvement   Collaborate with multidisciplinary team to address chronic and comorbid conditions and prevent exacerbation or deterioration   Update acute care plan with appropriate goals if chronic or comorbid symptoms are exacerbated and prevent overall improvement and discharge     Problem: Neurosensory - Adult  Goal: Achieves maximal functionality and self care  Recent Flowsheet Documentation  Taken 7/21/2022 0800 by Rena Gu RN  Achieves maximal functionality and self care: Monitor swallowing and airway patency with patient fatigue and changes in neurological status     Problem: Skin/Tissue Integrity - Adult  Goal: Skin integrity remains intact  Recent Flowsheet Documentation  Taken 7/21/2022 0800 by Rena Gu RN  Skin Integrity Remains Intact:   Monitor for areas of redness and/or skin breakdown   Assess vascular access sites hourly     Problem: Skin/Tissue Integrity - Adult  Goal: Incisions, wounds, or drain sites healing without S/S of infection  Recent Flowsheet Documentation  Taken 7/21/2022 0800 by Rena Gu RN  Incisions, Wounds, or Drain Sites Healing Without Sign and Symptoms of Infection: TWICE DAILY: Assess and document skin integrity     Problem: Musculoskeletal - Adult  Goal: Return mobility to safest level of function  Recent Flowsheet Documentation  Taken 7/21/2022 0800 by Rena Gu RN  Return Mobility to Safest Level of Function:   Assess patient stability and activity tolerance for standing, transferring and ambulating with or without assistive devices   Assist with transfers and ambulation using safe patient handling equipment as needed   Ensure adequate protection for wounds/incisions during mobilization     Problem: Musculoskeletal - Adult  Goal: Maintain proper alignment of affected body part  Recent Flowsheet Documentation  Taken 7/21/2022 0800 by Anell Mortimer, RN  Maintain proper alignment of affected body part: Support and protect limb and body alignment per provider's orders     Problem: Musculoskeletal - Adult  Goal: Return ADL status to a safe level of function  Recent Flowsheet Documentation  Taken 7/21/2022 0800 by Anell Mortimer, RN  Return ADL Status to a Safe Level of Function: Administer medication as ordered     Problem: Safety - Adult  Goal: Free from fall injury  Recent Flowsheet Documentation  Taken 7/21/2022 1858 by Anell Mortimer, RN  Free From Fall Injury: Instruct family/caregiver on patient safety

## 2022-07-21 NOTE — PROGRESS NOTES
Podiatric Progress Note  Latrell Barfieldbuck Baher  Subjective :   7/21/22  Patient seen bedside today on behalf of Dr. Vivek Dumont. Patient appeared pleasant, was oriented to person, place and time and in no acute distress. Patient states that he is doing well. Patient denies any N/V/F/C/SOB or CP. Patient has no further pedal concerns at this point in time. HPI           The patient is a 45 y.o. male with significant past medical history of hypertension, hyperlipidemia, CAD, type 2 diabetes who is being seen at bedside on behalf of Dr. Vivek Dumont. Patient is known to Dr. Vivek Dumont and has been following in [address] [place] [address],[address]. Patient presents today for follow up on wound/ulcer's progression. The patient is currently on oral Doxycycline. Current dressing care includes Betadine wet-to-dry, dry sterile dressing, Ace bandage. Patient states that over the weekend he had episodes of nausea, fevers, chills and yesterday 7/18/2022 he noticed a thick green discharge from the wound. Patient currently denies any N/F/V/C/SOB/CP. No other pedal complaints today.       Current Medications:    Current Facility-Administered Medications: Sodium Hypochlorite (DAKINS) 0.25 % external solution, , Irrigation, Daily  sodium chloride flush 0.9 % injection 5-40 mL, 5-40 mL, IntraVENous, 2 times per day  sodium chloride flush 0.9 % injection 5-40 mL, 5-40 mL, IntraVENous, PRN  0.9 % sodium chloride infusion, , IntraVENous, PRN  enoxaparin (LOVENOX) injection 60 mg, 60 mg, SubCUTAneous, BID  ondansetron (ZOFRAN-ODT) disintegrating tablet 4 mg, 4 mg, Oral, Q8H PRN **OR** ondansetron (ZOFRAN) injection 4 mg, 4 mg, IntraVENous, Q6H PRN  polyethylene glycol (GLYCOLAX) packet 17 g, 17 g, Oral, Daily PRN  acetaminophen (TYLENOL) tablet 650 mg, 650 mg, Oral, Q6H PRN **OR** acetaminophen (TYLENOL) suppository 650 mg, 650 mg, Rectal, Q6H PRN  cefepime (MAXIPIME) 2000 mg IVPB minibag, 2,000 mg, IntraVENous, Q8H  albuterol sulfate HFA (PROVENTIL;VENTOLIN;PROAIR) 108 (90 Base) MCG/ACT inhaler 2 puff, 2 puff, Inhalation, Q4H PRN  amitriptyline (ELAVIL) tablet 25 mg, 25 mg, Oral, Nightly  aspirin EC tablet 81 mg, 81 mg, Oral, Daily  atorvastatin (LIPITOR) tablet 40 mg, 40 mg, Oral, Nightly  buPROPion (WELLBUTRIN SR) extended release tablet 200 mg, 200 mg, Oral, BID  dicyclomine (BENTYL) capsule 10 mg, 10 mg, Oral, Daily PRN  isosorbide mononitrate (IMDUR) extended release tablet 60 mg, 60 mg, Oral, Daily  lisinopril (PRINIVIL;ZESTRIL) tablet 30 mg, 30 mg, Oral, Daily  metoprolol tartrate (LOPRESSOR) tablet 25 mg, 25 mg, Oral, Daily  pantoprazole (PROTONIX) tablet 40 mg, 40 mg, Oral, Daily  pregabalin (LYRICA) capsule 150 mg, 150 mg, Oral, BID  melatonin tablet 4.5 mg, 4.5 mg, Oral, Nightly PRN  tamsulosin (FLOMAX) capsule 0.4 mg, 0.4 mg, Oral, Daily  vilazodone HCl (VIIBRYD) TABS 40 mg, 40 mg, Oral, Daily  0.9 % sodium chloride infusion, , IntraVENous, Continuous  insulin lispro (HUMALOG) injection vial 0-6 Units, 0-6 Units, SubCUTAneous, TID WC  insulin lispro (HUMALOG) injection vial 0-3 Units, 0-3 Units, SubCUTAneous, Nightly  glucose chewable tablet 16 g, 4 tablet, Oral, PRN  dextrose bolus 10% 125 mL, 125 mL, IntraVENous, PRN **OR** dextrose bolus 10% 250 mL, 250 mL, IntraVENous, PRN  glucagon (rDNA) injection 1 mg, 1 mg, IntraMUSCular, PRN  dextrose 5 % solution, 100 mL/hr, IntraVENous, PRN  oxyCODONE-acetaminophen (PERCOCET) 5-325 MG per tablet 1 tablet, 1 tablet, Oral, Q4H PRN **OR** oxyCODONE-acetaminophen (PERCOCET) 5-325 MG per tablet 2 tablet, 2 tablet, Oral, Q4H PRN    Objective     /75   Pulse 69   Temp 97.6 °F (36.4 °C) (Oral)   Resp 16   Ht 6' 1\" (1.854 m)   Wt (!) 376 lb 5.2 oz (170.7 kg)   SpO2 95%   BMI 49.65 kg/m²      I/O:  Intake/Output Summary (Last 24 hours) at 7/21/2022 1038  Last data filed at 7/21/2022 0606  Gross per 24 hour   Intake 3450.76 ml   Output 2450 ml   Net 1000.76 ml              Wt Readings from Last 3 Encounters:   07/21/22 (!) 376 lb 5.2 oz (170.7 kg)   01/19/22 (!) 369 lb (167.4 kg)   12/22/21 (!) 371 lb (168.3 kg)       LABS:    Recent Labs     07/19/22  1449 07/20/22  0540   WBC 9.4 7.2   HGB 16.5 15.9   HCT 49.5 48.4    202        Recent Labs     07/20/22  0540      K 4.1      CO2 25   BUN 14   CREATININE 0.9      No results for input(s): PROT, INR, APTT in the last 72 hours. No results for input(s): CKTOTAL, CKMB, CKMBINDEX, TROPONINI in the last 72 hours. Focused Lower Extremity Examination:    Vitals:    /75   Pulse 69   Temp 97.6 °F (36.4 °C) (Oral)   Resp 16   Ht 6' 1\" (1.854 m)   Wt (!) 376 lb 5.2 oz (170.7 kg)   SpO2 95%   BMI 49.65 kg/m²      Vascular: Dorsalis pedis and posterior tibial pulses are faintly palpable secondary to edema bilaterally. Skin temperature is warm to warm from proximal tibial tuberosity to distal digits. CFT brisk to exposed digits and to amputation stump on right. Pitting edema present to bilateral LE. Dermatologic: TMA dehiscence wound noted to the distal aspect of the right foot. Base appears granular with superficial slough. Wound probes to bone. Some epibole noted to the wound edges, but the edges appear viable. Scant maceration noted to the periwound area, mostly to the medial flap. Minimal erythema noted. No purulent drainage or malodor. Some moderate nonpitting edema noted to the right foot. Neurovascular: Light touch sensation grossly diminished at the feet bilaterally. Musculoskeletal: Muscle strength 5/5 for all pedal muscle groups tested, right. Gastro-soleal equinus present bilaterally. Transmetatarsal amputation to right. Mild pain on palpation. Images  MRI FOOT RIGHT W WO CONTRAST   Final Result   Impression:   1. Osteomyelitis in the 5th metatarsal stump. Mild osteomyelitis at the    distal end of the 4th metatarsal stump. 2. Cellulitis. Soft tissue ulcer at the stump. No drainable rim-enhancing    fluid collection.       This document has solution by nursing.  -Patient is to remain heel weightbearing only to right foot for for transfers and bathroom privileges.  -Discussed with patient the possibility for further surgical intervention due to the presence of infected bone in MRI, pending response to IV antibiotic therapy  -Patient verbalized agreement understanding with treatment plan discussed. All patient's questions were answered to satisfaction. DISPO: MRI positive for osteomyelitis to 4th and 5th metatarsal bases. Pending response to IV antibiotic therapy, assess need for further surgical intervention. Continue with Dakin's wet-to-dry dressing at this time. Zuleika Perez [id number]  7/21/2022   10:38 AM     Patient examined independent of resident exam. Discussed MRI results and need for surgical resection of infected bone. Discussed partial 4,5 metatarsal resections vs TMA revision. Discussed at length that he will likely need soft tissue balancing in the future vs a more proximal amputation. Patient verbalized understanding. Plan for OR tomorrow for above procedures. All questions and concerns addressed. Benefits and risks such as but not limited to bleeding, infection, recurrence, CRPS, transfer lesions, need for further surgery, DVT, PE, loss of limb, loss of life discussed. No promises or gaurantees were made regarding surgical outcome.    Nash Aldrich DPM

## 2022-07-21 NOTE — CARE COORDINATION
7/21/22, 3:55 PM EDT    DISCHARGE ON GOING EVALUATION    [facility] day: 1  Location: -05/005-A Reason for admit: Cellulitis of right foot [L03.115]  Osteomyelitis ([address] [M86.9]   Procedure:   MRI of right foot:  1. Osteomyelitis in the 5th metatarsal stump. Mild osteomyelitis at the   distal end of the 4th metatarsal stump. 2. Cellulitis. Soft tissue ulcer at the stump. No drainable rim-enhancing   fluid collection. Barriers to Discharge: IVF. IV ancef. Dakins. Await surgical plan. PCP: MICHELLE Marte CNP  Readmission Risk Score: 10.2%  Patient Goals/Plan/Treatment Preferences: Plans home alone with assist from brother. Current with [facility] for RN visits. Follow for surgical and atb plan.

## 2022-07-21 NOTE — CARE COORDINATION
DISCHARGE/PLANNING EVALUATION  7/21/22, 12:18 PM EDT    Reason for Referral: Discharge planning  Mental Status: Answered questions appropriately  Decision Making: Independent  Family/Social/Home Environment: Lives at home alone, brother will be coming up to help him out. Current Services including food security, transportation and housekeeping: Patient does not drive, he is current with [facility] services. Current Equipment:W/C and electric scooter  Payment Source:[facility] Medicare   Concerns or Barriers to Discharge: None  Post acute provider list with quality measures, geographic area and applicable managed care information provided. Questions regarding selection process answered:None    Teach Back Method used with Monica Arcos regarding care plan and discharge planning. Patient  verbalize understanding of the plan of care and contribute to goal setting. Patient goals, treatment preferences and discharge plan: Return to home alone with help from brother. Current [facility] for [employer]. Confirmed services with agency.     Electronically signed by SONIA Saenz on 7/21/2022 at 12:18 PM

## 2022-07-21 NOTE — FLOWSHEET NOTE
Pt was in bed and alone at the time of the visit. He was dealing with osteomyelitis but was hopeful. He will be having surgery tomorrow, he reported. It was a good visit but he declined prayer. I wished him all the best.    07/21/22 1435   Encounter Summary   Encounter Overview/Reason  Initial Encounter   Service Provided For: Patient   Referral/Consult From: [address] Family members   Last Encounter  07/21/22   Complexity of Encounter Low   Begin Time 1015   End Time  1022   Total Time Calculated 7 min   Spiritual/Emotional needs   Type Spiritual Support   Assessment/Intervention/Outcome   Assessment Calm; Hopeful   Intervention Active listening;Empowerment;Nurtured [facility]

## 2022-07-21 NOTE — PLAN OF CARE
Problem: Discharge Planning  Goal: Discharge to home or other facility with appropriate resources  7/21/2022 1217 by SONIA Acevedo  Outcome: Progressing     Consult received. Please see SW note dated 7/21.

## 2022-07-21 NOTE — PROGRESS NOTES
Hospitalist Progress Note      Patient:  Perri Solis    Unit/Bed:5K-05/005-A  YOB: 1970  MRN: [id number]   Acct: [de-identified]   PCP: MICHELLE Garnica CNP  Date of Admission: 7/19/2022    Assessment/Plan:    Osteomyelitis, Right Foot: Podiatry & ID consulted. A&A growing Staph Aureus (coag positive). IV ABX continued. MRI right foot showed OM of stump. Likely will need surgery. Essential HTN: BP controlled. Continue home medications. NIDDMII: Controlled. Lastest Hgb A1c 5.5, continue SSI in hospital and will restart home regimen at ID. Polyneuropathy: Lyrica. HLD: Lipitor. GERD: PPI. Anxiety: Macario Daquan, Bupropion    Chief Complaint: Fever, chills, foot ulcer     Initial H and P:-    Initial H&P \"52 y.o. male with a PMHx of HTN, DM2, HLD, peripheral neuropathy, GERD, depression, and anxiety who presented to [address] [place] [address] for worsening right foot wound with associated fever, chills, and feeling sick over the weekend. Patient has a history of diabetic foot ulcer requiring right transmetatarsal amputation. Patient has had open wound since his surgery about a year ago. He was treated with Keflex/doxycycline however he had persistent wound that failed to close. Over the weekend, he developed fever, chills and nausea. He states that yesterday he noticed purulent greenish discharge from his right foot wound. He was seen today at the wound clinic and decision was made to admit patient for further treatment. Hospitalist was contacted for admission. Please refer to A&P for further details. \"     7/20: No acute events overnight. Pt states that his pain is relatively controlled. Pt has no issues or concerns at this time. Pt is anticipating surgery. Subjective (past 24 hours):   No acute events overnight. Pt states that her pain is relatively controlled.  Pt states that he is having some leg and back pain today, he attributed this to the hospital bed. Pt is asking if he is going to have surgery; it was explained that Podiatry will make this decision. Past medical history, family history, social history and allergies reviewed again and is unchanged since admission. ROS (All review of systems completed. Pertinent positives noted. Otherwise All other systems reviewed and negative.)     Medications:  Reviewed    Infusion Medications    sodium chloride      sodium chloride 75 mL/hr at 07/21/22 0024    dextrose       Scheduled Medications    ceFAZolin  2,000 mg IntraVENous Q8H    Sodium Hypochlorite   Irrigation Daily    sodium chloride flush  5-40 mL IntraVENous 2 times per day    enoxaparin  60 mg SubCUTAneous BID    amitriptyline  25 mg Oral Nightly    aspirin  81 mg Oral Daily    atorvastatin  40 mg Oral Nightly    buPROPion  200 mg Oral BID    isosorbide mononitrate  60 mg Oral Daily    lisinopril  30 mg Oral Daily    metoprolol tartrate  25 mg Oral Daily    pantoprazole  40 mg Oral Daily    pregabalin  150 mg Oral BID    tamsulosin  0.4 mg Oral Daily    vilazodone HCl  40 mg Oral Daily    insulin lispro  0-6 Units SubCUTAneous TID WC    insulin lispro  0-3 Units SubCUTAneous Nightly     PRN Meds: sodium chloride flush, sodium chloride, ondansetron **OR** ondansetron, polyethylene glycol, acetaminophen **OR** acetaminophen, albuterol sulfate HFA, dicyclomine, melatonin, glucose, dextrose bolus **OR** dextrose bolus, glucagon (rDNA), dextrose, oxyCODONE-acetaminophen **OR** oxyCODONE-acetaminophen      Intake/Output Summary (Last 24 hours) at 7/21/2022 1311  Last data filed at 7/21/2022 0606  Gross per 24 hour   Intake 3450.76 ml   Output 2450 ml   Net 1000.76 ml       Diet:  ADULT DIET;  Regular; 4 carb choices (60 gm/meal)    Exam:  /75   Pulse 69   Temp 97.6 °F (36.4 °C) (Oral)   Resp 16   Ht 6' 1\" (1.854 m)   Wt (!) 376 lb 5.2 oz (170.7 kg)   SpO2 95%   BMI 49.65 kg/m²   General appearance: No apparent distress, appears stated age and cooperative. HEENT: Pupils equal, round, and reactive to light. Conjunctivae/corneas clear. Neck: Supple, with full range of motion. No jugular venous distention. Trachea midline. Respiratory:  Normal respiratory effort. Clear to auscultation, bilaterally without Rales/Wheezes/Rhonchi. Cardiovascular: Regular rate and rhythm with normal S1/S2 without murmurs, rubs or gallops. Abdomen: Soft, non-tender, non-distended with normal bowel sounds. Musculoskeletal: passive and active ROM x 4 extremities. Skin: Skin color, texture, turgor normal.  No rashes or lesions. Neurologic:  Neurovascularly intact without any focal sensory/motor deficits. Cranial nerves: II-XII intact, grossly non-focal.  Psychiatric: Alert and oriented, thought content appropriate, normal insight  Capillary Refill: Brisk,< 3 seconds   Peripheral Pulses: +2 palpable, equal bilaterally     Right Foot:            Labs:   Recent Labs     07/19/22  1449 07/20/22  0540   WBC 9.4 7.2   HGB 16.5 15.9   HCT 49.5 48.4    202     Recent Labs     07/19/22  1449 07/20/22  0540    136   K 4.5 4.1   CL 99 101   CO2 25 25   BUN 13 14   CREATININE 0.8 0.9   CALCIUM 9.8 9.1       Microbiology:    Blood culture #1:   Lab Results   Component Value Date/Time    BC No growth-preliminary  No growth   04/21/2017 04:48 PM       Blood culture #2:No results found for: Giana Jennings    Organism:  Lab Results   Component Value Date/Time    ORG Staphylococcus aureus 07/19/2022 01:40 PM         Lab Results   Component Value Date/Time    LABGRAM  07/19/2022 01:40 PM     Rare segmented neutrophils observed. Rare epithelial cells observed. Rare gram positive cocci occurring singly and in pairs. Aerobic and Anaerobic :  Lab Results   Component Value Date/Time    LABAERO  07/19/2022 01:40 PM     Culture also yielded moderate growth of gram positive bacilli most consistent with Corynebacterium species.      LABAERO 07/19/2022 01:40 PM     moderate growth In the treatment of gram positive infections, GENTAMICIN should be CONSIDERED a SYNERGYSTIC agent ONLY. Lab Results   Component Value Date/Time    LABANAE  06/29/2021 01:53 PM     Culture yielded heavy mixed growth which included multiple colony types of anaerobic gram positive cocci, anaerobic gram negative bacilli, and anaerobic gram positive bacilli most consistent with Lactobacillus species. If a true mixed aerobic and anaerobic infection is suspected, then broad spectrum empiric antibiotic therapy is indicated and should include coverage for anaerobic organisms. Urinalysis:      Lab Results   Component Value Date/Time    NITRU Negative 08/12/2016 06:59 PM    BLOODU Negative 08/12/2016 06:59 PM    SPECGRAV 1.015 08/12/2016 06:59 PM    GLUCOSEU NEGATIVE 07/07/2015 12:48 AM       Radiology:  MRI FOOT RIGHT W WO CONTRAST   Final Result   Impression:   1. Osteomyelitis in the 5th metatarsal stump. Mild osteomyelitis at the    distal end of the 4th metatarsal stump. 2. Cellulitis. Soft tissue ulcer at the stump. No drainable rim-enhancing    fluid collection. This document has been electronically signed by: Rambo Rachel MD on    07/19/2022 10:08 PM      XR FOOT RIGHT (MIN 3 VIEWS)   Final Result   Post operative changes related to transmetatarsal amputation are noted. Periarticular calcification and fragmented appearance of the distal fifth metatarsal diaphysis is nonspecific and could be postoperative. There is a suggestion of soft tissue    swelling at the fourth and fifth metatarsals and osteomyelitis at this location is not excluded. **This report has been created using voice recognition software. It may contain minor errors which are inherent in voice recognition technology. **      Final report electronically signed by Dr Teresa Pierre on 7/19/2022 4:56 PM          Electronically signed by LATISHA Madrid on 7/21/2022 at 1:11 PM

## 2022-07-22 ENCOUNTER — ANESTHESIA (OUTPATIENT)
Dept: OPERATING ROOM | Age: 52
DRG: 617 | End: 2022-07-22
Payer: MEDICARE

## 2022-07-22 ENCOUNTER — ANESTHESIA EVENT (OUTPATIENT)
Dept: OPERATING ROOM | Age: 52
DRG: 617 | End: 2022-07-22
Payer: MEDICARE

## 2022-07-22 LAB
AEROBIC CULTURE: ABNORMAL
AEROBIC CULTURE: ABNORMAL
ANAEROBIC CULTURE: ABNORMAL
GLUCOSE BLD-MCNC: 160 MG/DL (ref 70–108)
GLUCOSE BLD-MCNC: 86 MG/DL (ref 70–108)
GLUCOSE BLD-MCNC: 89 MG/DL (ref 70–108)
GLUCOSE BLD-MCNC: 90 MG/DL (ref 70–108)
GRAM STAIN RESULT: ABNORMAL
ORGANISM: ABNORMAL

## 2022-07-22 PROCEDURE — 2709999900 HC NON-CHARGEABLE SUPPLY: Performed by: STUDENT IN AN ORGANIZED HEALTH CARE EDUCATION/TRAINING PROGRAM

## 2022-07-22 PROCEDURE — 87186 SC STD MICRODIL/AGAR DIL: CPT

## 2022-07-22 PROCEDURE — 99233 SBSQ HOSP IP/OBS HIGH 50: CPT | Performed by: PHYSICIAN ASSISTANT

## 2022-07-22 PROCEDURE — 6370000000 HC RX 637 (ALT 250 FOR IP)

## 2022-07-22 PROCEDURE — 2580000003 HC RX 258: Performed by: INTERNAL MEDICINE

## 2022-07-22 PROCEDURE — 87070 CULTURE OTHR SPECIMN AEROBIC: CPT

## 2022-07-22 PROCEDURE — 0Y6M0Z8 DETACHMENT AT RIGHT FOOT, COMPLETE 5TH RAY, OPEN APPROACH: ICD-10-PCS | Performed by: PHYSICIAN ASSISTANT

## 2022-07-22 PROCEDURE — 2580000003 HC RX 258

## 2022-07-22 PROCEDURE — 88305 TISSUE EXAM BY PATHOLOGIST: CPT

## 2022-07-22 PROCEDURE — 2500000003 HC RX 250 WO HCPCS: Performed by: STUDENT IN AN ORGANIZED HEALTH CARE EDUCATION/TRAINING PROGRAM

## 2022-07-22 PROCEDURE — 3700000001 HC ADD 15 MINUTES (ANESTHESIA): Performed by: STUDENT IN AN ORGANIZED HEALTH CARE EDUCATION/TRAINING PROGRAM

## 2022-07-22 PROCEDURE — 87077 CULTURE AEROBIC IDENTIFY: CPT

## 2022-07-22 PROCEDURE — 3700000000 HC ANESTHESIA ATTENDED CARE: Performed by: STUDENT IN AN ORGANIZED HEALTH CARE EDUCATION/TRAINING PROGRAM

## 2022-07-22 PROCEDURE — 2580000003 HC RX 258: Performed by: STUDENT IN AN ORGANIZED HEALTH CARE EDUCATION/TRAINING PROGRAM

## 2022-07-22 PROCEDURE — 82948 REAGENT STRIP/BLOOD GLUCOSE: CPT

## 2022-07-22 PROCEDURE — 3600000002 HC SURGERY LEVEL 2 BASE: Performed by: STUDENT IN AN ORGANIZED HEALTH CARE EDUCATION/TRAINING PROGRAM

## 2022-07-22 PROCEDURE — 6360000002 HC RX W HCPCS: Performed by: INTERNAL MEDICINE

## 2022-07-22 PROCEDURE — 87147 CULTURE TYPE IMMUNOLOGIC: CPT

## 2022-07-22 PROCEDURE — 87075 CULTR BACTERIA EXCEPT BLOOD: CPT

## 2022-07-22 PROCEDURE — 2500000003 HC RX 250 WO HCPCS

## 2022-07-22 PROCEDURE — 6360000002 HC RX W HCPCS

## 2022-07-22 PROCEDURE — 1200000000 HC SEMI PRIVATE

## 2022-07-22 PROCEDURE — 88311 DECALCIFY TISSUE: CPT

## 2022-07-22 PROCEDURE — 0Y6M0Z7 DETACHMENT AT RIGHT FOOT, COMPLETE 4TH RAY, OPEN APPROACH: ICD-10-PCS | Performed by: PHYSICIAN ASSISTANT

## 2022-07-22 PROCEDURE — 3600000012 HC SURGERY LEVEL 2 ADDTL 15MIN: Performed by: STUDENT IN AN ORGANIZED HEALTH CARE EDUCATION/TRAINING PROGRAM

## 2022-07-22 PROCEDURE — 87205 SMEAR GRAM STAIN: CPT

## 2022-07-22 PROCEDURE — 6370000000 HC RX 637 (ALT 250 FOR IP): Performed by: STUDENT IN AN ORGANIZED HEALTH CARE EDUCATION/TRAINING PROGRAM

## 2022-07-22 RX ORDER — MIDAZOLAM HYDROCHLORIDE 1 MG/ML
INJECTION INTRAMUSCULAR; INTRAVENOUS PRN
Status: DISCONTINUED | OUTPATIENT
Start: 2022-07-22 | End: 2022-07-22 | Stop reason: SDUPTHER

## 2022-07-22 RX ORDER — FENTANYL CITRATE 50 UG/ML
INJECTION, SOLUTION INTRAMUSCULAR; INTRAVENOUS PRN
Status: DISCONTINUED | OUTPATIENT
Start: 2022-07-22 | End: 2022-07-22 | Stop reason: SDUPTHER

## 2022-07-22 RX ORDER — PROPOFOL 10 MG/ML
INJECTION, EMULSION INTRAVENOUS PRN
Status: DISCONTINUED | OUTPATIENT
Start: 2022-07-22 | End: 2022-07-22 | Stop reason: SDUPTHER

## 2022-07-22 RX ORDER — LIDOCAINE HYDROCHLORIDE 20 MG/ML
INJECTION, SOLUTION INTRAVENOUS PRN
Status: DISCONTINUED | OUTPATIENT
Start: 2022-07-22 | End: 2022-07-22 | Stop reason: SDUPTHER

## 2022-07-22 RX ORDER — PROPOFOL 10 MG/ML
INJECTION, EMULSION INTRAVENOUS CONTINUOUS PRN
Status: DISCONTINUED | OUTPATIENT
Start: 2022-07-22 | End: 2022-07-22 | Stop reason: SDUPTHER

## 2022-07-22 RX ORDER — LIDOCAINE HYDROCHLORIDE 10 MG/ML
INJECTION, SOLUTION INFILTRATION; PERINEURAL PRN
Status: DISCONTINUED | OUTPATIENT
Start: 2022-07-22 | End: 2022-07-22 | Stop reason: ALTCHOICE

## 2022-07-22 RX ORDER — EPHEDRINE SULFATE/0.9% NACL/PF 50 MG/5 ML
SYRINGE (ML) INTRAVENOUS PRN
Status: DISCONTINUED | OUTPATIENT
Start: 2022-07-22 | End: 2022-07-22 | Stop reason: SDUPTHER

## 2022-07-22 RX ORDER — ONDANSETRON 2 MG/ML
INJECTION INTRAMUSCULAR; INTRAVENOUS PRN
Status: DISCONTINUED | OUTPATIENT
Start: 2022-07-22 | End: 2022-07-22 | Stop reason: SDUPTHER

## 2022-07-22 RX ADMIN — SODIUM CHLORIDE: 9 INJECTION, SOLUTION INTRAVENOUS at 15:22

## 2022-07-22 RX ADMIN — INSULIN LISPRO 1 UNITS: 100 INJECTION, SOLUTION INTRAVENOUS; SUBCUTANEOUS at 21:15

## 2022-07-22 RX ADMIN — ONDANSETRON 4 MG: 2 INJECTION INTRAMUSCULAR; INTRAVENOUS at 15:28

## 2022-07-22 RX ADMIN — TAMSULOSIN HYDROCHLORIDE 0.4 MG: 0.4 CAPSULE ORAL at 09:03

## 2022-07-22 RX ADMIN — OXYCODONE AND ACETAMINOPHEN 2 TABLET: 5; 325 TABLET ORAL at 01:00

## 2022-07-22 RX ADMIN — PHENYLEPHRINE HYDROCHLORIDE 100 MCG: 10 INJECTION INTRAVENOUS at 15:16

## 2022-07-22 RX ADMIN — PANTOPRAZOLE SODIUM 40 MG: 40 TABLET, DELAYED RELEASE ORAL at 09:03

## 2022-07-22 RX ADMIN — Medication 20 MG: at 15:30

## 2022-07-22 RX ADMIN — Medication 15 MG: at 15:21

## 2022-07-22 RX ADMIN — ISOSORBIDE MONONITRATE 60 MG: 60 TABLET, EXTENDED RELEASE ORAL at 09:03

## 2022-07-22 RX ADMIN — OXYCODONE AND ACETAMINOPHEN 1 TABLET: 5; 325 TABLET ORAL at 21:16

## 2022-07-22 RX ADMIN — LIDOCAINE HYDROCHLORIDE 100 MG: 20 INJECTION, SOLUTION INTRAVENOUS at 14:48

## 2022-07-22 RX ADMIN — PHENYLEPHRINE HYDROCHLORIDE 100 MCG: 10 INJECTION INTRAVENOUS at 15:10

## 2022-07-22 RX ADMIN — Medication 15 MG: at 15:27

## 2022-07-22 RX ADMIN — PREGABALIN 150 MG: 75 CAPSULE ORAL at 21:16

## 2022-07-22 RX ADMIN — CEFAZOLIN 2000 MG: 10 INJECTION, POWDER, FOR SOLUTION INTRAVENOUS at 04:40

## 2022-07-22 RX ADMIN — ATORVASTATIN CALCIUM 40 MG: 40 TABLET, FILM COATED ORAL at 21:05

## 2022-07-22 RX ADMIN — VILAZODONE HYDROCHLORIDE 40 MG: 40 TABLET ORAL at 09:02

## 2022-07-22 RX ADMIN — PREGABALIN 150 MG: 75 CAPSULE ORAL at 09:02

## 2022-07-22 RX ADMIN — CEFAZOLIN 2000 MG: 10 INJECTION, POWDER, FOR SOLUTION INTRAVENOUS at 12:54

## 2022-07-22 RX ADMIN — MIDAZOLAM 2 MG: 1 INJECTION INTRAMUSCULAR; INTRAVENOUS at 14:45

## 2022-07-22 RX ADMIN — PROPOFOL 150 MCG/KG/MIN: 10 INJECTION, EMULSION INTRAVENOUS at 14:48

## 2022-07-22 RX ADMIN — PROPOFOL 50 MG: 10 INJECTION, EMULSION INTRAVENOUS at 14:48

## 2022-07-22 RX ADMIN — PROPOFOL 30 MG: 10 INJECTION, EMULSION INTRAVENOUS at 15:43

## 2022-07-22 RX ADMIN — CEFAZOLIN 2000 MG: 10 INJECTION, POWDER, FOR SOLUTION INTRAVENOUS at 21:22

## 2022-07-22 RX ADMIN — FENTANYL CITRATE 25 MCG: 50 INJECTION, SOLUTION INTRAMUSCULAR; INTRAVENOUS at 14:45

## 2022-07-22 RX ADMIN — LISINOPRIL 30 MG: 20 TABLET ORAL at 09:03

## 2022-07-22 RX ADMIN — AMITRIPTYLINE HYDROCHLORIDE 25 MG: 25 TABLET, FILM COATED ORAL at 21:05

## 2022-07-22 RX ADMIN — METOPROLOL TARTRATE 25 MG: 25 TABLET, FILM COATED ORAL at 09:03

## 2022-07-22 RX ADMIN — SODIUM CHLORIDE: 9 INJECTION, SOLUTION INTRAVENOUS at 00:27

## 2022-07-22 RX ADMIN — OXYCODONE AND ACETAMINOPHEN 2 TABLET: 5; 325 TABLET ORAL at 16:43

## 2022-07-22 RX ADMIN — BUPROPION HYDROCHLORIDE 200 MG: 100 TABLET, FILM COATED, EXTENDED RELEASE ORAL at 09:03

## 2022-07-22 RX ADMIN — PHENYLEPHRINE HYDROCHLORIDE 100 MCG: 10 INJECTION INTRAVENOUS at 15:05

## 2022-07-22 RX ADMIN — BUPROPION HYDROCHLORIDE 200 MG: 100 TABLET, FILM COATED, EXTENDED RELEASE ORAL at 21:05

## 2022-07-22 ASSESSMENT — PAIN DESCRIPTION - DESCRIPTORS
DESCRIPTORS: THROBBING
DESCRIPTORS: ACHING;DISCOMFORT
DESCRIPTORS: ACHING;SHARP
DESCRIPTORS: ACHING;SORE
DESCRIPTORS: ACHING;DISCOMFORT

## 2022-07-22 ASSESSMENT — PAIN SCALES - GENERAL
PAINLEVEL_OUTOF10: 2
PAINLEVEL_OUTOF10: 5
PAINLEVEL_OUTOF10: 6
PAINLEVEL_OUTOF10: 9
PAINLEVEL_OUTOF10: 4
PAINLEVEL_OUTOF10: 2
PAINLEVEL_OUTOF10: 8
PAINLEVEL_OUTOF10: 4

## 2022-07-22 ASSESSMENT — PAIN - FUNCTIONAL ASSESSMENT: PAIN_FUNCTIONAL_ASSESSMENT: ACTIVITIES ARE NOT PREVENTED

## 2022-07-22 ASSESSMENT — PAIN DESCRIPTION - PAIN TYPE
TYPE: CHRONIC PAIN
TYPE: ACUTE PAIN;SURGICAL PAIN
TYPE: CHRONIC PAIN

## 2022-07-22 ASSESSMENT — PAIN DESCRIPTION - DIRECTION: RADIATING_TOWARDS: LEFT THIGH

## 2022-07-22 ASSESSMENT — PAIN DESCRIPTION - FREQUENCY
FREQUENCY: CONTINUOUS
FREQUENCY: CONTINUOUS
FREQUENCY: INTERMITTENT
FREQUENCY: CONTINUOUS
FREQUENCY: CONTINUOUS

## 2022-07-22 ASSESSMENT — PAIN DESCRIPTION - ORIENTATION
ORIENTATION: LOWER;LEFT
ORIENTATION: RIGHT;OUTER
ORIENTATION: LOWER

## 2022-07-22 ASSESSMENT — PAIN DESCRIPTION - LOCATION
LOCATION: BACK
LOCATION: BACK;HIP
LOCATION: FOOT
LOCATION: BACK
LOCATION: BACK

## 2022-07-22 ASSESSMENT — PAIN DESCRIPTION - ONSET
ONSET: ON-GOING
ONSET: GRADUAL
ONSET: ON-GOING

## 2022-07-22 NOTE — PROGRESS NOTES
Progress note: Infectious diseases    Patient - Alona Ponce,  Age - 45 y.o.    - 1970      Room Number - 5K-05/005-A   MRN -  [id number]   [facility]  Date of Admission -  2022  2:38 PM    SUBJECTIVE:   No new issues. plan for surgery today  OBJECTIVE   VITALS    height is 6' 1\" (1.854 m) and weight is 376 lb 5.2 oz (170.7 kg) (abnormal). His oral temperature is 97.6 °F (36.4 °C). His blood pressure is 151/82 (abnormal) and his pulse is 72. His respiration is 16 and oxygen saturation is 98%. Wt Readings from Last 3 Encounters:   22 (!) 376 lb 5.2 oz (170.7 kg)   22 (!) 369 lb (167.4 kg)   21 (!) 371 lb (168.3 kg)       I/O (24 Hours)    Intake/Output Summary (Last 24 hours) at 2022 1040  Last data filed at 2022 0803  Gross per 24 hour   Intake 3361.35 ml   Output 1400 ml   Net 1961.35 ml         General Appearance  Awake, alert, oriented,  not  In acute distress  HEENT - normocephalic, atraumatic, pink conjunctiva,  anicteric sclera  Neck - Supple, no mass  Lungs -  Bilateral good air entry, no rhonchi, no wheeze  Cardiovascular - Heart sounds are normal.  Regular rate and rhythm without murmur, gallop or rub. Abdomen - soft, not distended, nontender,   Neurologic -oriented  Skin - No bruising or bleeding  Extremities - + edema,   Open wound on the right foot probes to the bone.   MEDICATIONS:      ceFAZolin  2,000 mg IntraVENous Q8H    lidocaine  2 patch TransDERmal Daily    Sodium Hypochlorite   Irrigation Daily    sodium chloride flush  5-40 mL IntraVENous 2 times per day    enoxaparin  60 mg SubCUTAneous BID    amitriptyline  25 mg Oral Nightly    aspirin  81 mg Oral Daily    atorvastatin  40 mg Oral Nightly    buPROPion  200 mg Oral BID    isosorbide mononitrate  60 mg Oral Daily    lisinopril  30 mg Oral Daily    metoprolol tartrate  25 mg Oral Daily  pantoprazole  40 mg Oral Daily    pregabalin  150 mg Oral BID    tamsulosin  0.4 mg Oral Daily    vilazodone HCl  40 mg Oral Daily    insulin lispro  0-6 Units SubCUTAneous TID     insulin lispro  0-3 Units SubCUTAneous Nightly      sodium chloride      sodium chloride 75 mL/hr at 07/22/22 0510    dextrose       sodium chloride flush, sodium chloride, ondansetron **OR** ondansetron, polyethylene glycol, acetaminophen **OR** acetaminophen, albuterol sulfate HFA, dicyclomine, melatonin, glucose, dextrose bolus **OR** dextrose bolus, glucagon (rDNA), dextrose, oxyCODONE-acetaminophen **OR** oxyCODONE-acetaminophen      LABS:     CBC:   Recent Labs     07/19/22  1449 07/20/22  0540   WBC 9.4 7.2   HGB 16.5 15.9    202       BMP:    Recent Labs     07/19/22  1449 07/20/22  0540    136   K 4.5 4.1   CL 99 101   CO2 25 25   BUN 13 14   CREATININE 0.8 0.9   GLUCOSE 104 105       Calcium:  Recent Labs     07/20/22  0540   CALCIUM 9.1        Glucose:  Recent Labs     07/21/22  1624 07/21/22  1951 07/22/22  0757   POCGLU 103 114* 89           Problem list of patient:     Patient Active Problem List   Diagnosis Code    Hypertension I10    Hyperlipidemia E78.5    CAD (coronary artery disease) I25.10    Family history of premature coronary heart disease Z82.49    Dizziness R42    Acute renal failure (ARF) ([facility]) N17.9    Chronic low back pain M54.50, G89.29    PTSD (post-traumatic stress disorder) F43.10    Foot ulceration, right, with fat layer exposed ([facility] L97.512    Type 2 diabetes mellitus without complication, with long-term current use of insulin ([facility]) E11.9, Z79.4    Cellulitis of right foot L03.115    Osteomyelitis ([facility]) M86.9         ASSESSMENT/PLAN   Right foot diabetic ulcer with OM: plan for surgery today.   Continue current treatment         Diana Ortiz MD, MD, FACP 7/22/2022 10:40 AM

## 2022-07-22 NOTE — BRIEF OP NOTE
Brief Postoperative Note      Patient: Galileo Leong  YOB: 1970  MRN: [id number]    Date of Procedure: 7/22/2022    Pre-Op Diagnosis: Osteomyelitis of foot, unspecified laterality, unspecified [facility] [M86.9]    Post-Op Diagnosis: Same       Procedure(s):  Incision and drainage down to bone with Right 4th & 5th Ray Amputation    Surgeon(s):  Rocky Fink DPM    Assistant:  Resident: Dallas Bradshaw DPM, Neetu Eric DPM    Anesthesia: General    Estimated Blood Loss (mL): less than 55AE    Complications: None    Specimens:   ID Type Source Tests Collected by Time Destination   A : Right 5th Metatarsal Bone Toe SURGICAL PATHOLOGY, CULTURE, ANAEROBIC AND AEROBIC Rocky Fink DPM 7/22/2022 1520    B : Ulcer Right Foot Tissue Foot SURGICAL PATHOLOGY, CULTURE, ANAEROBIC AND AEROBIC Rocky Fink, JADE 7/22/2022 1521    C : Right 4th Metatarsal Bone Toe SURGICAL PATHOLOGY, CULTURE, ANAEROBIC AND AEROBIC Rocky Fink, JADE 7/22/2022 1527        Implants:  * No implants in log *      Drains: * No LDAs found *    Findings: Consistent with diagnosis    Materials: 3-0 prolene, 3-0 monocryl, 4-0 monocryl    Injectables: 30cc 1% lidocaine plain    Electronically signed by Neetu Eric DPM on 7/22/2022 at 4:08 PM

## 2022-07-22 NOTE — OP NOTE
Operative Note      Patient: Valerie Marina  YOB: 1970  MRN: [id number]    Date of Procedure: 7/22/2022    Pre-Op Diagnosis: Osteomyelitis of foot, unspecified laterality, unspecified type ([facility] [[email or web address] 75.) [M86.9]    Post-Op Diagnosis: Same       Procedure: incision and drainage down to bone with 4th, 5th ray amputation right foot     Surgeon(s):  Devante Abarca DPM    Assistant:   Resident: Nya Bauman DPM    Anesthesia: General    Estimated Blood Loss (mL): less than 50     Complications: None    Specimens:   ID Type Source Tests Collected by Time Destination   A : Right 5th Metatarsal Bone Toe SURGICAL PATHOLOGY, CULTURE, ANAEROBIC AND AEROBIC [facility] 7/22/2022 1520    B : Ulcer Right Foot Tissue Foot SURGICAL PATHOLOGY, CULTURE, ANAEROBIC AND AEROBIC [facility] 7/22/2022 1521    C : Right 4th Metatarsal Bone Toe SURGICAL PATHOLOGY, CULTURE, ANAEROBIC AND AEROBIC [facility] 7/22/2022 1527        Implants:  * No implants in log *      Drains: * No LDAs found *    Findings: consistent with diagnosis     Detailed Description of Procedure: Indications: Patient is a 45 y.o. male with a chief complaint of right foot wound being treated for right foot osteomyelitis. At this time all conservative options have been exhausted and surgical intervention is necessary. The procedure has been explained to the patient and they understand the risks, benefits and possible complications including bleeding, infection, recurrence, dehiscence, need for revision surgery DVT, PE, loss of limb, loss of life. No promises have been made as to surgical outcome. Procedure: The patient was transported from the pre-op holding to the operating room and placed in a supine position. A pre-operative injection of 30cc of 1% lidocaine plain was injected into the right foot in a field block. The right foot was then prepped and draped in the normal aspetic manner.       Attention was directed to the right foot. An elliptical incision was made around the right foot wound. This was excised down to bone with a 10 blade. The incision was then carried proximal to the 5th metatarsal base. Sharp dissection was carried dwon to the 4th and 5th metatarsals. Purulence was noted. The 4th and 5th metatarsal were dusky and necrotic in appearance. All soft tissue attachments of the metatarsals 4-5 were resected using a sams scissors. The 4th and 5th metatarsals were then sent for surgical pathology. The distal aspect of hte 5th metatarsal area had fragmented necrotic bone in the soft tissue. This was sharply excised using a 10 blade. The area was then inspected for healthy tissue. Irrisept was used to flush the wound. There was no more necrotic non viable tissue noted in the wound bed. Surgicell was used for hemostasis. After the procedure there was CFT less than 3 seconds to the remaining stump. The incision was flushed with copious amounts of sterile saline. The skin was closed using 3-0 prolene and 3-0 monocryl. The incision was dressed with adaptic, 4x4's, kerlix, etc. . A posterior splint was then applied. The patient was transported to the PACU with VSS and NVS intact to all digits of the right foot. No complications were noted throughout the procedure. The patient is to be transferred back to the [facility] floor.      Dr. Millie Guerrero DPM      Electronically signed by Jean Claude Diaz DPM on 7/22/2022 at 7:53 PM

## 2022-07-22 NOTE — PLAN OF CARE
Problem: Chronic Conditions and Co-morbidities  Goal: Patient's chronic conditions and co-morbidity symptoms are monitored and maintained or improved  Recent Flowsheet Documentation  Taken 7/22/2022 1640 by Irma Bowen 34 - Patient's Chronic Conditions and Co-Morbidity Symptoms are Monitored and Maintained or Improved: Monitor and assess patient's chronic conditions and comorbid symptoms for stability, deterioration, or improvement     Problem: Neurosensory - Adult  Goal: Achieves maximal functionality and self care  Recent Flowsheet Documentation  Taken 7/22/2022 1640 by Rafa Katz RN  Achieves maximal functionality and self care: Monitor swallowing and airway patency with patient fatigue and changes in neurological status     Problem: Skin/Tissue Integrity - Adult  Goal: Skin integrity remains intact  Recent Flowsheet Documentation  Taken 7/22/2022 1640 by Rafa Katz RN  Skin Integrity Remains Intact: Monitor for areas of redness and/or skin breakdown     Problem: Skin/Tissue Integrity - Adult  Goal: Incisions, wounds, or drain sites healing without S/S of infection  Recent Flowsheet Documentation  Taken 7/22/2022 1640 by Rafa Katz RN  Incisions, Wounds, or Drain Sites Healing Without Sign and Symptoms of Infection: TWICE DAILY: Assess and document skin integrity    Problem: Skin/Tissue Integrity - Adult  Goal: Oral mucous membranes remain intact  Recent Flowsheet Documentation  Taken 7/22/2022 1640 by Rafa Katz RN  Oral Mucous Membranes Remain Intact: Assess oral mucosa and hygiene practices       Problem: Musculoskeletal - Adult  Goal: Return mobility to safest level of function  Recent Flowsheet Documentation  Taken 7/22/2022 1640 by Rafa Katz RN  Return Mobility to Safest Level of Function:   Assess patient stability and activity tolerance for standing, transferring and ambulating with or without assistive devices   Assist with transfers and ambulation using

## 2022-07-22 NOTE — ANESTHESIA PRE PROCEDURE
Department of Anesthesiology  Preprocedure Note       Name:  Skyla Proctor   Age:  45 y.o.  :  1970                                          MRN:  [id number]         Date:  2022      Surgeon: Garth Fagan):  Amy Rock DPM    Procedure: Procedure(s):  Right 4th & 5th Partial Amputation vs. TMA Revision    Medications prior to admission:   Prior to Admission medications    Medication Sig Start Date End Date Taking?  Authorizing Provider   Fluticasone-Umeclidin-Vilant (TRELEGY ELLIPTA IN) Inhale 1 puff into the lungs daily    Historical Provider, MD   Dulaglutide (TRULICITY SC) Inject into the skin once a week    Historical Provider, MD   Ascorbic Acid (VITAMIN C) 1000 MG tablet Take 1,000 mg by mouth 2 times daily    Historical Provider, MD   buPROPion (WELLBUTRIN SR) 200 MG extended release tablet Take 200 mg by mouth 2 times daily    Historical Provider, MD   amitriptyline (ELAVIL) 25 MG tablet Take 25 mg by mouth nightly    Historical Provider, MD   doxycycline hyclate (VIBRAMYCIN) 100 MG capsule Take 100 mg by mouth 2 times daily  Patient not taking: Reported on 2022    Historical Provider, MD   cephALEXin (KEFLEX) 250 MG capsule Take 250 mg by mouth daily    Historical Provider, MD   pregabalin (LYRICA) 150 MG capsule TAKE 1 CAPSULE BY MOUTH TWICE DAILY 22   Historical Provider, MD   potassium chloride (KLOR-CON M) 20 MEQ TBCR extended release tablet TAKE 1 TABLET BY MOUTH ONCE DAILY AS NEEDED - TAKE ONLY IF TAKING LASIX. 21   Historical Provider, MD   furosemide (LASIX) 20 MG tablet TAKE 1 TABLET BY MOUTH ONCE DAILY AS NEEDED SWELLING 21   Historical Provider, MD   dicyclomine (BENTYL) 10 MG capsule  21   Historical Provider, MD   Cholecalciferol (VITAMIN D3) 125 MCG (5000 UT) CAPS TAKE 1 CAPSULE BY MOUTH ONCE DAILY 22   Historical Provider, MD   metoprolol tartrate (LOPRESSOR) 25 MG tablet Take 25 mg by mouth daily    Historical Provider, MD   ramelteon (ROZEREM) 8 MG tablet Take 8 mg by mouth nightly    Historical Provider, MD   Multiple Vitamin (THERA/BETA-CAROTENE) TABS Take 1 tablet by mouth daily    Historical Provider, MD   vilazodone HCl (VIIBRYD) 10 MG TABS Take 40 mg by mouth daily     Historical Provider, MD   isosorbide mononitrate (IMDUR) 60 MG extended release tablet Take 60 mg by mouth daily    Historical Provider, MD   lisinopril (PRINIVIL;ZESTRIL) 20 MG tablet Take 1 tablet by mouth daily  Patient taking differently: Take 30 mg by mouth in the morning. 8/8/19   Traci Selby MD   tamsulosin (FLOMAX) 0.4 MG capsule Take 0.4 mg by mouth daily    Historical Provider, MD   pantoprazole (PROTONIX) 40 MG tablet Take 40 mg by mouth daily    Historical Provider, MD   atorvastatin (LIPITOR) 40 MG tablet Take 40 mg by mouth daily     Historical Provider, MD   albuterol sulfate HFA (PROVENTIL;VENTOLIN;PROAIR) 108 (90 Base) MCG/ACT inhaler Inhale 2 puffs into the lungs every 4 hours as needed for Wheezing. Historical Provider, MD   aspirin 81 MG tablet Take 81 mg by mouth daily.     Historical Provider, MD       Current medications:    Current Facility-Administered Medications   Medication Dose Route Frequency Provider Last Rate Last Admin    ceFAZolin (ANCEF) 2000 mg in dextrose 5 % 50 mL IVPB  2,000 mg IntraVENous Q8H Rafaela Atwood MD   Stopped at 07/22/22 1330    lidocaine 4 % external patch 2 patch  2 patch TransDERmal Daily MICHELLE Beckman - CNP   2 patch at 07/22/22 0024    Sodium Hypochlorite (DAKINS) 0.25 % external solution   Irrigation Daily Gelene Tatianna, DPM   Given at 07/21/22 1018    sodium chloride flush 0.9 % injection 5-40 mL  5-40 mL IntraVENous 2 times per day Lashay Gregory MD   10 mL at 07/19/22 2020    sodium chloride flush 0.9 % injection 5-40 mL  5-40 mL IntraVENous PRN Lashay Gregory MD        0.9 % sodium chloride infusion   IntraVENous PRN Lashay Gregory MD        enoxaparin (LOVENOX) injection 60 mg  60 mg SubCUTAneous BID Nancy Simpson MD   60 mg at 07/21/22 1015    ondansetron (ZOFRAN-ODT) disintegrating tablet 4 mg  4 mg Oral Q8H PRN Nancy Simpson MD        Or    ondansetron [facility]) injection 4 mg  4 mg IntraVENous Q6H PRN Nancy Simpson MD        polyethylene glycol (GLYCOLAX) packet 17 g  17 g Oral Daily PRN Nancy Simpson MD        acetaminophen (TYLENOL) tablet 650 mg  650 mg Oral Q6H PRN Nancy Simpson MD   650 mg at 07/19/22 2017    Or    acetaminophen (TYLENOL) suppository 650 mg  650 mg Rectal Q6H PRN Nancy Simpson MD        albuterol sulfate HFA (PROVENTIL;VENTOLIN;PROAIR) 108 (90 Base) MCG/ACT inhaler 2 puff  2 puff Inhalation Q4H PRN Nancy Simpson MD        amitriptyline (ELAVIL) tablet 25 mg  25 mg Oral Nightly Nancy Simpson MD   25 mg at 07/21/22 2108    aspirin EC tablet 81 mg  81 mg Oral Daily Nancy Simpson MD   81 mg at 07/21/22 1015    atorvastatin (LIPITOR) tablet 40 mg  40 mg Oral Nightly Nancy Simpson MD   40 mg at 07/21/22 2108    buPROPion [facility]) extended release tablet 200 mg  200 mg Oral BID Nancy Simpson MD   200 mg at 07/22/22 1305    dicyclomine (BENTYL) capsule 10 mg  10 mg Oral Daily PRN Nancy Simpson MD        isosorbide mononitrate (IMDUR) extended release tablet 60 mg  60 mg Oral Daily Nancy Simpson MD   60 mg at 07/22/22 0903    lisinopril (PRINIVIL;ZESTRIL) tablet 30 mg  30 mg Oral Daily Nancy Simpson MD   30 mg at 07/22/22 4853    metoprolol tartrate (LOPRESSOR) tablet 25 mg  25 mg Oral Daily Nancy Simpson MD   25 mg at 07/22/22 0903    pantoprazole (PROTONIX) tablet 40 mg  40 mg Oral Daily Nancy Simpson MD   40 mg at 07/22/22 8756    pregabalin (LYRICA) capsule 150 mg  150 mg Oral BID Nancy Simpson MD   150 mg at 07/22/22 0902    melatonin tablet 4.5 mg  4.5 mg Oral Nightly PRN Nancy Simpson MD        tamsulosin [facility]) capsule 0.4 mg  0.4 mg Oral Daily Nancy Simpson MD   0.4 mg at 07/22/22 4017    vilazodone HCl (VIIBRYD) TABS 40 mg  40 mg Oral Daily Nancy Simpson MD   40 mg at 07/22/22 0902    0.9 % sodium chloride infusion   IntraVENous Continuous Andris Essex, MD 75 mL/hr at 07/22/22 0510 Restarted at 07/22/22 1444    insulin lispro (HUMALOG) injection vial 0-6 Units  0-6 Units SubCUTAneous TID WC Andris Essex, MD        insulin lispro (HUMALOG) injection vial 0-3 Units  0-3 Units SubCUTAneous Nightly Andris Essex, MD        glucose chewable tablet 16 g  4 tablet Oral PRN Andris Essex, MD        dextrose bolus 10% 125 mL  125 mL IntraVENous PRN Andris Essex, MD        Or    dextrose bolus 10% 250 mL  250 mL IntraVENous PRN Andris Essex, MD        glucagon (rDNA) injection 1 mg  1 mg IntraMUSCular PRN Andris Essex, MD        dextrose 5 % solution  100 mL/hr IntraVENous PRN Andris Essex, MD        oxyCODONE-acetaminophen (PERCOCET) 5-325 MG per tablet 1 tablet  1 tablet Oral Q4H PRN Kuldeep Purl, DO        Or    oxyCODONE-acetaminophen (PERCOCET) 5-325 MG per tablet 2 tablet  2 tablet Oral Q4H PRN Kuldeep Purl, DO   2 tablet at 07/22/22 0100     Facility-Administered Medications Ordered in Other Encounters   Medication Dose Route Frequency Provider Last Rate Last Admin    propofol injection   IntraVENous PRN Jorene Reaper, APRN - CRNA   50 mg at 07/22/22 1448    propofol injection   IntraVENous Continuous PRN Jorene Reaper, APRN - CRNA 81.936 mL/hr at 07/22/22 1511 80 mcg/kg/min at 07/22/22 1511    lidocaine (cardiac) (XYLOCAINE) injection   IntraVENous PRN Jorene Reaper, APRN - CRNA   100 mg at 07/22/22 1448    midazolam (VERSED) injection   IntraVENous PRN Jorene Reaper, APRN - CRNA   2 mg at 07/22/22 1445    fentaNYL (SUBLIMAZE) injection   IntraVENous PRN Jorene Reaper, APRN - CRNA   25 mcg at 07/22/22 1445    phenylephrine (ASHISH-SYNEPHRINE) injection   IntraVENous PRN Jorene Reaper, APRN - CRNA   100 mcg at 07/22/22 1516       Allergies:     Allergies   Allergen Reactions    Metformin And Related      Kidney failure    Cleocin [Clindamycin]     Ilosone Answered      Vital Signs (Current):   Vitals:    07/21/22 2315 07/22/22 0130 07/22/22 0326 07/22/22 0843   BP: 118/60  113/60 (!) 151/82   Pulse: 70  83 72   Resp: 16 16 15 16   Temp: 97.9 °F (36.6 °C)  97.6 °F (36.4 °C) 97.6 °F (36.4 °C)   TempSrc:   Oral Oral   SpO2: 95%  95% 98%   Weight:       Height:                                                  BP Readings from Last 3 Encounters:   07/22/22 (!) 151/82   07/12/22 132/72   01/19/22 122/60       NPO Status:                                                                                 BMI:   Wt Readings from Last 3 Encounters:   07/21/22 (!) 376 lb 5.2 oz (170.7 kg)   01/19/22 (!) 369 lb (167.4 kg)   12/22/21 (!) 371 lb (168.3 kg)     Body mass index is 49.65 kg/m².     CBC:   Lab Results   Component Value Date/Time    WBC 7.2 07/20/2022 05:40 AM    RBC 5.15 07/20/2022 05:40 AM    RBC 4.63 02/17/2022 08:24 AM    HGB 15.9 07/20/2022 05:40 AM    HCT 48.4 07/20/2022 05:40 AM    MCV 94.0 07/20/2022 05:40 AM    RDW 14.0 02/17/2022 08:24 AM     07/20/2022 05:40 AM       CMP:   Lab Results   Component Value Date/Time     07/20/2022 05:40 AM    K 4.1 07/20/2022 05:40 AM     07/20/2022 05:40 AM    CO2 25 07/20/2022 05:40 AM    BUN 14 07/20/2022 05:40 AM    CREATININE 0.9 07/20/2022 05:40 AM    LABGLOM 88 07/20/2022 05:40 AM    GLUCOSE 105 07/20/2022 05:40 AM    GLUCOSE 164 02/17/2022 08:24 AM    PROT 5.7 08/04/2019 05:46 AM    CALCIUM 9.1 07/20/2022 05:40 AM    BILITOT 0.9 01/07/2022 09:51 AM    ALKPHOS 118 01/07/2022 09:51 AM    ALKPHOS 58 08/04/2019 05:46 AM    AST 20 01/07/2022 09:51 AM    ALT 27 01/07/2022 09:51 AM       POC Tests:   Recent Labs     07/22/22  1158   POCGLU 86       Coags:   Lab Results   Component Value Date/Time    PROTIME 12.2 01/07/2022 09:51 AM    INR 1.0 01/07/2022 09:51 AM    APTT 31.1 01/07/2022 09:51 AM    APTT 31.8 04/21/2017 04:27 PM       HCG (If Applicable): No results found for: PREGTESTUR, PREGSERUM, HCG, HCGQUANT ABGs: No results found for: PHART, PO2ART, COI0QZZ, PIT7TXA, BEART, L6SDOXRJ     Type & Screen (If Applicable):  No results found for: LABABO, LABRH    Drug/Infectious Status (If Applicable):  No results found for: HIV, HEPCAB    COVID-19 Screening (If Applicable):   Lab Results   Component Value Date/Time    COVID19 Not Detected 02/17/2022 09:30 AM           Anesthesia Evaluation  Patient summary reviewed  Airway: Mallampati: II  TM distance: >3 FB   Neck ROM: full  Mouth opening: > = 3 FB   Dental:    (+) upper dentures      Pulmonary:   (+) COPD:  asthma:                            Cardiovascular:    (+) hypertension:, CAD:,       ECG reviewed                        Neuro/Psych:   (+) psychiatric history:            GI/Hepatic/Renal:   (+) morbid obesity          Endo/Other:    (+) Diabetes, . Abdominal:             Vascular: Other Findings:           Anesthesia Plan      general     ASA 3       Induction: intravenous. MIPS: Postoperative opioids intended and Prophylactic antiemetics administered. Anesthetic plan and risks discussed with patient. Plan discussed with WERNER. Ivette Bocanegra.  [address] [place] [address]   7/22/2022

## 2022-07-22 NOTE — PROGRESS NOTES
Hospitalist Progress Note      Patient:  Melba Ledesma    Unit/Bed:5K-05/005-A  YOB: 1970  MRN: [id number]   Acct: [de-identified]   PCP: MICHELLE Powers CNP  Date of Admission: 7/19/2022    Assessment/Plan:    Pre-Op Risk Stratification: RCRI score is 0 due to having a negative for high risk surgery, history of ischemic heart disease, history of heart failure, history of CVA, pre-operative treatment with insulin, and pre-operative creatinine greater than 2. This calculates to a 3.9% 30 day risk of MI, death, cardiac arrest. PT understands & accepts these risks. CXR & EKG show no acute issues. Osteomyelitis, Right Foot: Podiatry & ID consulted. A&A growing Staph Aureus (coag positive). IV ABX continued. MRI right foot showed OM of stump. Surgery today. Essential HTN: BP controlled. Continue home medications. NIDDMII: Controlled. Lastest Hgb A1c 5.5, continue SSI in hospital and will restart home regimen at OR. Polyneuropathy: Lyrica. HLD: Lipitor. GERD: PPI. Anxiety: Lenka Cousin, Bupropion    Chief Complaint: Fever, chills, foot ulcer     Initial H and P:-    Initial H&P \"52 y.o. male with a PMHx of HTN, DM2, HLD, peripheral neuropathy, GERD, depression, and anxiety who presented to [address] [place] [address] for worsening right foot wound with associated fever, chills, and feeling sick over the weekend. Patient has a history of diabetic foot ulcer requiring right transmetatarsal amputation. Patient has had open wound since his surgery about a year ago. He was treated with Keflex/doxycycline however he had persistent wound that failed to close. Over the weekend, he developed fever, chills and nausea. He states that yesterday he noticed purulent greenish discharge from his right foot wound. He was seen today at the wound clinic and decision was made to admit patient for further treatment.   Hospitalist was contacted for admission. Please refer to A&P for further details. \"     7/20: No acute events overnight. Pt states that his pain is relatively controlled. Pt has no issues or concerns at this time. Pt is anticipating surgery. 7/21: No acute events overnight. Pt states that her pain is relatively controlled. Pt states that he is having some leg and back pain today, he attributed this to the hospital bed. Pt is asking if he is going to have surgery; it was explained that Podiatry will make this decision. Subjective (past 24 hours):   No acute events overnight. Pt states that his pain is relatively controlled. Pt has no issues or concerns at this time. Past medical history, family history, social history and allergies reviewed again and is unchanged since admission. ROS (All review of systems completed. Pertinent positives noted.  Otherwise All other systems reviewed and negative.)     Medications:  Reviewed    Infusion Medications    sodium chloride      sodium chloride 75 mL/hr at 07/22/22 0510    dextrose       Scheduled Medications    ceFAZolin  2,000 mg IntraVENous Q8H    lidocaine  2 patch TransDERmal Daily    Sodium Hypochlorite   Irrigation Daily    sodium chloride flush  5-40 mL IntraVENous 2 times per day    enoxaparin  60 mg SubCUTAneous BID    amitriptyline  25 mg Oral Nightly    aspirin  81 mg Oral Daily    atorvastatin  40 mg Oral Nightly    buPROPion  200 mg Oral BID    isosorbide mononitrate  60 mg Oral Daily    lisinopril  30 mg Oral Daily    metoprolol tartrate  25 mg Oral Daily    pantoprazole  40 mg Oral Daily    pregabalin  150 mg Oral BID    tamsulosin  0.4 mg Oral Daily    vilazodone HCl  40 mg Oral Daily    insulin lispro  0-6 Units SubCUTAneous TID WC    insulin lispro  0-3 Units SubCUTAneous Nightly     PRN Meds: sodium chloride flush, sodium chloride, ondansetron **OR** ondansetron, polyethylene glycol, acetaminophen **OR** acetaminophen, albuterol sulfate HFA, dicyclomine, melatonin, glucose, dextrose bolus **OR** dextrose bolus, glucagon (rDNA), dextrose, oxyCODONE-acetaminophen **OR** oxyCODONE-acetaminophen      Intake/Output Summary (Last 24 hours) at 7/22/2022 1412  Last data filed at 7/22/2022 1200  Gross per 24 hour   Intake 3061.35 ml   Output 1900 ml   Net 1161.35 ml       Diet:  Diet NPO    Exam:  BP (!) 151/82   Pulse 72   Temp 97.6 °F (36.4 °C) (Oral)   Resp 16   Ht 6' 1\" (1.854 m)   Wt (!) 376 lb 5.2 oz (170.7 kg)   SpO2 98%   BMI 49.65 kg/m²   General appearance: No apparent distress, appears stated age and cooperative. HEENT: Pupils equal, round, and reactive to light. Conjunctivae/corneas clear. Neck: Supple, with full range of motion. No jugular venous distention. Trachea midline. Respiratory:  Normal respiratory effort. Clear to auscultation, bilaterally without Rales/Wheezes/Rhonchi. Cardiovascular: Regular rate and rhythm with normal S1/S2 without murmurs, rubs or gallops. Abdomen: Soft, non-tender, non-distended with normal bowel sounds. Musculoskeletal: passive and active ROM x 4 extremities. Skin: Skin color, texture, turgor normal.  No rashes or lesions. Neurologic:  Neurovascularly intact without any focal sensory/motor deficits. Cranial nerves: II-XII intact, grossly non-focal.  Psychiatric: Alert and oriented, thought content appropriate, normal insight  Capillary Refill: Brisk,< 3 seconds   Peripheral Pulses: +2 palpable, equal bilaterally     Right Foot:            Labs:   Recent Labs     07/19/22  1449 07/20/22  0540   WBC 9.4 7.2   HGB 16.5 15.9   HCT 49.5 48.4    202     Recent Labs     07/19/22  1449 07/20/22  0540    136   K 4.5 4.1   CL 99 101   CO2 25 25   BUN 13 14   CREATININE 0.8 0.9   CALCIUM 9.8 9.1       Microbiology:    Blood culture #1:   Lab Results   Component Value Date/Time    BC No growth 24 hours. No growth 48 hours.   07/20/2022 01:33 PM       Blood culture #2:No results found for: BLOODCULT2    Organism:  Lab Results Component Value Date/Time    ORG Staphylococcus aureus 07/19/2022 01:40 PM         Lab Results   Component Value Date/Time    LABGRAM  07/19/2022 01:40 PM     Rare segmented neutrophils observed. Rare epithelial cells observed. Rare gram positive cocci occurring singly and in pairs. Aerobic and Anaerobic :  Lab Results   Component Value Date/Time    LABAERO  07/19/2022 01:40 PM     Culture also yielded moderate growth of gram positive bacilli most consistent with Corynebacterium species. LABAERO  07/19/2022 01:40 PM     moderate growth In the treatment of gram positive infections, GENTAMICIN should be CONSIDERED a SYNERGYSTIC agent ONLY. Lab Results   Component Value Date/Time    LABANAE  07/19/2022 01:40 PM     Culture yielded moderate mixed anaerobic growth which included anaerobic gram positive cocci and anaerobic gram negative bacilli. If a true mixed aerobic and anaerobic infection is suspected, then broad spectrum empiric antibiotic therapy is indicated and should include coverage for anaerobic organisms. Urinalysis:      Lab Results   Component Value Date/Time    NITRU Negative 08/12/2016 06:59 PM    BLOODU Negative 08/12/2016 06:59 PM    SPECGRAV 1.015 08/12/2016 06:59 PM    GLUCOSEU NEGATIVE 07/07/2015 12:48 AM       Radiology:  XR CHEST PORTABLE   Final Result   Impression:   1. Central bronchial wall thickening which may reflect bronchitis or viral    pneumonitis. Findings are more prominent on the current exam compared to    previous. This document has been electronically signed by: Arian Mukherjee MD on    07/21/2022 08:03 PM      MRI FOOT RIGHT W WO CONTRAST   Final Result   Impression:   1. Osteomyelitis in the 5th metatarsal stump. Mild osteomyelitis at the    distal end of the 4th metatarsal stump. 2. Cellulitis. Soft tissue ulcer at the stump. No drainable rim-enhancing    fluid collection.       This document has been electronically signed by: Migue Arguelles MD on 07/19/2022 10:08 PM      XR FOOT RIGHT (MIN 3 VIEWS)   Final Result   Post operative changes related to transmetatarsal amputation are noted. Periarticular calcification and fragmented appearance of the distal fifth metatarsal diaphysis is nonspecific and could be postoperative. There is a suggestion of soft tissue    swelling at the fourth and fifth metatarsals and osteomyelitis at this location is not excluded. **This report has been created using voice recognition software. It may contain minor errors which are inherent in voice recognition technology. **      Final report electronically signed by Dr Anabel Patterson on 7/19/2022 4:56 PM          Electronically signed by LATISHA Araujo on 7/22/2022 at 2:12 PM

## 2022-07-22 NOTE — CARE COORDINATION
7/22/22, 1:39 PM EDT    DISCHARGE ON GOING EVALUATION    [facility] day: 2  Location: -05/005-A Reason for admit: Cellulitis of right foot [L03.115]  Osteomyelitis ([address] [M86.9]   Procedure: 7/22/2022 OR planned. Barriers to Discharge:   Podiatry and ID following, IV fluids, Ancef q 8 hr., Lovenox, DM management, wound care with Dakins solution, prn pain medications and Zofran, NPO, partial weight bearing right lower extremity. PCP: MICHELLE Saldivar CNP  Readmission Risk Score: 9%  Patient Goals/Plan/Treatment Preferences: Shelley Doherty is from home alone and [facility] He plans for the same at discharge.

## 2022-07-22 NOTE — PLAN OF CARE
Problem: Pain  Goal: Verbalizes/displays adequate comfort level or baseline comfort level  Outcome: Progressing  Note: Patient's post op and chronic back pain relieved. Pain medications given as needed. Rated pain as 7/10; verbalized satisfaction of interventions implemented. Elevation, lidocaine patches applied. Rest and repositioning provided. Pain assessment ongoing. Problem: Safety - Adult  Goal: Free from fall injury  Outcome: Progressing  Flowsheets (Taken 7/21/2022 6488 by Bryan Fong RN)  Free From Fall Injury: Instruct family/caregiver on patient safety  Note: Patient's safety maintained. Fall precautions in place. Call light and possessions within reach, bed alarm on and placed in low position, side rails upx2, non skid socks on, up with assistance and calls out appropriately; hourly rounding. Problem: Chronic Conditions and Co-morbidities  Goal: Patient's chronic conditions and co-morbidity symptoms are monitored and maintained or improved  Outcome: Progressing  Note:  Problem: Skin/Tissue Integrity  Goal: Absence of new skin breakdown  Description: 1. Monitor for areas of redness and/or skin breakdown  2. Assess vascular access sites hourly  3. Every 4-6 hours minimum:  Change oxygen saturation probe site  4. Every 4-6 hours:  If on nasal continuous positive airway pressure, respiratory therapy assess nares and determine need for appliance change or resting period. Outcome: Progressing  Note: Skin protocols continued. No skin breakdown noted. Elevation and repositioning encouraged. Problem: Skin/Tissue Integrity - Adult  Goal: Incisions, wounds, or drain sites healing without S/S of infection  Outcome: Progressing  Note: Post op dressing on right foot CDI. On IV Ancef and Vancomycin for antibiotic treatment.      Problem: Musculoskeletal - Adult  Goal: Return mobility to safest level of function  Outcome: Progressing     Problem: Discharge Planning  Goal: Discharge to home or other facility with appropriate resources  Outcome: Progressing  Note: Patient to return home at discharge and resume services through [address] [place] [address]. Care plan reviewed with patient. Patient verbalize understanding of the plan of care and contribute to goal setting.

## 2022-07-23 LAB
ANION GAP SERPL CALCULATED.3IONS-SCNC: 9 MEQ/L (ref 8–16)
BASOPHILS # BLD: 0.4 %
BASOPHILS ABSOLUTE: 0 THOU/MM3 (ref 0–0.1)
BUN BLDV-MCNC: 14 MG/DL (ref 7–22)
CALCIUM SERPL-MCNC: 9 MG/DL (ref 8.5–10.5)
CHLORIDE BLD-SCNC: 102 MEQ/L (ref 98–111)
CO2: 26 MEQ/L (ref 23–33)
CREAT SERPL-MCNC: 0.9 MG/DL (ref 0.4–1.2)
EOSINOPHIL # BLD: 2.7 %
EOSINOPHILS ABSOLUTE: 0.2 THOU/MM3 (ref 0–0.4)
ERYTHROCYTE [DISTWIDTH] IN BLOOD BY AUTOMATED COUNT: 12.7 % (ref 11.5–14.5)
ERYTHROCYTE [DISTWIDTH] IN BLOOD BY AUTOMATED COUNT: 43.3 FL (ref 35–45)
GFR SERPL CREATININE-BSD FRML MDRD: 88 ML/MIN/1.73M2
GLUCOSE BLD-MCNC: 104 MG/DL (ref 70–108)
GLUCOSE BLD-MCNC: 104 MG/DL (ref 70–108)
GLUCOSE BLD-MCNC: 105 MG/DL (ref 70–108)
GLUCOSE BLD-MCNC: 149 MG/DL (ref 70–108)
GLUCOSE BLD-MCNC: 151 MG/DL (ref 70–108)
HCT VFR BLD CALC: 44.9 % (ref 42–52)
HEMOGLOBIN: 14.8 GM/DL (ref 14–18)
IMMATURE GRANS (ABS): 0.02 THOU/MM3 (ref 0–0.07)
IMMATURE GRANULOCYTES: 0.3 %
LYMPHOCYTES # BLD: 18.2 %
LYMPHOCYTES ABSOLUTE: 1.2 THOU/MM3 (ref 1–4.8)
MCH RBC QN AUTO: 31 PG (ref 26–33)
MCHC RBC AUTO-ENTMCNC: 33 GM/DL (ref 32.2–35.5)
MCV RBC AUTO: 93.9 FL (ref 80–94)
MONOCYTES # BLD: 8.4 %
MONOCYTES ABSOLUTE: 0.6 THOU/MM3 (ref 0.4–1.3)
NUCLEATED RED BLOOD CELLS: 0 /100 WBC
PLATELET # BLD: 199 THOU/MM3 (ref 130–400)
PMV BLD AUTO: 10.3 FL (ref 9.4–12.4)
POTASSIUM REFLEX MAGNESIUM: 4.8 MEQ/L (ref 3.5–5.2)
RBC # BLD: 4.78 MILL/MM3 (ref 4.7–6.1)
SEG NEUTROPHILS: 70 %
SEGMENTED NEUTROPHILS ABSOLUTE COUNT: 4.8 THOU/MM3 (ref 1.8–7.7)
SODIUM BLD-SCNC: 137 MEQ/L (ref 135–145)
WBC # BLD: 6.8 THOU/MM3 (ref 4.8–10.8)

## 2022-07-23 PROCEDURE — 36415 COLL VENOUS BLD VENIPUNCTURE: CPT

## 2022-07-23 PROCEDURE — 6360000002 HC RX W HCPCS

## 2022-07-23 PROCEDURE — 6370000000 HC RX 637 (ALT 250 FOR IP)

## 2022-07-23 PROCEDURE — 80048 BASIC METABOLIC PNL TOTAL CA: CPT

## 2022-07-23 PROCEDURE — 6360000002 HC RX W HCPCS: Performed by: STUDENT IN AN ORGANIZED HEALTH CARE EDUCATION/TRAINING PROGRAM

## 2022-07-23 PROCEDURE — 2580000003 HC RX 258

## 2022-07-23 PROCEDURE — 82948 REAGENT STRIP/BLOOD GLUCOSE: CPT

## 2022-07-23 PROCEDURE — 1200000000 HC SEMI PRIVATE

## 2022-07-23 PROCEDURE — 99232 SBSQ HOSP IP/OBS MODERATE 35: CPT | Performed by: PHYSICIAN ASSISTANT

## 2022-07-23 PROCEDURE — 85025 COMPLETE CBC W/AUTO DIFF WBC: CPT

## 2022-07-23 RX ORDER — ENOXAPARIN SODIUM 100 MG/ML
40 INJECTION SUBCUTANEOUS 2 TIMES DAILY
Status: DISCONTINUED | OUTPATIENT
Start: 2022-07-23 | End: 2022-07-26 | Stop reason: HOSPADM

## 2022-07-23 RX ADMIN — CEFAZOLIN 2000 MG: 10 INJECTION, POWDER, FOR SOLUTION INTRAVENOUS at 05:36

## 2022-07-23 RX ADMIN — PANTOPRAZOLE SODIUM 40 MG: 40 TABLET, DELAYED RELEASE ORAL at 11:44

## 2022-07-23 RX ADMIN — TAMSULOSIN HYDROCHLORIDE 0.4 MG: 0.4 CAPSULE ORAL at 11:44

## 2022-07-23 RX ADMIN — OXYCODONE AND ACETAMINOPHEN 2 TABLET: 5; 325 TABLET ORAL at 16:14

## 2022-07-23 RX ADMIN — OXYCODONE AND ACETAMINOPHEN 2 TABLET: 5; 325 TABLET ORAL at 20:58

## 2022-07-23 RX ADMIN — ATORVASTATIN CALCIUM 40 MG: 40 TABLET, FILM COATED ORAL at 21:24

## 2022-07-23 RX ADMIN — ENOXAPARIN SODIUM 40 MG: 100 INJECTION SUBCUTANEOUS at 21:24

## 2022-07-23 RX ADMIN — OXYCODONE AND ACETAMINOPHEN 2 TABLET: 5; 325 TABLET ORAL at 11:44

## 2022-07-23 RX ADMIN — CEFAZOLIN 2000 MG: 10 INJECTION, POWDER, FOR SOLUTION INTRAVENOUS at 22:10

## 2022-07-23 RX ADMIN — ISOSORBIDE MONONITRATE 60 MG: 60 TABLET, EXTENDED RELEASE ORAL at 11:43

## 2022-07-23 RX ADMIN — BUPROPION HYDROCHLORIDE 200 MG: 100 TABLET, FILM COATED, EXTENDED RELEASE ORAL at 11:43

## 2022-07-23 RX ADMIN — ASPIRIN 81 MG: 81 TABLET, COATED ORAL at 11:44

## 2022-07-23 RX ADMIN — BUPROPION HYDROCHLORIDE 200 MG: 100 TABLET, FILM COATED, EXTENDED RELEASE ORAL at 21:24

## 2022-07-23 RX ADMIN — CEFAZOLIN 2000 MG: 10 INJECTION, POWDER, FOR SOLUTION INTRAVENOUS at 16:32

## 2022-07-23 RX ADMIN — AMITRIPTYLINE HYDROCHLORIDE 25 MG: 25 TABLET, FILM COATED ORAL at 21:24

## 2022-07-23 RX ADMIN — PREGABALIN 150 MG: 75 CAPSULE ORAL at 21:24

## 2022-07-23 RX ADMIN — SODIUM CHLORIDE: 9 INJECTION, SOLUTION INTRAVENOUS at 16:16

## 2022-07-23 RX ADMIN — LISINOPRIL 30 MG: 20 TABLET ORAL at 11:43

## 2022-07-23 RX ADMIN — PREGABALIN 150 MG: 75 CAPSULE ORAL at 11:44

## 2022-07-23 RX ADMIN — SODIUM CHLORIDE: 9 INJECTION, SOLUTION INTRAVENOUS at 00:36

## 2022-07-23 RX ADMIN — VILAZODONE HYDROCHLORIDE 40 MG: 40 TABLET ORAL at 11:43

## 2022-07-23 RX ADMIN — INSULIN LISPRO 1 UNITS: 100 INJECTION, SOLUTION INTRAVENOUS; SUBCUTANEOUS at 21:24

## 2022-07-23 RX ADMIN — METOPROLOL TARTRATE 25 MG: 25 TABLET, FILM COATED ORAL at 11:43

## 2022-07-23 RX ADMIN — ENOXAPARIN SODIUM 60 MG: 100 INJECTION SUBCUTANEOUS at 11:44

## 2022-07-23 ASSESSMENT — PAIN DESCRIPTION - DESCRIPTORS
DESCRIPTORS: ACHING
DESCRIPTORS: ACHING;BURNING

## 2022-07-23 ASSESSMENT — PAIN DESCRIPTION - LOCATION
LOCATION: INCISION
LOCATION: FOOT

## 2022-07-23 ASSESSMENT — PAIN SCALES - GENERAL
PAINLEVEL_OUTOF10: 7
PAINLEVEL_OUTOF10: 8
PAINLEVEL_OUTOF10: 8

## 2022-07-23 ASSESSMENT — PAIN DESCRIPTION - ORIENTATION
ORIENTATION: RIGHT
ORIENTATION: RIGHT

## 2022-07-23 NOTE — PLAN OF CARE
Problem: Chronic Conditions and Co-morbidities  Goal: Patient's chronic conditions and co-morbidity symptoms are monitored and maintained or improved  Outcome: Progressing  Flowsheets (Taken 7/22/2022 1640 by Jose Francisco Calhoun RN)  Care Plan - Patient's Chronic Conditions and Co-Morbidity Symptoms are Monitored and Maintained or Improved: Monitor and assess patient's chronic conditions and comorbid symptoms for stability, deterioration, or improvement     Problem: Pain  Goal: Verbalizes/displays adequate comfort level or baseline comfort level  Outcome: Progressing  Flowsheets (Taken 7/22/2022 2215)  Verbalizes/displays adequate comfort level or baseline comfort level:   Encourage patient to monitor pain and request assistance   Assess pain using appropriate pain scale   Administer analgesics based on type and severity of pain and evaluate response   Implement non-pharmacological measures as appropriate and evaluate response     Problem: Skin/Tissue Integrity  Goal: Absence of new skin breakdown  Description: 1. Monitor for areas of redness and/or skin breakdown  2. Assess vascular access sites hourly  3. Every 4-6 hours minimum:  Change oxygen saturation probe site  4. Every 4-6 hours:  If on nasal continuous positive airway pressure, respiratory therapy assess nares and determine need for appliance change or resting period.   Outcome: Progressing     Problem: Wound:  Goal: Will show signs of wound healing; wound closure and no evidence of infection  Description: Will show signs of wound healing; wound closure and no evidence of infection  Outcome: Progressing     Problem: Neurosensory - Adult  Goal: Achieves maximal functionality and self care  Outcome: Progressing  Flowsheets  Taken 7/22/2022 2100 by Leela Morales RN  Achieves maximal functionality and self care: Monitor swallowing and airway patency with patient fatigue and changes in neurological status  Taken 7/22/2022 1640 by Jose Francisco Calhoun RN  Achieves maximal functionality and self care: Monitor swallowing and airway patency with patient fatigue and changes in neurological status     Problem: Skin/Tissue Integrity - Adult  Goal: Skin integrity remains intact  Outcome: Progressing  Flowsheets  Taken 7/22/2022 2100 by Dayami Montenegro RN  Skin Integrity Remains Intact: Monitor for areas of redness and/or skin breakdown  Taken 7/22/2022 1640 by Bonifacio Souza RN  Skin Integrity Remains Intact: Monitor for areas of redness and/or skin breakdown  Goal: Incisions, wounds, or drain sites healing without S/S of infection  Outcome: Progressing  Flowsheets  Taken 7/22/2022 2100 by Dayami Montenegro RN  Incisions, Wounds, or Drain Sites Healing Without Sign and Symptoms of Infection:   TWICE DAILY: Assess and document skin integrity   TWICE DAILY: Assess and document dressing/incision, wound bed, drain sites and surrounding tissue  Taken 7/22/2022 1640 by Bonifacio Souza RN  Incisions, Wounds, or Drain Sites Healing Without Sign and Symptoms of Infection: TWICE DAILY: Assess and document skin integrity  Goal: Oral mucous membranes remain intact  Outcome: Progressing  Flowsheets  Taken 7/22/2022 2100 by Dayami Montenegro RN  Oral Mucous Membranes Remain Intact: Assess oral mucosa and hygiene practices  Taken 7/22/2022 1640 by Bonifacio Souza RN  Oral Mucous Membranes Remain Intact: Assess oral mucosa and hygiene practices     Problem: Musculoskeletal - Adult  Goal: Return mobility to safest level of function  Outcome: Progressing  Flowsheets (Taken 7/22/2022 1640 by Bonifacio Souza RN)  Return Mobility to Safest Level of Function:   Assess patient stability and activity tolerance for standing, transferring and ambulating with or without assistive devices   Assist with transfers and ambulation using safe patient handling equipment as needed   Ensure adequate protection for wounds/incisions during mobilization  Goal: Maintain proper alignment of affected body part  Outcome: Progressing  Flowsheets (Taken 7/22/2022 1640 by Ramy Xavier, RN)  Maintain proper alignment of affected body part: Support and protect limb and body alignment per provider's orders  Goal: Return ADL status to a safe level of function  Outcome: Progressing  Flowsheets (Taken 7/22/2022 1640 by Ramy Xavier, RN)  Return ADL Status to a Safe Level of Function: Administer medication as ordered     Problem: Safety - Adult  Goal: Free from fall injury  Outcome: Progressing  Flowsheets (Taken 7/22/2022 2100)  Free From Fall Injury: Instruct family/caregiver on patient safety     Problem: Discharge Planning  Goal: Discharge to home or other facility with appropriate resources  Outcome: Progressing  Flowsheets (Taken 7/22/2022 1640 by Ramy Xavier, GABY)  Discharge to home or other facility with appropriate resources: Identify barriers to discharge with patient and caregiver

## 2022-07-23 NOTE — PROGRESS NOTES
Pharmacist Review and Automatic Dose Adjustment of Prophylactic Enoxaparin      The reviewing pharmacist has made an adjustment to the ordered enoxaparin dose or converted to UFH per the approved [facility] protocol and table as identified below. Claudine Martínez is a 45 y.o. male. Recent Labs     07/23/22  1222   CREATININE 0.9       Estimated Creatinine Clearance: 158 mL/min (based on SCr of 0.9 mg/dL). Height:   Ht Readings from Last 1 Encounters:   07/21/22 6' 1\" (1.854 m)     Weight:  Wt Readings from Last 1 Encounters:   07/21/22 (!) 376 lb 5.2 oz (170.7 kg)               Plan: Based upon the patient's weight and renal function, the enoxaparin dose has been changed/converted to 40 mg BID from 60 mg BID.       Thank you,  Miguel A Sierra, [facility]  7/23/2022, 2:20 PM

## 2022-07-23 NOTE — PROGRESS NOTES
Progress note: Infectious diseases    Patient - Blanca Meng,  Age - 45 y.o.    - 1970      Room Number - 5K-05/005-A   MRN -  [id number]   Acct # - [de-identified]  Date of Admission -  2022  2:38 PM    SUBJECTIVE:   No new issues. he had surgery yesterday  OBJECTIVE   VITALS    height is 6' 1\" (1.854 m) and weight is 376 lb 5.2 oz (170.7 kg) (abnormal). His oral temperature is 97.6 °F (36.4 °C). His blood pressure is 143/79 (abnormal) and his pulse is 78. His respiration is 20 and oxygen saturation is 100%. Wt Readings from Last 3 Encounters:   22 (!) 376 lb 5.2 oz (170.7 kg)   22 (!) 369 lb (167.4 kg)   21 (!) 371 lb (168.3 kg)       I/O (24 Hours)    Intake/Output Summary (Last 24 hours) at 2022 1427  Last data filed at 2022 1212  Gross per 24 hour   Intake 1363.47 ml   Output 2655 ml   Net -1291.53 ml         General Appearance  Awake, alert, oriented,  not  In acute distress  HEENT - normocephalic, atraumatic, pink conjunctiva,  anicteric sclera  Neck - Supple, no mass  Lungs -  Bilateral good air entry, no rhonchi, no wheeze  Cardiovascular - Heart sounds are normal.  Regular rate and rhythm without murmur, gallop or rub.   Abdomen - soft, not distended, nontender,   Neurologic -oriented  Skin - No bruising or bleeding  Extremities - + edema,       MEDICATIONS:      ceFAZolin  2,000 mg IntraVENous Q8H    lidocaine  2 patch TransDERmal Daily    Sodium Hypochlorite   Irrigation Daily    sodium chloride flush  5-40 mL IntraVENous 2 times per day    enoxaparin  60 mg SubCUTAneous BID    amitriptyline  25 mg Oral Nightly    aspirin  81 mg Oral Daily    atorvastatin  40 mg Oral Nightly    buPROPion  200 mg Oral BID    isosorbide mononitrate  60 mg Oral Daily    lisinopril  30 mg Oral Daily    metoprolol tartrate  25 mg Oral Daily    pantoprazole  40 mg Oral Daily    pregabalin  150 mg Oral BID    tamsulosin  0.4 mg Oral Daily    vilazodone HCl  40 mg Oral Daily    insulin lispro  0-6 Units SubCUTAneous TID WC    insulin lispro  0-3 Units SubCUTAneous Nightly      sodium chloride      sodium chloride 75 mL/hr at 07/23/22 0036    dextrose       sodium chloride flush, sodium chloride, ondansetron **OR** ondansetron, polyethylene glycol, acetaminophen **OR** acetaminophen, albuterol sulfate HFA, dicyclomine, melatonin, glucose, dextrose bolus **OR** dextrose bolus, glucagon (rDNA), dextrose, oxyCODONE-acetaminophen **OR** oxyCODONE-acetaminophen      LABS:     CBC:   Recent Labs     07/23/22  1222   WBC 6.8   HGB 14.8          BMP:    Recent Labs     07/23/22  1222      K 4.8      CO2 26   BUN 14   CREATININE 0.9   GLUCOSE 105       Calcium:  Recent Labs     07/23/22  1222   CALCIUM 9.0        Glucose:  Recent Labs     07/22/22  1920 07/23/22  0749 07/23/22  1130   POCGLU 160* 149* 104           Problem list of patient:     Patient Active Problem List   Diagnosis Code    Hypertension I10    Hyperlipidemia E78.5    CAD (coronary artery disease) I25.10    Family history of premature coronary heart disease Z82.49    Dizziness R42    Acute renal failure (ARF) ([facility]) N17.9    Chronic low back pain M54.50, G89.29    PTSD (post-traumatic stress disorder) F43.10    Foot ulceration, right, with fat layer exposed ([facility] [address] L97.512    Type 2 diabetes mellitus without complication, with long-term current use of insulin ([facility]) E11.9, Z79.4    Cellulitis of right foot L03.115    Osteomyelitis ([facility]) M86.9         ASSESSMENT/PLAN   Right foot diabetic ulcer with OM: s/p resection of infected 4th and 5th metatarsal  Continue current treatment  Discssed on weight loss         Rody Bray MD, MD, [address] 7/23/2022 2:27 PM

## 2022-07-23 NOTE — PLAN OF CARE
Problem: Chronic Conditions and Co-morbidities  Goal: Patient's chronic conditions and co-morbidity symptoms are monitored and maintained or improved  7/23/2022 1703 by Rachelle Brown RN  Outcome: Progressing  Flowsheets (Taken 7/23/2022 0925)  Care Plan - Patient's Chronic Conditions and Co-Morbidity Symptoms are Monitored and Maintained or Improved: Monitor and assess patient's chronic conditions and comorbid symptoms for stability, deterioration, or improvement     Problem: Pain  Goal: Verbalizes/displays adequate comfort level or baseline comfort level  7/23/2022 1703 by Rachelle Brown RN  Outcome: Progressing     Problem: Skin/Tissue Integrity  Goal: Absence of new skin breakdown  Description: 1. Monitor for areas of redness and/or skin breakdown  2. Assess vascular access sites hourly  3. Every 4-6 hours minimum:  Change oxygen saturation probe site  4. Every 4-6 hours:  If on nasal continuous positive airway pressure, respiratory therapy assess nares and determine need for appliance change or resting period.   7/23/2022 1703 by Rachelle Brown RN  Outcome: Progressing     Problem: Wound:  Goal: Will show signs of wound healing; wound closure and no evidence of infection  Description: Will show signs of wound healing; wound closure and no evidence of infection  7/23/2022 1703 by Rachelle Brown RN  Outcome: Progressing     Problem: Neurosensory - Adult  Goal: Achieves maximal functionality and self care  7/23/2022 1703 by Rachelle Brown RN  Outcome: Progressing  Flowsheets (Taken 7/23/2022 0925)  Achieves maximal functionality and self care: Monitor swallowing and airway patency with patient fatigue and changes in neurological status     Problem: Skin/Tissue Integrity - Adult  Goal: Skin integrity remains intact  7/23/2022 1703 by Rachelle Brown RN  Outcome: Progressing  Flowsheets (Taken 7/23/2022 0925)  Skin Integrity Remains Intact: Monitor for areas of redness and/or skin breakdown     Problem: Skin/Tissue Integrity - Adult  Goal: Incisions, wounds, or drain sites healing without S/S of infection  7/23/2022 1703 by Rosa Watson RN  Outcome: Progressing  Flowsheets (Taken 7/23/2022 0925)  Incisions, Wounds, or Drain Sites Healing Without Sign and Symptoms of Infection: TWICE DAILY: Assess and document dressing/incision, wound bed, drain sites and surrounding tissue     Problem: Skin/Tissue Integrity - Adult  Goal: Oral mucous membranes remain intact  7/23/2022 1703 by Rosa Watson RN  Outcome: Progressing     Problem: Musculoskeletal - Adult  Goal: Return mobility to safest level of function  7/23/2022 1703 by Rosa Watson RN  Outcome: Progressing  Flowsheets (Taken 7/23/2022 0925)  Return Mobility to Safest Level of Function:   Assess patient stability and activity tolerance for standing, transferring and ambulating with or without assistive devices   Assist with transfers and ambulation using safe patient handling equipment as needed   Ensure adequate protection for wounds/incisions during mobilization   Obtain physical therapy/occupational therapy consults as needed     Problem: Musculoskeletal - Adult  Goal: Maintain proper alignment of affected body part  7/23/2022 1703 by Rosa Watson RN  Outcome: Progressing     Problem: Musculoskeletal - Adult  Goal: Return ADL status to a safe level of function  7/23/2022 1703 by Rosa Watson RN  Outcome: Progressing  Flowsheets (Taken 7/23/2022 0925)  Return ADL Status to a Safe Level of Function:   Administer medication as ordered   Assess activities of daily living deficits and provide assistive devices as needed   Assist and instruct patient to increase activity and self care as tolerated   Obtain physical therapy/occupational therapy consults as needed     Problem: Safety - Adult  Goal: Free from fall injury  7/23/2022 1703 by Rosa Watson RN  Outcome: Progressing     Problem: Discharge Planning  Goal: Discharge to home or other facility with appropriate resources  7/23/2022 1703 by Peter Joiner RN  Outcome: Progressing  Flowsheets (Taken 7/23/2022 0084)  Discharge to home or other facility with appropriate resources:   Identify barriers to discharge with patient and caregiver   Arrange for needed discharge resources and transportation as appropriate   Identify discharge learning needs (meds, wound care, etc)   Care plan reviewed with patient. Patient verbalize understanding of the plan of care and contribute to goal setting.

## 2022-07-23 NOTE — PROGRESS NOTES
Patient educated on importance of early ambulation post op. Gibbons care will be performed every shift with CHG cleaning solution. Patient will be discharged with CHG cleaning solution to continue care at home. Incentive spirometer at the bedside.  RN will continue to monitor

## 2022-07-23 NOTE — PROGRESS NOTES
Hospitalist Progress Note      Patient:  Valerie Marina    Unit/Bed:5K-05/005-A  YOB: 1970  MRN: [id number]   Acct: [de-identified]   PCP: MICHELLE Milian CNP  Date of Admission: 7/19/2022    Assessment/Plan:    Osteomyelitis, Right Foot: Podiatry & ID consulted. A&A growing Staph Aureus (coag positive). IV ABX continued. MRI right foot showed OM of stump. POD1 of I&D. Dressing changes per podiatry. Essential HTN: BP controlled. Continue home medications. NIDDMII: Controlled. Lastest Hgb A1c 5.5, continue SSI in hospital and will restart home regimen at [place]. Polyneuropathy: Lyrica. HLD: Lipitor. GERD: PPI. Anxiety: Michelle Vick, Bupropion    Chief Complaint: Fever, chills, foot ulcer     Initial H and P:-    Initial H&P \"52 y.o. male with a PMHx of HTN, DM2, HLD, peripheral neuropathy, GERD, depression, and anxiety who presented to [facility] for worsening right foot wound with associated fever, chills, and feeling sick over the weekend. Patient has a history of diabetic foot ulcer requiring right transmetatarsal amputation. Patient has had open wound since his surgery about a year ago. He was treated with Keflex/doxycycline however he had persistent wound that failed to close. Over the weekend, he developed fever, chills and nausea. He states that yesterday he noticed purulent greenish discharge from his right foot wound. He was seen today at the wound clinic and decision was made to admit patient for further treatment. Hospitalist was contacted for admission. Please refer to A&P for further details. \"     7/20: No acute events overnight. Pt states that his pain is relatively controlled. Pt has no issues or concerns at this time. Pt is anticipating surgery. 7/21: No acute events overnight. Pt states that her pain is relatively controlled.  Pt states that he is having some leg and back pain today, he attributed this to the hospital bed. Pt is asking if he is going to have surgery; it was explained that Podiatry will make this decision. 7/22: No acute events overnight. Pt states that his pain is relatively controlled. Pt has no issues or concerns at this time. Pt had surgery and tolerated it well. Subjective (past 24 hours):   No acute events overnight. Pt states that his pain is relatively controlled. Pt has no issues or concerns at this time. Past medical history, family history, social history and allergies reviewed again and is unchanged since admission. ROS (All review of systems completed. Pertinent positives noted.  Otherwise All other systems reviewed and negative.)     Medications:  Reviewed    Infusion Medications    sodium chloride      sodium chloride 75 mL/hr at 07/23/22 0036    dextrose       Scheduled Medications    ceFAZolin  2,000 mg IntraVENous Q8H    lidocaine  2 patch TransDERmal Daily    Sodium Hypochlorite   Irrigation Daily    sodium chloride flush  5-40 mL IntraVENous 2 times per day    enoxaparin  60 mg SubCUTAneous BID    amitriptyline  25 mg Oral Nightly    aspirin  81 mg Oral Daily    atorvastatin  40 mg Oral Nightly    buPROPion  200 mg Oral BID    isosorbide mononitrate  60 mg Oral Daily    lisinopril  30 mg Oral Daily    metoprolol tartrate  25 mg Oral Daily    pantoprazole  40 mg Oral Daily    pregabalin  150 mg Oral BID    tamsulosin  0.4 mg Oral Daily    vilazodone HCl  40 mg Oral Daily    insulin lispro  0-6 Units SubCUTAneous TID WC    insulin lispro  0-3 Units SubCUTAneous Nightly     PRN Meds: sodium chloride flush, sodium chloride, ondansetron **OR** ondansetron, polyethylene glycol, acetaminophen **OR** acetaminophen, albuterol sulfate HFA, dicyclomine, melatonin, glucose, dextrose bolus **OR** dextrose bolus, glucagon (rDNA), dextrose, oxyCODONE-acetaminophen **OR** oxyCODONE-acetaminophen      Intake/Output Summary (Last 24 hours) at 7/23/2022 1318  Last data filed at 7/23/2022 1212  Gross per 24 hour   Intake 1363.47 ml   Output 2655 ml   Net -1291.53 ml       Diet:  ADULT DIET; Regular    Exam:  BP (!) 143/79   Pulse 78   Temp 97.6 °F (36.4 °C) (Oral)   Resp 20   Ht 6' 1\" (1.854 m)   Wt (!) 376 lb 5.2 oz (170.7 kg)   SpO2 100%   BMI 49.65 kg/m²   General appearance: No apparent distress, appears stated age and cooperative. HEENT: Pupils equal, round, and reactive to light. Conjunctivae/corneas clear. Neck: Supple, with full range of motion. No jugular venous distention. Trachea midline. Respiratory:  Normal respiratory effort. Clear to auscultation, bilaterally without Rales/Wheezes/Rhonchi. Cardiovascular: Regular rate and rhythm with normal S1/S2 without murmurs, rubs or gallops. Abdomen: Soft, non-tender, non-distended with normal bowel sounds. Musculoskeletal: passive and active ROM x 4 extremities. Skin: Skin color, texture, turgor normal.  No rashes or lesions. Neurologic:  Neurovascularly intact without any focal sensory/motor deficits. Cranial nerves: II-XII intact, grossly non-focal.  Psychiatric: Alert and oriented, thought content appropriate, normal insight  Capillary Refill: Brisk,< 3 seconds   Peripheral Pulses: +2 palpable, equal bilaterally     Right Foot:            Labs:   Recent Labs     07/23/22  1222   WBC 6.8   HGB 14.8   HCT 44.9        Recent Labs     07/23/22  1222      K 4.8      CO2 26   BUN 14   CREATININE 0.9   CALCIUM 9.0       Microbiology:    Blood culture #1:   Lab Results   Component Value Date/Time    BC No growth 24 hours. No growth 48 hours.   07/20/2022 01:33 PM       Blood culture #2:No results found for: [address]    Organism:  Lab Results   Component Value Date/Time    ORG Staphylococcus (coagulase positive) 07/22/2022 03:20 PM    ORG Staphylococcus species 07/22/2022 03:20 PM         Lab Results   Component Value Date/Time    LABGRAM  07/22/2022 03:20 PM     No segmented neutrophils observed. Rare epithelial cells observed. Few gram positive cocci occurring singly and in pairs. Few gram positive bacilli. LABGRAM  07/22/2022 03:20 PM     Rare segmented neutrophils observed. No epithelial cells observed. No organisms observed. LABGRAM  07/22/2022 03:20 PM     Rare segmented neutrophils observed. No epithelial cells observed. No organisms observed. Aerobic and Anaerobic :  Lab Results   Component Value Date/Time    LABAERO moderate growth  07/22/2022 03:20 PM    LABAERO No growth-preliminary  07/22/2022 03:20 PM    LABAERO light growth  07/22/2022 03:20 PM     Lab Results   Component Value Date/Time    LABANAE  07/19/2022 01:40 PM     Culture yielded moderate mixed anaerobic growth which included anaerobic gram positive cocci and anaerobic gram negative bacilli. If a true mixed aerobic and anaerobic infection is suspected, then broad spectrum empiric antibiotic therapy is indicated and should include coverage for anaerobic organisms. Urinalysis:      Lab Results   Component Value Date/Time    NITRU Negative 08/12/2016 06:59 PM    BLOODU Negative 08/12/2016 06:59 PM    SPECGRAV 1.015 08/12/2016 06:59 PM    GLUCOSEU NEGATIVE 07/07/2015 12:48 AM       Radiology:  XR CHEST PORTABLE   Final Result   Impression:   1. Central bronchial wall thickening which may reflect bronchitis or viral    pneumonitis. Findings are more prominent on the current exam compared to    previous. This document has been electronically signed by: Irlanda Ferrara MD on    07/21/2022 08:03 PM      MRI FOOT RIGHT W WO CONTRAST   Final Result   Impression:   1. Osteomyelitis in the 5th metatarsal stump. Mild osteomyelitis at the    distal end of the 4th metatarsal stump. 2. Cellulitis. Soft tissue ulcer at the stump. No drainable rim-enhancing    fluid collection.       This document has been electronically signed by: Desiree Lee MD on    07/19/2022 10:08 PM      XR FOOT RIGHT (MIN 3 VIEWS)   Final

## 2022-07-23 NOTE — PROGRESS NOTES
Podiatric Progress Note  Alessio Galeano  Subjective :   7/23/22  Patient seen bedside today on behalf of Dr. Jessica Longoria. Patient is 1 day status post incision and drainage with 4th and 5th ray amputation, right foot (DOS: 7/22/22). Patient appeared pleasant, was oriented to person, place and time, and in no acute distress. Patient states that he is doing well. He endorses some mild burning pain to his right foot but has been able to tolerate it. Patient was interested in seeing a picture of his foot. Denies any N/V/F/C/SOB or CP. Patient has no further pedal concerns at this point in time. 7/21/22  Patient seen bedside today on behalf of Dr. Shanon Patton. Patient appeared pleasant, was oriented to person, place and time and in no acute distress. Patient states that he is doing well. Patient denies any N/V/F/C/SOB or CP. Patient has no further pedal concerns at this point in time. HPI           The patient is a 45 y.o. male with significant past medical history of hypertension, hyperlipidemia, CAD, type 2 diabetes who is being seen at bedside on behalf of Dr. Shanon Patton. Patient is known to Dr. Shanon Patton and has been following in [facility]. Patient presents today for follow up on wound/ulcer's progression. The patient is currently on oral Doxycycline. Current dressing care includes Betadine wet-to-dry, dry sterile dressing, Ace bandage. Patient states that over the weekend he had episodes of nausea, fevers, chills and yesterday 7/18/2022 he noticed a thick green discharge from the wound. Patient currently denies any N/F/V/C/SOB/CP. No other pedal complaints today.       Current Medications:    Current Facility-Administered Medications: ceFAZolin (ANCEF) 2000 mg in dextrose 5 % 50 mL IVPB, 2,000 mg, IntraVENous, Q8H  lidocaine 4 % external patch 2 patch, 2 patch, TransDERmal, Daily  Sodium Hypochlorite (DAKINS) 0.25 % external solution, , Irrigation, Daily  sodium chloride flush 0.9 % injection 5-40 mL, 5-40 mL, IntraVENous, 2 times per day  sodium chloride flush 0.9 % injection 5-40 mL, 5-40 mL, IntraVENous, PRN  0.9 % sodium chloride infusion, , IntraVENous, PRN  enoxaparin (LOVENOX) injection 60 mg, 60 mg, SubCUTAneous, BID  ondansetron (ZOFRAN-ODT) disintegrating tablet 4 mg, 4 mg, Oral, Q8H PRN **OR** ondansetron (ZOFRAN) injection 4 mg, 4 mg, IntraVENous, Q6H PRN  polyethylene glycol (GLYCOLAX) packet 17 g, 17 g, Oral, Daily PRN  acetaminophen (TYLENOL) tablet 650 mg, 650 mg, Oral, Q6H PRN **OR** acetaminophen (TYLENOL) suppository 650 mg, 650 mg, Rectal, Q6H PRN  albuterol sulfate HFA (PROVENTIL;VENTOLIN;PROAIR) 108 (90 Base) MCG/ACT inhaler 2 puff, 2 puff, Inhalation, Q4H PRN  amitriptyline (ELAVIL) tablet 25 mg, 25 mg, Oral, Nightly  aspirin EC tablet 81 mg, 81 mg, Oral, Daily  atorvastatin (LIPITOR) tablet 40 mg, 40 mg, Oral, Nightly  buPROPion (WELLBUTRIN SR) extended release tablet 200 mg, 200 mg, Oral, BID  dicyclomine (BENTYL) capsule 10 mg, 10 mg, Oral, Daily PRN  isosorbide mononitrate (IMDUR) extended release tablet 60 mg, 60 mg, Oral, Daily  lisinopril (PRINIVIL;ZESTRIL) tablet 30 mg, 30 mg, Oral, Daily  metoprolol tartrate (LOPRESSOR) tablet 25 mg, 25 mg, Oral, Daily  pantoprazole (PROTONIX) tablet 40 mg, 40 mg, Oral, Daily  pregabalin (LYRICA) capsule 150 mg, 150 mg, Oral, BID  melatonin tablet 4.5 mg, 4.5 mg, Oral, Nightly PRN  tamsulosin (FLOMAX) capsule 0.4 mg, 0.4 mg, Oral, Daily  vilazodone HCl (VIIBRYD) TABS 40 mg, 40 mg, Oral, Daily  0.9 % sodium chloride infusion, , IntraVENous, Continuous  insulin lispro (HUMALOG) injection vial 0-6 Units, 0-6 Units, SubCUTAneous, TID WC  insulin lispro (HUMALOG) injection vial 0-3 Units, 0-3 Units, SubCUTAneous, Nightly  glucose chewable tablet 16 g, 4 tablet, Oral, PRN  dextrose bolus 10% 125 mL, 125 mL, IntraVENous, PRN **OR** dextrose bolus 10% 250 mL, 250 mL, IntraVENous, PRN  glucagon (rDNA) injection 1 mg, 1 mg, IntraMUSCular, PRN  dextrose 5 % solution, 100 mL/hr, IntraVENous, PRN  oxyCODONE-acetaminophen (PERCOCET) 5-325 MG per tablet 1 tablet, 1 tablet, Oral, Q4H PRN **OR** oxyCODONE-acetaminophen (PERCOCET) 5-325 MG per tablet 2 tablet, 2 tablet, Oral, Q4H PRN    Objective     BP (!) 143/79   Pulse 78   Temp 97.6 °F (36.4 °C) (Oral)   Resp 20   Ht 6' 1\" (1.854 m)   Wt (!) 376 lb 5.2 oz (170.7 kg)   SpO2 100%   BMI 49.65 kg/m²      I/O:  Intake/Output Summary (Last 24 hours) at 7/23/2022 1332  Last data filed at 7/23/2022 1212  Gross per 24 hour   Intake 1363.47 ml   Output 2655 ml   Net -1291.53 ml              Wt Readings from Last 3 Encounters:   07/21/22 (!) 376 lb 5.2 oz (170.7 kg)   01/19/22 (!) 369 lb (167.4 kg)   12/22/21 (!) 371 lb (168.3 kg)       LABS:    Recent Labs     07/23/22  1222   WBC 6.8   HGB 14.8   HCT 44.9           Recent Labs     07/23/22  1222      K 4.8      CO2 26   BUN 14   CREATININE 0.9      No results for input(s): PROT, INR, APTT in the last 72 hours. No results for input(s): CKTOTAL, CKMB, CKMBINDEX, TROPONINI in the last 72 hours. Focused Lower Extremity Examination:    Vitals:    BP (!) 143/79   Pulse 78   Temp 97.6 °F (36.4 °C) (Oral)   Resp 20   Ht 6' 1\" (1.854 m)   Wt (!) 376 lb 5.2 oz (170.7 kg)   SpO2 100%   BMI 49.65 kg/m²      Vascular: Dorsalis pedis and posterior tibial pulses are faintly palpable secondary to edema bilaterally. Skin temperature is warm to warm from proximal tibial tuberosity to distal digits. CFT brisk to exposed digits and to amputation stump on right. Pitting edema present to bilateral LE. Dermatologic: Dressing intact to right foot with strikethrough noted. Upon removal of dressing, semi-elliptical incision noted to distolateral aspect of right foot, extending proximally from the 5th metatarsal base to the plantar aspect of the TMA stump. Incision appears well coapted with sutures intact. No angel-incisional maceration noted.  Mild edema and erythema noted consistent with post-operative course. Mild sanguinous drainage noted. No malodor, purulence. Neurovascular: Light touch sensation grossly diminished at the feet bilaterally. Musculoskeletal: Muscle strength 5/5 for all pedal muscle groups tested, right. Gastro-soleal equinus present bilaterally. Transmetatarsal amputation to right with 4th and 5th metatarsal amputation. Mild pain on palpation. Images  XR CHEST PORTABLE   Final Result   Impression:   1. Central bronchial wall thickening which may reflect bronchitis or viral    pneumonitis. Findings are more prominent on the current exam compared to    previous. This document has been electronically signed by: Katlin Dee MD on    07/21/2022 08:03 PM      MRI FOOT RIGHT W WO CONTRAST   Final Result   Impression:   1. Osteomyelitis in the 5th metatarsal stump. Mild osteomyelitis at the    distal end of the 4th metatarsal stump. 2. Cellulitis. Soft tissue ulcer at the stump. No drainable rim-enhancing    fluid collection. This document has been electronically signed by: River Paredes MD on    07/19/2022 10:08 PM      XR FOOT RIGHT (MIN 3 VIEWS)   Final Result   Post operative changes related to transmetatarsal amputation are noted. Periarticular calcification and fragmented appearance of the distal fifth metatarsal diaphysis is nonspecific and could be postoperative. There is a suggestion of soft tissue    swelling at the fourth and fifth metatarsals and osteomyelitis at this location is not excluded. **This report has been created using voice recognition software. It may contain minor errors which are inherent in voice recognition technology. **      Final report electronically signed by Dr Genesis Dover on 7/19/2022 4:56 PM            Chronic  Patient Active Problem List   Diagnosis    Hypertension    Hyperlipidemia    CAD (coronary artery disease)    Family history of premature coronary heart disease    Dizziness    Acute renal failure

## 2022-07-24 ENCOUNTER — APPOINTMENT (OUTPATIENT)
Dept: GENERAL RADIOLOGY | Age: 52
DRG: 617 | End: 2022-07-24
Attending: INTERNAL MEDICINE
Payer: MEDICARE

## 2022-07-24 LAB
ANION GAP SERPL CALCULATED.3IONS-SCNC: 10 MEQ/L (ref 8–16)
BASOPHILS # BLD: 0.3 %
BASOPHILS ABSOLUTE: 0 THOU/MM3 (ref 0–0.1)
BUN BLDV-MCNC: 13 MG/DL (ref 7–22)
CALCIUM SERPL-MCNC: 8.7 MG/DL (ref 8.5–10.5)
CHLORIDE BLD-SCNC: 104 MEQ/L (ref 98–111)
CO2: 25 MEQ/L (ref 23–33)
CREAT SERPL-MCNC: 0.9 MG/DL (ref 0.4–1.2)
EOSINOPHIL # BLD: 4.3 %
EOSINOPHILS ABSOLUTE: 0.3 THOU/MM3 (ref 0–0.4)
ERYTHROCYTE [DISTWIDTH] IN BLOOD BY AUTOMATED COUNT: 12.6 % (ref 11.5–14.5)
ERYTHROCYTE [DISTWIDTH] IN BLOOD BY AUTOMATED COUNT: 43.5 FL (ref 35–45)
GFR SERPL CREATININE-BSD FRML MDRD: 88 ML/MIN/1.73M2
GLUCOSE BLD-MCNC: 101 MG/DL (ref 70–108)
GLUCOSE BLD-MCNC: 112 MG/DL (ref 70–108)
GLUCOSE BLD-MCNC: 127 MG/DL (ref 70–108)
GLUCOSE BLD-MCNC: 150 MG/DL (ref 70–108)
GLUCOSE BLD-MCNC: 174 MG/DL (ref 70–108)
HCT VFR BLD CALC: 41.1 % (ref 42–52)
HEMOGLOBIN: 13.7 GM/DL (ref 14–18)
IMMATURE GRANS (ABS): 0.03 THOU/MM3 (ref 0–0.07)
IMMATURE GRANULOCYTES: 0.5 %
LYMPHOCYTES # BLD: 21.5 %
LYMPHOCYTES ABSOLUTE: 1.4 THOU/MM3 (ref 1–4.8)
MCH RBC QN AUTO: 31.6 PG (ref 26–33)
MCHC RBC AUTO-ENTMCNC: 33.3 GM/DL (ref 32.2–35.5)
MCV RBC AUTO: 94.7 FL (ref 80–94)
MONOCYTES # BLD: 8.2 %
MONOCYTES ABSOLUTE: 0.5 THOU/MM3 (ref 0.4–1.3)
NUCLEATED RED BLOOD CELLS: 0 /100 WBC
PLATELET # BLD: 161 THOU/MM3 (ref 130–400)
PMV BLD AUTO: 10.6 FL (ref 9.4–12.4)
POTASSIUM REFLEX MAGNESIUM: 4.8 MEQ/L (ref 3.5–5.2)
RBC # BLD: 4.34 MILL/MM3 (ref 4.7–6.1)
SEG NEUTROPHILS: 65.2 %
SEGMENTED NEUTROPHILS ABSOLUTE COUNT: 4.1 THOU/MM3 (ref 1.8–7.7)
SODIUM BLD-SCNC: 139 MEQ/L (ref 135–145)
WBC # BLD: 6.3 THOU/MM3 (ref 4.8–10.8)

## 2022-07-24 PROCEDURE — 2580000003 HC RX 258

## 2022-07-24 PROCEDURE — 85025 COMPLETE CBC W/AUTO DIFF WBC: CPT

## 2022-07-24 PROCEDURE — 6360000002 HC RX W HCPCS

## 2022-07-24 PROCEDURE — 6370000000 HC RX 637 (ALT 250 FOR IP)

## 2022-07-24 PROCEDURE — 80048 BASIC METABOLIC PNL TOTAL CA: CPT

## 2022-07-24 PROCEDURE — 36415 COLL VENOUS BLD VENIPUNCTURE: CPT

## 2022-07-24 PROCEDURE — 99232 SBSQ HOSP IP/OBS MODERATE 35: CPT | Performed by: PHYSICIAN ASSISTANT

## 2022-07-24 PROCEDURE — 73630 X-RAY EXAM OF FOOT: CPT

## 2022-07-24 PROCEDURE — 1200000000 HC SEMI PRIVATE

## 2022-07-24 PROCEDURE — 82948 REAGENT STRIP/BLOOD GLUCOSE: CPT

## 2022-07-24 PROCEDURE — 6360000002 HC RX W HCPCS: Performed by: STUDENT IN AN ORGANIZED HEALTH CARE EDUCATION/TRAINING PROGRAM

## 2022-07-24 RX ADMIN — TAMSULOSIN HYDROCHLORIDE 0.4 MG: 0.4 CAPSULE ORAL at 10:00

## 2022-07-24 RX ADMIN — METOPROLOL TARTRATE 25 MG: 25 TABLET, FILM COATED ORAL at 10:00

## 2022-07-24 RX ADMIN — CEFAZOLIN 2000 MG: 10 INJECTION, POWDER, FOR SOLUTION INTRAVENOUS at 21:08

## 2022-07-24 RX ADMIN — INSULIN LISPRO 1 UNITS: 100 INJECTION, SOLUTION INTRAVENOUS; SUBCUTANEOUS at 21:08

## 2022-07-24 RX ADMIN — ENOXAPARIN SODIUM 40 MG: 100 INJECTION SUBCUTANEOUS at 09:58

## 2022-07-24 RX ADMIN — BUPROPION HYDROCHLORIDE 200 MG: 100 TABLET, FILM COATED, EXTENDED RELEASE ORAL at 21:05

## 2022-07-24 RX ADMIN — PREGABALIN 150 MG: 75 CAPSULE ORAL at 09:58

## 2022-07-24 RX ADMIN — SODIUM CHLORIDE: 9 INJECTION, SOLUTION INTRAVENOUS at 13:52

## 2022-07-24 RX ADMIN — OXYCODONE AND ACETAMINOPHEN 2 TABLET: 5; 325 TABLET ORAL at 10:15

## 2022-07-24 RX ADMIN — PREGABALIN 150 MG: 75 CAPSULE ORAL at 21:05

## 2022-07-24 RX ADMIN — ATORVASTATIN CALCIUM 40 MG: 40 TABLET, FILM COATED ORAL at 21:05

## 2022-07-24 RX ADMIN — OXYCODONE AND ACETAMINOPHEN 2 TABLET: 5; 325 TABLET ORAL at 21:05

## 2022-07-24 RX ADMIN — BUPROPION HYDROCHLORIDE 200 MG: 100 TABLET, FILM COATED, EXTENDED RELEASE ORAL at 09:59

## 2022-07-24 RX ADMIN — ENOXAPARIN SODIUM 40 MG: 100 INJECTION SUBCUTANEOUS at 21:05

## 2022-07-24 RX ADMIN — OXYCODONE AND ACETAMINOPHEN 2 TABLET: 5; 325 TABLET ORAL at 16:52

## 2022-07-24 RX ADMIN — ASPIRIN 81 MG: 81 TABLET, COATED ORAL at 09:58

## 2022-07-24 RX ADMIN — VILAZODONE HYDROCHLORIDE 40 MG: 40 TABLET ORAL at 10:00

## 2022-07-24 RX ADMIN — AMITRIPTYLINE HYDROCHLORIDE 25 MG: 25 TABLET, FILM COATED ORAL at 21:05

## 2022-07-24 RX ADMIN — PANTOPRAZOLE SODIUM 40 MG: 40 TABLET, DELAYED RELEASE ORAL at 09:58

## 2022-07-24 RX ADMIN — OXYCODONE AND ACETAMINOPHEN 2 TABLET: 5; 325 TABLET ORAL at 06:04

## 2022-07-24 RX ADMIN — CEFAZOLIN 2000 MG: 10 INJECTION, POWDER, FOR SOLUTION INTRAVENOUS at 13:56

## 2022-07-24 RX ADMIN — LISINOPRIL 30 MG: 20 TABLET ORAL at 10:00

## 2022-07-24 RX ADMIN — ISOSORBIDE MONONITRATE 60 MG: 60 TABLET, EXTENDED RELEASE ORAL at 10:00

## 2022-07-24 RX ADMIN — OXYCODONE AND ACETAMINOPHEN 2 TABLET: 5; 325 TABLET ORAL at 01:03

## 2022-07-24 RX ADMIN — CEFAZOLIN 2000 MG: 10 INJECTION, POWDER, FOR SOLUTION INTRAVENOUS at 06:07

## 2022-07-24 ASSESSMENT — PAIN SCALES - GENERAL
PAINLEVEL_OUTOF10: 7
PAINLEVEL_OUTOF10: 5
PAINLEVEL_OUTOF10: 7
PAINLEVEL_OUTOF10: 5

## 2022-07-24 ASSESSMENT — PAIN DESCRIPTION - DESCRIPTORS
DESCRIPTORS: ACHING

## 2022-07-24 ASSESSMENT — PAIN DESCRIPTION - ORIENTATION
ORIENTATION: RIGHT

## 2022-07-24 ASSESSMENT — PAIN DESCRIPTION - LOCATION
LOCATION: FOOT

## 2022-07-24 NOTE — PROGRESS NOTES
Podiatric Progress Note  Ryann Galeano  Subjective :   7/24/22  Patient seen bedside today on behalf of Dr. Chela Enciso. Patient is 2 days status post incision and drainage with 4th and 5th ray amputation, right foot (DOS: 7/22/22). Patient appeared pleasant, was oriented to person, place and time, and in no acute distress. Patient states that he is doing well. He endorses some soreness to his right foot but has been tolerating it. Also relates that he has been elevating his foot but admits that it feels better to dangle it sometimes. Denies any N/V/F/C/SOB or CP. Patient has no further pedal concerns at this point in time. 7/23/22  Patient seen bedside today on behalf of Dr. Chela Enciso. Patient is 1 day status post incision and drainage with 4th and 5th ray amputation, right foot (DOS: 7/22/22). Patient appeared pleasant, was oriented to person, place and time, and in no acute distress. Patient states that he is doing well. He endorses some mild burning pain to his right foot but has been able to tolerate it. Patient was interested in seeing a picture of his foot. Denies any N/V/F/C/SOB or CP. Patient has no further pedal concerns at this point in time. 7/21/22  Patient seen bedside today on behalf of Dr. Jaret Vicente. Patient appeared pleasant, was oriented to person, place and time and in no acute distress. Patient states that he is doing well. Patient denies any N/V/F/C/SOB or CP. Patient has no further pedal concerns at this point in time. HPI           The patient is a 45 y.o. male with significant past medical history of hypertension, hyperlipidemia, CAD, type 2 diabetes who is being seen at bedside on behalf of Dr. Jaret Vicente. Patient is known to Dr. Jaret Vicente and has been following in [facility]. Patient presents today for follow up on wound/ulcer's progression. The patient is currently on oral Doxycycline. Current dressing care includes Betadine wet-to-dry, dry sterile dressing, Ace bandage.   Patient states that over the weekend he had episodes of nausea, fevers, chills and yesterday 7/18/2022 he noticed a thick green discharge from the wound. Patient currently denies any N/F/V/C/SOB/CP. No other pedal complaints today.       Current Medications:    Current Facility-Administered Medications: enoxaparin (LOVENOX) injection 40 mg, 40 mg, SubCUTAneous, BID  ceFAZolin (ANCEF) 2000 mg in dextrose 5 % 50 mL IVPB, 2,000 mg, IntraVENous, Q8H  lidocaine 4 % external patch 2 patch, 2 patch, TransDERmal, Daily  sodium chloride flush 0.9 % injection 5-40 mL, 5-40 mL, IntraVENous, 2 times per day  sodium chloride flush 0.9 % injection 5-40 mL, 5-40 mL, IntraVENous, PRN  0.9 % sodium chloride infusion, , IntraVENous, PRN  ondansetron (ZOFRAN-ODT) disintegrating tablet 4 mg, 4 mg, Oral, Q8H PRN **OR** ondansetron (ZOFRAN) injection 4 mg, 4 mg, IntraVENous, Q6H PRN  polyethylene glycol (GLYCOLAX) packet 17 g, 17 g, Oral, Daily PRN  acetaminophen (TYLENOL) tablet 650 mg, 650 mg, Oral, Q6H PRN **OR** acetaminophen (TYLENOL) suppository 650 mg, 650 mg, Rectal, Q6H PRN  albuterol sulfate HFA (PROVENTIL;VENTOLIN;PROAIR) 108 (90 Base) MCG/ACT inhaler 2 puff, 2 puff, Inhalation, Q4H PRN  amitriptyline (ELAVIL) tablet 25 mg, 25 mg, Oral, Nightly  aspirin EC tablet 81 mg, 81 mg, Oral, Daily  atorvastatin (LIPITOR) tablet 40 mg, 40 mg, Oral, Nightly  buPROPion (WELLBUTRIN SR) extended release tablet 200 mg, 200 mg, Oral, BID  dicyclomine (BENTYL) capsule 10 mg, 10 mg, Oral, Daily PRN  isosorbide mononitrate (IMDUR) extended release tablet 60 mg, 60 mg, Oral, Daily  lisinopril (PRINIVIL;ZESTRIL) tablet 30 mg, 30 mg, Oral, Daily  metoprolol tartrate (LOPRESSOR) tablet 25 mg, 25 mg, Oral, Daily  pantoprazole (PROTONIX) tablet 40 mg, 40 mg, Oral, Daily  pregabalin (LYRICA) capsule 150 mg, 150 mg, Oral, BID  melatonin tablet 4.5 mg, 4.5 mg, Oral, Nightly PRN  tamsulosin (FLOMAX) capsule 0.4 mg, 0.4 mg, Oral, Daily  vilazodone HCl (VIIBRYD) TABS 40 mg, 40 mg, exposed digits and to amputation stump on right. Pitting edema present to bilateral LE. Dermatologic: Dressing intact to right foot with strikethrough noted. Upon removal of dressing, semi-elliptical incision noted to distolateral aspect of right foot, extending proximally from the 5th metatarsal base to the plantar aspect of the TMA stump. Incision appears well coapted with sutures intact. Scant angel-incisional maceration noted. Mild edema and erythema noted consistent with post-operative course. Mild sanguinous drainage noted. No malodor, purulence. Neurovascular: Light touch sensation grossly diminished at the feet bilaterally. Musculoskeletal: Muscle strength 5/5 for all pedal muscle groups tested, right. Gastro-soleal equinus present bilaterally. Transmetatarsal amputation to right with 4th and 5th metatarsal amputation. Mild pain on palpation. Images  XR CHEST PORTABLE   Final Result   Impression:   1. Central bronchial wall thickening which may reflect bronchitis or viral    pneumonitis. Findings are more prominent on the current exam compared to    previous. This document has been electronically signed by: Krysta Hendrix MD on    07/21/2022 08:03 PM      MRI FOOT RIGHT W WO CONTRAST   Final Result   Impression:   1. Osteomyelitis in the 5th metatarsal stump. Mild osteomyelitis at the    distal end of the 4th metatarsal stump. 2. Cellulitis. Soft tissue ulcer at the stump. No drainable rim-enhancing    fluid collection. This document has been electronically signed by: Isiah Perez MD on    07/19/2022 10:08 PM      XR FOOT RIGHT (MIN 3 VIEWS)   Final Result   Post operative changes related to transmetatarsal amputation are noted. Periarticular calcification and fragmented appearance of the distal fifth metatarsal diaphysis is nonspecific and could be postoperative.  There is a suggestion of soft tissue    swelling at the fourth and fifth metatarsals and osteomyelitis at this location is not excluded. **This report has been created using voice recognition software. It may contain minor errors which are inherent in voice recognition technology. **      Final report electronically signed by Dr Milton Montgomery on 7/19/2022 4:56 PM            Chronic  Patient Active Problem List   Diagnosis    Hypertension    Hyperlipidemia    CAD (coronary artery disease)    Family history of premature coronary heart disease    Dizziness    Acute renal failure (ARF) ([facility])    Chronic low back pain    PTSD (post-traumatic stress disorder)    Foot ulceration, right, with fat layer exposed ([address]    Type 2 diabetes mellitus without complication, with long-term current use of insulin (HCC)    Cellulitis of right foot    Osteomyelitis ([address]       Assessment and Plan  Principle  1. Cellulitis of right foot  2. Osteomyelitis, right foot     -Patient examined and evaluated  -Wound dressed with betadine, gauze, ABD, Kerlix, Ace bandage  -Patient placed on IV cefazolin per infectious disease  -Reinforced with patient the importance of staying off of his foot for the management of this wound and for healing  -Tissue culture reviewed: preliminary results for few gram positive cocci occurring singly and in pairs. Few gram positive bacilli. Coagulase positive staphylococcus. -X-rays reviewed, see impression above  -MRI reviewed, osteomyelitis in 5th metatarsal stump, mild osteomyelitis in 4th metatarsal stump. See impression above  -WBC 6.8, Hgb 14.8; afebrile  -Patient is to remain non-weightbearing RLE  -Discussed with patient that we will continue to monitor the incision for adequate skin healing.   -Patient verbalized agreement understanding with treatment plan discussed. All patient's questions were answered to satisfaction. DISPO: S/p 4th and 5th ray amputation (7/22/22). Will continue to monitor incision healing, pending pathology and finalization of cultures.     Darya Levy [id number]  7/24/2022   9:40 AM

## 2022-07-24 NOTE — PROGRESS NOTES
Hospitalist Progress Note      Patient:  Skyla Proctor    Unit/Bed:5K-05/005-A  YOB: 1970  MRN: [id number]   Acct: [de-identified]   PCP: MICHELLE Anderson CNP  Date of Admission: 7/19/2022    Assessment/Plan:    Osteomyelitis, Right Foot: Podiatry & ID consulted. A&A growing Staph Aureus (coag positive). IV ABX continued. MRI right foot showed OM of stump. POD2 of I&D. Dressing changes per podiatry. Essential HTN: BP controlled. Continue home medications. NIDDMII: Controlled. Lastest Hgb A1c 5.5, continue SSI in hospital and will restart home regimen at [place]. Polyneuropathy: Lyrica. HLD: Lipitor. GERD: PPI. Anxiety: Elvail, Viibryd, Bupropion    Dispo: Awaiting OP ABX plan     Chief Complaint: Fever, chills, foot ulcer     Initial H and P:-    Initial H&P \"52 y.o. male with a PMHx of HTN, DM2, HLD, peripheral neuropathy, GERD, depression, and anxiety who presented to [address] [place] [address] for worsening right foot wound with associated fever, chills, and feeling sick over the weekend. Patient has a history of diabetic foot ulcer requiring right transmetatarsal amputation. Patient has had open wound since his surgery about a year ago. He was treated with Keflex/doxycycline however he had persistent wound that failed to close. Over the weekend, he developed fever, chills and nausea. He states that yesterday he noticed purulent greenish discharge from his right foot wound. He was seen today at the wound clinic and decision was made to admit patient for further treatment. Hospitalist was contacted for admission. Please refer to A&P for further details. \"     7/20: No acute events overnight. Pt states that his pain is relatively controlled. Pt has no issues or concerns at this time. Pt is anticipating surgery. 7/21: No acute events overnight. Pt states that her pain is relatively controlled.  Pt states that he is having some leg and back pain today, he attributed this to the hospital bed. Pt is asking if he is going to have surgery; it was explained that Podiatry will make this decision. 7/22: No acute events overnight. Pt states that his pain is relatively controlled. Pt has no issues or concerns at this time. Pt had surgery and tolerated it well.     7/23: No acute events overnight. Pt states that his pain is relatively controlled. Pt has no issues or concerns at this time. Subjective (past 24 hours):   Pt is eager to get out of the hospital. Pt states that he is feeling okay. Pt says that his legs and thigh are giving him the most pain. No new issues at this time. Past medical history, family history, social history and allergies reviewed again and is unchanged since admission. ROS (All review of systems completed. Pertinent positives noted.  Otherwise All other systems reviewed and negative.)     Medications:  Reviewed    Infusion Medications    sodium chloride      sodium chloride 75 mL/hr at 07/24/22 1352    dextrose       Scheduled Medications    enoxaparin  40 mg SubCUTAneous BID    ceFAZolin  2,000 mg IntraVENous Q8H    lidocaine  2 patch TransDERmal Daily    sodium chloride flush  5-40 mL IntraVENous 2 times per day    amitriptyline  25 mg Oral Nightly    aspirin  81 mg Oral Daily    atorvastatin  40 mg Oral Nightly    buPROPion  200 mg Oral BID    isosorbide mononitrate  60 mg Oral Daily    lisinopril  30 mg Oral Daily    metoprolol tartrate  25 mg Oral Daily    pantoprazole  40 mg Oral Daily    pregabalin  150 mg Oral BID    tamsulosin  0.4 mg Oral Daily    vilazodone HCl  40 mg Oral Daily    insulin lispro  0-6 Units SubCUTAneous TID WC    insulin lispro  0-3 Units SubCUTAneous Nightly     PRN Meds: sodium chloride flush, sodium chloride, ondansetron **OR** ondansetron, polyethylene glycol, acetaminophen **OR** acetaminophen, albuterol sulfate HFA, dicyclomine, melatonin, glucose, dextrose bolus **OR** dextrose bolus, glucagon (rDNA), dextrose, oxyCODONE-acetaminophen **OR** oxyCODONE-acetaminophen      Intake/Output Summary (Last 24 hours) at 7/24/2022 1540  Last data filed at 7/24/2022 1426  Gross per 24 hour   Intake 480 ml   Output 2405 ml   Net -1925 ml       Diet:  ADULT DIET; Regular    Exam:  /68   Pulse 82   Temp 97.6 °F (36.4 °C) (Oral)   Resp 18   Ht 6' 1\" (1.854 m)   Wt (!) 376 lb 5.2 oz (170.7 kg)   SpO2 97%   BMI 49.65 kg/m²   General appearance: No apparent distress, appears stated age and cooperative. HEENT: Pupils equal, round, and reactive to light. Conjunctivae/corneas clear. Neck: Supple, with full range of motion. No jugular venous distention. Trachea midline. Respiratory:  Normal respiratory effort. Clear to auscultation, bilaterally without Rales/Wheezes/Rhonchi. Cardiovascular: Regular rate and rhythm with normal S1/S2 without murmurs, rubs or gallops. Abdomen: Soft, non-tender, non-distended with normal bowel sounds. Musculoskeletal: passive and active ROM x 4 extremities. Skin: Skin color, texture, turgor normal.  No rashes or lesions. Neurologic:  Neurovascularly intact without any focal sensory/motor deficits. Cranial nerves: II-XII intact, grossly non-focal.  Psychiatric: Alert and oriented, thought content appropriate, normal insight  Capillary Refill: Brisk,< 3 seconds   Peripheral Pulses: +2 palpable, equal bilaterally     Labs:   Recent Labs     07/23/22  1222 07/24/22  1014   WBC 6.8 6.3   HGB 14.8 13.7*   HCT 44.9 41.1*    161     Recent Labs     07/23/22  1222 07/24/22  1014    139   K 4.8 4.8    104   CO2 26 25   BUN 14 13   CREATININE 0.9 0.9   CALCIUM 9.0 8.7       Microbiology:    Blood culture #1:   Lab Results   Component Value Date/Time    BC No growth 24 hours. No growth 48 hours.   07/20/2022 01:33 PM       Blood culture #2:No results found for: BLOODCULT2    Organism:  Lab Results   Component Value Date/Time    ORG Staphylococcus aureus 07/22/2022 03:20 PM    ORG gram positive bacilli 07/22/2022 03:20 PM    ORG Staphylococcus aureus 07/22/2022 03:20 PM         Lab Results   Component Value Date/Time    LABGRAM  07/22/2022 03:20 PM     No segmented neutrophils observed. Rare epithelial cells observed. Few gram positive cocci occurring singly and in pairs. Few gram positive bacilli. LABGRAM  07/22/2022 03:20 PM     Rare segmented neutrophils observed. No epithelial cells observed. No organisms observed. LABGRAM  07/22/2022 03:20 PM     Rare segmented neutrophils observed. No epithelial cells observed. No organisms observed. Aerobic and Anaerobic :  Lab Results   Component Value Date/Time    LABAERO  07/22/2022 03:20 PM     Culture also yielded moderate growth of gram positive bacilli most consistent with Corynebacterium species. LABAERO  07/22/2022 03:20 PM     moderate growth In the treatment of gram positive infections, GENTAMICIN should be CONSIDERED a SYNERGYSTIC agent ONLY. LABAERO No growth-preliminary  07/22/2022 03:20 PM    LABAERO  07/22/2022 03:20 PM     very light growth (one colony) gram positive bacilli consistent with Corynebacterium species. Clinical correlation required     LABAERO  07/22/2022 03:20 PM     light growth In the treatment of gram positive infections, GENTAMICIN should be CONSIDERED a SYNERGYSTIC agent ONLY. Lab Results   Component Value Date/Time    LABANAE No growth-preliminary  07/22/2022 03:20 PM    LABANAE No anaerobes isolated- preliminary  07/22/2022 03:20 PM       Urinalysis:      Lab Results   Component Value Date/Time    NITRU Negative 08/12/2016 06:59 PM    BLOODU Negative 08/12/2016 06:59 PM    SPECGRAV 1.015 08/12/2016 06:59 PM    GLUCOSEU NEGATIVE 07/07/2015 12:48 AM       Radiology:  XR CHEST PORTABLE   Final Result   Impression:   1. Central bronchial wall thickening which may reflect bronchitis or viral    pneumonitis.  Findings are more prominent on the current exam compared to previous. This document has been electronically signed by: Harrison Albarran MD on    07/21/2022 08:03 PM      MRI FOOT RIGHT W WO CONTRAST   Final Result   Impression:   1. Osteomyelitis in the 5th metatarsal stump. Mild osteomyelitis at the    distal end of the 4th metatarsal stump. 2. Cellulitis. Soft tissue ulcer at the stump. No drainable rim-enhancing    fluid collection. This document has been electronically signed by: Zulema Price MD on    07/19/2022 10:08 PM      XR FOOT RIGHT (MIN 3 VIEWS)   Final Result   Post operative changes related to transmetatarsal amputation are noted. Periarticular calcification and fragmented appearance of the distal fifth metatarsal diaphysis is nonspecific and could be postoperative. There is a suggestion of soft tissue    swelling at the fourth and fifth metatarsals and osteomyelitis at this location is not excluded. **This report has been created using voice recognition software. It may contain minor errors which are inherent in voice recognition technology. **      Final report electronically signed by Dr Antoinette Patterson on 7/19/2022 4:56 PM          Electronically signed by LATISHA Alvarez on 7/24/2022 at 3:40 PM

## 2022-07-24 NOTE — PLAN OF CARE
Problem: Chronic Conditions and Co-morbidities  Goal: Patient's chronic conditions and co-morbidity symptoms are monitored and maintained or improved  Outcome: Progressing  Flowsheets (Taken 7/24/2022 0858)  Care Plan - Patient's Chronic Conditions and Co-Morbidity Symptoms are Monitored and Maintained or Improved:   Monitor and assess patient's chronic conditions and comorbid symptoms for stability, deterioration, or improvement   Collaborate with multidisciplinary team to address chronic and comorbid conditions and prevent exacerbation or deterioration   Update acute care plan with appropriate goals if chronic or comorbid symptoms are exacerbated and prevent overall improvement and discharge     Problem: Pain  Goal: Verbalizes/displays adequate comfort level or baseline comfort level  Outcome: Progressing  Flowsheets (Taken 7/24/2022 0858)  Verbalizes/displays adequate comfort level or baseline comfort level:   Encourage patient to monitor pain and request assistance   Assess pain using appropriate pain scale   Administer analgesics based on type and severity of pain and evaluate response   Implement non-pharmacological measures as appropriate and evaluate response     Problem: Skin/Tissue Integrity  Goal: Absence of new skin breakdown  Description: 1. Monitor for areas of redness and/or skin breakdown  2. Assess vascular access sites hourly  3. Every 4-6 hours minimum:  Change oxygen saturation probe site  4. Every 4-6 hours:  If on nasal continuous positive airway pressure, respiratory therapy assess nares and determine need for appliance change or resting period.   Outcome: Progressing     Problem: Wound:  Goal: Will show signs of wound healing; wound closure and no evidence of infection  Description: Will show signs of wound healing; wound closure and no evidence of infection  Outcome: Progressing     Problem: Neurosensory - Adult  Goal: Achieves maximal functionality and self care  Outcome: Progressing  Flowsheets (Taken 7/24/2022 0858)  Achieves maximal functionality and self care: Encourage and assist patient to increase activity and self care with guidance from physical therapy/occupational therapy     Problem: Skin/Tissue Integrity - Adult  Goal: Skin integrity remains intact  Outcome: Progressing  Flowsheets (Taken 7/24/2022 0858)  Skin Integrity Remains Intact: Monitor for areas of redness and/or skin breakdown     Problem: Skin/Tissue Integrity - Adult  Goal: Incisions, wounds, or drain sites healing without S/S of infection  Outcome: Progressing  Flowsheets (Taken 7/24/2022 0858)  Incisions, Wounds, or Drain Sites Healing Without Sign and Symptoms of Infection: TWICE DAILY: Assess and document dressing/incision, wound bed, drain sites and surrounding tissue     Problem: Skin/Tissue Integrity - Adult  Goal: Oral mucous membranes remain intact  Outcome: Progressing     Problem: Musculoskeletal - Adult  Goal: Return mobility to safest level of function  Outcome: Progressing  Flowsheets (Taken 7/24/2022 0858)  Return Mobility to Safest Level of Function:   Assess patient stability and activity tolerance for standing, transferring and ambulating with or without assistive devices   Assist with transfers and ambulation using safe patient handling equipment as needed   Ensure adequate protection for wounds/incisions during mobilization   Obtain physical therapy/occupational therapy consults as needed   Instruct patient/family in ordered activity level     Problem: Musculoskeletal - Adult  Goal: Maintain proper alignment of affected body part  Outcome: Progressing     Problem: Musculoskeletal - Adult  Goal: Return ADL status to a safe level of function  Outcome: Progressing     Problem: Safety - Adult  Goal: Free from fall injury  Outcome: Progressing     Problem: Discharge Planning  Goal: Discharge to home or other facility with appropriate resources  Outcome: Progressing  Flowsheets (Taken 7/24/2022

## 2022-07-24 NOTE — PLAN OF CARE
Problem: Wound:  Goal: Will show signs of wound healing; wound closure and no evidence of infection  Description: Will show signs of wound healing; wound closure and no evidence of infection  7/23/2022 2240 by Dennie Decamp, RN  Outcome: Progressing     Problem: Chronic Conditions and Co-morbidities  Goal: Patient's chronic conditions and co-morbidity symptoms are monitored and maintained or improved  7/23/2022 2240 by Dennie Decamp, RN  Outcome: Progressing  Flowsheets (Taken 7/23/2022 2100)  Care Plan - Patient's Chronic Conditions and Co-Morbidity Symptoms are Monitored and Maintained or Improved: Monitor and assess patient's chronic conditions and comorbid symptoms for stability, deterioration, or improvement     Problem: Neurosensory - Adult  Goal: Achieves maximal functionality and self care  7/23/2022 2240 by Dennie Decamp, RN  Outcome: Progressing  Flowsheets (Taken 7/23/2022 2100)  Achieves maximal functionality and self care: Monitor swallowing and airway patency with patient fatigue and changes in neurological status     Problem: Skin/Tissue Integrity - Adult  Goal: Skin integrity remains intact  7/23/2022 2240 by Dennie Decamp, RN  Outcome: Progressing  Flowsheets (Taken 7/23/2022 2100)  Skin Integrity Remains Intact: Monitor for areas of redness and/or skin breakdown     Problem: Skin/Tissue Integrity - Adult  Goal: Incisions, wounds, or drain sites healing without S/S of infection  7/23/2022 2240 by Dennie Decamp, RN  Outcome: Progressing  Flowsheets (Taken 7/23/2022 2100)  Incisions, Wounds, or Drain Sites Healing Without Sign and Symptoms of Infection: TWICE DAILY: Assess and document dressing/incision, wound bed, drain sites and surrounding tissue     Problem: Musculoskeletal - Adult  Goal: Return mobility to safest level of function  7/23/2022 2240 by Dennie Decamp, RN  Outcome: Progressing  Flowsheets (Taken 7/23/2022 2100)  Return Mobility to Safest Level of Function: Assess patient stability and activity tolerance for standing, transferring and ambulating with or without assistive devices     Problem: Safety - Adult  Goal: Free from fall injury  7/23/2022 2240 by Thu Connor RN  Outcome: Progressing   All fall precautions in place. Bed in low position, alarm activated and appropriate use of call light. Problem: Discharge Planning  Goal: Discharge to home or other facility with appropriate resources  7/23/2022 2240 by Thu Connor RN  Outcome: Progressing  Flowsheets (Taken 7/23/2022 2100)  Discharge to home or other facility with appropriate resources: Identify barriers to discharge with patient and caregiver     Problem: Pain  Goal: Verbalizes/displays adequate comfort level or baseline comfort level  7/23/2022 2240 by Thu Connor RN  Outcome: Progressing   Pain Assessment: None - Denies Pain  Pain Level: 7   Patient's Stated Pain Goal: 0 - No pain   Is pain goal met at this time? No     Non-Pharmaceutical Pain Intervention(s): Repositioned, Rest  Care plan reviewed with patient. Patient verbalizes understanding of the plan of care and contributes to goal setting.     Electronically signed by Thu Connor RN on 7/23/2022 at 10:41 PM

## 2022-07-25 LAB
AEROBIC CULTURE: ABNORMAL
AEROBIC CULTURE: ABNORMAL
ANAEROBIC CULTURE: ABNORMAL
ANION GAP SERPL CALCULATED.3IONS-SCNC: 7 MEQ/L (ref 8–16)
BASOPHILS # BLD: 0.4 %
BASOPHILS ABSOLUTE: 0 THOU/MM3 (ref 0–0.1)
BLOOD CULTURE, ROUTINE: NORMAL
BLOOD CULTURE, ROUTINE: NORMAL
BUN BLDV-MCNC: 13 MG/DL (ref 7–22)
CALCIUM SERPL-MCNC: 8.9 MG/DL (ref 8.5–10.5)
CHLORIDE BLD-SCNC: 107 MEQ/L (ref 98–111)
CO2: 28 MEQ/L (ref 23–33)
CREAT SERPL-MCNC: 0.9 MG/DL (ref 0.4–1.2)
EOSINOPHIL # BLD: 5.7 %
EOSINOPHILS ABSOLUTE: 0.3 THOU/MM3 (ref 0–0.4)
ERYTHROCYTE [DISTWIDTH] IN BLOOD BY AUTOMATED COUNT: 12.6 % (ref 11.5–14.5)
ERYTHROCYTE [DISTWIDTH] IN BLOOD BY AUTOMATED COUNT: 43.6 FL (ref 35–45)
GFR SERPL CREATININE-BSD FRML MDRD: 88 ML/MIN/1.73M2
GLUCOSE BLD-MCNC: 101 MG/DL (ref 70–108)
GLUCOSE BLD-MCNC: 109 MG/DL (ref 70–108)
GLUCOSE BLD-MCNC: 127 MG/DL (ref 70–108)
GLUCOSE BLD-MCNC: 140 MG/DL (ref 70–108)
GLUCOSE BLD-MCNC: 143 MG/DL (ref 70–108)
GRAM STAIN RESULT: ABNORMAL
HCT VFR BLD CALC: 39.6 % (ref 42–52)
HEMOGLOBIN: 13.1 GM/DL (ref 14–18)
IMMATURE GRANS (ABS): 0.03 THOU/MM3 (ref 0–0.07)
IMMATURE GRANULOCYTES: 0.6 %
LYMPHOCYTES # BLD: 30.8 %
LYMPHOCYTES ABSOLUTE: 1.6 THOU/MM3 (ref 1–4.8)
MCH RBC QN AUTO: 31.1 PG (ref 26–33)
MCHC RBC AUTO-ENTMCNC: 33.1 GM/DL (ref 32.2–35.5)
MCV RBC AUTO: 94.1 FL (ref 80–94)
MONOCYTES # BLD: 11.7 %
MONOCYTES ABSOLUTE: 0.6 THOU/MM3 (ref 0.4–1.3)
NUCLEATED RED BLOOD CELLS: 0 /100 WBC
ORGANISM: ABNORMAL
PLATELET # BLD: 158 THOU/MM3 (ref 130–400)
PMV BLD AUTO: 10.4 FL (ref 9.4–12.4)
POTASSIUM REFLEX MAGNESIUM: 5.2 MEQ/L (ref 3.5–5.2)
RBC # BLD: 4.21 MILL/MM3 (ref 4.7–6.1)
SEG NEUTROPHILS: 50.8 %
SEGMENTED NEUTROPHILS ABSOLUTE COUNT: 2.7 THOU/MM3 (ref 1.8–7.7)
SODIUM BLD-SCNC: 142 MEQ/L (ref 135–145)
WBC # BLD: 5.3 THOU/MM3 (ref 4.8–10.8)

## 2022-07-25 PROCEDURE — 1200000000 HC SEMI PRIVATE

## 2022-07-25 PROCEDURE — 6370000000 HC RX 637 (ALT 250 FOR IP)

## 2022-07-25 PROCEDURE — 80048 BASIC METABOLIC PNL TOTAL CA: CPT

## 2022-07-25 PROCEDURE — 6360000002 HC RX W HCPCS: Performed by: STUDENT IN AN ORGANIZED HEALTH CARE EDUCATION/TRAINING PROGRAM

## 2022-07-25 PROCEDURE — 99232 SBSQ HOSP IP/OBS MODERATE 35: CPT | Performed by: PHYSICIAN ASSISTANT

## 2022-07-25 PROCEDURE — 82948 REAGENT STRIP/BLOOD GLUCOSE: CPT

## 2022-07-25 PROCEDURE — 2580000003 HC RX 258

## 2022-07-25 PROCEDURE — 85025 COMPLETE CBC W/AUTO DIFF WBC: CPT

## 2022-07-25 PROCEDURE — 6360000002 HC RX W HCPCS

## 2022-07-25 PROCEDURE — 36415 COLL VENOUS BLD VENIPUNCTURE: CPT

## 2022-07-25 RX ADMIN — SODIUM CHLORIDE: 9 INJECTION, SOLUTION INTRAVENOUS at 22:01

## 2022-07-25 RX ADMIN — AMITRIPTYLINE HYDROCHLORIDE 25 MG: 25 TABLET, FILM COATED ORAL at 19:32

## 2022-07-25 RX ADMIN — VILAZODONE HYDROCHLORIDE 40 MG: 40 TABLET ORAL at 09:27

## 2022-07-25 RX ADMIN — PREGABALIN 150 MG: 75 CAPSULE ORAL at 09:27

## 2022-07-25 RX ADMIN — PANTOPRAZOLE SODIUM 40 MG: 40 TABLET, DELAYED RELEASE ORAL at 09:27

## 2022-07-25 RX ADMIN — SODIUM CHLORIDE: 9 INJECTION, SOLUTION INTRAVENOUS at 14:04

## 2022-07-25 RX ADMIN — ATORVASTATIN CALCIUM 40 MG: 40 TABLET, FILM COATED ORAL at 19:32

## 2022-07-25 RX ADMIN — CEFAZOLIN 2000 MG: 10 INJECTION, POWDER, FOR SOLUTION INTRAVENOUS at 05:43

## 2022-07-25 RX ADMIN — INSULIN LISPRO 1 UNITS: 100 INJECTION, SOLUTION INTRAVENOUS; SUBCUTANEOUS at 19:53

## 2022-07-25 RX ADMIN — ASPIRIN 81 MG: 81 TABLET, COATED ORAL at 09:27

## 2022-07-25 RX ADMIN — TAMSULOSIN HYDROCHLORIDE 0.4 MG: 0.4 CAPSULE ORAL at 09:27

## 2022-07-25 RX ADMIN — OXYCODONE AND ACETAMINOPHEN 2 TABLET: 5; 325 TABLET ORAL at 02:20

## 2022-07-25 RX ADMIN — PREGABALIN 150 MG: 75 CAPSULE ORAL at 19:33

## 2022-07-25 RX ADMIN — OXYCODONE AND ACETAMINOPHEN 2 TABLET: 5; 325 TABLET ORAL at 17:54

## 2022-07-25 RX ADMIN — SODIUM CHLORIDE, PRESERVATIVE FREE 10 ML: 5 INJECTION INTRAVENOUS at 09:27

## 2022-07-25 RX ADMIN — OXYCODONE AND ACETAMINOPHEN 2 TABLET: 5; 325 TABLET ORAL at 22:00

## 2022-07-25 RX ADMIN — ISOSORBIDE MONONITRATE 60 MG: 60 TABLET, EXTENDED RELEASE ORAL at 09:27

## 2022-07-25 RX ADMIN — BUPROPION HYDROCHLORIDE 200 MG: 100 TABLET, FILM COATED, EXTENDED RELEASE ORAL at 19:32

## 2022-07-25 RX ADMIN — CEFAZOLIN 2000 MG: 10 INJECTION, POWDER, FOR SOLUTION INTRAVENOUS at 14:05

## 2022-07-25 RX ADMIN — LISINOPRIL 30 MG: 20 TABLET ORAL at 09:27

## 2022-07-25 RX ADMIN — ENOXAPARIN SODIUM 40 MG: 100 INJECTION SUBCUTANEOUS at 19:32

## 2022-07-25 RX ADMIN — ENOXAPARIN SODIUM 40 MG: 100 INJECTION SUBCUTANEOUS at 09:27

## 2022-07-25 RX ADMIN — CEFAZOLIN 2000 MG: 10 INJECTION, POWDER, FOR SOLUTION INTRAVENOUS at 21:15

## 2022-07-25 RX ADMIN — BUPROPION HYDROCHLORIDE 200 MG: 100 TABLET, FILM COATED, EXTENDED RELEASE ORAL at 09:27

## 2022-07-25 ASSESSMENT — PAIN DESCRIPTION - PAIN TYPE
TYPE: SURGICAL PAIN;CHRONIC PAIN
TYPE: ACUTE PAIN;SURGICAL PAIN
TYPE: ACUTE PAIN;SURGICAL PAIN

## 2022-07-25 ASSESSMENT — PAIN SCALES - GENERAL
PAINLEVEL_OUTOF10: 7
PAINLEVEL_OUTOF10: 5
PAINLEVEL_OUTOF10: 2

## 2022-07-25 ASSESSMENT — PAIN DESCRIPTION - ONSET
ONSET: ON-GOING

## 2022-07-25 ASSESSMENT — PAIN DESCRIPTION - LOCATION
LOCATION: FOOT;BACK
LOCATION: BACK;FOOT
LOCATION: BACK;FOOT
LOCATION: FOOT
LOCATION: BACK;FOOT

## 2022-07-25 ASSESSMENT — PAIN DESCRIPTION - ORIENTATION
ORIENTATION: RIGHT;LOWER
ORIENTATION: RIGHT
ORIENTATION: RIGHT;LOWER
ORIENTATION: RIGHT;LEFT;LOWER
ORIENTATION: RIGHT;LEFT;LOWER

## 2022-07-25 ASSESSMENT — PAIN - FUNCTIONAL ASSESSMENT
PAIN_FUNCTIONAL_ASSESSMENT: ACTIVITIES ARE NOT PREVENTED
PAIN_FUNCTIONAL_ASSESSMENT: ACTIVITIES ARE NOT PREVENTED

## 2022-07-25 ASSESSMENT — PAIN DESCRIPTION - DESCRIPTORS
DESCRIPTORS: BURNING;DISCOMFORT
DESCRIPTORS: DISCOMFORT;THROBBING
DESCRIPTORS: SHARP
DESCRIPTORS: SHARP

## 2022-07-25 ASSESSMENT — PAIN DESCRIPTION - DIRECTION
RADIATING_TOWARDS: LEFT THIGH
RADIATING_TOWARDS: LEFT THIGH

## 2022-07-25 ASSESSMENT — PAIN DESCRIPTION - FREQUENCY
FREQUENCY: CONTINUOUS

## 2022-07-25 NOTE — PROGRESS NOTES
Podiatric Progress Note  Felix Galeano  Subjective :   7/25/22  Patient seen bedside today on behalf of Dr. Oscar Huitron. Patient is 3 days status post incision and drainage with 4th and 5th ray amputation, right foot (DOS: 7/22/22). Patient appeared pleasant, was oriented to person, place and time, and in no acute distress. Patient states that he is doing well. He continues to endorse some mild soreness to his right foot, which is unchanged from yesterday. States that he felt his bed was too warm yesterday. Denies any N/V/F/C/SOB or SOB. Patient has no further pedal concerns at this point in time. 7/24/22  Patient seen bedside today on behalf of Dr. Oscar Huitron. Patient is 2 days status post incision and drainage with 4th and 5th ray amputation, right foot (DOS: 7/22/22). Patient appeared pleasant, was oriented to person, place and time, and in no acute distress. Patient states that he is doing well. He endorses some soreness to his right foot but has been tolerating it. Also relates that he has been elevating his foot but admits that it feels better to dangle it sometimes. Denies any N/V/F/C/SOB or CP. Patient has no further pedal concerns at this point in time. 7/23/22  Patient seen bedside today on behalf of Dr. Oscar Huitron. Patient is 1 day status post incision and drainage with 4th and 5th ray amputation, right foot (DOS: 7/22/22). Patient appeared pleasant, was oriented to person, place and time, and in no acute distress. Patient states that he is doing well. He endorses some mild burning pain to his right foot but has been able to tolerate it. Patient was interested in seeing a picture of his foot. Denies any N/V/F/C/SOB or CP. Patient has no further pedal concerns at this point in time. 7/21/22  Patient seen bedside today on behalf of Dr. Elias Romero. Patient appeared pleasant, was oriented to person, place and time and in no acute distress. Patient states that he is doing well. Patient denies any N/V/F/C/SOB or CP. Patient has no further pedal concerns at this point in time. HPI           The patient is a 45 y.o. male with significant past medical history of hypertension, hyperlipidemia, CAD, type 2 diabetes who is being seen at bedside on behalf of Dr. Lee Fitch. Patient is known to Dr. Lee Fitch and has been following in [facility]. Patient presents today for follow up on wound/ulcer's progression. The patient is currently on oral Doxycycline. Current dressing care includes Betadine wet-to-dry, dry sterile dressing, Ace bandage. Patient states that over the weekend he had episodes of nausea, fevers, chills and yesterday 7/18/2022 he noticed a thick green discharge from the wound. Patient currently denies any N/F/V/C/SOB/CP. No other pedal complaints today.       Current Medications:    Current Facility-Administered Medications: enoxaparin (LOVENOX) injection 40 mg, 40 mg, SubCUTAneous, BID  ceFAZolin (ANCEF) 2000 mg in dextrose 5 % 50 mL IVPB, 2,000 mg, IntraVENous, Q8H  lidocaine 4 % external patch 2 patch, 2 patch, TransDERmal, Daily  sodium chloride flush 0.9 % injection 5-40 mL, 5-40 mL, IntraVENous, 2 times per day  sodium chloride flush 0.9 % injection 5-40 mL, 5-40 mL, IntraVENous, PRN  0.9 % sodium chloride infusion, , IntraVENous, PRN  ondansetron (ZOFRAN-ODT) disintegrating tablet 4 mg, 4 mg, Oral, Q8H PRN **OR** ondansetron (ZOFRAN) injection 4 mg, 4 mg, IntraVENous, Q6H PRN  polyethylene glycol (GLYCOLAX) packet 17 g, 17 g, Oral, Daily PRN  acetaminophen (TYLENOL) tablet 650 mg, 650 mg, Oral, Q6H PRN **OR** acetaminophen (TYLENOL) suppository 650 mg, 650 mg, Rectal, Q6H PRN  albuterol sulfate HFA (PROVENTIL;VENTOLIN;PROAIR) 108 (90 Base) MCG/ACT inhaler 2 puff, 2 puff, Inhalation, Q4H PRN  amitriptyline (ELAVIL) tablet 25 mg, 25 mg, Oral, Nightly  aspirin EC tablet 81 mg, 81 mg, Oral, Daily  atorvastatin (LIPITOR) tablet 40 mg, 40 mg, Oral, Nightly  buPROPion (WELLBUTRIN SR) extended release tablet 200 mg, 200 mg, Oral, BID  dicyclomine (BENTYL) capsule 10 mg, 10 mg, Oral, Daily PRN  isosorbide mononitrate (IMDUR) extended release tablet 60 mg, 60 mg, Oral, Daily  lisinopril (PRINIVIL;ZESTRIL) tablet 30 mg, 30 mg, Oral, Daily  metoprolol tartrate (LOPRESSOR) tablet 25 mg, 25 mg, Oral, Daily  pantoprazole (PROTONIX) tablet 40 mg, 40 mg, Oral, Daily  pregabalin (LYRICA) capsule 150 mg, 150 mg, Oral, BID  melatonin tablet 4.5 mg, 4.5 mg, Oral, Nightly PRN  tamsulosin (FLOMAX) capsule 0.4 mg, 0.4 mg, Oral, Daily  vilazodone HCl (VIIBRYD) TABS 40 mg, 40 mg, Oral, Daily  0.9 % sodium chloride infusion, , IntraVENous, Continuous  insulin lispro (HUMALOG) injection vial 0-6 Units, 0-6 Units, SubCUTAneous, TID WC  insulin lispro (HUMALOG) injection vial 0-3 Units, 0-3 Units, SubCUTAneous, Nightly  glucose chewable tablet 16 g, 4 tablet, Oral, PRN  dextrose bolus 10% 125 mL, 125 mL, IntraVENous, PRN **OR** dextrose bolus 10% 250 mL, 250 mL, IntraVENous, PRN  glucagon (rDNA) injection 1 mg, 1 mg, IntraMUSCular, PRN  dextrose 5 % solution, 100 mL/hr, IntraVENous, PRN  oxyCODONE-acetaminophen (PERCOCET) 5-325 MG per tablet 1 tablet, 1 tablet, Oral, Q4H PRN **OR** oxyCODONE-acetaminophen (PERCOCET) 5-325 MG per tablet 2 tablet, 2 tablet, Oral, Q4H PRN    Objective     BP (!) 105/59   Pulse 72   Temp 98.2 °F (36.8 °C) (Oral)   Resp 20   Ht 6' 1\" (1.854 m)   Wt (!) 376 lb 5.2 oz (170.7 kg)   SpO2 97%   BMI 49.65 kg/m²      I/O:  Intake/Output Summary (Last 24 hours) at 7/25/2022 0820  Last data filed at 7/25/2022 0736  Gross per 24 hour   Intake 1020 ml   Output 4445 ml   Net -3425 ml              Wt Readings from Last 3 Encounters:   07/21/22 (!) 376 lb 5.2 oz (170.7 kg)   01/19/22 (!) 369 lb (167.4 kg)   12/22/21 (!) 371 lb (168.3 kg)       LABS:    Recent Labs     07/24/22  1014 07/25/22  0730   WBC 6.3 5.3   HGB 13.7* 13.1*   HCT 41.1* 39.6*    158        Recent Labs     07/25/22  0730      K 5.2      CO2 28   BUN 13 CREATININE 0.9      No results for input(s): PROT, INR, APTT in the last 72 hours. No results for input(s): CKTOTAL, CKMB, CKMBINDEX, TROPONINI in the last 72 hours. Focused Lower Extremity Examination:    Vitals:    BP (!) 105/59   Pulse 72   Temp 98.2 °F (36.8 °C) (Oral)   Resp 20   Ht 6' 1\" (1.854 m)   Wt (!) 376 lb 5.2 oz (170.7 kg)   SpO2 97%   BMI 49.65 kg/m²      Vascular: Dorsalis pedis and posterior tibial pulses are faintly palpable secondary to edema bilaterally. Skin temperature is warm to warm from proximal tibial tuberosity to distal digits. CFT brisk to exposed digits and to amputation stump on right. Pitting edema present to bilateral LE. Dermatologic: Dressing intact to right foot with strikethrough noted to kerlix layer. Upon removal of dressing, semi-elliptical incision noted to distolateral aspect of right foot, extending proximally from the 5th metatarsal base to the plantar aspect of the TMA stump. Incision appears well coapted with sutures intact. Mild macerated spot roughly 1cm in length noted to 2 o'clock position of incision. Mild edema and erythema noted consistent with post-operative course. Mild sanguinous drainage noted. No malodor, purulence. Neurovascular: Light touch sensation grossly diminished at the feet bilaterally. Musculoskeletal: Muscle strength 5/5 for all pedal muscle groups tested, right. Gastro-soleal equinus present bilaterally. Transmetatarsal amputation to right with 4th and 5th metatarsal amputation. Mild pain on palpation. Images  XR FOOT RIGHT (MIN 3 VIEWS)   Final Result   Impression:   1. Interval resection of the 4th and 5th metatarsal stumps. 2. Soft tissue swelling. This document has been electronically signed by: Migue Arguelles MD on    07/25/2022 12:54 AM      XR CHEST PORTABLE   Final Result   Impression:   1. Central bronchial wall thickening which may reflect bronchitis or viral    pneumonitis.  Findings are more prominent on the current exam compared to    previous. This document has been electronically signed by: Miguel A Churchill MD on    07/21/2022 08:03 PM      MRI FOOT RIGHT W WO CONTRAST   Final Result   Impression:   1. Osteomyelitis in the 5th metatarsal stump. Mild osteomyelitis at the    distal end of the 4th metatarsal stump. 2. Cellulitis. Soft tissue ulcer at the stump. No drainable rim-enhancing    fluid collection. This document has been electronically signed by: Karthik Mendoza MD on    07/19/2022 10:08 PM      XR FOOT RIGHT (MIN 3 VIEWS)   Final Result   Post operative changes related to transmetatarsal amputation are noted. Periarticular calcification and fragmented appearance of the distal fifth metatarsal diaphysis is nonspecific and could be postoperative. There is a suggestion of soft tissue    swelling at the fourth and fifth metatarsals and osteomyelitis at this location is not excluded. **This report has been created using voice recognition software. It may contain minor errors which are inherent in voice recognition technology. **      Final report electronically signed by Dr Lisa Javier on 7/19/2022 4:56 PM            Chronic  Patient Active Problem List   Diagnosis    Hypertension    Hyperlipidemia    CAD (coronary artery disease)    Family history of premature coronary heart disease    Dizziness    Acute renal failure (ARF) (HCC)    Chronic low back pain    PTSD (post-traumatic stress disorder)    Foot ulceration, right, with fat layer exposed ([address]    Type 2 diabetes mellitus without complication, with long-term current use of insulin (HCC)    Cellulitis of right foot    Osteomyelitis ([address]       Assessment and Plan  Principle  1. Cellulitis of right foot  2.  Osteomyelitis, right foot     -Patient examined and evaluated  -Wound dressed with betadine soaked gauze, ABD, Kerlix, Ace bandage  -Patient placed on IV cefazolin per infectious disease  -Reinforced with patient the importance of staying off of his foot for the management of this wound and for healing  -Tissue culture reviewed: preliminary results for few gram positive cocci occurring singly and in pairs. Few gram positive bacilli. Coagulase positive staphylococcus. -X-rays reviewed, resection of 4th and 5th metatarsal stumps, see impression above  -MRI reviewed, osteomyelitis in 5th metatarsal stump, mild osteomyelitis in 4th metatarsal stump. See impression above  -WBC 5.3, Hgb 13.1; afebrile  -Patient is to remain non-weightbearing RLE  -Discussed with patient that we will continue to monitor the incision for adequate skin healing.   -Patient verbalized agreement understanding with treatment plan discussed. All patient's questions were answered to satisfaction. DISPO: S/p 4th and 5th ray amputation (7/22/22). Will continue to monitor incision healing, pending pathology and finalization of cultures.     Josephine Tong [id number]  7/25/2022   8:20 AM

## 2022-07-25 NOTE — CARE COORDINATION
7/25/22, 1:28 PM EDT    DISCHARGE ON GOING EVALUATION    [facility] day: 5  Location: -05/005-A Reason for admit: Cellulitis of right foot [L03.115]  Osteomyelitis ([address] [M86.9]   Procedure:   7/22/2022   incision and drainage down to bone with 4th, 5th ray amputation right foot   Barriers to Discharge: Podiatry and ID following, IV fluids, Ancef q 8 hr., Lovenox, DM management, lidocaine patch, prn pain medications and Zofran, daily BMP and CBC, regular diet, up with assistance. PCP: MICHELLE Marte CNP  Readmission Risk Score: 8.7%  Patient Goals/Plan/Treatment Preferences: Primo Chong is from home alone and current with [facility].

## 2022-07-25 NOTE — PLAN OF CARE
Problem: Wound:  Goal: Will show signs of wound healing; wound closure and no evidence of infection  Description: Will show signs of wound healing; wound closure and no evidence of infection  7/24/2022 2351 by Mejia Hightower RN  Outcome: Progressing     Problem: Chronic Conditions and Co-morbidities  Goal: Patient's chronic conditions and co-morbidity symptoms are monitored and maintained or improved  7/24/2022 2351 by Mejia Hightower RN  Outcome: Progressing  [address] (Taken 7/24/2022 2045)  Care Plan - Patient's Chronic Conditions and Co-Morbidity Symptoms are Monitored and Maintained or Improved: Monitor and assess patient's chronic conditions and comorbid symptoms for stability, deterioration, or improvement     Problem: Pain  Goal: Verbalizes/displays adequate comfort level or baseline comfort level  7/24/2022 2351 by Mejia Hightower RN  Outcome: Progressing     Problem: Skin/Tissue Integrity  Goal: Absence of new skin breakdown  Description: 1. Monitor for areas of redness and/or skin breakdown  2. Assess vascular access sites hourly  3. Every 4-6 hours minimum:  Change oxygen saturation probe site  4. Every 4-6 hours:  If on nasal continuous positive airway pressure, respiratory therapy assess nares and determine need for appliance change or resting period.   7/24/2022 2351 by Mejia Hightower RN  Outcome: Progressing     Problem: Neurosensory - Adult  Goal: Achieves maximal functionality and self care  7/24/2022 2351 by Mejia Hightower RN  Outcome: Progressing     Problem: Skin/Tissue Integrity - Adult  Goal: Skin integrity remains intact  7/24/2022 2351 by Mejia Hightower RN  Outcome: Progressing  Flowsheets (Taken 7/24/2022 2045)  Skin Integrity Remains Intact: Monitor for areas of redness and/or skin breakdown     Problem: Musculoskeletal - Adult  Goal: Return mobility to safest level of function  7/24/2022 2351 by Mejia Hightower RN  Outcome: Progressing  [address] (Taken 7/24/2022 2045)  Return Mobility to Safest Level of Function: Assess patient stability and activity tolerance for standing, transferring and ambulating with or without assistive devices     Problem: Discharge Planning  Goal: Discharge to home or other facility with appropriate resources  7/24/2022 2351 by Maeve Saha RN  Outcome: Progressing  Flowsheets (Taken 7/24/2022 2045)  Discharge to home or other facility with appropriate resources: Identify barriers to discharge with patient and caregiver   Care plan reviewed with patient. Patient verbalizes understanding of the plan of care and contributes to goal setting.   Electronically signed by Maeve Saha RN on 7/24/2022 at 11:51 PM

## 2022-07-25 NOTE — PROGRESS NOTES
controlled. Pt states that he is having some leg and back pain today, he attributed this to the hospital bed. Pt is asking if he is going to have surgery; it was explained that Podiatry will make this decision. 7/22: No acute events overnight. Pt states that his pain is relatively controlled. Pt has no issues or concerns at this time. Pt had surgery and tolerated it well.     7/23: No acute events overnight. Pt states that his pain is relatively controlled. Pt has no issues or concerns at this time. Subjective (past 24 hours):   Pt is resting comfortably in bed. Pt states that pain is controlled. No new issues. Past medical history, family history, social history and allergies reviewed again and is unchanged since admission. ROS (All review of systems completed. Pertinent positives noted.  Otherwise All other systems reviewed and negative.)     Medications:  Reviewed    Infusion Medications    sodium chloride      sodium chloride 75 mL/hr at 07/25/22 1404    dextrose       Scheduled Medications    enoxaparin  40 mg SubCUTAneous BID    ceFAZolin  2,000 mg IntraVENous Q8H    lidocaine  2 patch TransDERmal Daily    sodium chloride flush  5-40 mL IntraVENous 2 times per day    amitriptyline  25 mg Oral Nightly    aspirin  81 mg Oral Daily    atorvastatin  40 mg Oral Nightly    buPROPion  200 mg Oral BID    isosorbide mononitrate  60 mg Oral Daily    lisinopril  30 mg Oral Daily    metoprolol tartrate  25 mg Oral Daily    pantoprazole  40 mg Oral Daily    pregabalin  150 mg Oral BID    tamsulosin  0.4 mg Oral Daily    vilazodone HCl  40 mg Oral Daily    insulin lispro  0-6 Units SubCUTAneous TID WC    insulin lispro  0-3 Units SubCUTAneous Nightly     PRN Meds: sodium chloride flush, sodium chloride, ondansetron **OR** ondansetron, polyethylene glycol, acetaminophen **OR** acetaminophen, albuterol sulfate HFA, dicyclomine, melatonin, glucose, dextrose bolus **OR** dextrose bolus, glucagon (rDNA), dextrose, oxyCODONE-acetaminophen **OR** oxyCODONE-acetaminophen      Intake/Output Summary (Last 24 hours) at 7/25/2022 1519  Last data filed at 7/25/2022 1330  Gross per 24 hour   Intake 1090 ml   Output 3170 ml   Net -2080 ml       Diet:  ADULT DIET; Regular    Exam:  /60   Pulse 78   Temp 98.1 °F (36.7 °C) (Oral)   Resp 18   Ht 6' 1\" (1.854 m)   Wt (!) 376 lb 5.2 oz (170.7 kg)   SpO2 94%   BMI 49.65 kg/m²   General appearance: No apparent distress, appears stated age and cooperative. HEENT: Pupils equal, round, and reactive to light. Conjunctivae/corneas clear. Neck: Supple, with full range of motion. No jugular venous distention. Trachea midline. Respiratory:  Normal respiratory effort. Clear to auscultation, bilaterally without Rales/Wheezes/Rhonchi. Cardiovascular: Regular rate and rhythm with normal S1/S2 without murmurs, rubs or gallops. Abdomen: Soft, non-tender, non-distended with normal bowel sounds. Musculoskeletal: passive and active ROM x 4 extremities. Skin: Skin color, texture, turgor normal.  No rashes or lesions. Neurologic:  Neurovascularly intact without any focal sensory/motor deficits. Cranial nerves: II-XII intact, grossly non-focal.  Psychiatric: Alert and oriented, thought content appropriate, normal insight  Capillary Refill: Brisk,< 3 seconds   Peripheral Pulses: +2 palpable, equal bilaterally     Labs:   Recent Labs     07/23/22  1222 07/24/22  1014 07/25/22  0730   WBC 6.8 6.3 5.3   HGB 14.8 13.7* 13.1*   HCT 44.9 41.1* 39.6*    161 158     Recent Labs     07/23/22  1222 07/24/22  1014 07/25/22  0730    139 142   K 4.8 4.8 5.2    104 107   CO2 26 25 28   BUN 14 13 13   CREATININE 0.9 0.9 0.9   CALCIUM 9.0 8.7 8.9       Microbiology:    Blood culture #1:   Lab Results   Component Value Date/Time    [facility]  07/20/2022 01:33 PM     No growth 24 hours. No growth 48 hours.  No growth at 5 days        Blood culture #2:No results found for: Nakia York    Organism:  Lab Results   Component Value Date/Time    ORG Staphylococcus aureus 07/22/2022 03:20 PM    ORG gram positive bacilli 07/22/2022 03:20 PM    ORG Staphylococcus aureus 07/22/2022 03:20 PM         Lab Results   Component Value Date/Time    LABGRAM  07/22/2022 03:20 PM     No segmented neutrophils observed. Rare epithelial cells observed. Few gram positive cocci occurring singly and in pairs. Few gram positive bacilli. LABGRAM  07/22/2022 03:20 PM     Rare segmented neutrophils observed. No epithelial cells observed. No organisms observed. LABGRAM  07/22/2022 03:20 PM     Rare segmented neutrophils observed. No epithelial cells observed. No organisms observed. Aerobic and Anaerobic :  Lab Results   Component Value Date/Time    [facility]  07/22/2022 03:20 PM     Culture also yielded moderate growth of gram positive bacilli most consistent with Corynebacterium species. L[facility]ERO  07/22/2022 03:20 PM     moderate growth In the treatment of gram positive infections, GENTAMICIN should be CONSIDERED a SYNERGYSTIC agent ONLY. LABAERO No growth-preliminary  07/22/2022 03:20 PM    [facility]ERO  07/22/2022 03:20 PM     very light growth (one colony) gram positive bacilli consistent with Corynebacterium species. Clinical correlation required     LABAERO  07/22/2022 03:20 PM     light growth In the treatment of gram positive infections, GENTAMICIN should be CONSIDERED a SYNERGYSTIC agent ONLY. Lab Results   Component Value Date/Time    LABANAE  07/22/2022 03:20 PM     Culture yielded moderate growth of anaerobic gram positive cocci. If a true mixed aerobic and anaerobic infection is suspected, then broad spectrum empiric antibiotic therapy is indicated and should include coverage for anaerobic organisms.      LABANAE No growth-preliminary  07/22/2022 03:20 PM    LABANAE No anaerobes isolated- preliminary  07/22/2022 03:20 PM       Urinalysis:      Lab Results   Component Value Date/Time    NITRU Negative 08/12/2016

## 2022-07-25 NOTE — PROGRESS NOTES
Progress note: Infectious diseases    Patient - Galileo Leong,  Age - 45 y.o.    - 1970      Room Number - 5K-05/005-A   MRN -  [id number]   Acct # - [de-identified]  Date of Admission -  2022  2:38 PM    SUBJECTIVE:   He has no new issues. OBJECTIVE   VITALS    height is 6' 1\" (1.854 m) and weight is 376 lb 5.2 oz (170.7 kg) (abnormal). His oral temperature is 97.5 °F (36.4 °C). His blood pressure is 118/55 (abnormal) and his pulse is 80. His respiration is 18 and oxygen saturation is 97%.        Wt Readings from Last 3 Encounters:   22 (!) 376 lb 5.2 oz (170.7 kg)   22 (!) 369 lb (167.4 kg)   21 (!) 371 lb (168.3 kg)       I/O (24 Hours)    Intake/Output Summary (Last 24 hours) at 2022 1313  Last data filed at 2022 1134  Gross per 24 hour   Intake 1030 ml   Output 3770 ml   Net -2740 ml         General Appearance  Awake, alert, oriented,     HEENT - normocephalic, atraumatic, pale  conjunctiva,  anicteric sclera  Neck - Supple, no mass  Lungs -  Bilateral   air entry, no rhonchi, no wheeze  Cardiovascular - Heart sounds are normal.     Abdomen - soft, not distended, nontender,   Neurologic -oriented  Skin - No bruising or bleeding  Extremities - + edema,          MEDICATIONS:      enoxaparin  40 mg SubCUTAneous BID    ceFAZolin  2,000 mg IntraVENous Q8H    lidocaine  2 patch TransDERmal Daily    sodium chloride flush  5-40 mL IntraVENous 2 times per day    amitriptyline  25 mg Oral Nightly    aspirin  81 mg Oral Daily    atorvastatin  40 mg Oral Nightly    buPROPion  200 mg Oral BID    isosorbide mononitrate  60 mg Oral Daily    lisinopril  30 mg Oral Daily    metoprolol tartrate  25 mg Oral Daily    pantoprazole  40 mg Oral Daily    pregabalin  150 mg Oral BID    tamsulosin  0.4 mg Oral Daily    vilazodone HCl  40 mg Oral Daily    insulin lispro  0-6 Units SubCUTAneous TID WC    insulin lispro  0-3 Units SubCUTAneous Nightly      sodium chloride      sodium chloride 75 mL/hr at 07/24/22 1352    dextrose       sodium chloride flush, sodium chloride, ondansetron **OR** ondansetron, polyethylene glycol, acetaminophen **OR** acetaminophen, albuterol sulfate HFA, dicyclomine, melatonin, glucose, dextrose bolus **OR** dextrose bolus, glucagon (rDNA), dextrose, oxyCODONE-acetaminophen **OR** oxyCODONE-acetaminophen      LABS:     CBC:   Recent Labs     07/23/22  1222 07/24/22  1014 07/25/22  0730   WBC 6.8 6.3 5.3   HGB 14.8 13.7* 13.1*    161 158       BMP:    Recent Labs     07/23/22  1222 07/24/22  1014 07/25/22  0730    139 142   K 4.8 4.8 5.2    104 107   CO2 26 25 28   BUN 14 13 13   CREATININE 0.9 0.9 0.9   GLUCOSE 105 174* 109*       Calcium:  Recent Labs     07/25/22  0730   CALCIUM 8.9        Glucose:  Recent Labs     07/24/22  1916 07/25/22  0758 07/25/22  1132   POCGLU 150* 101 127*           Problem list of patient:     Patient Active Problem List   Diagnosis Code    Hypertension I10    Hyperlipidemia E78.5    CAD (coronary artery disease) I25.10    Family history of premature coronary heart disease Z82.49    Dizziness R42    Acute renal failure (ARF) ([facility]) N17.9    Chronic low back pain M54.50, G89.29    PTSD (post-traumatic stress disorder) F43.10    Foot ulceration, right, with fat layer exposed ([facility] L97.512    Type 2 diabetes mellitus without complication, with long-term current use of insulin ([facility]) E11.9, Z79.4    Cellulitis of right foot L03.115    Osteomyelitis ([facility]) M86.9         ASSESSMENT/PLAN   Right foot diabetic ulcer with OM: s/p resection of infected 4th and 5th metatarsal  Continue current treatment: will transition to oral antibiotic on discharge            Juany Lopez MD, MD, FACP 7/25/2022 1:13 PM

## 2022-07-25 NOTE — PLAN OF CARE
Problem: Wound:  Goal: Will show signs of wound healing; wound closure and no evidence of infection  Description: Will show signs of wound healing; wound closure and no evidence of infection  7/25/2022 1015 by Marlene Whittington RN  Outcome: Progressing  Note: Daily dressing change done with podiatry. Xray reviewed with patient by podiatry and showed no evidence of infection.      Problem: Chronic Conditions and Co-morbidities  Goal: Patient's chronic conditions and co-morbidity symptoms are monitored and maintained or improved  7/25/2022 1015 by Marlene Whittington RN  Outcome: Progressing  Flowsheets (Taken 7/25/2022 1015)  Care Plan - Patient's Chronic Conditions and Co-Morbidity Symptoms are Monitored and Maintained or Improved: Monitor and assess patient's chronic conditions and comorbid symptoms for stability, deterioration, or improvement     Problem: Pain  Goal: Verbalizes/displays adequate comfort level or baseline comfort level  7/25/2022 1015 by Marlene Whittington RN  Outcome: Progressing  Flowsheets (Taken 7/25/2022 1015)  Verbalizes/displays adequate comfort level or baseline comfort level:   Encourage patient to monitor pain and request assistance   Assess pain using appropriate pain scale   Administer analgesics based on type and severity of pain and evaluate response   Implement non-pharmacological measures as appropriate and evaluate response     Problem: Neurosensory - Adult  Goal: Achieves maximal functionality and self care  7/25/2022 1015 by Marlene Whittington RN  Outcome: Progressing  Flowsheets (Taken 7/25/2022 1015)  Achieves maximal functionality and self care: Encourage and assist patient to increase activity and self care with guidance from physical therapy/occupational therapy     Problem: Skin/Tissue Integrity - Adult  Goal: Skin integrity remains intact  7/25/2022 1015 by Marlene Whittington RN  Outcome: Progressing  Flowsheets (Taken 7/25/2022 1015)  Skin Integrity Remains Intact:   Monitor for areas of redness and/or skin breakdown   Assess vascular access sites hourly     Problem: Skin/Tissue Integrity - Adult  Goal: Incisions, wounds, or drain sites healing without S/S of infection  7/25/2022 1015 by Viv Cancino RN  Outcome: Progressing  Flowsheets (Taken 7/25/2022 1015)  Incisions, Wounds, or Drain Sites Healing Without Sign and Symptoms of Infection:   ADMISSION and DAILY: Assess and document risk factors for pressure ulcer development   TWICE DAILY: Assess and document skin integrity   TWICE DAILY: Assess and document dressing/incision, wound bed, drain sites and surrounding tissue   Implement wound care per orders     Problem: Skin/Tissue Integrity - Adult  Goal: Oral mucous membranes remain intact  7/25/2022 1015 by Viv Cancino RN  Outcome: Progressing  Flowsheets (Taken 7/22/2022 2100 by Magdalena Lombardo RN)  Oral Mucous Membranes Remain Intact: Assess oral mucosa and hygiene practices     Problem: Musculoskeletal - Adult  Goal: Return mobility to safest level of function  7/25/2022 1015 by Viv Cancino RN  Outcome: Progressing  Flowsheets (Taken 7/25/2022 1015)  Return Mobility to Safest Level of Function:   Assess patient stability and activity tolerance for standing, transferring and ambulating with or without assistive devices   Assist with transfers and ambulation using safe patient handling equipment as needed   Ensure adequate protection for wounds/incisions during mobilization     Problem: Musculoskeletal - Adult  Goal: Maintain proper alignment of affected body part  7/25/2022 1015 by Viv Cancino RN  Outcome: Progressing  Flowsheets (Taken 7/22/2022 1640 by Frankie Francisco RN)  Maintain proper alignment of affected body part: Support and protect limb and body alignment per provider's orders     Problem: Musculoskeletal - Adult  Goal: Return ADL status to a safe level of function  7/25/2022 1015 by Viv Cancino RN  Outcome: Progressing  Flowsheets (Taken 7/25/2022 1015)  Return ADL Status to a Safe Level of Function:   Administer medication as ordered   Assess activities of daily living deficits and provide assistive devices as needed     Problem: Safety - Adult  Goal: Free from fall injury  7/25/2022 1015 by Flor Carr RN  Outcome: Progressing  Flowsheets (Taken 7/25/2022 1015)  Free From Fall Injury: Instruct family/caregiver on patient safety     Problem: Discharge Planning  Goal: Discharge to home or other facility with appropriate resources  7/25/2022 1015 by Flor Carr RN  Outcome: Progressing  Flowsheets (Taken 7/25/2022 1015)  Discharge to home or other facility with appropriate resources:   Identify barriers to discharge with patient and caregiver   Arrange for needed discharge resources and transportation as appropriate   Identify discharge learning needs (meds, wound care, etc)     Care plan reviewed with patient. Patient verbalizes understanding of the plan of care and contributes to goal setting.

## 2022-07-26 VITALS
RESPIRATION RATE: 18 BRPM | WEIGHT: 315 LBS | SYSTOLIC BLOOD PRESSURE: 144 MMHG | HEART RATE: 78 BPM | BODY MASS INDEX: 41.75 KG/M2 | DIASTOLIC BLOOD PRESSURE: 92 MMHG | OXYGEN SATURATION: 99 % | TEMPERATURE: 98.4 F | HEIGHT: 73 IN

## 2022-07-26 LAB
ANION GAP SERPL CALCULATED.3IONS-SCNC: 9 MEQ/L (ref 8–16)
BASOPHILS # BLD: 0.7 %
BASOPHILS ABSOLUTE: 0 THOU/MM3 (ref 0–0.1)
BUN BLDV-MCNC: 15 MG/DL (ref 7–22)
CALCIUM SERPL-MCNC: 8.9 MG/DL (ref 8.5–10.5)
CHLORIDE BLD-SCNC: 101 MEQ/L (ref 98–111)
CO2: 28 MEQ/L (ref 23–33)
CREAT SERPL-MCNC: 0.9 MG/DL (ref 0.4–1.2)
EOSINOPHIL # BLD: 5.8 %
EOSINOPHILS ABSOLUTE: 0.3 THOU/MM3 (ref 0–0.4)
ERYTHROCYTE [DISTWIDTH] IN BLOOD BY AUTOMATED COUNT: 12.4 % (ref 11.5–14.5)
ERYTHROCYTE [DISTWIDTH] IN BLOOD BY AUTOMATED COUNT: 43.2 FL (ref 35–45)
GFR SERPL CREATININE-BSD FRML MDRD: 88 ML/MIN/1.73M2
GLUCOSE BLD-MCNC: 129 MG/DL (ref 70–108)
GLUCOSE BLD-MCNC: 95 MG/DL (ref 70–108)
HCT VFR BLD CALC: 42.6 % (ref 42–52)
HEMOGLOBIN: 13.9 GM/DL (ref 14–18)
IMMATURE GRANS (ABS): 0.04 THOU/MM3 (ref 0–0.07)
IMMATURE GRANULOCYTES: 0.7 %
LYMPHOCYTES # BLD: 33 %
LYMPHOCYTES ABSOLUTE: 1.8 THOU/MM3 (ref 1–4.8)
MCH RBC QN AUTO: 31 PG (ref 26–33)
MCHC RBC AUTO-ENTMCNC: 32.6 GM/DL (ref 32.2–35.5)
MCV RBC AUTO: 94.9 FL (ref 80–94)
MONOCYTES # BLD: 9.3 %
MONOCYTES ABSOLUTE: 0.5 THOU/MM3 (ref 0.4–1.3)
NUCLEATED RED BLOOD CELLS: 0 /100 WBC
PLATELET # BLD: 175 THOU/MM3 (ref 130–400)
PMV BLD AUTO: 10.5 FL (ref 9.4–12.4)
POTASSIUM REFLEX MAGNESIUM: 4 MEQ/L (ref 3.5–5.2)
RBC # BLD: 4.49 MILL/MM3 (ref 4.7–6.1)
SEG NEUTROPHILS: 50.5 %
SEGMENTED NEUTROPHILS ABSOLUTE COUNT: 2.8 THOU/MM3 (ref 1.8–7.7)
SODIUM BLD-SCNC: 138 MEQ/L (ref 135–145)
WBC # BLD: 5.5 THOU/MM3 (ref 4.8–10.8)

## 2022-07-26 PROCEDURE — 6360000002 HC RX W HCPCS

## 2022-07-26 PROCEDURE — 80048 BASIC METABOLIC PNL TOTAL CA: CPT

## 2022-07-26 PROCEDURE — 85025 COMPLETE CBC W/AUTO DIFF WBC: CPT

## 2022-07-26 PROCEDURE — 6360000002 HC RX W HCPCS: Performed by: STUDENT IN AN ORGANIZED HEALTH CARE EDUCATION/TRAINING PROGRAM

## 2022-07-26 PROCEDURE — 99239 HOSP IP/OBS DSCHRG MGMT >30: CPT | Performed by: PHYSICIAN ASSISTANT

## 2022-07-26 PROCEDURE — 6370000000 HC RX 637 (ALT 250 FOR IP)

## 2022-07-26 PROCEDURE — 36415 COLL VENOUS BLD VENIPUNCTURE: CPT

## 2022-07-26 PROCEDURE — 2580000003 HC RX 258

## 2022-07-26 PROCEDURE — 82948 REAGENT STRIP/BLOOD GLUCOSE: CPT

## 2022-07-26 RX ORDER — CEPHALEXIN 500 MG/1
500 CAPSULE ORAL 4 TIMES DAILY
Qty: 56 CAPSULE | Refills: 0 | Status: SHIPPED | OUTPATIENT
Start: 2022-07-26 | End: 2022-08-09

## 2022-07-26 RX ADMIN — BUPROPION HYDROCHLORIDE 200 MG: 100 TABLET, FILM COATED, EXTENDED RELEASE ORAL at 08:35

## 2022-07-26 RX ADMIN — ENOXAPARIN SODIUM 40 MG: 100 INJECTION SUBCUTANEOUS at 08:34

## 2022-07-26 RX ADMIN — PANTOPRAZOLE SODIUM 40 MG: 40 TABLET, DELAYED RELEASE ORAL at 08:35

## 2022-07-26 RX ADMIN — TAMSULOSIN HYDROCHLORIDE 0.4 MG: 0.4 CAPSULE ORAL at 08:34

## 2022-07-26 RX ADMIN — PREGABALIN 150 MG: 75 CAPSULE ORAL at 08:34

## 2022-07-26 RX ADMIN — VILAZODONE HYDROCHLORIDE 40 MG: 40 TABLET ORAL at 08:34

## 2022-07-26 RX ADMIN — OXYCODONE AND ACETAMINOPHEN 2 TABLET: 5; 325 TABLET ORAL at 05:58

## 2022-07-26 RX ADMIN — ISOSORBIDE MONONITRATE 60 MG: 60 TABLET, EXTENDED RELEASE ORAL at 08:35

## 2022-07-26 RX ADMIN — ASPIRIN 81 MG: 81 TABLET, COATED ORAL at 08:35

## 2022-07-26 RX ADMIN — CEFAZOLIN 2000 MG: 10 INJECTION, POWDER, FOR SOLUTION INTRAVENOUS at 05:58

## 2022-07-26 RX ADMIN — LISINOPRIL 30 MG: 20 TABLET ORAL at 08:35

## 2022-07-26 RX ADMIN — SODIUM CHLORIDE, PRESERVATIVE FREE 10 ML: 5 INJECTION INTRAVENOUS at 08:34

## 2022-07-26 RX ADMIN — METOPROLOL TARTRATE 25 MG: 25 TABLET, FILM COATED ORAL at 08:35

## 2022-07-26 RX ADMIN — OXYCODONE AND ACETAMINOPHEN 2 TABLET: 5; 325 TABLET ORAL at 02:00

## 2022-07-26 ASSESSMENT — PAIN DESCRIPTION - LOCATION
LOCATION: FOOT
LOCATION: BACK

## 2022-07-26 ASSESSMENT — PAIN SCALES - GENERAL
PAINLEVEL_OUTOF10: 7
PAINLEVEL_OUTOF10: 2
PAINLEVEL_OUTOF10: 7
PAINLEVEL_OUTOF10: 2
PAINLEVEL_OUTOF10: 4

## 2022-07-26 ASSESSMENT — PAIN DESCRIPTION - ORIENTATION
ORIENTATION: RIGHT;LEFT;LOWER
ORIENTATION: RIGHT;LEFT;LOWER
ORIENTATION: LOWER;MID
ORIENTATION: RIGHT;LEFT;LOWER
ORIENTATION: RIGHT;LEFT;LOWER

## 2022-07-26 ASSESSMENT — PAIN DESCRIPTION - DESCRIPTORS
DESCRIPTORS: DISCOMFORT;THROBBING
DESCRIPTORS: DISCOMFORT;THROBBING

## 2022-07-26 ASSESSMENT — PAIN DESCRIPTION - PAIN TYPE
TYPE: SURGICAL PAIN;CHRONIC PAIN
TYPE: SURGICAL PAIN;CHRONIC PAIN

## 2022-07-26 ASSESSMENT — PAIN DESCRIPTION - ONSET
ONSET: ON-GOING
ONSET: ON-GOING

## 2022-07-26 ASSESSMENT — PAIN DESCRIPTION - DIRECTION
RADIATING_TOWARDS: LEFT THIGH
RADIATING_TOWARDS: LEFT THIGH

## 2022-07-26 ASSESSMENT — PAIN DESCRIPTION - FREQUENCY
FREQUENCY: CONTINUOUS
FREQUENCY: CONTINUOUS

## 2022-07-26 NOTE — CARE COORDINATION
Discharge orders on chart. Pt verbalized understanding and gave permission for possible discharge within 4 hours of receiving IMM.   Electronically signed by Magdy Conrad RN on 7/26/22 at 10:52 AM EDT

## 2022-07-26 NOTE — PROGRESS NOTES
Progress note: Infectious diseases    Patient - Claudene Hole,  Age - 45 y.o.    - 1970      Room Number - 5K-05/005-A   MRN -  [id number]   Acct # - [de-identified]  Date of Admission -  2022  2:38 PM    SUBJECTIVE:   He has no new issues. OBJECTIVE   VITALS    height is 6' 1\" (1.854 m) and weight is 376 lb 5.2 oz (170.7 kg) (abnormal). His oral temperature is 98.4 °F (36.9 °C). His blood pressure is 144/92 (abnormal) and his pulse is 78. His respiration is 18 and oxygen saturation is 99%.        Wt Readings from Last 3 Encounters:   22 (!) 376 lb 5.2 oz (170.7 kg)   22 (!) 369 lb (167.4 kg)   21 (!) 371 lb (168.3 kg)       I/O (24 Hours)    Intake/Output Summary (Last 24 hours) at 2022 1019  Last data filed at 2022 0834  Gross per 24 hour   Intake 5943.27 ml   Output 3300 ml   Net 2643.27 ml         General Appearance  Awake, alert, oriented,     HEENT - normocephalic, atraumatic, pale  conjunctiva,  anicteric sclera  Neck - Supple, no mass  Lungs -  Bilateral   air entry, no rhonchi, no wheeze  Cardiovascular - Heart sounds are normal.     Abdomen - soft, not distended, nontender,   Neurologic -oriented  Skin - No bruising or bleeding  Extremities - + edema,           MEDICATIONS:      enoxaparin  40 mg SubCUTAneous BID    ceFAZolin  2,000 mg IntraVENous Q8H    lidocaine  2 patch TransDERmal Daily    sodium chloride flush  5-40 mL IntraVENous 2 times per day    amitriptyline  25 mg Oral Nightly    aspirin  81 mg Oral Daily    atorvastatin  40 mg Oral Nightly    buPROPion  200 mg Oral BID    isosorbide mononitrate  60 mg Oral Daily    lisinopril  30 mg Oral Daily    metoprolol tartrate  25 mg Oral Daily    pantoprazole  40 mg Oral Daily    pregabalin  150 mg Oral BID    tamsulosin  0.4 mg Oral Daily    vilazodone HCl  40 mg Oral Daily    insulin lispro  0-6 Units SubCUTAneous TID WC

## 2022-07-26 NOTE — DISCHARGE SUMMARY
Hospitalist Discharge Summary        Patient: Jerry Cruz  YOB: 1970  MRN: [id number]   Acct: [de-identified]    Primary Care Physician: MICHELLE Saldivar CNP    Admit date  7/19/2022    Discharge date: 7/26/2022 12:12 PM    Chief Complaint on presentation :-  Fever, chills, foot ulcer     Discharge Assessment and Plan:-   Osteomyelitis, Right Foot: Podiatry & ID consulted. A&A growing Staph Aureus (coag positive). Keflex prescribed by ID on DC for 14 days. MRI right foot showed OM of stump. POD4 of I&D. Dressing changes per podiatry. Essential HTN: BP controlled. Continue home medications. NIDDMII: Controlled. Lastest Hgb A1c 5.5, continue SSI in hospital and will restart home regimen at [place]. Polyneuropathy: Lyrica. HLD: Lipitor. GERD: PPI. Anxiety: Elvail, Viibryd, Bupropion    Initial H and P and Hospital course:-  Initial H&P \"52 y.o. male with a PMHx of HTN, DM2, HLD, peripheral neuropathy, GERD, depression, and anxiety who presented to [facility] for worsening right foot wound with associated fever, chills, and feeling sick over the weekend. Patient has a history of diabetic foot ulcer requiring right transmetatarsal amputation. Patient has had open wound since his surgery about a year ago. He was treated with Keflex/doxycycline however he had persistent wound that failed to close. Over the weekend, he developed fever, chills and nausea. He states that yesterday he noticed purulent greenish discharge from his right foot wound. He was seen today at the wound clinic and decision was made to admit patient for further treatment. Hospitalist was contacted for admission. Please refer to A&P for further details. \"     7/20: No acute events overnight. Pt states that his pain is relatively controlled. Pt has no issues or concerns at this time. Pt is anticipating surgery. 7/21: No acute events overnight. Pt states that her pain is relatively controlled.  Pt states that he is having some leg and back pain today, he attributed this to the hospital bed. Pt is asking if he is going to have surgery; it was explained that Podiatry will make this decision. 7/22: No acute events overnight. Pt states that his pain is relatively controlled. Pt has no issues or concerns at this time. Pt had surgery and tolerated it well.     7/23: No acute events overnight. Pt states that his pain is relatively controlled. Pt has no issues or concerns at this time. 7/24: Pt is eager to get out of the hospital. Pt states that he is feeling okay. Pt says that his legs and thigh are giving him the most pain. No new issues at this time. 7/25: Pt is resting comfortably in bed. Pt states that pain is controlled. No new issues. On the day of discharge, it was explained to the patient that it was very important to follow up with his PCP and Podiatry to have continued care. Appointments were made and information was given. Physical Exam:-  Vitals:   Patient Vitals for the past 24 hrs:   BP Temp Temp src Pulse Resp SpO2   07/26/22 0834 (!) 144/92 98.4 °F (36.9 °C) Oral 78 18 99 %   07/26/22 0313 120/61 97.9 °F (36.6 °C) Oral 81 18 96 %   07/26/22 0300 -- -- -- -- 18 --   07/25/22 2306 125/79 97.9 °F (36.6 °C) Oral 86 18 95 %   07/25/22 2230 -- -- -- -- 18 --   07/25/22 1929 (!) 115/53 97.8 °F (36.6 °C) Oral 88 17 92 %     Weight:   Weight: (!) 376 lb 5.2 oz (170.7 kg)   24 hour intake/output:     Intake/Output Summary (Last 24 hours) at 7/26/2022 1608  Last data filed at 7/26/2022 2959  Gross per 24 hour   Intake 5643.27 ml   Output 2250 ml   Net 3393.27 ml       General appearance: No apparent distress, appears stated age and cooperative. HEENT: Normal cephalic, atraumatic without obvious deformity. Pupils equal, round, and reactive to light. Extra ocular muscles intact. Conjunctivae/corneas clear. Neck: Supple, with full range of motion. No jugular venous distention.  Trachea midline. Respiratory:  Normal respiratory effort on RA. Clear to auscultation, bilaterally without Rales/Wheezes/Rhonchi. Cardiovascular: Regular rate and rhythm with normal S1/S2 without murmurs, rubs or gallops. Abdomen: Soft, non-tender, non-distended with normal bowel sounds. Musculoskeletal:  No clubbing, cyanosis or edema bilaterally. Skin: Skin color, texture, turgor normal.  No rashes or lesions. Dressing c/d/I   Neurologic:  Neurovascularly intact without any focal sensory/motor deficits. Cranial nerves: II-XII intact, grossly non-focal.  Psychiatric: Alert and oriented, thought content appropriate, normal insight  Capillary Refill: Brisk,< 3 seconds   Peripheral Pulses: +2 palpable, equal bilaterally       Discharge Medications:-      Medication List        CHANGE how you take these medications      cephALEXin 500 MG capsule  Commonly known as: KEFLEX  Take 1 capsule by mouth in the morning and 1 capsule at noon and 1 capsule in the evening and 1 capsule before bedtime. Do all this for 14 days.   What changed:   medication strength  how much to take  when to take this            CONTINUE taking these medications      albuterol sulfate  (90 Base) MCG/ACT inhaler  Commonly known as: PROVENTIL;VENTOLIN;PROAIR     amitriptyline 25 MG tablet  Commonly known as: ELAVIL     aspirin 81 MG tablet     atorvastatin 40 MG tablet  Commonly known as: LIPITOR     buPROPion 200 MG extended release tablet  Commonly known as: WELLBUTRIN SR     dicyclomine 10 MG capsule  Commonly known as: BENTYL     furosemide 20 MG tablet  Commonly known as: LASIX     isosorbide mononitrate 60 MG extended release tablet  Commonly known as: IMDUR     metoprolol tartrate 25 MG tablet  Commonly known as: LOPRESSOR     pantoprazole 40 MG tablet  Commonly known as: PROTONIX     potassium chloride 20 MEQ Tbcr extended release tablet  Commonly known as: KLOR-CON M     pregabalin 150 MG capsule  Commonly known as: LYRICA     ramelteon 8 MG tablet  Commonly known as: ROZEREM     tamsulosin 0.4 MG capsule  Commonly known as: FLOMAX     thera/beta-carotene Tabs     TRELEGY ELLIPTA IN     TRULICITY SC     vilazodone HCl 10 MG Tabs  Commonly known as: VIIBRYD     vitamin C 1000 MG tablet     vitamin D3 125 MCG (5000 UT) Caps            STOP taking these medications      doxycycline hyclate 100 MG capsule  Commonly known as: VIBRAMYCIN            ASK your doctor about these medications      lisinopril 20 MG tablet  Commonly known as: PRINIVIL;ZESTRIL  Take 1 tablet by mouth daily               Where to Get Your Medications        These medications were sent to [address], [address] [place] [address]      Phone: [phone]   cephALEXin 500 MG capsule          Labs :-  Recent Results (from the past 72 hour(s))   POCT Glucose    Collection Time: 07/23/22  4:30 PM   Result Value Ref Range    POC Glucose 104 70 - 108 mg/dl   POCT Glucose    Collection Time: 07/23/22  7:29 PM   Result Value Ref Range    POC Glucose 151 (H) 70 - 108 mg/dl   POCT Glucose    Collection Time: 07/24/22  7:47 AM   Result Value Ref Range    POC Glucose 101 70 - 108 mg/dl   CBC with Auto Differential    Collection Time: 07/24/22 10:14 AM   Result Value Ref Range    WBC 6.3 4.8 - 10.8 thou/mm3    RBC 4.34 (L) 4.70 - 6.10 mill/mm3    Hemoglobin 13.7 (L) 14.0 - 18.0 gm/dl    Hematocrit 41.1 (L) 42.0 - 52.0 %    MCV 94.7 (H) 80.0 - 94.0 fL    MCH 31.6 26.0 - 33.0 pg    MCHC 33.3 32.2 - 35.5 gm/dl    RDW-CV 12.6 11.5 - 14.5 %    RDW-SD 43.5 35.0 - 45.0 fL    Platelets 098 070 - 426 thou/mm3    MPV 10.6 9.4 - 12.4 fL    Seg Neutrophils 65.2 %    Lymphocytes 21.5 %    Monocytes 8.2 %    Eosinophils 4.3 %    Basophils 0.3 %    Immature Granulocytes 0.5 %    Segs Absolute 4.1 1.8 - 7.7 thou/mm3    Lymphocytes Absolute 1.4 1.0 - 4.8 thou/mm3    Monocytes Absolute 0.5 0.4 - 1.3 thou/mm3 Eosinophils Absolute 0.3 0.0 - 0.4 thou/mm3    Basophils Absolute 0.0 0.0 - 0.1 thou/mm3    Immature Grans (Abs) 0.03 0.00 - 0.07 thou/mm3    nRBC 0 /100 wbc   Basic Metabolic Panel w/ Reflex to MG    Collection Time: 07/24/22 10:14 AM   Result Value Ref Range    Sodium 139 135 - 145 meq/L    Potassium reflex Magnesium 4.8 3.5 - 5.2 meq/L    Chloride 104 98 - 111 meq/L    CO2 25 23 - 33 meq/L    Glucose 174 (H) 70 - 108 mg/dL    BUN 13 7 - 22 mg/dL    Creatinine 0.9 0.4 - 1.2 mg/dL    Calcium 8.7 8.5 - 10.5 mg/dL   Anion Gap    Collection Time: 07/24/22 10:14 AM   Result Value Ref Range    Anion Gap 10.0 8.0 - 16.0 meq/L   Glomerular Filtration Rate, Estimated    Collection Time: 07/24/22 10:14 AM   Result Value Ref Range    Est, Glom Filt Rate 88 (A) ml/min/1.73m2   POCT Glucose    Collection Time: 07/24/22 11:49 AM   Result Value Ref Range    POC Glucose 127 (H) 70 - 108 mg/dl   POCT Glucose    Collection Time: 07/24/22  5:01 PM   Result Value Ref Range    POC Glucose 112 (H) 70 - 108 mg/dl   POCT Glucose    Collection Time: 07/24/22  7:16 PM   Result Value Ref Range    POC Glucose 150 (H) 70 - 108 mg/dl   CBC with Auto Differential    Collection Time: 07/25/22  7:30 AM   Result Value Ref Range    WBC 5.3 4.8 - 10.8 thou/mm3    RBC 4.21 (L) 4.70 - 6.10 mill/mm3    Hemoglobin 13.1 (L) 14.0 - 18.0 gm/dl    Hematocrit 39.6 (L) 42.0 - 52.0 %    MCV 94.1 (H) 80.0 - 94.0 fL    MCH 31.1 26.0 - 33.0 pg    MCHC 33.1 32.2 - 35.5 gm/dl    RDW-CV 12.6 11.5 - 14.5 %    RDW-SD 43.6 35.0 - 45.0 fL    Platelets 623 373 - 716 thou/mm3    MPV 10.4 9.4 - 12.4 fL    Seg Neutrophils 50.8 %    Lymphocytes 30.8 %    Monocytes 11.7 %    Eosinophils 5.7 %    Basophils 0.4 %    Immature Granulocytes 0.6 %    Segs Absolute 2.7 1.8 - 7.7 thou/mm3    Lymphocytes Absolute 1.6 1.0 - 4.8 thou/mm3    Monocytes Absolute 0.6 0.4 - 1.3 thou/mm3    Eosinophils Absolute 0.3 0.0 - 0.4 thou/mm3    Basophils Absolute 0.0 0.0 - 0.1 thou/mm3    Immature Grans (Abs) 0.03 0.00 - 0.07 thou/mm3    nRBC 0 /100 wbc   Basic Metabolic Panel w/ Reflex to MG    Collection Time: 07/25/22  7:30 AM   Result Value Ref Range    Sodium 142 135 - 145 meq/L    Potassium reflex Magnesium 5.2 3.5 - 5.2 meq/L    Chloride 107 98 - 111 meq/L    CO2 28 23 - 33 meq/L    Glucose 109 (H) 70 - 108 mg/dL    BUN 13 7 - 22 mg/dL    Creatinine 0.9 0.4 - 1.2 mg/dL    Calcium 8.9 8.5 - 10.5 mg/dL   Anion Gap    Collection Time: 07/25/22  7:30 AM   Result Value Ref Range    Anion Gap 7.0 (L) 8.0 - 16.0 meq/L   Glomerular Filtration Rate, Estimated    Collection Time: 07/25/22  7:30 AM   Result Value Ref Range    Est, Glom Filt Rate 88 (A) ml/min/1.73m2   POCT Glucose    Collection Time: 07/25/22  7:58 AM   Result Value Ref Range    POC Glucose 101 70 - 108 mg/dl   POCT Glucose    Collection Time: 07/25/22 11:32 AM   Result Value Ref Range    POC Glucose 127 (H) 70 - 108 mg/dl   POCT Glucose    Collection Time: 07/25/22  4:33 PM   Result Value Ref Range    POC Glucose 140 (H) 70 - 108 mg/dl   POCT Glucose    Collection Time: 07/25/22  7:39 PM   Result Value Ref Range    POC Glucose 143 (H) 70 - 108 mg/dl   CBC with Auto Differential    Collection Time: 07/26/22  5:24 AM   Result Value Ref Range    WBC 5.5 4.8 - 10.8 thou/mm3    RBC 4.49 (L) 4.70 - 6.10 mill/mm3    Hemoglobin 13.9 (L) 14.0 - 18.0 gm/dl    Hematocrit 42.6 42.0 - 52.0 %    MCV 94.9 (H) 80.0 - 94.0 fL    MCH 31.0 26.0 - 33.0 pg    MCHC 32.6 32.2 - 35.5 gm/dl    RDW-CV 12.4 11.5 - 14.5 %    RDW-SD 43.2 35.0 - 45.0 fL    Platelets 686 501 - 624 thou/mm3    MPV 10.5 9.4 - 12.4 fL    Seg Neutrophils 50.5 %    Lymphocytes 33.0 %    Monocytes 9.3 %    Eosinophils 5.8 %    Basophils 0.7 %    Immature Granulocytes 0.7 %    Segs Absolute 2.8 1.8 - 7.7 thou/mm3    Lymphocytes Absolute 1.8 1.0 - 4.8 thou/mm3    Monocytes Absolute 0.5 0.4 - 1.3 thou/mm3    Eosinophils Absolute 0.3 0.0 - 0.4 thou/mm3    Basophils Absolute 0.0 0.0 - 0.1 thou/mm3 Immature Grans (Abs) 0.04 0.00 - 0.07 thou/mm3    nRBC 0 /100 wbc   Basic Metabolic Panel w/ Reflex to MG    Collection Time: 07/26/22  5:24 AM   Result Value Ref Range    Sodium 138 135 - 145 meq/L    Potassium reflex Magnesium 4.0 3.5 - 5.2 meq/L    Chloride 101 98 - 111 meq/L    CO2 28 23 - 33 meq/L    Glucose 129 (H) 70 - 108 mg/dL    BUN 15 7 - 22 mg/dL    Creatinine 0.9 0.4 - 1.2 mg/dL    Calcium 8.9 8.5 - 10.5 mg/dL   Anion Gap    Collection Time: 07/26/22  5:24 AM   Result Value Ref Range    Anion Gap 9.0 8.0 - 16.0 meq/L   Glomerular Filtration Rate, Estimated    Collection Time: 07/26/22  5:24 AM   Result Value Ref Range    Est, Glom Filt Rate 88 (A) ml/min/1.73m2   POCT Glucose    Collection Time: 07/26/22  7:45 AM   Result Value Ref Range    POC Glucose 95 70 - 108 mg/dl        Microbiology:    Blood culture #1:   Lab Results   Component Value Date/Time    [facility]  07/20/2022 01:33 PM     No growth 24 hours. No growth 48 hours. No growth at 5 days        Blood culture #2:No results found for: BLOODCULT2    Organism:    Lab Results   Component Value Date/Time    [facility]  07/22/2022 03:20 PM     No segmented neutrophils observed. Rare epithelial cells observed. Few gram positive cocci occurring singly and in pairs. Few gram positive bacilli. LABGRAM  07/22/2022 03:20 PM     Rare segmented neutrophils observed. No epithelial cells observed. No organisms observed. LABGRAM  07/22/2022 03:20 PM     Rare segmented neutrophils observed. No epithelial cells observed. No organisms observed.         MRSA culture only:No results found for: [facility]    Urine culture: No results found for: LABURIN  Lab Results   Component Value Date/Time    ORG Staphylococcus aureus 07/22/2022 03:20 PM    ORG gram positive bacilli 07/22/2022 03:20 PM    ORG Staphylococcus aureus 07/22/2022 03:20 PM        Respiratory culture: No results found for: CULTRESP    Aerobic and Anaerobic :  Lab Results   Component Value Date/Time    LABAERO 07/22/2022 03:20 PM     Culture also yielded moderate growth of gram positive bacilli most consistent with Corynebacterium species. LABAERO  07/22/2022 03:20 PM     moderate growth In the treatment of gram positive infections, GENTAMICIN should be CONSIDERED a SYNERGYSTIC agent ONLY. LABAERO No growth-preliminary  07/22/2022 03:20 PM    [facility]ERO  07/22/2022 03:20 PM     very light growth (one colony) gram positive bacilli consistent with Corynebacterium species. Clinical correlation required     LABAERO  07/22/2022 03:20 PM     light growth In the treatment of gram positive infections, GENTAMICIN should be CONSIDERED a SYNERGYSTIC agent ONLY. Lab Results   Component Value Date/Time    LABANAE  07/22/2022 03:20 PM     Culture yielded moderate growth of anaerobic gram positive cocci. If a true mixed aerobic and anaerobic infection is suspected, then broad spectrum empiric antibiotic therapy is indicated and should include coverage for anaerobic organisms. LABANAE No growth-preliminary  07/22/2022 03:20 PM    LABANAE No anaerobes isolated- preliminary  07/22/2022 03:20 PM       Urinalysis:      Lab Results   Component Value Date/Time    NITRU Negative 08/12/2016 06:59 PM    BLOODU Negative 08/12/2016 06:59 PM    SPECGRAV 1.015 08/12/2016 06:59 PM    GLUCOSEU NEGATIVE 07/07/2015 12:48 AM       Radiology:-  XR FOOT RIGHT (MIN 3 VIEWS)    Result Date: 7/19/2022  PROCEDURE: XR FOOT RIGHT (MIN 3 VIEWS) CLINICAL INFORMATION: Right foot pain and swelling. COMPARISON: No prior study. TECHNIQUE: AP, lateral, and 2 oblique views of the foot performed. FINDINGS: Postoperative changes related to transmetatarsal amputation with fragmentation and periarticular calcification along the distal aspect of the fifth metatarsal diaphysis is observed. Soft tissue swelling is present. It is difficult to assess for subcutaneous air density. Post operative changes related to transmetatarsal amputation are noted. Periarticular calcification and fragmented appearance of the distal fifth metatarsal diaphysis is nonspecific and could be postoperative. There is a suggestion of soft tissue swelling at the fourth and fifth metatarsals and osteomyelitis at this location is not excluded. **This report has been created using voice recognition software. It may contain minor errors which are inherent in voice recognition technology. ** Final report electronically signed by Dr Joaquin Whitfield on 7/19/2022 4:56 PM    MRI FOOT RIGHT W WO CONTRAST    Result Date: 7/19/2022  MRI right foot without and with contrast. Indication: Bone infection. Technique: MRI right foot without and with contrast, utilizing multiplanar, multisequence images. Comparison: CR,SR - XR FOOT RIGHT (MIN 3 VIEWS) - 07/19/2022 03:45 PM EDT Findings: Prior transmetatarsal amputation. Abnormal replacement of the normal fatty marrow in the 5th metatarsal stump, with associated bone marrow edema and abnormal enhancement, most consistent with osteomyelitis. There is also mild replacement of the normal fatty marrow at distal aspect of the 4th metatarsal stump, at the resection margin, with bone marrow edema and mild bone marrow enhancement, concerning for additional site of osteomyelitis. Deep and superficial soft tissue swelling. No drainable rim-enhancing fluid collection. At the soft tissue stump, there is focal area of skin ulceration and enhancement, with packing material.     Impression: 1. Osteomyelitis in the 5th metatarsal stump. Mild osteomyelitis at the distal end of the 4th metatarsal stump. 2. Cellulitis. Soft tissue ulcer at the stump. No drainable rim-enhancing fluid collection.  This document has been electronically signed by: Hoda Forbes MD on 07/19/2022 10:08 PM       Follow-up scheduled after discharge :-    in the next few days with MICHELLE Kaufman - CNP  in the next few days with Podiatry     Consultations during this hospital stay:-  [] NONE [] Cardiology  [] Nephrology  [] Hemo onco   [] GI   [] ID  [] Endocrine  [] Pulm    [] Neuro    [] Psych   [] Urology  [] ENT   [] G SURGERY   []Ortho    []CV surg    [] Palliative  [] Hospice [] Pain management   []    []TCU   [] PT/OT  OTHERS:-    Disposition: home with HH   Condition at Discharge: Stable    Time Spent:- 35 minutes    Electronically signed by LATISHA Macias on 7/26/22 at 4:08 PM EDT   Discharging Hospitalist

## 2022-07-26 NOTE — PROGRESS NOTES
Podiatric Progress Note  Juanpablobryon Garibay Waleska  Subjective :   7/26/22  Patient seen bedside today on behalf of Dr. Katya Cartagena. Patient is 4 days status post incision and drainage with 4th and 5th ray amputation, right foot (DOS: 7/22/22). Patient appeared pleasant, was oriented to person, place and time, and in no acute distress. Patient states that he is doing well. Patient was initially seated upright at the time of this encounter and states that he has been experiencing nerve pain along his left thigh when laying down in his bed. Relates that this makes it difficult for him to keep his right foot elevated for extended periods of time. Denies any N/V/F/C/CP or SOB. Patient has no further pedal concerns at this point in time. 7/25/22  Patient seen bedside today on behalf of Dr. Katya Cartagena. Patient is 3 days status post incision and drainage with 4th and 5th ray amputation, right foot (DOS: 7/22/22). Patient appeared pleasant, was oriented to person, place and time, and in no acute distress. Patient states that he is doing well. He continues to endorse some mild soreness to his right foot, which is unchanged from yesterday. States that he felt his bed was too warm yesterday. Denies any N/V/F/C/CP or SOB. Patient has no further pedal concerns at this point in time. 7/24/22  Patient seen bedside today on behalf of Dr. Katya Cartagena. Patient is 2 days status post incision and drainage with 4th and 5th ray amputation, right foot (DOS: 7/22/22). Patient appeared pleasant, was oriented to person, place and time, and in no acute distress. Patient states that he is doing well. He endorses some soreness to his right foot but has been tolerating it. Also relates that he has been elevating his foot but admits that it feels better to dangle it sometimes. Denies any N/V/F/C/SOB or CP. Patient has no further pedal concerns at this point in time. 7/23/22  Patient seen bedside today on behalf of Dr. Katya Cartagena.  Patient is 1 day status post incision and drainage with 4th and 5th ray amputation, right foot (DOS: 7/22/22). Patient appeared pleasant, was oriented to person, place and time, and in no acute distress. Patient states that he is doing well. He endorses some mild burning pain to his right foot but has been able to tolerate it. Patient was interested in seeing a picture of his foot. Denies any N/V/F/C/SOB or CP. Patient has no further pedal concerns at this point in time. 7/21/22  Patient seen bedside today on behalf of Dr. Danita Thompson. Patient appeared pleasant, was oriented to person, place and time and in no acute distress. Patient states that he is doing well. Patient denies any N/V/F/C/SOB or CP. Patient has no further pedal concerns at this point in time. HPI           The patient is a 45 y.o. male with significant past medical history of hypertension, hyperlipidemia, CAD, type 2 diabetes who is being seen at bedside on behalf of Dr. Danita Thompson. Patient is known to Dr. Danita Thompson and has been following in [address] [place] [address],[address]. Patient presents today for follow up on wound/ulcer's progression. The patient is currently on oral Doxycycline. Current dressing care includes Betadine wet-to-dry, dry sterile dressing, Ace bandage. Patient states that over the weekend he had episodes of nausea, fevers, chills and yesterday 7/18/2022 he noticed a thick green discharge from the wound. Patient currently denies any N/F/V/C/SOB/CP. No other pedal complaints today.       Current Medications:    Current Facility-Administered Medications: enoxaparin (LOVENOX) injection 40 mg, 40 mg, SubCUTAneous, BID  ceFAZolin (ANCEF) 2000 mg in dextrose 5 % 50 mL IVPB, 2,000 mg, IntraVENous, Q8H  lidocaine 4 % external patch 2 patch, 2 patch, TransDERmal, Daily  sodium chloride flush 0.9 % injection 5-40 mL, 5-40 mL, IntraVENous, 2 times per day  sodium chloride flush 0.9 % injection 5-40 mL, 5-40 mL, IntraVENous, PRN  0.9 % sodium chloride infusion, , IntraVENous, PRN  ondansetron (ZOFRAN-ODT) disintegrating tablet 4 mg, 4 mg, Oral, Q8H PRN **OR** ondansetron (ZOFRAN) injection 4 mg, 4 mg, IntraVENous, Q6H PRN  polyethylene glycol (GLYCOLAX) packet 17 g, 17 g, Oral, Daily PRN  acetaminophen (TYLENOL) tablet 650 mg, 650 mg, Oral, Q6H PRN **OR** acetaminophen (TYLENOL) suppository 650 mg, 650 mg, Rectal, Q6H PRN  albuterol sulfate HFA (PROVENTIL;VENTOLIN;PROAIR) 108 (90 Base) MCG/ACT inhaler 2 puff, 2 puff, Inhalation, Q4H PRN  amitriptyline (ELAVIL) tablet 25 mg, 25 mg, Oral, Nightly  aspirin EC tablet 81 mg, 81 mg, Oral, Daily  atorvastatin (LIPITOR) tablet 40 mg, 40 mg, Oral, Nightly  buPROPion (WELLBUTRIN SR) extended release tablet 200 mg, 200 mg, Oral, BID  dicyclomine (BENTYL) capsule 10 mg, 10 mg, Oral, Daily PRN  isosorbide mononitrate (IMDUR) extended release tablet 60 mg, 60 mg, Oral, Daily  lisinopril (PRINIVIL;ZESTRIL) tablet 30 mg, 30 mg, Oral, Daily  metoprolol tartrate (LOPRESSOR) tablet 25 mg, 25 mg, Oral, Daily  pantoprazole (PROTONIX) tablet 40 mg, 40 mg, Oral, Daily  pregabalin (LYRICA) capsule 150 mg, 150 mg, Oral, BID  melatonin tablet 4.5 mg, 4.5 mg, Oral, Nightly PRN  tamsulosin (FLOMAX) capsule 0.4 mg, 0.4 mg, Oral, Daily  vilazodone HCl (VIIBRYD) TABS 40 mg, 40 mg, Oral, Daily  0.9 % sodium chloride infusion, , IntraVENous, Continuous  insulin lispro (HUMALOG) injection vial 0-6 Units, 0-6 Units, SubCUTAneous, TID WC  insulin lispro (HUMALOG) injection vial 0-3 Units, 0-3 Units, SubCUTAneous, Nightly  glucose chewable tablet 16 g, 4 tablet, Oral, PRN  dextrose bolus 10% 125 mL, 125 mL, IntraVENous, PRN **OR** dextrose bolus 10% 250 mL, 250 mL, IntraVENous, PRN  glucagon (rDNA) injection 1 mg, 1 mg, IntraMUSCular, PRN  dextrose 5 % solution, 100 mL/hr, IntraVENous, PRN  oxyCODONE-acetaminophen (PERCOCET) 5-325 MG per tablet 1 tablet, 1 tablet, Oral, Q4H PRN **OR** oxyCODONE-acetaminophen (PERCOCET) 5-325 MG per tablet 2 tablet, 2 tablet, Oral, Q4H PRN    Objective     /61 Pulse 81   Temp 97.9 °F (36.6 °C) (Oral)   Resp 18   Ht 6' 1\" (1.854 m)   Wt (!) 376 lb 5.2 oz (170.7 kg)   SpO2 96%   BMI 49.65 kg/m²      I/O:  Intake/Output Summary (Last 24 hours) at 7/26/2022 0832  Last data filed at 7/26/2022 0559  Gross per 24 hour   Intake 6183.27 ml   Output 2850 ml   Net 3333.27 ml              Wt Readings from Last 3 Encounters:   07/21/22 (!) 376 lb 5.2 oz (170.7 kg)   01/19/22 (!) 369 lb (167.4 kg)   12/22/21 (!) 371 lb (168.3 kg)       LABS:    Recent Labs     07/25/22  0730 07/26/22  0524   WBC 5.3 5.5   HGB 13.1* 13.9*   HCT 39.6* 42.6    175        Recent Labs     07/26/22  0524      K 4.0      CO2 28   BUN 15   CREATININE 0.9      No results for input(s): PROT, INR, APTT in the last 72 hours. No results for input(s): CKTOTAL, CKMB, CKMBINDEX, TROPONINI in the last 72 hours. Focused Lower Extremity Examination:    Vitals:    /61   Pulse 81   Temp 97.9 °F (36.6 °C) (Oral)   Resp 18   Ht 6' 1\" (1.854 m)   Wt (!) 376 lb 5.2 oz (170.7 kg)   SpO2 96%   BMI 49.65 kg/m²      Vascular: Dorsalis pedis and posterior tibial pulses are faintly palpable secondary to edema bilaterally. Skin temperature is warm to warm from proximal tibial tuberosity to distal digits. CFT brisk to exposed digits and to amputation stump on right. Pitting edema present to bilateral LE. Dermatologic: Dressing intact to right foot with strikethrough noted to kerlix layer. Upon removal of dressing, semi-elliptical incision noted to distolateral aspect of right foot, extending proximally from the 5th metatarsal base to the plantar aspect of the TMA stump. Incision appears well coapted with sutures intact. Mild macerated spot roughly 1cm in length noted to 2 o'clock position of incision. Mild edema and erythema noted consistent with post-operative course. Mild sanguinous drainage noted. No malodor, purulence.      Neurovascular: Light touch sensation grossly diminished at the feet bilaterally. Musculoskeletal: Muscle strength 5/5 for all pedal muscle groups tested, right. Gastro-soleal equinus present bilaterally. Transmetatarsal amputation to right with 4th and 5th metatarsal amputation. Mild pain on palpation. Images  XR FOOT RIGHT (MIN 3 VIEWS)   Final Result   Impression:   1. Interval resection of the 4th and 5th metatarsal stumps. 2. Soft tissue swelling. This document has been electronically signed by: Nikik Bliss MD on    07/25/2022 12:54 AM      XR CHEST PORTABLE   Final Result   Impression:   1. Central bronchial wall thickening which may reflect bronchitis or viral    pneumonitis. Findings are more prominent on the current exam compared to    previous. This document has been electronically signed by: Joellen Barkley MD on    07/21/2022 08:03 PM      MRI FOOT RIGHT W WO CONTRAST   Final Result   Impression:   1. Osteomyelitis in the 5th metatarsal stump. Mild osteomyelitis at the    distal end of the 4th metatarsal stump. 2. Cellulitis. Soft tissue ulcer at the stump. No drainable rim-enhancing    fluid collection. This document has been electronically signed by: Nikki Bliss MD on    07/19/2022 10:08 PM      XR FOOT RIGHT (MIN 3 VIEWS)   Final Result   Post operative changes related to transmetatarsal amputation are noted. Periarticular calcification and fragmented appearance of the distal fifth metatarsal diaphysis is nonspecific and could be postoperative. There is a suggestion of soft tissue    swelling at the fourth and fifth metatarsals and osteomyelitis at this location is not excluded. **This report has been created using voice recognition software. It may contain minor errors which are inherent in voice recognition technology. **      Final report electronically signed by Dr Dennis Sawyer on 7/19/2022 4:56 PM            Chronic  Patient Active Problem List   Diagnosis    Hypertension    Hyperlipidemia    CAD (coronary artery disease)    Family history of premature coronary heart disease    Dizziness    Acute renal failure (ARF) (HCC)    Chronic low back pain    PTSD (post-traumatic stress disorder)    Foot ulceration, right, with fat layer exposed ([address]    Type 2 diabetes mellitus without complication, with long-term current use of insulin (HCC)    Cellulitis of right foot    Osteomyelitis ([facility])       Assessment and Plan  Principle  1. Cellulitis of right foot  2. Osteomyelitis, right foot     -Patient examined and evaluated  -Wound dressed with betadine soaked gauze, ABD, Kerlix, Ace bandage  -Patient placed on IV cefazolin per infectious disease  -Reinforced with patient the importance of staying off of his foot for the management of this wound and for healing  -Tissue culture reviewed: preliminary results for few gram positive cocci occurring singly and in pairs. Few gram positive bacilli. Coagulase positive staphylococcus. -X-rays reviewed, resection of 4th and 5th metatarsal stumps, see impression above  -MRI reviewed, osteomyelitis in 5th metatarsal stump, mild osteomyelitis in 4th metatarsal stump. See impression above  -WBC 5.5, Hgb 13.9; afebrile  -Patient is to remain non-weightbearing RLE  -Discussed with patient that we will continue to monitor the incision for adequate skin healing.   -Patient verbalized agreement understanding with treatment plan discussed. All patient's questions were answered to satisfaction. DISPO: S/p 4th and 5th ray amputation (7/22/22). Will continue to monitor incision healing, pending pathology and finalization of cultures.     Casey Geronimo [id number]  7/26/2022   8:32 AM

## 2022-07-26 NOTE — PLAN OF CARE
Problem: Pain  Goal: Verbalizes/displays adequate comfort level or baseline comfort level  Outcome: Progressing Towards Goal  Note: Patient's post op and chronic back pain relieved. Pain medications given as needed. Rated pain as 7/10; verbalized satisfaction of interventions implemented. Elevation, cold pack applied. Rest and repositioning provided. Pain assessment ongoing. Problem: Safety - Adult  Goal: Free from fall injury  Outcome: Progressing Towards Goal  Flowsheets (Taken 7/25/2022 1929)  Free From Fall Injury: Instruct family/caregiver on patient safety  Note:  Patient's safety maintained. Fall precautions in place. Call light and possessions within reach, bed alarm on and placed in low position, side rails upx2, non skid socks on, up with assistance and calls out appropriately; hourly rounding. Problem: Chronic Conditions and Co-morbidities  Goal: Patient's chronic conditions and co-morbidity symptoms are monitored and maintained or improved  Outcome: Progressing Towards Goal  Flowsheets (Taken 7/25/2022 1929)  Care Plan - Patient's Chronic Conditions and Co-Morbidity Symptoms are Monitored and Maintained or Improved: Monitor and assess patient's chronic conditions and comorbid symptoms for stability, deterioration, or improvement     Problem: Skin/Tissue Integrity  Goal: Absence of new skin breakdown  Description: 1. Monitor for areas of redness and/or skin breakdown  2. Assess vascular access sites hourly  3. Every 4-6 hours minimum:  Change oxygen saturation probe site  4. Every 4-6 hours:  If on nasal continuous positive airway pressure, respiratory therapy assess nares and determine need for appliance change or resting period. Outcome: Progressing Towards Goal  Note: Skin protocols continued. No skin breakdown noted. Elevation and repositioning encouraged.     Problem: Skin/Tissue Integrity - Adult  Goal: Incisions, wounds, or drain sites healing without S/S of infection  Outcome: Progressing Towards Goal  Flowsheets (Taken 7/25/2022 1929)  Incisions, Wounds, or Drain Sites Healing Without Sign and Symptoms of Infection:   TWICE DAILY: Assess and document skin integrity   TWICE DAILY: Assess and document dressing/incision, wound bed, drain sites and surrounding tissue  Note: Post op dressing on right foot CDI; changed by Podiatry. On IV Ancef for antibiotic treatment. X-ray reviewed with patient by podiatry and showed no evidence of infection. Problem: Discharge Planning  Goal: Discharge to home or other facility with appropriate resources  Outcome: Progressing Towards Goal  Flowsheets (Taken 7/25/2022 1929)  Discharge to home or other facility with appropriate resources:   Arrange for needed discharge resources and transportation as appropriate   Identify barriers to discharge with patient and caregiver   Identify discharge learning needs (meds, wound care, etc)  Note: Patient to return home at discharge and resume services through [address] [place] [address]. Care plan reviewed with patient. Patient verbalize understanding of the plan of care and contribute to goal setting.

## 2022-07-27 LAB
AEROBIC CULTURE: ABNORMAL
ANAEROBIC CULTURE: ABNORMAL
ANAEROBIC CULTURE: ABNORMAL
GRAM STAIN RESULT: ABNORMAL
GRAM STAIN RESULT: ABNORMAL
ORGANISM: ABNORMAL
ORGANISM: ABNORMAL

## 2022-07-27 NOTE — CARE COORDINATION
7/27/22, 8:22 AM EDT    Patient goals/plan/ treatment preferences discussed by  and . Patient goals/plan/ treatment preferences reviewed with patient/ family. Patient/ family verbalize understanding of discharge plan and are in agreement with goal/plan/treatment preferences. Understanding was demonstrated using the teach back method. AVS provided by RN at time of discharge, which includes all necessary medical information pertaining to the patients current course of illness, treatment, post-discharge goals of care, and treatment preferences. Services At/After Discharge: [id number] [facility]       IMM Letter  IMM Letter given to Patient/Family/Significant other/Guardian/POA/by[de-identified] Copy delivered to patient by Carline Carrasco  IMM Letter date given[de-identified] 07/26/22  IMM Letter time given[de-identified] 65  Observation Status Letter date given[de-identified] 07/19/22  Observation Status Letter time given[de-identified] 0743  Observation Status Letter given to Patient/Family/Significant other/Guardian/POA/by[de-identified] staff     Call to [employer] with [facility], she is aware of discharge yesterday, will get him started back on services, denies needs from  at this time.

## 2022-07-28 ENCOUNTER — TELEPHONE (OUTPATIENT)
Dept: WOUND CARE | Age: 52
End: 2022-07-28

## 2022-07-28 NOTE — TELEPHONE ENCOUNTER
----- Message from Ervin Rico sent at 7/27/2022  8:18 AM EDT -----  Kyrie WILSON AT [facility] NEEDS A CALL BACK ABOUT SIMI'S ORDERS. RESUMING [address].  JUST NEED SOME MORE DETAILS ABOUT HOW OFTEN FOR DRESSING CHANGES

## 2022-07-28 NOTE — TELEPHONE ENCOUNTER
Spoke with edmundo at [facility] explained to Lina Coleman patient has had surgery since his visit at wound clinic. Surgery was 7/22/22. Provided Lina Coleman with resident on call to clarify any orders. Patient next visit to wound clinic is 8/2/22.

## 2022-08-02 ENCOUNTER — HOSPITAL ENCOUNTER (OUTPATIENT)
Dept: WOUND CARE | Age: 52
Discharge: HOME | End: 2022-08-02

## 2022-08-09 ENCOUNTER — HOSPITAL ENCOUNTER (OUTPATIENT)
Dept: WOUND CARE | Age: 52
Discharge: HOME | End: 2022-08-09

## 2023-02-13 LAB — AEROBIC CULTURE: ABNORMAL

## 2023-03-01 ENCOUNTER — HOSPITAL ENCOUNTER (INPATIENT)
Age: 53
LOS: 7 days | Discharge: NURSING, REHAB OR LONG-TERM CARE | DRG: 475 | End: 2023-03-08
Attending: NURSE PRACTITIONER | Admitting: INTERNAL MEDICINE
Payer: MEDICARE

## 2023-03-01 ENCOUNTER — APPOINTMENT (OUTPATIENT)
Dept: GENERAL RADIOLOGY | Age: 53
DRG: 475 | End: 2023-03-01
Payer: MEDICARE

## 2023-03-01 DIAGNOSIS — M86.9 OSTEOMYELITIS OF RIGHT FOOT, UNSPECIFIED TYPE (HCC): ICD-10-CM

## 2023-03-01 DIAGNOSIS — M86.9 OSTEOMYELITIS OF LEFT FOOT, UNSPECIFIED TYPE (HCC): Primary | ICD-10-CM

## 2023-03-01 PROBLEM — L97.509 DIABETIC FOOT ULCER WITH OSTEOMYELITIS (HCC): Status: ACTIVE | Noted: 2023-03-01

## 2023-03-01 PROBLEM — E11.621 DIABETIC FOOT ULCER WITH OSTEOMYELITIS (HCC): Status: ACTIVE | Noted: 2023-03-01

## 2023-03-01 PROBLEM — E11.69 DIABETIC FOOT ULCER WITH OSTEOMYELITIS (HCC): Status: ACTIVE | Noted: 2023-03-01

## 2023-03-01 LAB
ALBUMIN SERPL BCG-MCNC: 4.4 G/DL (ref 3.5–5.1)
ALP SERPL-CCNC: 162 U/L (ref 38–126)
ALT SERPL W/O P-5'-P-CCNC: 36 U/L (ref 11–66)
ANION GAP SERPL CALC-SCNC: 16 MEQ/L (ref 8–16)
AST SERPL-CCNC: 26 U/L (ref 5–40)
BASOPHILS ABSOLUTE: 0.1 THOU/MM3 (ref 0–0.1)
BASOPHILS NFR BLD AUTO: 0.6 %
BILIRUB SERPL-MCNC: 0.7 MG/DL (ref 0.3–1.2)
BILIRUB UR QL STRIP.AUTO: NEGATIVE
BUN SERPL-MCNC: 11 MG/DL (ref 7–22)
CALCIUM SERPL-MCNC: 10 MG/DL (ref 8.5–10.5)
CHARACTER UR: CLEAR
CHLORIDE SERPL-SCNC: 98 MEQ/L (ref 98–111)
CO2 SERPL-SCNC: 25 MEQ/L (ref 23–33)
COLOR: YELLOW
CREAT SERPL-MCNC: 1 MG/DL (ref 0.4–1.2)
DEPRECATED RDW RBC AUTO: 41.2 FL (ref 35–45)
EKG ATRIAL RATE: 81 BPM
EKG P AXIS: 60 DEGREES
EKG P-R INTERVAL: 148 MS
EKG Q-T INTERVAL: 346 MS
EKG QRS DURATION: 96 MS
EKG QTC CALCULATION (BAZETT): 401 MS
EKG R AXIS: 9 DEGREES
EKG T AXIS: 46 DEGREES
EKG VENTRICULAR RATE: 81 BPM
EOSINOPHIL NFR BLD AUTO: 2 %
EOSINOPHILS ABSOLUTE: 0.2 THOU/MM3 (ref 0–0.4)
ERYTHROCYTE [DISTWIDTH] IN BLOOD BY AUTOMATED COUNT: 12.6 % (ref 11.5–14.5)
GFR SERPL CREATININE-BSD FRML MDRD: > 60 ML/MIN/1.73M2
GLUCOSE BLD STRIP.AUTO-MCNC: 141 MG/DL (ref 70–108)
GLUCOSE BLD STRIP.AUTO-MCNC: 157 MG/DL (ref 70–108)
GLUCOSE SERPL-MCNC: 157 MG/DL (ref 70–108)
GLUCOSE UR QL STRIP.AUTO: NEGATIVE MG/DL
HCT VFR BLD AUTO: 45.9 % (ref 42–52)
HGB BLD-MCNC: 15.6 GM/DL (ref 14–18)
HGB UR QL STRIP.AUTO: NEGATIVE
IMM GRANULOCYTES # BLD AUTO: 0.05 THOU/MM3 (ref 0–0.07)
IMM GRANULOCYTES NFR BLD AUTO: 0.5 %
KETONES UR QL STRIP.AUTO: NEGATIVE
LACTIC ACID, SEPSIS: 1.6 MMOL/L (ref 0.5–1.9)
LACTIC ACID, SEPSIS: 2 MMOL/L (ref 0.5–1.9)
LACTIC ACID, SEPSIS: 2.4 MMOL/L (ref 0.5–1.9)
LACTIC ACID, SEPSIS: 2.5 MMOL/L (ref 0.5–1.9)
LYMPHOCYTES ABSOLUTE: 1.5 THOU/MM3 (ref 1–4.8)
LYMPHOCYTES NFR BLD AUTO: 15.1 %
MCH RBC QN AUTO: 30.6 PG (ref 26–33)
MCHC RBC AUTO-ENTMCNC: 34 GM/DL (ref 32.2–35.5)
MCV RBC AUTO: 90 FL (ref 80–94)
MONOCYTES ABSOLUTE: 0.7 THOU/MM3 (ref 0.4–1.3)
MONOCYTES NFR BLD AUTO: 7.2 %
NEUTROPHILS NFR BLD AUTO: 74.6 %
NITRITE UR QL STRIP: NEGATIVE
NRBC BLD AUTO-RTO: 0 /100 WBC
PH UR STRIP.AUTO: 5 [PH] (ref 5–9)
PLATELET # BLD AUTO: 264 THOU/MM3 (ref 130–400)
PMV BLD AUTO: 10.3 FL (ref 9.4–12.4)
POTASSIUM SERPL-SCNC: 4.2 MEQ/L (ref 3.5–5.2)
PROCALCITONIN SERPL IA-MCNC: 0.04 NG/ML (ref 0.01–0.09)
PROT SERPL-MCNC: 7.4 G/DL (ref 6.1–8)
PROT UR STRIP.AUTO-MCNC: NEGATIVE MG/DL
RBC # BLD AUTO: 5.1 MILL/MM3 (ref 4.7–6.1)
SEGMENTED NEUTROPHILS ABSOLUTE COUNT: 7.5 THOU/MM3 (ref 1.8–7.7)
SODIUM SERPL-SCNC: 139 MEQ/L (ref 135–145)
SP GR UR REFRACT.AUTO: 1.02 (ref 1–1.03)
UROBILINOGEN, URINE: 0.2 EU/DL (ref 0–1)
WBC # BLD AUTO: 10 THOU/MM3 (ref 4.8–10.8)
WBC #/AREA URNS HPF: NEGATIVE /[HPF]

## 2023-03-01 PROCEDURE — 87186 SC STD MICRODIL/AGAR DIL: CPT

## 2023-03-01 PROCEDURE — 87070 CULTURE OTHR SPECIMN AEROBIC: CPT

## 2023-03-01 PROCEDURE — 6370000000 HC RX 637 (ALT 250 FOR IP): Performed by: NURSE PRACTITIONER

## 2023-03-01 PROCEDURE — 1200000000 HC SEMI PRIVATE

## 2023-03-01 PROCEDURE — 2580000003 HC RX 258: Performed by: NURSE PRACTITIONER

## 2023-03-01 PROCEDURE — 83605 ASSAY OF LACTIC ACID: CPT

## 2023-03-01 PROCEDURE — 36415 COLL VENOUS BLD VENIPUNCTURE: CPT

## 2023-03-01 PROCEDURE — 2580000003 HC RX 258

## 2023-03-01 PROCEDURE — 82948 REAGENT STRIP/BLOOD GLUCOSE: CPT

## 2023-03-01 PROCEDURE — 94640 AIRWAY INHALATION TREATMENT: CPT

## 2023-03-01 PROCEDURE — 87205 SMEAR GRAM STAIN: CPT

## 2023-03-01 PROCEDURE — 87147 CULTURE TYPE IMMUNOLOGIC: CPT

## 2023-03-01 PROCEDURE — 99223 1ST HOSP IP/OBS HIGH 75: CPT | Performed by: NURSE PRACTITIONER

## 2023-03-01 PROCEDURE — 2580000003 HC RX 258: Performed by: INTERNAL MEDICINE

## 2023-03-01 PROCEDURE — 93005 ELECTROCARDIOGRAM TRACING: CPT

## 2023-03-01 PROCEDURE — 71045 X-RAY EXAM CHEST 1 VIEW: CPT

## 2023-03-01 PROCEDURE — 81003 URINALYSIS AUTO W/O SCOPE: CPT

## 2023-03-01 PROCEDURE — 87040 BLOOD CULTURE FOR BACTERIA: CPT

## 2023-03-01 PROCEDURE — 87077 CULTURE AEROBIC IDENTIFY: CPT

## 2023-03-01 PROCEDURE — 84145 PROCALCITONIN (PCT): CPT

## 2023-03-01 PROCEDURE — 85025 COMPLETE CBC W/AUTO DIFF WBC: CPT

## 2023-03-01 PROCEDURE — 99285 EMERGENCY DEPT VISIT HI MDM: CPT

## 2023-03-01 PROCEDURE — 96374 THER/PROPH/DIAG INJ IV PUSH: CPT

## 2023-03-01 PROCEDURE — 6360000002 HC RX W HCPCS: Performed by: INTERNAL MEDICINE

## 2023-03-01 PROCEDURE — 6360000002 HC RX W HCPCS

## 2023-03-01 PROCEDURE — 87075 CULTR BACTERIA EXCEPT BLOOD: CPT

## 2023-03-01 PROCEDURE — 80053 COMPREHEN METABOLIC PANEL: CPT

## 2023-03-01 PROCEDURE — 6360000002 HC RX W HCPCS: Performed by: NURSE PRACTITIONER

## 2023-03-01 PROCEDURE — 93010 ELECTROCARDIOGRAM REPORT: CPT | Performed by: INTERNAL MEDICINE

## 2023-03-01 RX ORDER — DOXEPIN HYDROCHLORIDE 10 MG/1
10 CAPSULE ORAL ONCE
Status: COMPLETED | OUTPATIENT
Start: 2023-03-02 | End: 2023-03-02

## 2023-03-01 RX ORDER — ACETAMINOPHEN 325 MG/1
650 TABLET ORAL EVERY 6 HOURS PRN
Status: DISCONTINUED | OUTPATIENT
Start: 2023-03-01 | End: 2023-03-08 | Stop reason: HOSPADM

## 2023-03-01 RX ORDER — ONDANSETRON 4 MG/1
4 TABLET, ORALLY DISINTEGRATING ORAL EVERY 8 HOURS PRN
Status: DISCONTINUED | OUTPATIENT
Start: 2023-03-01 | End: 2023-03-03 | Stop reason: SDUPTHER

## 2023-03-01 RX ORDER — SODIUM CHLORIDE, SODIUM LACTATE, POTASSIUM CHLORIDE, AND CALCIUM CHLORIDE .6; .31; .03; .02 G/100ML; G/100ML; G/100ML; G/100ML
1000 INJECTION, SOLUTION INTRAVENOUS ONCE
Status: COMPLETED | OUTPATIENT
Start: 2023-03-01 | End: 2023-03-01

## 2023-03-01 RX ORDER — INSULIN LISPRO 100 [IU]/ML
0-4 INJECTION, SOLUTION INTRAVENOUS; SUBCUTANEOUS
Status: DISCONTINUED | OUTPATIENT
Start: 2023-03-01 | End: 2023-03-08 | Stop reason: HOSPADM

## 2023-03-01 RX ORDER — MIDODRINE HYDROCHLORIDE 2.5 MG/1
2.5 TABLET ORAL 2 TIMES DAILY
COMMUNITY

## 2023-03-01 RX ORDER — BUPROPION HYDROCHLORIDE 100 MG/1
200 TABLET, EXTENDED RELEASE ORAL 2 TIMES DAILY
Status: DISCONTINUED | OUTPATIENT
Start: 2023-03-01 | End: 2023-03-08 | Stop reason: HOSPADM

## 2023-03-01 RX ORDER — METOPROLOL SUCCINATE 25 MG/1
25 TABLET, EXTENDED RELEASE ORAL DAILY
Status: DISCONTINUED | OUTPATIENT
Start: 2023-03-01 | End: 2023-03-08 | Stop reason: HOSPADM

## 2023-03-01 RX ORDER — PANTOPRAZOLE SODIUM 40 MG/1
40 TABLET, DELAYED RELEASE ORAL DAILY
Status: DISCONTINUED | OUTPATIENT
Start: 2023-03-02 | End: 2023-03-08 | Stop reason: HOSPADM

## 2023-03-01 RX ORDER — SODIUM CHLORIDE 0.9 % (FLUSH) 0.9 %
5-40 SYRINGE (ML) INJECTION PRN
Status: DISCONTINUED | OUTPATIENT
Start: 2023-03-01 | End: 2023-03-08 | Stop reason: HOSPADM

## 2023-03-01 RX ORDER — ATORVASTATIN CALCIUM 40 MG/1
40 TABLET, FILM COATED ORAL NIGHTLY
Status: DISCONTINUED | OUTPATIENT
Start: 2023-03-01 | End: 2023-03-08 | Stop reason: HOSPADM

## 2023-03-01 RX ORDER — POLYETHYLENE GLYCOL 3350 17 G/17G
17 POWDER, FOR SOLUTION ORAL DAILY PRN
Status: DISCONTINUED | OUTPATIENT
Start: 2023-03-01 | End: 2023-03-08 | Stop reason: HOSPADM

## 2023-03-01 RX ORDER — ENOXAPARIN SODIUM 100 MG/ML
60 INJECTION SUBCUTANEOUS 2 TIMES DAILY
Status: DISCONTINUED | OUTPATIENT
Start: 2023-03-01 | End: 2023-03-08 | Stop reason: HOSPADM

## 2023-03-01 RX ORDER — LANOLIN ALCOHOL/MO/W.PET/CERES
100 CREAM (GRAM) TOPICAL DAILY
Status: DISCONTINUED | OUTPATIENT
Start: 2023-03-01 | End: 2023-03-08 | Stop reason: HOSPADM

## 2023-03-01 RX ORDER — NICOTINE 21 MG/24HR
1 PATCH, TRANSDERMAL 24 HOURS TRANSDERMAL DAILY
Status: DISCONTINUED | OUTPATIENT
Start: 2023-03-01 | End: 2023-03-08 | Stop reason: HOSPADM

## 2023-03-01 RX ORDER — ACETAMINOPHEN 650 MG/1
650 SUPPOSITORY RECTAL EVERY 6 HOURS PRN
Status: DISCONTINUED | OUTPATIENT
Start: 2023-03-01 | End: 2023-03-08 | Stop reason: HOSPADM

## 2023-03-01 RX ORDER — ALBUTEROL SULFATE 90 UG/1
2 AEROSOL, METERED RESPIRATORY (INHALATION) EVERY 4 HOURS PRN
Status: DISCONTINUED | OUTPATIENT
Start: 2023-03-01 | End: 2023-03-08 | Stop reason: HOSPADM

## 2023-03-01 RX ORDER — INSULIN LISPRO 100 [IU]/ML
0-4 INJECTION, SOLUTION INTRAVENOUS; SUBCUTANEOUS NIGHTLY
Status: DISCONTINUED | OUTPATIENT
Start: 2023-03-01 | End: 2023-03-08 | Stop reason: HOSPADM

## 2023-03-01 RX ORDER — DEXTROSE MONOHYDRATE 100 MG/ML
INJECTION, SOLUTION INTRAVENOUS CONTINUOUS PRN
Status: DISCONTINUED | OUTPATIENT
Start: 2023-03-01 | End: 2023-03-08 | Stop reason: HOSPADM

## 2023-03-01 RX ORDER — MULTIVITAMIN WITH IRON
1 TABLET ORAL DAILY
Status: DISCONTINUED | OUTPATIENT
Start: 2023-03-01 | End: 2023-03-08 | Stop reason: HOSPADM

## 2023-03-01 RX ORDER — SODIUM CHLORIDE 0.9 % (FLUSH) 0.9 %
5-40 SYRINGE (ML) INJECTION EVERY 12 HOURS SCHEDULED
Status: DISCONTINUED | OUTPATIENT
Start: 2023-03-01 | End: 2023-03-08 | Stop reason: HOSPADM

## 2023-03-01 RX ORDER — SODIUM CHLORIDE, SODIUM LACTATE, POTASSIUM CHLORIDE, CALCIUM CHLORIDE 600; 310; 30; 20 MG/100ML; MG/100ML; MG/100ML; MG/100ML
INJECTION, SOLUTION INTRAVENOUS CONTINUOUS
Status: DISCONTINUED | OUTPATIENT
Start: 2023-03-01 | End: 2023-03-05

## 2023-03-01 RX ORDER — SODIUM CHLORIDE, SODIUM LACTATE, POTASSIUM CHLORIDE, AND CALCIUM CHLORIDE .6; .31; .03; .02 G/100ML; G/100ML; G/100ML; G/100ML
30 INJECTION, SOLUTION INTRAVENOUS ONCE
Status: DISCONTINUED | OUTPATIENT
Start: 2023-03-01 | End: 2023-03-01

## 2023-03-01 RX ORDER — SODIUM CHLORIDE 9 MG/ML
INJECTION, SOLUTION INTRAVENOUS PRN
Status: DISCONTINUED | OUTPATIENT
Start: 2023-03-01 | End: 2023-03-08 | Stop reason: HOSPADM

## 2023-03-01 RX ORDER — ONDANSETRON 2 MG/ML
4 INJECTION INTRAMUSCULAR; INTRAVENOUS EVERY 6 HOURS PRN
Status: DISCONTINUED | OUTPATIENT
Start: 2023-03-01 | End: 2023-03-03 | Stop reason: SDUPTHER

## 2023-03-01 RX ORDER — ASPIRIN 81 MG/1
81 TABLET ORAL DAILY
Status: DISCONTINUED | OUTPATIENT
Start: 2023-03-01 | End: 2023-03-08 | Stop reason: HOSPADM

## 2023-03-01 RX ADMIN — SODIUM CHLORIDE, POTASSIUM CHLORIDE, SODIUM LACTATE AND CALCIUM CHLORIDE 2397 ML: 600; 310; 30; 20 INJECTION, SOLUTION INTRAVENOUS at 13:30

## 2023-03-01 RX ADMIN — BUPROPION HYDROCHLORIDE 200 MG: 100 TABLET, FILM COATED, EXTENDED RELEASE ORAL at 19:30

## 2023-03-01 RX ADMIN — ENOXAPARIN SODIUM 60 MG: 100 INJECTION SUBCUTANEOUS at 19:31

## 2023-03-01 RX ADMIN — SODIUM CHLORIDE, POTASSIUM CHLORIDE, SODIUM LACTATE AND CALCIUM CHLORIDE 1000 ML: 600; 310; 30; 20 INJECTION, SOLUTION INTRAVENOUS at 16:21

## 2023-03-01 RX ADMIN — ATORVASTATIN CALCIUM 40 MG: 40 TABLET, FILM COATED ORAL at 19:30

## 2023-03-01 RX ADMIN — MOMETASONE FUROATE AND FORMOTEROL FUMARATE DIHYDRATE 2 PUFF: 200; 5 AEROSOL RESPIRATORY (INHALATION) at 16:20

## 2023-03-01 RX ADMIN — SODIUM CHLORIDE, POTASSIUM CHLORIDE, SODIUM LACTATE AND CALCIUM CHLORIDE 1000 ML: 600; 310; 30; 20 INJECTION, SOLUTION INTRAVENOUS at 20:18

## 2023-03-01 RX ADMIN — SODIUM CHLORIDE, POTASSIUM CHLORIDE, SODIUM LACTATE AND CALCIUM CHLORIDE: 600; 310; 30; 20 INJECTION, SOLUTION INTRAVENOUS at 16:30

## 2023-03-01 RX ADMIN — ENOXAPARIN SODIUM 60 MG: 100 INJECTION SUBCUTANEOUS at 16:29

## 2023-03-01 RX ADMIN — Medication 2000 MG: at 16:22

## 2023-03-01 RX ADMIN — CEFEPIME 2000 MG: 2 INJECTION, POWDER, FOR SOLUTION INTRAVENOUS at 13:33

## 2023-03-01 ASSESSMENT — PAIN DESCRIPTION - INTENSITY: RATING_2: 3

## 2023-03-01 ASSESSMENT — PAIN DESCRIPTION - ORIENTATION
ORIENTATION: RIGHT

## 2023-03-01 ASSESSMENT — PAIN DESCRIPTION - LOCATION
LOCATION: BACK
LOCATION: BACK
LOCATION: ANKLE;FOOT
LOCATION_2: ANKLE

## 2023-03-01 ASSESSMENT — PAIN DESCRIPTION - FREQUENCY
FREQUENCY: CONTINUOUS
FREQUENCY: CONTINUOUS
FREQUENCY: INTERMITTENT
FREQUENCY: CONTINUOUS

## 2023-03-01 ASSESSMENT — PAIN DESCRIPTION - DESCRIPTORS
DESCRIPTORS: BURNING
DESCRIPTORS: ACHING

## 2023-03-01 ASSESSMENT — PAIN - FUNCTIONAL ASSESSMENT
PAIN_FUNCTIONAL_ASSESSMENT: ACTIVITIES ARE NOT PREVENTED
PAIN_FUNCTIONAL_ASSESSMENT: 0-10

## 2023-03-01 ASSESSMENT — PAIN SCALES - GENERAL
PAINLEVEL_OUTOF10: 4
PAINLEVEL_OUTOF10: 5
PAINLEVEL_OUTOF10: 4

## 2023-03-01 ASSESSMENT — PAIN DESCRIPTION - PAIN TYPE
TYPE: CHRONIC PAIN
TYPE: ACUTE PAIN

## 2023-03-01 ASSESSMENT — PAIN DESCRIPTION - ONSET: ONSET: ON-GOING
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
## 2023-03-01 NOTE — ED NOTES
In for hourly rounding. Pt resting on cot in position of comfort. Pt remains A&Ox4, resps easy and unlabored. IV initiated, flushed and shows no s/s of infection or infiltration. Pt pain remains unchanged at this time. Podiatry resident at bedside, obtained swabs from wound on R foot. Tolerated well. Monitor remains in place. Updated pt on POC. Will monitor.      Selin Kate RN  03/01/23 [id number]

## 2023-03-01 NOTE — ED TRIAGE NOTES
Pt presents to ED via triage for c/o foot wound and nausea. Pt reports noticing the wound approx 3 weeks ago while at Dr Sandor Canavan office having a callus removed. Pt reports the callus was removed and Dr Carmen Dixon found the \"divet\". Pt reports the wound has now grown in size and was advised to seek ED treatment by Dr Carmen Dixon. Upon initial assessment, pt is A&Ox4, resps easy and unlabored. Pt reports shortness of breath, no worse than normal. Pt reports burning sensation in R foot near wound. VS as noted. Awaiting provider assessment and further orders. Will monitor.

## 2023-03-01 NOTE — ED NOTES
ED to inpatient nurses report    Chief Complaint   Patient presents with    Wound Infection     R foot- sent by Dr Yaquelin Boyd Nausea      Present to ED from home  LOC: alert and orientated to name, place, date  Vital signs   Vitals:    03/01/23 1123 03/01/23 1231 03/01/23 1345   BP: (!) 162/97 (!) 139/90 (!) 140/86   Pulse: 90 82 76   Resp: 20 20 20   Temp: 98.6 °F (37 °C)     TempSrc: Oral     SpO2: 98% 99% 99%   Weight: (!) 393 lb (178.3 kg)        Oxygen Baseline Room Air    Current needs required Room Air Bipap/Cpap No  LDAs:   Peripheral IV 03/01/23 Left Antecubital (Active)   Site Assessment Clean, dry & intact 03/01/23 1235   Line Status Blood return noted; Flushed;Specimen collected;Normal saline locked 03/01/23 1235   Phlebitis Assessment No symptoms 03/01/23 1235   Infiltration Assessment 0 03/01/23 1235   Dressing Status Clean, dry & intact 03/01/23 1235   Dressing Type Transparent 03/01/23 1235   Dressing Intervention New 03/01/23 1235     Mobility: Requires assistance * 1  Pending ED orders: NA  Present condition: Pt resting on cot in position of comfort. Pt is A&Ox4, resps easy and unlabored. IV shows no s/s of infection or infiltration.   Person of contract, phone number   Our promise was given to patient    C-SSRS Risk of Suicide: No Risk  Swallow Screening    Preferred Language: English     Electronically signed by Sierra Otto RN on 3/1/2023 at 2:01 PM       Sierra Otto RN  03/01/23 [id number]

## 2023-03-01 NOTE — PROGRESS NOTES
Podiatric Progress Note  Oren Galeano  Subjective :     3/1/23  Patient seen bedside today on behalf of  [facility]. Patient appeared pleasant, was oriented to person, place and time and in no acute distress. Patient relates that he is leaning towards a BKA rather than a lisfranc amputation. The patient relates that he has been thinking about it for the past few days. The patient relates that he doesn't think that he wants a lisfranc amputation at this time because he believes that he will have reoccurring wound and the amputation site will not heal secondary to him not being able to stay off of it. He relates that he tries to stay off of the wound he currently has, but will sometimes accidentally use it and apply pressure. The patient relates that he is unable to use a knee scooter because he is about 100 pounds over the weight limit. The patient relates that he will \"sleep on it\" in regards to a decision, but is still leaning towards a BKA at this time. Patient denies any N/V/F/C/SOB or CP. Patient has no further pedal concerns at this point in time. ASSESSMENT: Pt. is a 47 y.o. male with:  Principle  1. Nonhealing diabetic foot ulceration, right foot    Chronic  Patient Active Problem List   Diagnosis    Hypertension    Hyperlipidemia    CAD (coronary artery disease)    Family history of premature coronary heart disease    Dizziness    Acute renal failure (ARF) ([facility])    Chronic low back pain    PTSD (post-traumatic stress disorder)    Foot ulceration, right, with fat layer exposed ([address]    Type 2 diabetes mellitus without complication, with long-term current use of insulin (HCC)    Cellulitis of right foot    Osteomyelitis ([address]    Diabetic foot ulcer with osteomyelitis ([address]       PLAN:     -Patient initially examined and evaluated  -WBC 10.0; Afebrile  -Imaging reviewed: MRI from OSH exhibiting suggestion of osteomyelitis to right third metatarsal stump.   -CXR obtained in ED; no acute findings  -IV cefepime and vancomycin per ED provider.  -Swab cultures taken of right foot wound; sent for aerobic and anaerobic.  -Dressed right foot wound with Betadine wet-to-dry, 4 x 4's, ABD, Kerlix, Ace.    -Weight bearing status: Partial weightbearing to heel right foot while wearing postop shoe.  -Ordered postop shoe. Patient to wear at all times while ambulating.  -Discussed with patient plan for IV antibiotic therapy and further amputation of the right foot which may include Lisfranc's amputation versus a more proximal amputation. This was discussed at length with patient, and he was aware of this as explained by Dr. Kelly Galarza in clinic this morning.  -Had a further discussion with the patient in regards to amputation. The patient is highly considering a BKA at this time. Will see patient tomorrow for any further questions and to check on the patient with any further decision. The patient is aware that this is his decision on what he would like to do to proceed. All pros/cons/ risks/ benefits discussed with patient.   -Patient verbalized agreement and understanding with treatment plan discussed. All questions answered to their satisfaction.  -Podiatry will continue to follow patient       DISPO: Podiatry will continue to follow. Surgical intervention of the right lower extremity during this admission; either a Lisfranc amputation vs BKA.      Beacher Dancer, DPM-PGY2  3/1/2023   4:16 PM

## 2023-03-01 NOTE — ED NOTES
In for hourly rounding. Pt resting on cot in position of comfort. Pt remains A&Ox4, resps easy and unlabored. IV infusing and shows no s/s of infection or infiltration. Pt pain remains unchanged at this time. Pt given turkey sandwich and ice water per request. Hospitalist NP at bedside for assessment. Monitor remains in place. Updated pt on POC. Will monitor.      Mike Becerra RN  03/01/23 0104

## 2023-03-01 NOTE — RT PROTOCOL NOTE
RT Inhaler-Nebulizer Bronchodilator Protocol Note    There is a bronchodilator order in the chart from a provider indicating to follow the RT Bronchodilator Protocol and there is an Initiate RT Inhaler-Nebulizer Bronchodilator Protocol order as well (see protocol at bottom of note). CXR Findings:  XR CHEST PORTABLE    Result Date: 3/1/2023  1. Normal heart size. Evidence for old, healed granulomatous disease. 2. No acute findings. No infiltrates or effusions are seen. **This report has been created using voice recognition software. It may contain minor errors which are inherent in voice recognition technology. ** Final report electronically signed by Dr. Laurann Severe on 3/1/2023 1:29 PM      The findings from the last RT Protocol Assessment were as follows:   History Pulmonary Disease: Smoker 15 pack years or more  Respiratory Pattern: Regular pattern and RR 12-20 bpm  Breath Sounds: Slightly diminished and/or crackles  Cough: Strong, spontaneous, non-productive  Indication for Bronchodilator Therapy:    Bronchodilator Assessment Score: 3    Aerosolized bronchodilator medication orders have been revised according to the RT Inhaler-Nebulizer Bronchodilator Protocol below. Respiratory Therapist to perform RT Therapy Protocol Assessment initially then follow the protocol. Repeat RT Therapy Protocol Assessment PRN for score 0-3 or on second treatment, BID, and PRN for scores above 3. No Indications - adjust the frequency to every 6 hours PRN wheezing or bronchospasm, if no treatments needed after 48 hours then discontinue using Per Protocol order mode. If indication present, adjust the RT bronchodilator orders based on the Bronchodilator Assessment Score as indicated below.   Use Inhaler orders unless patient has one or more of the following: on home nebulizer, not able to hold breath for 10 seconds, is not alert and oriented, cannot activate and use MDI correctly, or respiratory rate 25 breaths per minute or more, then use the equivalent nebulizer order(s) with same Frequency and PRN reasons based on the score. If a patient is on this medication at home then do not decrease Frequency below that used at home. 0-3 - enter or revise RT bronchodilator order(s) to equivalent RT Bronchodilator order with Frequency of every 4 hours PRN for wheezing or increased work of breathing using Per Protocol order mode. 4-6 - enter or revise RT Bronchodilator order(s) to two equivalent RT bronchodilator orders with one order with BID Frequency and one order with Frequency of every 4 hours PRN wheezing or increased work of breathing using Per Protocol order mode. 7-10 - enter or revise RT Bronchodilator order(s) to two equivalent RT bronchodilator orders with one order with TID Frequency and one order with Frequency of every 4 hours PRN wheezing or increased work of breathing using Per Protocol order mode. 11-13 - enter or revise RT Bronchodilator order(s) to one equivalent RT bronchodilator order with QID Frequency and an Albuterol order with Frequency of every 4 hours PRN wheezing or increased work of breathing using Per Protocol order mode. Greater than 13 - enter or revise RT Bronchodilator order(s) to one equivalent RT bronchodilator order with every 4 hours Frequency and an Albuterol order with Frequency of every 2 hours PRN wheezing or increased work of breathing using Per Protocol order mode. RT to enter RT Home Evaluation for COPD & MDI Assessment order using Per Protocol order mode.     Electronically signed by Jasmin Crouch RCP on 3/1/2023 at 4:24 PM

## 2023-03-01 NOTE — ED NOTES
Pt transported to [address]. Pt in stable condition. Floor contacted before transport.       Agustín Nj  03/01/23 2714
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
## 2023-03-01 NOTE — PROGRESS NOTES
Patient arrived to [phone] by wheelchair from ED. Currently infusing LR bolus into LAC. Orders reviewed. Care board updated. Call light within reach.

## 2023-03-02 LAB
GLUCOSE BLD STRIP.AUTO-MCNC: 135 MG/DL (ref 70–108)
GLUCOSE BLD STRIP.AUTO-MCNC: 138 MG/DL (ref 70–108)
GLUCOSE BLD STRIP.AUTO-MCNC: 141 MG/DL (ref 70–108)
GLUCOSE BLD STRIP.AUTO-MCNC: 141 MG/DL (ref 70–108)
LACTATE SERPL-SCNC: 1.8 MMOL/L (ref 0.5–2)

## 2023-03-02 PROCEDURE — 82948 REAGENT STRIP/BLOOD GLUCOSE: CPT

## 2023-03-02 PROCEDURE — 36415 COLL VENOUS BLD VENIPUNCTURE: CPT

## 2023-03-02 PROCEDURE — 6370000000 HC RX 637 (ALT 250 FOR IP)

## 2023-03-02 PROCEDURE — 83605 ASSAY OF LACTIC ACID: CPT

## 2023-03-02 PROCEDURE — 6370000000 HC RX 637 (ALT 250 FOR IP): Performed by: NURSE PRACTITIONER

## 2023-03-02 PROCEDURE — 6360000002 HC RX W HCPCS: Performed by: NURSE PRACTITIONER

## 2023-03-02 PROCEDURE — 99232 SBSQ HOSP IP/OBS MODERATE 35: CPT | Performed by: NURSE PRACTITIONER

## 2023-03-02 PROCEDURE — 6360000002 HC RX W HCPCS: Performed by: INTERNAL MEDICINE

## 2023-03-02 PROCEDURE — 1200000000 HC SEMI PRIVATE

## 2023-03-02 PROCEDURE — 94640 AIRWAY INHALATION TREATMENT: CPT

## 2023-03-02 PROCEDURE — 2580000003 HC RX 258: Performed by: NURSE PRACTITIONER

## 2023-03-02 PROCEDURE — 2580000003 HC RX 258: Performed by: INTERNAL MEDICINE

## 2023-03-02 RX ADMIN — METOPROLOL SUCCINATE 25 MG: 25 TABLET, FILM COATED, EXTENDED RELEASE ORAL at 08:47

## 2023-03-02 RX ADMIN — SODIUM CHLORIDE, POTASSIUM CHLORIDE, SODIUM LACTATE AND CALCIUM CHLORIDE: 600; 310; 30; 20 INJECTION, SOLUTION INTRAVENOUS at 07:26

## 2023-03-02 RX ADMIN — Medication 1 TABLET: at 08:45

## 2023-03-02 RX ADMIN — BUPROPION HYDROCHLORIDE 200 MG: 100 TABLET, FILM COATED, EXTENDED RELEASE ORAL at 20:32

## 2023-03-02 RX ADMIN — DOXEPIN HYDROCHLORIDE 10 MG: 10 CAPSULE ORAL at 00:27

## 2023-03-02 RX ADMIN — TIOTROPIUM BROMIDE INHALATION SPRAY 2 PUFF: 3.12 SPRAY, METERED RESPIRATORY (INHALATION) at 07:44

## 2023-03-02 RX ADMIN — Medication 2000 MG: at 16:42

## 2023-03-02 RX ADMIN — PANTOPRAZOLE SODIUM 40 MG: 40 TABLET, DELAYED RELEASE ORAL at 06:12

## 2023-03-02 RX ADMIN — SODIUM CHLORIDE, POTASSIUM CHLORIDE, SODIUM LACTATE AND CALCIUM CHLORIDE: 600; 310; 30; 20 INJECTION, SOLUTION INTRAVENOUS at 22:22

## 2023-03-02 RX ADMIN — MOMETASONE FUROATE AND FORMOTEROL FUMARATE DIHYDRATE 2 PUFF: 200; 5 AEROSOL RESPIRATORY (INHALATION) at 07:45

## 2023-03-02 RX ADMIN — MOMETASONE FUROATE AND FORMOTEROL FUMARATE DIHYDRATE 2 PUFF: 200; 5 AEROSOL RESPIRATORY (INHALATION) at 18:02

## 2023-03-02 RX ADMIN — ATORVASTATIN CALCIUM 40 MG: 40 TABLET, FILM COATED ORAL at 20:32

## 2023-03-02 RX ADMIN — ENOXAPARIN SODIUM 60 MG: 100 INJECTION SUBCUTANEOUS at 10:10

## 2023-03-02 RX ADMIN — ENOXAPARIN SODIUM 60 MG: 100 INJECTION SUBCUTANEOUS at 20:32

## 2023-03-02 RX ADMIN — BUPROPION HYDROCHLORIDE 200 MG: 100 TABLET, FILM COATED, EXTENDED RELEASE ORAL at 08:44

## 2023-03-02 RX ADMIN — Medication 2000 MG: at 08:54

## 2023-03-02 RX ADMIN — Medication 2000 MG: at 00:30

## 2023-03-02 RX ADMIN — Medication 100 MG: at 08:45

## 2023-03-02 ASSESSMENT — PAIN DESCRIPTION - ORIENTATION: ORIENTATION: LOWER

## 2023-03-02 ASSESSMENT — PAIN DESCRIPTION - DESCRIPTORS: DESCRIPTORS: ACHING

## 2023-03-02 ASSESSMENT — PAIN - FUNCTIONAL ASSESSMENT: PAIN_FUNCTIONAL_ASSESSMENT: ACTIVITIES ARE NOT PREVENTED

## 2023-03-02 ASSESSMENT — PAIN SCALES - GENERAL: PAINLEVEL_OUTOF10: 5

## 2023-03-02 ASSESSMENT — PAIN DESCRIPTION - LOCATION: LOCATION: BACK

## 2023-03-02 NOTE — PROGRESS NOTES
Hospitalist Progress Note    Patient:  Jerry Cruz      Unit/Bed:6E-56/056-A    YOB: 1970    MRN: [id number]       Acct: [de-identified]     PCP: MICHELLE Saldivar CNP    Date of Admission: 3/1/2023    Assessment/Plan:    Right diabetic foot ulcer with osteomyelitis: follows with podiatrist Dr Jose Blood who referred patient to ED for admission. ID consulted. Plans for surgical intervention following a MRI that was obtained at an outside hospital 2/27 that exhibited probable osteomyelitis of the third metatarsal stump. Failed outpatient doxycycline course. Continue Cefepime. Lisfranc amputation scheduled tomorrow. Preoperative clearance. EKG/CXR? Labs reviewed. Met score 5.3. RCRI class II, 6% risk of cardiac event including death in the next 30 days. Accepts risk and wishes to proceed. Lactic acidosis, resolved: mild, no leukocytosis, left shift, tachycardia, tachypnea, hypotension, or fever to suggest sepsis. Continue gentle hydration and recheck LA. IDDM: most recent A1c per patient report, 6.0. Continue Trulicity (patient to provide) every Sunday. Accuchecks with low dose corrective coverage. CAD, no history of intervention: cont ASA  HTN: home medications reviewed and include metoprolol, lisinopril, and midodrine. Patient endorses taking this regimen. Chart reviewed, no office notes available. Will continue metoprolol for now with hold parameters but will hold lisinopril and midodrine and monitor BP. HLP: cont statin  COPD: not in acute exacerbation. Cont home inhalers. Morbid obesity Body mass index is 52.11 kg/m².   Alcohol abuse: reports no alcohol intake for over one month  Tobacco abuse: NRT prn, cessation education      Chief Complaint: diabetic foot ulcer    Hospital Course:     Jerry Cruz is a 47 y.o. male with PMHx of CAD, HTN, HLP, COPD, alcohol and tobacco abuse, IDDM with extensive h/o diabetic foot ulcerations s/p multiple amputations and surgeries of BLE who presented to [facility] at the direction of podiatrist, Dr Jaret Vicente following a MRI that was obtained at an outside hospital 2/27 that exhibited probable osteomyelitis of the third metatarsal stump. Patient alert, oriented x3, pleasant. Reports right diabetic ulcer continues to worsen despite podiatry care and Doxycycline course. Denies fever but states he has had intermittent chills and poor appetite. Denies shortness of breath, chest pain, dysuria, or diarrhea. Reports NWB status on R foot however admits to ambulating. On presentation to ED, patient afebrile, normotensive, O2 sat adequate on room air. Electrolytes and renal function with no abnormalities. Lactic acid mildly elevated (2.0). No leukocytosis or anemia. Wound cultures obtained in ED and pending. Patient received IVF, ordered per sepsis protocol however patient does not meet sepsis criteria therefore discontinued and MIV ordered. Initial dose of cefepime per podiatry recommendations initiated. Subjective: Anxious for procedure tomorrow. No n/v/d. Denies CP/SOB/Dizziness.        Medications:  Reviewed    Infusion Medications    sodium chloride      lactated ringers IV soln 75 mL/hr at 03/02/23 0726    dextrose       Scheduled Medications    sodium chloride flush  5-40 mL IntraVENous 2 times per day    enoxaparin  60 mg SubCUTAneous BID    nicotine  1 patch TransDERmal Daily    ceFAZolin  2,000 mg IntraVENous Q8H    aspirin  81 mg Oral Daily    atorvastatin  40 mg Oral Nightly    buPROPion  200 mg Oral BID    metoprolol succinate  25 mg Oral Daily    pantoprazole  40 mg Oral Daily    mometasone-formoterol  2 puff Inhalation BID    tiotropium  2 puff Inhalation Daily    insulin lispro  0-4 Units SubCUTAneous TID     insulin lispro  0-4 Units SubCUTAneous Nightly    [START ON 3/5/2023] Dulaglutide  0.5 mL SubCUTAneous Weekly    thiamine  100 mg Oral Daily    multivitamin  1 tablet Oral Daily     PRN Meds: sodium chloride flush, sodium chloride, ondansetron **OR** ondansetron, polyethylene glycol, acetaminophen **OR** acetaminophen, albuterol sulfate HFA, glucose, dextrose bolus **OR** dextrose bolus, glucagon (rDNA), dextrose      Intake/Output Summary (Last 24 hours) at 3/2/2023 1558  Last data filed at 3/2/2023 1541  Gross per 24 hour   Intake 6364.99 ml   Output 3450 ml   Net 2914.99 ml       Diet:  ADULT DIET; Regular; 5 carb choices (75 gm/meal); Low Fat/Low Chol/High Fiber/CHEYENNE  Diet NPO  Diet NPO    Exam:  /80   Pulse 78   Temp 98 °F (36.7 °C) (Oral)   Resp 16   Ht 6' 1\" (1.854 m)   Wt (!) 395 lb (179.2 kg)   SpO2 96%   BMI 52.11 kg/m²     General appearance: No apparent distress, appears stated age and cooperative. HEENT: Pupils equal, round, and reactive to light. Conjunctivae/corneas clear. Neck: Supple, with full range of motion. No jugular venous distention. Trachea midline. Respiratory:  Normal respiratory effort. Clear to auscultation, bilaterally without Rales/Wheezes/Rhonchi. Cardiovascular: Regular rate and rhythm with normal S1/S2 without murmurs, rubs or gallops. Abdomen: Soft, non-tender, non-distended with normal bowel sounds. Musculoskeletal: passive and active ROM x 4 extremities. Skin: Skin color, texture, turgor normal.  Dressing to right foot. Neurologic:  Neurovascularly intact without any focal sensory/motor deficits. Cranial nerves: II-XII intact, grossly non-focal.  Psychiatric: Alert and oriented, thought content appropriate, normal insight  Capillary Refill: Brisk,< 3 seconds   Peripheral Pulses: +2 palpable, equal bilaterally       Labs:   Recent Labs     03/01/23  1224   WBC 10.0   HGB 15.6   HCT 45.9        Recent Labs     03/01/23  1224      K 4.2   CL 98   CO2 25   BUN 11   CREATININE 1.0   CALCIUM 10.0     Recent Labs     03/01/23  1224   AST 26   ALT 36   BILITOT 0.7   ALKPHOS 162*     No results for input(s): INR in the last 72 hours.   No results for input(s): Adelaida Diaz in the last 72 hours. Urinalysis:      Lab Results   Component Value Date/Time    NITRU NEGATIVE 03/01/2023 02:20 PM    BLOODU NEGATIVE 03/01/2023 02:20 PM    SPECGRAV 1.015 08/12/2016 06:59 PM    GLUCOSEU NEGATIVE 03/01/2023 02:20 PM       Radiology:  XR CHEST PORTABLE   Final Result   1. Normal heart size. Evidence for old, healed granulomatous disease. 2. No acute findings. No infiltrates or effusions are seen. **This report has been created using voice recognition software. It may contain minor errors which are inherent in voice recognition technology. **      Final report electronically signed by Dr. Janis Perez on 3/1/2023 1:29 PM          Diet: ADULT DIET; Regular; 5 carb choices (75 gm/meal);  Low Fat/Low Chol/High Fiber/CHEYENNE  Diet NPO  Diet NPO    DVT prophylaxis: [x] Lovenox                                 [] SCDs                                 [] SQ Heparin                                 [] Encourage ambulation           [] Already on Anticoagulation     Disposition:    [x] Home       [] TCU       [] Rehab       [] Psych       [] SNF       [] [place]       [] Other-    Code Status: Full Code    PT/OT Eval Status: ordered        Electronically signed by MICHELLE Gonzalez CNP on 3/2/2023 at 3:58 PM

## 2023-03-02 NOTE — PROGRESS NOTES
Podiatric Progress Note  Stephanie Brendan Waleska  Subjective :     3/2/23  Patient seen bedside today alongside of Dr Ian Clark. Patient appeared pleasant, was oriented to person, place and time and in no acute distress. Patient relates that this morning he wanted to proceed with a BKA, but at this time he wants to proceed with a lisfranc amputation. The patient will consider going to a SNF at time of discharge. Patient denies any N/V/F/C/SOB or CP. Patient has no further pedal concerns at this point in time. 3/1/23  Patient seen bedside today on behalf of Dr. Ian Clark. Patient appeared pleasant, was oriented to person, place and time and in no acute distress. Patient relates that he is leaning towards a BKA rather than a lisfranc amputation. The patient relates that he has been thinking about it for the past few days. The patient relates that he doesn't think that he wants a lisfranc amputation at this time because he believes that he will have reoccurring wound and the amputation site will not heal secondary to him not being able to stay off of it. He relates that he tries to stay off of the wound he currently has, but will sometimes accidentally use it and apply pressure. The patient relates that he is unable to use a knee scooter because he is about 100 pounds over the weight limit. The patient relates that he will \"sleep on it\" in regards to a decision, but is still leaning towards a BKA at this time. Patient denies any N/V/F/C/SOB or CP. Patient has no further pedal concerns at this point in time. 3/1/23  HISTORY OF PRESENT ILLNESS:                 The patient is a 47 y.o. male with significant past medical history of CAD, ARF, CHF, COPD, T2DM, HLD, HTN who is being seen at bedside on behalf of Dr. Ian Clark. Patient was seen earlier this morning by Dr. Ian Clark in clinic, and was sent to [facility] emergency department for surgical intervention of the right foot later this week or early next week.   He is well-known to the podiatry service. Patient has had multiple amputations and surgeries to the bilateral feet, most recently a TMA on the right foot with revision for nonhealing plantar wound. MRI was obtained at outside hospital exhibiting suggestion of osteomyelitis of the third metatarsal stump. On exam in the ED, patient was resting comfortably in no acute distress. He admitted to feeling tired for the past week, with increased lack of appetite. He denied any fevers, chills, chest pain, shortness of breath, nausea, vomiting, diarrhea. Patient is aware of plan for further amputation. No other pedal concerns.     Current Medications:    Current Facility-Administered Medications: sodium chloride flush 0.9 % injection 5-40 mL, 5-40 mL, IntraVENous, 2 times per day  sodium chloride flush 0.9 % injection 5-40 mL, 5-40 mL, IntraVENous, PRN  0.9 % sodium chloride infusion, , IntraVENous, PRN  enoxaparin (LOVENOX) injection 60 mg, 60 mg, SubCUTAneous, BID  ondansetron (ZOFRAN-ODT) disintegrating tablet 4 mg, 4 mg, Oral, Q8H PRN **OR** ondansetron (ZOFRAN) injection 4 mg, 4 mg, IntraVENous, Q6H PRN  polyethylene glycol (GLYCOLAX) packet 17 g, 17 g, Oral, Daily PRN  acetaminophen (TYLENOL) tablet 650 mg, 650 mg, Oral, Q6H PRN **OR** acetaminophen (TYLENOL) suppository 650 mg, 650 mg, Rectal, Q6H PRN  nicotine (NICODERM CQ) 14 MG/24HR 1 patch, 1 patch, TransDERmal, Daily  lactated ringers IV soln infusion, , IntraVENous, Continuous  ceFAZolin (ANCEF) 2000 mg in 0.9% sodium chloride 50 mL IVPB, 2,000 mg, IntraVENous, Q8H  albuterol sulfate HFA (PROVENTIL;VENTOLIN;PROAIR) 108 (90 Base) MCG/ACT inhaler 2 puff, 2 puff, Inhalation, Q4H PRN  aspirin EC tablet 81 mg, 81 mg, Oral, Daily  atorvastatin (LIPITOR) tablet 40 mg, 40 mg, Oral, Nightly  buPROPion (WELLBUTRIN SR) extended release tablet 200 mg, 200 mg, Oral, BID  metoprolol succinate (TOPROL XL) extended release tablet 25 mg, 25 mg, Oral, Daily  pantoprazole (PROTONIX) tablet 40 mg, 40 mg, Oral, Daily  mometasone-formoterol (DULERA) 200-5 MCG/ACT inhaler 2 puff, 2 puff, Inhalation, BID  tiotropium (SPIRIVA RESPIMAT) 2.5 MCG/ACT inhaler 2 puff, 2 puff, Inhalation, Daily  glucose chewable tablet 16 g, 4 tablet, Oral, PRN  dextrose bolus 10% 125 mL, 125 mL, IntraVENous, PRN **OR** dextrose bolus 10% 250 mL, 250 mL, IntraVENous, PRN  glucagon (rDNA) injection 1 mg, 1 mg, SubCUTAneous, PRN  dextrose 10 % infusion, , IntraVENous, Continuous PRN  insulin lispro (HUMALOG) injection vial 0-4 Units, 0-4 Units, SubCUTAneous, TID WC  insulin lispro (HUMALOG) injection vial 0-4 Units, 0-4 Units, SubCUTAneous, Nightly  [START ON 3/5/2023] dulaglutide 0.75 mg/0.5 mL injection (Patient Supplied), 0.5 mL, SubCUTAneous, Weekly  thiamine tablet 100 mg, 100 mg, Oral, Daily  multivitamin 1 tablet, 1 tablet, Oral, Daily    Objective     /80   Pulse 78   Temp 98 °F (36.7 °C) (Oral)   Resp 16   Ht 6' 1\" (1.854 m)   Wt (!) 395 lb (179.2 kg)   SpO2 96%   BMI 52.11 kg/m²      I/O:  Intake/Output Summary (Last 24 hours) at 3/2/2023 1430  Last data filed at 3/2/2023 1354  Gross per 24 hour   Intake 6364.99 ml   Output 2900 ml   Net 3464.99 ml              Wt Readings from Last 3 Encounters:   03/01/23 (!) 395 lb (179.2 kg)   07/21/22 (!) 376 lb 5.2 oz (170.7 kg)   01/19/22 (!) 369 lb (167.4 kg)       LABS:    Recent Labs     03/01/23  1224   WBC 10.0   HGB 15.6   HCT 45.9           Recent Labs     03/01/23  1224      K 4.2   CL 98   CO2 25   BUN 11   CREATININE 1.0        Recent Labs     03/01/23  1224   PROT 7.4      No results for input(s): CKTOTAL, CKMB, CKMBINDEX, TROPONINI in the last 72 hours. Focused Lower Extremity Examination:    Vitals:    /80   Pulse 78   Temp 98 °F (36.7 °C) (Oral)   Resp 16   Ht 6' 1\" (1.854 m)   Wt (!) 395 lb (179.2 kg)   SpO2 96%   BMI 52.11 kg/m²      Vascular: Dorsalis pedis and posterior tibial pulses are faintly palpable bilaterally.  Skin temperature is warm to warm from proximal tibial tuberosity to distal digits. CFT <5 sec to exposed distal right TMA stump. Edema present to RLE. Hair growth absent. Quality of skin thin, xerotic. Dermatologic: Right foot is notable for an ulceration on the plantar surface of the distal TMA stump. This overlies the approximate location of the distal aspects of metatarsals 2 3 and 4. Wound is a full-thickness ulceration with granular base, probes to periosteum, has a hyperkeratotic rim with slight maceration. There is also an area on the distal lateral portion of the TMA stump that is notable for dry, crusty, hyperkeratotic skin. This area does not probe. There is very mild sanguinous seepage from the plantar wound, no purulence, no malodor. Neurovascular: Light touch and protopathic sensation grossly absent to the level of the distal TMA stumps bilaterally. Musculoskeletal: Muscle strength 5/5 for all plantarflexors, dorsiflexors, inverters and everters examined. Full ROM noted at the STJ with the knee flexed, AJ, MTJ.  TMA right foot, several toes amputated on left foot. Images  XR CHEST PORTABLE   Final Result   1. Normal heart size. Evidence for old, healed granulomatous disease. 2. No acute findings. No infiltrates or effusions are seen. **This report has been created using voice recognition software. It may contain minor errors which are inherent in voice recognition technology. **      Final report electronically signed by Dr. Stan Conti on 3/1/2023 1:29 PM          ASSESSMENT: Pt. is a 47 y.o. male with:  Principle  1.   Nonhealing diabetic foot ulceration, right foot    Chronic  Patient Active Problem List   Diagnosis    Hypertension    Hyperlipidemia    CAD (coronary artery disease)    Family history of premature coronary heart disease    Dizziness    Acute renal failure (ARF) (HCC)    Chronic low back pain    PTSD (post-traumatic stress disorder)    Foot ulceration, right, with fat layer exposed ([address]    Type 2 diabetes mellitus without complication, with long-term current use of insulin (HCC)    Cellulitis of right foot    Osteomyelitis ([address]    Diabetic foot ulcer with osteomyelitis ([facility] [address]       PLAN:     -Patient initially examined and evaluated  -Hospitalist to admit patient. -WBC 10.0; Afebrile  -Imaging reviewed: MRI from OSH exhibiting suggestion of osteomyelitis to right third metatarsal stump. -CXR obtained in ED; no acute findings  -EKG taken; normal sinus rhythm   -IV Ancef; per primary   -Swab cultures taken of right foot wound; Staph coag positive   -Dressed right foot wound with Betadine wet-to-dry, 4 x 4's, ABD, Kerlix, Ace.    -Weight bearing status: Partial weightbearing to heel right foot while wearing postop shoe.  -Ordered postop shoe. Patient to wear at all times while ambulating.  - Preop orders: NPO, consent, hospitalist to complete pre op risk stratification, hold anticoagulants; all orders placed today 3/2/23  -Discussed with the patient that we can proceed with a Lisfranc Amp vs a transtibial amp; Reinforced with the patient that this is his decision. All risks and benefits discussed with the patient. The patient would like to proceed with surgery of the lisfranc amputation tomorrow, 3/3/23.   - Discussed the option of the patient going to a SNF after the surgery at time of discharge. Patient will consider.   -Patient verbalized agreement and understanding with treatment plan discussed. All questions answered to their satisfaction.  -Podiatry will continue to follow patient    DISPO: Lisfranc amp scheduled for tomorrow, 3/3/23 at 11:30am. Pre op orders placed. Pending hospitalist pre-op risk stratification. Ed Osman [id number]  3/2/2023   2:30 PM    Teaching Note:    I was present with the resident during the visit. I discussed the case with the resident and agree with the findings and the plan as documented in the residents note.     [id number] Yonny Alvarado DPM, [address] [place] [address] Surgery       [facility]

## 2023-03-02 NOTE — PLAN OF CARE
Problem: Chronic Conditions and Co-morbidities  Goal: Patient's chronic conditions and co-morbidity symptoms are monitored and maintained or improved  Outcome: Progressing  Flowsheets (Taken 3/1/2023 2101)  Care Plan - Patient's Chronic Conditions and Co-Morbidity Symptoms are Monitored and Maintained or Improved:   Monitor and assess patient's chronic conditions and comorbid symptoms for stability, deterioration, or improvement   Collaborate with multidisciplinary team to address chronic and comorbid conditions and prevent exacerbation or deterioration   Update acute care plan with appropriate goals if chronic or comorbid symptoms are exacerbated and prevent overall improvement and discharge     Problem: Discharge Planning  Goal: Discharge to home or other facility with appropriate resources  Outcome: Progressing  Flowsheets (Taken 3/1/2023 2101)  Discharge to home or other facility with appropriate resources:   Identify barriers to discharge with patient and caregiver   Identify discharge learning needs (meds, wound care, etc)   Arrange for needed discharge resources and transportation as appropriate     Problem: Pain  Goal: Verbalizes/displays adequate comfort level or baseline comfort level  Outcome: Progressing  Flowsheets (Taken 3/1/2023 2101)  Verbalizes/displays adequate comfort level or baseline comfort level:   Encourage patient to monitor pain and request assistance   Assess pain using appropriate pain scale   Administer analgesics based on type and severity of pain and evaluate response   Implement non-pharmacological measures as appropriate and evaluate response     Problem: Cardiovascular - Adult  Goal: Absence of cardiac dysrhythmias or at baseline  Outcome: Progressing  Flowsheets (Taken 3/1/2023 2101)  Absence of cardiac dysrhythmias or at baseline: Monitor cardiac rate and rhythm     Problem: Skin/Tissue Integrity - Adult  Goal: Skin integrity remains intact  Recent Flowsheet Documentation  Taken 3/1/2023 1911 by Kenya Turcios RN  Skin Integrity Remains Intact:   Monitor for areas of redness and/or skin breakdown   Assess vascular access sites hourly     Problem: Skin/Tissue Integrity - Adult  Goal: Incisions, wounds, or drain sites healing without S/S of infection  Outcome: Progressing  Flowsheets (Taken 3/1/2023 2101)  Incisions, Wounds, or Drain Sites Healing Without Sign and Symptoms of Infection:   ADMISSION and DAILY: Assess and document risk factors for pressure ulcer development   TWICE DAILY: Assess and document skin integrity   TWICE DAILY: Assess and document dressing/incision, wound bed, drain sites and surrounding tissue     Problem: Musculoskeletal - Adult  Goal: Return mobility to safest level of function  Outcome: Progressing  Flowsheets (Taken 3/1/2023 2101)  Return Mobility to Safest Level of Function:   Assess patient stability and activity tolerance for standing, transferring and ambulating with or without assistive devices   Assist with transfers and ambulation using safe patient handling equipment as needed   Apply continuous passive motion per provider or physical therapy orders to increase flexion toward goal   Instruct patient/family in ordered activity level     Problem: Infection - Adult  Goal: Absence of infection during hospitalization  Outcome: Progressing  Flowsheets (Taken 3/1/2023 2101)  Absence of infection during hospitalization:   Assess and monitor for signs and symptoms of infection   Monitor lab/diagnostic results   Monitor all insertion sites i.e., indwelling lines, tubes and drains   Administer medications as ordered   Instruct and encourage patient and family to use good hand hygiene technique   Identify and instruct in appropriate isolation precautions for identified infection/condition   Care plan reviewed with patient. Patient verbalize understanding of the plan of care and contribute to goal setting.

## 2023-03-02 NOTE — PROGRESS NOTES
Progress note: Infectious diseases    Patient - Loni Restrepo,  Age - 47 y.o.    - 1970      Room Number - 6E-56/056-A   MRN -  [id number]   Acct # - [de-identified]  Date of Admission -  3/1/2023 11:16 AM    SUBJECTIVE:   No new issues. OBJECTIVE   VITALS    height is 6' 1\" (1.854 m) and weight is 395 lb (179.2 kg) (abnormal). His oral temperature is 97.5 °F (36.4 °C). His blood pressure is 130/78 and his pulse is 72. His respiration is 16 and oxygen saturation is 96%.        Wt Readings from Last 3 Encounters:   23 (!) 395 lb (179.2 kg)   22 (!) 376 lb 5.2 oz (170.7 kg)   22 (!) 369 lb (167.4 kg)       I/O (24 Hours)    Intake/Output Summary (Last 24 hours) at 3/2/2023 0916  Last data filed at 3/2/2023 0334  Gross per 24 hour   Intake 5168.99 ml   Output 1000 ml   Net 4168.99 ml       General Appearance  Awake, alert, oriented,  not  In acute distress  HEENT - normocephalic, atraumatic, pink conjunctiva,  anicteric sclera  Neck - Supple, no mass  Lungs -  Bilateral good air entry, no rhonchi, no wheeze  Cardiovascular - Heart sounds are normal.     Abdomen - soft, not distended, nontender,   Neurologic -oriented  Skin - No bruising or bleeding  Extremities -  dressed foot wound    MEDICATIONS:      sodium chloride flush  5-40 mL IntraVENous 2 times per day    enoxaparin  60 mg SubCUTAneous BID    nicotine  1 patch TransDERmal Daily    ceFAZolin  2,000 mg IntraVENous Q8H    aspirin  81 mg Oral Daily    atorvastatin  40 mg Oral Nightly    buPROPion  200 mg Oral BID    metoprolol succinate  25 mg Oral Daily    pantoprazole  40 mg Oral Daily    mometasone-formoterol  2 puff Inhalation BID    tiotropium  2 puff Inhalation Daily    insulin lispro  0-4 Units SubCUTAneous TID     insulin lispro  0-4 Units SubCUTAneous Nightly    [START ON 3/5/2023] Dulaglutide  1 mL SubCUTAneous Weekly    thiamine  100 mg Oral Daily    multivitamin  1 tablet Oral Daily      sodium chloride      lactated ringers IV soln 75 mL/hr at 03/02/23 0726    dextrose       sodium chloride flush, sodium chloride, ondansetron **OR** ondansetron, polyethylene glycol, acetaminophen **OR** acetaminophen, albuterol sulfate HFA, glucose, dextrose bolus **OR** dextrose bolus, glucagon (rDNA), dextrose      LABS:     CBC:   Recent Labs     03/01/23  1224   WBC 10.0   HGB 15.6        BMP:    Recent Labs     03/01/23  1224      K 4.2   CL 98   CO2 25   BUN 11   CREATININE 1.0   GLUCOSE 157*     Calcium:  Recent Labs     03/01/23  1224   CALCIUM 10.0      Recent Labs     03/01/23  1618 03/01/23  1929 03/02/23  0722   POCGLU 141* 157* 141*     HgbA1C: No results for input(s): LABA1C in the last 72 hours. INR: No results for input(s): INR in the last 72 hours. Hepatic:   Recent Labs     03/01/23  1224   ALKPHOS 162*   ALT 36   AST 26   PROT 7.4   BILITOT 0.7   LABALBU 4.4     Amylase and Lipase:  Recent Labs     03/02/23  0006   LACTA 1.8     Lactic Acid:   Recent Labs     03/02/23  0006   LACTA 1.8     Troponin: No results for input(s): CKTOTAL, CKMB, TROPONINI in the last 72 hours. BNP: No results for input(s): BNP in the last 72 hours. CULTURES:   UA:   Recent Labs     03/01/23  1420   PHUR 5.0   COLORU YELLOW   PROTEINU NEGATIVE   BLOODU NEGATIVE   NITRU NEGATIVE   GLUCOSEU NEGATIVE   BILIRUBINUR NEGATIVE   UROBILINOGEN 0.2   KETUA NEGATIVE     Micro:   Lab Results   Component Value Date/Time    [facility]  07/20/2022 01:33 PM     No growth 24 hours. No growth 48 hours.  No growth at 5 days            Problem list of patient:     Patient Active Problem List   Diagnosis Code    Hypertension I10    Hyperlipidemia E78.5    CAD (coronary artery disease) I25.10    Family history of premature coronary heart disease Z82.49    Dizziness R42    Acute renal failure (ARF) (HCC) N17.9    Chronic low back pain M54.50, G89.29    PTSD (post-traumatic stress disorder) F43.10    Foot ulceration, right, with fat layer exposed ([address] L97.512    Type 2 diabetes mellitus without complication, with long-term current use of insulin (HCC) E11.9, Z79.4    Cellulitis of right foot L03.115    Osteomyelitis (HCC) M86.9    Diabetic foot ulcer with osteomyelitis (HCC) E11.621, E11.69, L97.509, M86.9         ASSESSMENT/PLAN   Diabetic foot ulcer with OM  Obesity  Continue current treatment.  Plan for debridement of the wound      Gm Chou MD, MD, FACP 3/2/2023 9:16 AM

## 2023-03-02 NOTE — FLOWSHEET NOTE
03/01/23 1952   Treatment Team Notification   Reason for Communication Review case  (Patient's lactic acid came back and is increased at 2.5)   Team Member Name Yrn Amezcua   Treatment Team Role Advanced Practice Nurse   Method of Communication Secure Message   Response See orders  (1 liter bolus LR.)   Notification Time 1952

## 2023-03-02 NOTE — CARE COORDINATION
Case Management Assessment  Initial Evaluation    Date/Time of Evaluation: 3/2/2023 11:11 AM  Assessment Completed by: Ludwin Bosch RN    If patient is discharged prior to next notation, then this note serves as note for discharge by case management. Patient Name: Sonia Del Rio                   YOB: 1970  Diagnosis: Diabetic foot ulcer with osteomyelitis ([facility] [E11.621, E11.69, L97.509, M86.9]  Osteomyelitis of left foot, unspecified type [facility]) [M86.9]                   Date / Time: 3/1/2023 11:16 AM  Location: [address] [place] [address]     Patient Admission Status: Inpatient   Readmission Risk Low 0-14, Mod 15-19), High > 20: Readmission Risk Score: 10.1    Current PCP: MICHELLE Hughes CNP  PCP verified by CM? Yes    Chart Reviewed: Yes      History Provided by: Patient  Patient Orientation: Alert and Oriented    Patient Cognition: Alert    Hospitalization in the last 30 days (Readmission):  No    If yes, Readmission Assessment in CM Navigator will be completed. Advance Directives:      Code Status: Full Code   Patient's Primary Decision Maker is: Named in [address] [place] [address]      Discharge Planning:    Patient lives with: Alone Type of Home: Apartment  Primary Care Giver: Self  Patient Support Systems include: Parent   Current Financial resources: Medicaid, Medicare  Current community resources:  (current [facility])  Current services prior to admission: Home Care            Current DME:              Type of Home Care services:  Nursing Services    ADLS  Prior functional level: Independent in ADLs/IADLs  Current functional level: Independent in ADLs/IADLs    Family can provide assistance at DC: Yes  Would you like Case Management to discuss the discharge plan with any other family members/significant others, and if so, who?     Plans to Return to Present Housing: Unknown at present  Other Identified Issues/Barriers to RETURNING to current housing: medical complications  Potential Assistance needed at discharge: [address]ado (current [facility])            Potential DME:    Patient expects to discharge to: [address] [place] [address] for transportation at discharge: Self    Financial    Payor: Robin Gallego / Plan: Armen Merrill / Product Type: *No Product type* /     Does insurance require precert for SNF: Yes    Potential assistance Purchasing Medications: No  Meds-to-Beds request: No      [address], [address] [facility]  [address] [facility] [address]  Phone: [phone] Fax: [phone]      Notes:    Factors facilitating achievement of predicted outcomes: Family support    Barriers to discharge: Medical complications    Additional Case Management Notes: From ED, diabetes management, Podiatry consult, ID consult, Asa, Lipitor, IV Ancef, Lovenox, IV fluids, Protonix. Procedure:   3/1 CXR 1. Normal heart size. Evidence for old, healed granulomatous disease. 2. No acute findings. No infiltrates or effusions are seen. The Plan for Transition of Care is related to the following treatment goals of Diabetic foot ulcer with osteomyelitis ([facility] [address] [X12.419, E11.69, L97.509, M86.9]  Osteomyelitis of left foot, unspecified type ([address] [M86.9]    Patient Goals/Plan/Treatment Preferences: Met with Vitaly Barajas. He currently lives at home alone. He is independent. He is current with [facility]. Discharge plan is unsure at this time pending clinical course. Will follow. SW consult in place. Transportation/Food Security/Housekeeping Addressed: No issues identified.      Diana Mckinnon RN  Case Management Department

## 2023-03-02 NOTE — PLAN OF CARE
Problem: Chronic Conditions and Co-morbidities  Goal: Patient's chronic conditions and co-morbidity symptoms are monitored and maintained or improved  Outcome: Progressing  Flowsheets (Taken 3/2/2023 1445)  Care Plan - Patient's Chronic Conditions and Co-Morbidity Symptoms are Monitored and Maintained or Improved: Monitor and assess patient's chronic conditions and comorbid symptoms for stability, deterioration, or improvement     Problem: Discharge Planning  Goal: Discharge to home or other facility with appropriate resources  Outcome: Progressing  Flowsheets (Taken 3/2/2023 1445)  Discharge to home or other facility with appropriate resources: Identify barriers to discharge with patient and caregiver     Problem: Respiratory - Adult  Goal: Clear lung sounds  3/2/2023 1146 by Jareth Feliciano RCP  Outcome: Progressing     Problem: Skin/Tissue Integrity - Adult  Goal: Skin integrity remains intact  Outcome: Progressing  Flowsheets (Taken 3/2/2023 1445)  Skin Integrity Remains Intact:   Monitor for areas of redness and/or skin breakdown   Assess vascular access sites hourly     Problem: Musculoskeletal - Adult  Goal: Return mobility to safest level of function  Outcome: Progressing  Flowsheets (Taken 3/2/2023 1445)  Return Mobility to Safest Level of Function: Assess patient stability and activity tolerance for standing, transferring and ambulating with or without assistive devices     Problem: Infection - Adult  Goal: Absence of infection during hospitalization  Outcome: Progressing  Flowsheets (Taken 3/2/2023 1445)  Absence of infection during hospitalization:   Monitor lab/diagnostic results   Assess and monitor for signs and symptoms of infection

## 2023-03-02 NOTE — CONSULTS
[address] [place] [address]                                  CONSULTATION    PATIENT NAME: Jazzy Andrea                     :        1970  MED REC NO:   [id number]                           ROOM:       [id number]  ACCOUNT NO:   [de-identified]                           ADMIT DATE: 2023  PROVIDER:     Henrique Rubio. Agustin Núñez M.D.    Debra Langler:  2023    REASON FOR CONSULTATION:  Diabetic foot ulcer with osteomyelitis. HISTORY OF PRESENT ILLNESS:  He is a 42-year-old obese male patient  whose past medical history is significant for diabetes with neuropathy,  coronary artery disease, congestive heart failure, COPD. He was  admitted due to worsening wound with drainage on his right foot. He  reports that he had an open wound for the last couple of weeks and scab  was removed; however, the wound started to drain. His MRI done at [facility] showed osteomyelitis in the third metatarsal.  The patient had  previous history of transmetatarsal amputation for diabetic foot ulcer. He denies any fever or chills. His diabetes is well controlled. PAST MEDICAL HISTORY:  Significant for history of acute renal failure,  history of alcohol abuse, anxiety, osteoarthritis, diabetes, neuropathy,  COPD, coronary artery disease, hyperlipidemia, hypertension. PAST SURGICAL HISTORY:  Include, he had history of back surgery, cardiac  catheterization, carpal tunnel release, foot surgery, toe amputation. MEDICATIONS:  Include Tylenol, albuterol, aspirin, Lipitor, Wellbutrin,  Cefazolin, dulaglutide, Lovenox, insulin, metoprolol, _____,  multivitamin, and nicotine. REVIEW OF SYSTEMS:  Noncontributory. PHYSICAL EXAMINATION:  GENERAL:  He is obese, not in distress. VITAL SIGNS:  Temperature 98.2, respirations 20, pulse 85, blood  pressure 147/90. HEENT:  He has slightly pale conjunctivae, anicteric sclerae.   CHEST:  Bilateral air entry.  CARDIOVASCULAR SYSTEM:  Regular. ABDOMEN:  Soft. EXTREMITIES:  He has edema on his right leg. He has an open wound on  the dorsum of the foot. He has previous history of transmetatarsal  amputation. The right leg is chronically swelling, nontender. CNS:  He is awake, oriented to person, place and time. DIAGNOSTICS:  MRI report was reviewed from [facility].  He has  WBC of 10, hemoglobin 15.6, hematocrit 45.9, platelets of 897. Sodium  139, potassium 4.2, chloride 90, bicarb _____, BUN 11, creatinine 1. IMPRESSION:  Nonhealing right foot wound, diabetic with neuropathy,  diabetic foot ulcer with osteomyelitis of third metatarsal.    RECOMMENDATIONS:  We will continue IV antibiotics. The patient will  need debridement of the foot with resection of the involved bone for  source control. Continue current treatment. Thank you for the consultation, Dr. Lupillo Albrecht.         Hadassah Paget, M.D.    D: 03/01/2023 17:49:07       T: 03/01/2023 19:48:31     [email or web address]  Job#: [id number]     Doc#: [id number]    CC:

## 2023-03-02 NOTE — CARE COORDINATION
3/2/23, 1:19 PM EST    DISCHARGE PLANNING EVALUATION    Spoke with [address], confirmed agency is providing nursing services and will continue at discharge.

## 2023-03-02 NOTE — PROGRESS NOTES
Patient is lying in bed appear to be sleeping. Bed is in low position and wheels are locked. Side rails are up times two. Call light and over bed table is within reach. Denies needs. Reported off to primary.  Sigrid PNS

## 2023-03-02 NOTE — PLAN OF CARE
Problem: Respiratory - Adult  Goal: Clear lung sounds  Outcome: Progressing   Patient on spiriva and dulera. Patient mutually agreed on goals.

## 2023-03-02 NOTE — PROGRESS NOTES
Patient lying in bed with eyes open. Calm and pleasant with staff. Oriented to person, place, and time. Mucous membranes pink, moist. Skin warm, dry. Speech is clear. Denies difficulty with swallowing. Lung sound clear anterior, posterior, lateral. Respirations easy, regular, deep. No cough noted. Heart sounds strong, regular. Bowel sounds active in all four quadrants. Abdomen is soft, round. Passing gas and urinating with ease. Bedside urinal contained 500 ml straw yellow, slightly aromatic. Radial pulses strong, regular bilateral. Hand grasp strong bilateral. Arm drift negative bilateral. IV site interior left forearm, site is free of redness, swelling, and pain. Lactated ringer infusing. Capillary refill less than three seconds. Skin turgor, brisk. Edema present in lower extremities, non pitting. Pedal pulses strong and regular in left. Pedal push and pull strong bilateral. Sonny's sign negative bilateral. Left lift strong bilateral. Express having numbness in lower extremities bilateral. Bed in low position, wheels locked. Side rails up times two. Call light and over bed table within reach. Denies further needs.  Sigrid PNS

## 2023-03-03 ENCOUNTER — ANESTHESIA (OUTPATIENT)
Dept: OPERATING ROOM | Age: 53
End: 2023-03-03
Payer: MEDICARE

## 2023-03-03 ENCOUNTER — ANESTHESIA EVENT (OUTPATIENT)
Dept: OPERATING ROOM | Age: 53
End: 2023-03-03
Payer: MEDICARE

## 2023-03-03 LAB
ANION GAP SERPL CALC-SCNC: 9 MEQ/L (ref 8–16)
BASOPHILS ABSOLUTE: 0 THOU/MM3 (ref 0–0.1)
BASOPHILS NFR BLD AUTO: 0.6 %
BUN SERPL-MCNC: 11 MG/DL (ref 7–22)
CALCIUM SERPL-MCNC: 8.9 MG/DL (ref 8.5–10.5)
CHLORIDE SERPL-SCNC: 105 MEQ/L (ref 98–111)
CO2 SERPL-SCNC: 29 MEQ/L (ref 23–33)
CREAT SERPL-MCNC: 1 MG/DL (ref 0.4–1.2)
DEPRECATED RDW RBC AUTO: 40.7 FL (ref 35–45)
EOSINOPHIL NFR BLD AUTO: 4.4 %
EOSINOPHILS ABSOLUTE: 0.3 THOU/MM3 (ref 0–0.4)
ERYTHROCYTE [DISTWIDTH] IN BLOOD BY AUTOMATED COUNT: 12.5 % (ref 11.5–14.5)
GFR SERPL CREATININE-BSD FRML MDRD: > 60 ML/MIN/1.73M2
GLUCOSE BLD STRIP.AUTO-MCNC: 123 MG/DL (ref 70–108)
GLUCOSE BLD STRIP.AUTO-MCNC: 124 MG/DL (ref 70–108)
GLUCOSE BLD STRIP.AUTO-MCNC: 136 MG/DL (ref 70–108)
GLUCOSE BLD STRIP.AUTO-MCNC: 137 MG/DL (ref 70–108)
GLUCOSE BLD STRIP.AUTO-MCNC: 154 MG/DL (ref 70–108)
GLUCOSE SERPL-MCNC: 132 MG/DL (ref 70–108)
HCT VFR BLD AUTO: 41.9 % (ref 42–52)
HGB BLD-MCNC: 13.9 GM/DL (ref 14–18)
IMM GRANULOCYTES # BLD AUTO: 0.03 THOU/MM3 (ref 0–0.07)
IMM GRANULOCYTES NFR BLD AUTO: 0.5 %
LYMPHOCYTES ABSOLUTE: 1.7 THOU/MM3 (ref 1–4.8)
LYMPHOCYTES NFR BLD AUTO: 26.6 %
MCH RBC QN AUTO: 30.2 PG (ref 26–33)
MCHC RBC AUTO-ENTMCNC: 33.2 GM/DL (ref 32.2–35.5)
MCV RBC AUTO: 91.1 FL (ref 80–94)
MONOCYTES ABSOLUTE: 0.5 THOU/MM3 (ref 0.4–1.3)
MONOCYTES NFR BLD AUTO: 7.9 %
NEUTROPHILS NFR BLD AUTO: 60 %
NRBC BLD AUTO-RTO: 0 /100 WBC
PLATELET # BLD AUTO: 206 THOU/MM3 (ref 130–400)
PMV BLD AUTO: 10.4 FL (ref 9.4–12.4)
POTASSIUM SERPL-SCNC: 4.5 MEQ/L (ref 3.5–5.2)
RBC # BLD AUTO: 4.6 MILL/MM3 (ref 4.7–6.1)
SEGMENTED NEUTROPHILS ABSOLUTE COUNT: 3.8 THOU/MM3 (ref 1.8–7.7)
SODIUM SERPL-SCNC: 143 MEQ/L (ref 135–145)
WBC # BLD AUTO: 6.4 THOU/MM3 (ref 4.8–10.8)

## 2023-03-03 PROCEDURE — 6370000000 HC RX 637 (ALT 250 FOR IP): Performed by: NURSE PRACTITIONER

## 2023-03-03 PROCEDURE — 2580000003 HC RX 258

## 2023-03-03 PROCEDURE — 6370000000 HC RX 637 (ALT 250 FOR IP)

## 2023-03-03 PROCEDURE — 3600000003 HC SURGERY LEVEL 3 BASE: Performed by: PODIATRIST

## 2023-03-03 PROCEDURE — 2709999900 HC NON-CHARGEABLE SUPPLY: Performed by: PODIATRIST

## 2023-03-03 PROCEDURE — 1200000000 HC SEMI PRIVATE

## 2023-03-03 PROCEDURE — 6360000002 HC RX W HCPCS: Performed by: ANESTHESIOLOGY

## 2023-03-03 PROCEDURE — 6360000002 HC RX W HCPCS

## 2023-03-03 PROCEDURE — 85025 COMPLETE CBC W/AUTO DIFF WBC: CPT

## 2023-03-03 PROCEDURE — 2500000003 HC RX 250 WO HCPCS: Performed by: PODIATRIST

## 2023-03-03 PROCEDURE — 2580000003 HC RX 258: Performed by: NURSE PRACTITIONER

## 2023-03-03 PROCEDURE — 88305 TISSUE EXAM BY PATHOLOGIST: CPT

## 2023-03-03 PROCEDURE — 88311 DECALCIFY TISSUE: CPT

## 2023-03-03 PROCEDURE — 3700000001 HC ADD 15 MINUTES (ANESTHESIA): Performed by: PODIATRIST

## 2023-03-03 PROCEDURE — 3600000013 HC SURGERY LEVEL 3 ADDTL 15MIN: Performed by: PODIATRIST

## 2023-03-03 PROCEDURE — 6360000002 HC RX W HCPCS: Performed by: INTERNAL MEDICINE

## 2023-03-03 PROCEDURE — 7100000000 HC PACU RECOVERY - FIRST 15 MIN: Performed by: PODIATRIST

## 2023-03-03 PROCEDURE — 2500000003 HC RX 250 WO HCPCS: Performed by: ANESTHESIOLOGY

## 2023-03-03 PROCEDURE — 94640 AIRWAY INHALATION TREATMENT: CPT

## 2023-03-03 PROCEDURE — 7100000001 HC PACU RECOVERY - ADDTL 15 MIN: Performed by: PODIATRIST

## 2023-03-03 PROCEDURE — 3700000000 HC ANESTHESIA ATTENDED CARE: Performed by: PODIATRIST

## 2023-03-03 PROCEDURE — 82948 REAGENT STRIP/BLOOD GLUCOSE: CPT

## 2023-03-03 PROCEDURE — 0Y6M0Z0 DETACHMENT AT RIGHT FOOT, COMPLETE, OPEN APPROACH: ICD-10-PCS

## 2023-03-03 PROCEDURE — 36415 COLL VENOUS BLD VENIPUNCTURE: CPT

## 2023-03-03 PROCEDURE — 2580000003 HC RX 258: Performed by: INTERNAL MEDICINE

## 2023-03-03 PROCEDURE — 80048 BASIC METABOLIC PNL TOTAL CA: CPT

## 2023-03-03 PROCEDURE — 99232 SBSQ HOSP IP/OBS MODERATE 35: CPT | Performed by: NURSE PRACTITIONER

## 2023-03-03 RX ORDER — DOXEPIN HYDROCHLORIDE 10 MG/1
10 CAPSULE ORAL NIGHTLY PRN
Status: DISCONTINUED | OUTPATIENT
Start: 2023-03-04 | End: 2023-03-03

## 2023-03-03 RX ORDER — DIPHENHYDRAMINE HYDROCHLORIDE 50 MG/ML
12.5 INJECTION INTRAMUSCULAR; INTRAVENOUS
Status: DISCONTINUED | OUTPATIENT
Start: 2023-03-03 | End: 2023-03-03 | Stop reason: HOSPADM

## 2023-03-03 RX ORDER — MORPHINE SULFATE 4 MG/ML
4 INJECTION, SOLUTION INTRAMUSCULAR; INTRAVENOUS
Status: DISCONTINUED | OUTPATIENT
Start: 2023-03-03 | End: 2023-03-08 | Stop reason: HOSPADM

## 2023-03-03 RX ORDER — SODIUM CHLORIDE 0.9 % (FLUSH) 0.9 %
5-40 SYRINGE (ML) INJECTION EVERY 12 HOURS SCHEDULED
Status: DISCONTINUED | OUTPATIENT
Start: 2023-03-03 | End: 2023-03-08 | Stop reason: HOSPADM

## 2023-03-03 RX ORDER — SODIUM CHLORIDE 9 MG/ML
INJECTION, SOLUTION INTRAVENOUS PRN
Status: DISCONTINUED | OUTPATIENT
Start: 2023-03-03 | End: 2023-03-03 | Stop reason: HOSPADM

## 2023-03-03 RX ORDER — MORPHINE SULFATE 2 MG/ML
2 INJECTION, SOLUTION INTRAMUSCULAR; INTRAVENOUS
Status: DISCONTINUED | OUTPATIENT
Start: 2023-03-03 | End: 2023-03-08 | Stop reason: HOSPADM

## 2023-03-03 RX ORDER — ROCURONIUM BROMIDE 10 MG/ML
INJECTION, SOLUTION INTRAVENOUS PRN
Status: DISCONTINUED | OUTPATIENT
Start: 2023-03-03 | End: 2023-03-03 | Stop reason: SDUPTHER

## 2023-03-03 RX ORDER — MIDAZOLAM HYDROCHLORIDE 1 MG/ML
INJECTION INTRAMUSCULAR; INTRAVENOUS PRN
Status: DISCONTINUED | OUTPATIENT
Start: 2023-03-03 | End: 2023-03-03 | Stop reason: SDUPTHER

## 2023-03-03 RX ORDER — MEPERIDINE HYDROCHLORIDE 25 MG/ML
12.5 INJECTION INTRAMUSCULAR; INTRAVENOUS; SUBCUTANEOUS EVERY 4 HOURS PRN
Status: DISCONTINUED | OUTPATIENT
Start: 2023-03-03 | End: 2023-03-03 | Stop reason: HOSPADM

## 2023-03-03 RX ORDER — SODIUM CHLORIDE 9 MG/ML
INJECTION, SOLUTION INTRAVENOUS CONTINUOUS
Status: DISCONTINUED | OUTPATIENT
Start: 2023-03-03 | End: 2023-03-05

## 2023-03-03 RX ORDER — METOCLOPRAMIDE HYDROCHLORIDE 5 MG/ML
10 INJECTION INTRAMUSCULAR; INTRAVENOUS
Status: DISCONTINUED | OUTPATIENT
Start: 2023-03-03 | End: 2023-03-03 | Stop reason: HOSPADM

## 2023-03-03 RX ORDER — ONDANSETRON 4 MG/1
4 TABLET, ORALLY DISINTEGRATING ORAL EVERY 8 HOURS PRN
Status: DISCONTINUED | OUTPATIENT
Start: 2023-03-03 | End: 2023-03-08 | Stop reason: HOSPADM

## 2023-03-03 RX ORDER — FENTANYL CITRATE 50 UG/ML
25 INJECTION, SOLUTION INTRAMUSCULAR; INTRAVENOUS EVERY 5 MIN PRN
Status: DISCONTINUED | OUTPATIENT
Start: 2023-03-03 | End: 2023-03-03 | Stop reason: HOSPADM

## 2023-03-03 RX ORDER — FENTANYL CITRATE 50 UG/ML
INJECTION, SOLUTION INTRAMUSCULAR; INTRAVENOUS PRN
Status: DISCONTINUED | OUTPATIENT
Start: 2023-03-03 | End: 2023-03-03 | Stop reason: SDUPTHER

## 2023-03-03 RX ORDER — SODIUM CHLORIDE 0.9 % (FLUSH) 0.9 %
5-40 SYRINGE (ML) INJECTION PRN
Status: DISCONTINUED | OUTPATIENT
Start: 2023-03-03 | End: 2023-03-08 | Stop reason: HOSPADM

## 2023-03-03 RX ORDER — LIDOCAINE HYDROCHLORIDE 20 MG/ML
INJECTION, SOLUTION INTRAVENOUS PRN
Status: DISCONTINUED | OUTPATIENT
Start: 2023-03-03 | End: 2023-03-03 | Stop reason: SDUPTHER

## 2023-03-03 RX ORDER — LIDOCAINE HYDROCHLORIDE 10 MG/ML
INJECTION, SOLUTION EPIDURAL; INFILTRATION; INTRACAUDAL; PERINEURAL PRN
Status: DISCONTINUED | OUTPATIENT
Start: 2023-03-03 | End: 2023-03-03 | Stop reason: HOSPADM

## 2023-03-03 RX ORDER — PROPOFOL 10 MG/ML
INJECTION, EMULSION INTRAVENOUS PRN
Status: DISCONTINUED | OUTPATIENT
Start: 2023-03-03 | End: 2023-03-03 | Stop reason: SDUPTHER

## 2023-03-03 RX ORDER — SODIUM CHLORIDE 9 MG/ML
INJECTION, SOLUTION INTRAVENOUS PRN
Status: DISCONTINUED | OUTPATIENT
Start: 2023-03-03 | End: 2023-03-08 | Stop reason: HOSPADM

## 2023-03-03 RX ORDER — FENTANYL CITRATE 50 UG/ML
50 INJECTION, SOLUTION INTRAMUSCULAR; INTRAVENOUS EVERY 5 MIN PRN
Status: DISCONTINUED | OUTPATIENT
Start: 2023-03-03 | End: 2023-03-03 | Stop reason: HOSPADM

## 2023-03-03 RX ORDER — SODIUM CHLORIDE 0.9 % (FLUSH) 0.9 %
5-40 SYRINGE (ML) INJECTION PRN
Status: DISCONTINUED | OUTPATIENT
Start: 2023-03-03 | End: 2023-03-03 | Stop reason: HOSPADM

## 2023-03-03 RX ORDER — OXYCODONE HYDROCHLORIDE 5 MG/1
5 TABLET ORAL EVERY 4 HOURS PRN
Status: DISCONTINUED | OUTPATIENT
Start: 2023-03-03 | End: 2023-03-08 | Stop reason: HOSPADM

## 2023-03-03 RX ORDER — ONDANSETRON 2 MG/ML
4 INJECTION INTRAMUSCULAR; INTRAVENOUS EVERY 6 HOURS PRN
Status: DISCONTINUED | OUTPATIENT
Start: 2023-03-03 | End: 2023-03-08 | Stop reason: HOSPADM

## 2023-03-03 RX ORDER — SUCCINYLCHOLINE/SOD CL,ISO/PF 200MG/10ML
SYRINGE (ML) INTRAVENOUS PRN
Status: DISCONTINUED | OUTPATIENT
Start: 2023-03-03 | End: 2023-03-03 | Stop reason: SDUPTHER

## 2023-03-03 RX ORDER — ONDANSETRON 2 MG/ML
INJECTION INTRAMUSCULAR; INTRAVENOUS PRN
Status: DISCONTINUED | OUTPATIENT
Start: 2023-03-03 | End: 2023-03-03 | Stop reason: SDUPTHER

## 2023-03-03 RX ORDER — BUPIVACAINE HYDROCHLORIDE 5 MG/ML
INJECTION, SOLUTION EPIDURAL; INTRACAUDAL PRN
Status: DISCONTINUED | OUTPATIENT
Start: 2023-03-03 | End: 2023-03-03 | Stop reason: HOSPADM

## 2023-03-03 RX ORDER — SODIUM CHLORIDE 0.9 % (FLUSH) 0.9 %
5-40 SYRINGE (ML) INJECTION EVERY 12 HOURS SCHEDULED
Status: DISCONTINUED | OUTPATIENT
Start: 2023-03-03 | End: 2023-03-03 | Stop reason: HOSPADM

## 2023-03-03 RX ORDER — OXYCODONE HYDROCHLORIDE 5 MG/1
10 TABLET ORAL EVERY 4 HOURS PRN
Status: DISCONTINUED | OUTPATIENT
Start: 2023-03-03 | End: 2023-03-08 | Stop reason: HOSPADM

## 2023-03-03 RX ORDER — DOXEPIN HYDROCHLORIDE 10 MG/1
10 CAPSULE ORAL NIGHTLY PRN
Status: DISCONTINUED | OUTPATIENT
Start: 2023-03-03 | End: 2023-03-08 | Stop reason: HOSPADM

## 2023-03-03 RX ADMIN — OXYCODONE 10 MG: 5 TABLET ORAL at 23:38

## 2023-03-03 RX ADMIN — SODIUM CHLORIDE, POTASSIUM CHLORIDE, SODIUM LACTATE AND CALCIUM CHLORIDE: 600; 310; 30; 20 INJECTION, SOLUTION INTRAVENOUS at 11:40

## 2023-03-03 RX ADMIN — ROCURONIUM BROMIDE 50 MG: 10 INJECTION, SOLUTION INTRAVENOUS at 12:18

## 2023-03-03 RX ADMIN — FENTANYL CITRATE 100 MCG: 50 INJECTION, SOLUTION INTRAMUSCULAR; INTRAVENOUS at 12:17

## 2023-03-03 RX ADMIN — Medication 2000 MG: at 08:48

## 2023-03-03 RX ADMIN — ONDANSETRON 4 MG: 2 INJECTION INTRAMUSCULAR; INTRAVENOUS at 11:48

## 2023-03-03 RX ADMIN — PROPOFOL 200 MG: 10 INJECTION, EMULSION INTRAVENOUS at 11:41

## 2023-03-03 RX ADMIN — SODIUM CHLORIDE: 9 INJECTION, SOLUTION INTRAVENOUS at 16:56

## 2023-03-03 RX ADMIN — HYDROMORPHONE HYDROCHLORIDE 0.5 MG: 1 INJECTION, SOLUTION INTRAMUSCULAR; INTRAVENOUS; SUBCUTANEOUS at 13:55

## 2023-03-03 RX ADMIN — MIDAZOLAM 2 MG: 1 INJECTION INTRAMUSCULAR; INTRAVENOUS at 11:38

## 2023-03-03 RX ADMIN — OXYCODONE 10 MG: 5 TABLET ORAL at 19:28

## 2023-03-03 RX ADMIN — Medication 2000 MG: at 16:58

## 2023-03-03 RX ADMIN — PANTOPRAZOLE SODIUM 40 MG: 40 TABLET, DELAYED RELEASE ORAL at 05:44

## 2023-03-03 RX ADMIN — BUPROPION HYDROCHLORIDE 200 MG: 100 TABLET, FILM COATED, EXTENDED RELEASE ORAL at 08:41

## 2023-03-03 RX ADMIN — HYDROMORPHONE HYDROCHLORIDE 0.5 MG: 1 INJECTION, SOLUTION INTRAMUSCULAR; INTRAVENOUS; SUBCUTANEOUS at 14:00

## 2023-03-03 RX ADMIN — LIDOCAINE HYDROCHLORIDE 100 MG: 20 INJECTION INTRAVENOUS at 13:39

## 2023-03-03 RX ADMIN — Medication 2000 MG: at 01:11

## 2023-03-03 RX ADMIN — SODIUM CHLORIDE: 9 INJECTION, SOLUTION INTRAVENOUS at 20:35

## 2023-03-03 RX ADMIN — Medication 160 MG: at 11:41

## 2023-03-03 RX ADMIN — LIDOCAINE HYDROCHLORIDE 100 MG: 20 INJECTION INTRAVENOUS at 11:41

## 2023-03-03 RX ADMIN — FENTANYL CITRATE 100 MCG: 50 INJECTION, SOLUTION INTRAMUSCULAR; INTRAVENOUS at 11:41

## 2023-03-03 RX ADMIN — BUPROPION HYDROCHLORIDE 200 MG: 100 TABLET, FILM COATED, EXTENDED RELEASE ORAL at 19:28

## 2023-03-03 RX ADMIN — ATORVASTATIN CALCIUM 40 MG: 40 TABLET, FILM COATED ORAL at 19:29

## 2023-03-03 RX ADMIN — METOPROLOL SUCCINATE 25 MG: 25 TABLET, FILM COATED, EXTENDED RELEASE ORAL at 08:41

## 2023-03-03 RX ADMIN — MOMETASONE FUROATE AND FORMOTEROL FUMARATE DIHYDRATE 2 PUFF: 200; 5 AEROSOL RESPIRATORY (INHALATION) at 08:36

## 2023-03-03 RX ADMIN — MOMETASONE FUROATE AND FORMOTEROL FUMARATE DIHYDRATE 2 PUFF: 200; 5 AEROSOL RESPIRATORY (INHALATION) at 18:12

## 2023-03-03 ASSESSMENT — PAIN DESCRIPTION - FREQUENCY
FREQUENCY: CONTINUOUS
FREQUENCY: CONTINUOUS

## 2023-03-03 ASSESSMENT — PAIN DESCRIPTION - ORIENTATION
ORIENTATION: LOWER
ORIENTATION: RIGHT
ORIENTATION: LOWER
ORIENTATION: RIGHT

## 2023-03-03 ASSESSMENT — PAIN SCALES - GENERAL
PAINLEVEL_OUTOF10: 8
PAINLEVEL_OUTOF10: 9
PAINLEVEL_OUTOF10: 5
PAINLEVEL_OUTOF10: 0
PAINLEVEL_OUTOF10: 5

## 2023-03-03 ASSESSMENT — PAIN DESCRIPTION - DESCRIPTORS
DESCRIPTORS: SHARP
DESCRIPTORS: ACHING

## 2023-03-03 ASSESSMENT — PAIN DESCRIPTION - LOCATION
LOCATION_2: BACK
LOCATION: FOOT
LOCATION: BACK
LOCATION: BACK;LEG
LOCATION: FOOT
LOCATION: FOOT

## 2023-03-03 ASSESSMENT — PAIN DESCRIPTION - PAIN TYPE
TYPE: SURGICAL PAIN
TYPE: SURGICAL PAIN

## 2023-03-03 ASSESSMENT — PAIN - FUNCTIONAL ASSESSMENT
PAIN_FUNCTIONAL_ASSESSMENT: ACTIVITIES ARE NOT PREVENTED
PAIN_FUNCTIONAL_ASSESSMENT: PREVENTS OR INTERFERES SOME ACTIVE ACTIVITIES AND ADLS

## 2023-03-03 ASSESSMENT — PAIN DESCRIPTION - ONSET: ONSET: ON-GOING

## 2023-03-03 ASSESSMENT — PAIN DESCRIPTION - INTENSITY: RATING_2: 9

## 2023-03-03 NOTE — PLAN OF CARE
Problem: Chronic Conditions and Co-morbidities  Goal: Patient's chronic conditions and co-morbidity symptoms are monitored and maintained or improved  3/3/2023 0002 by Lula Walters RN  Outcome: Progressing  Flowsheets (Taken 3/2/2023 1445 by Kelly Alcala RN)  Care Plan - Patient's Chronic Conditions and Co-Morbidity Symptoms are Monitored and Maintained or Improved: Monitor and assess patient's chronic conditions and comorbid symptoms for stability, deterioration, or improvement  3/2/2023 1445 by Kelly Alcala RN  Outcome: Progressing  Flowsheets (Taken 3/2/2023 1445)  Care Plan - Patient's Chronic Conditions and Co-Morbidity Symptoms are Monitored and Maintained or Improved: Monitor and assess patient's chronic conditions and comorbid symptoms for stability, deterioration, or improvement     Problem: Discharge Planning  Goal: Discharge to home or other facility with appropriate resources  3/3/2023 0002 by Lula Walters RN  Outcome: Progressing  Flowsheets (Taken 3/2/2023 1445 by Kelly Alcala RN)  Discharge to home or other facility with appropriate resources: Identify barriers to discharge with patient and caregiver  3/2/2023 1445 by Kelly Alcala RN  Outcome: Progressing  Flowsheets (Taken 3/2/2023 1445)  Discharge to home or other facility with appropriate resources: Identify barriers to discharge with patient and caregiver     Problem: Pain  Goal: Verbalizes/displays adequate comfort level or baseline comfort level  Outcome: Progressing  Flowsheets (Taken 3/1/2023 2101 by Tina White RN)  Verbalizes/displays adequate comfort level or baseline comfort level:   Encourage patient to monitor pain and request assistance   Assess pain using appropriate pain scale   Administer analgesics based on type and severity of pain and evaluate response   Implement non-pharmacological measures as appropriate and evaluate response     Problem: Respiratory - Adult  Goal: Clear lung sounds  3/2/2023 1146  by Neptali Loving RCP  Outcome: Progressing     Problem: Cardiovascular - Adult  Goal: Maintains optimal cardiac output and hemodynamic stability  Outcome: Progressing  Flowsheets (Taken 3/3/2023 0002)  Maintains optimal cardiac output and hemodynamic stability: Monitor blood pressure and heart rate  Goal: Absence of cardiac dysrhythmias or at baseline  Outcome: Progressing  Flowsheets (Taken 3/1/2023 2101 by Emigdio Cope RN)  Absence of cardiac dysrhythmias or at baseline: Monitor cardiac rate and rhythm     Problem: Skin/Tissue Integrity - Adult  Goal: Skin integrity remains intact  3/3/2023 0002 by Jud Oliveros RN  Outcome: Progressing  Flowsheets (Taken 3/3/2023 0001)  Skin Integrity Remains Intact: Monitor for areas of redness and/or skin breakdown  3/2/2023 1445 by Robby Bull RN  Outcome: Progressing  Flowsheets (Taken 3/2/2023 1445)  Skin Integrity Remains Intact:   Monitor for areas of redness and/or skin breakdown   Assess vascular access sites hourly  Goal: Incisions, wounds, or drain sites healing without S/S of infection  Outcome: Progressing  Flowsheets (Taken 3/3/2023 0001)  Incisions, Wounds, or Drain Sites Healing Without Sign and Symptoms of Infection:   ADMISSION and DAILY: Assess and document risk factors for pressure ulcer development   TWICE DAILY: Assess and document skin integrity     Problem: Musculoskeletal - Adult  Goal: Return mobility to safest level of function  3/3/2023 0002 by Jud Oliveros RN  Outcome: Progressing  Flowsheets (Taken 3/2/2023 1445 by Robby Bull RN)  Return Mobility to Safest Level of Function: Assess patient stability and activity tolerance for standing, transferring and ambulating with or without assistive devices  3/2/2023 1445 by Robby Bull RN  Outcome: Progressing  Flowsheets (Taken 3/2/2023 1445)  Return Mobility to Safest Level of Function: Assess patient stability and activity tolerance for standing, transferring and ambulating with or without assistive devices  Goal: Maintain proper alignment of affected body part  Outcome: Progressing  Flowsheets (Taken 3/3/2023 0002)  Maintain proper alignment of affected body part: Support and protect limb and body alignment per provider's orders  Goal: Return ADL status to a safe level of function  Outcome: Progressing  Flowsheets (Taken 3/3/2023 0002)  Return ADL Status to a Safe Level of Function: Administer medication as ordered     Problem: Infection - Adult  Goal: Absence of infection during hospitalization  3/3/2023 0002 by Elsi Hines RN  Outcome: Progressing  [address] (Taken 3/2/2023 1445 by Melissa Aguiar RN)  Absence of infection during hospitalization:   Monitor lab/diagnostic results   Assess and monitor for signs and symptoms of infection  3/2/2023 1445 by Melissa Aguiar RN  Outcome: Progressing  Flowsheets (Taken 3/2/2023 1445)  Absence of infection during hospitalization:   Monitor lab/diagnostic results   Assess and monitor for signs and symptoms of infection   Care plan reviewed with patient . Patient verbalizes understanding of the plan of care and contribute to goal setting.

## 2023-03-03 NOTE — CARE COORDINATION
3/3/23, 7:48 AM EST    DISCHARGE ON GOING EVALUATION    [facility] day: 2  Location: -/056-A Reason for admit: Diabetic foot ulcer with osteomyelitis ([address] [E11.621, E11.69, L97.509, M86.9]  Osteomyelitis of left foot, unspecified type ([facility] [address] [M86.9]   Procedure: 3/1: CXR: Normal heart size. Evidence for old, healed granulomatous disease. No acute findings. No infiltrates or effusions are seen. 3/3: pending amputation right foot  Barriers to Discharge: podiatry following-planning lisfranc amputation of right foot today, ID following-currently on IV Ancef q8hr, LR IV, PT/OT to follow, NPO for surgery today  PCP: MICHELLE Robbins - CNP  Readmission Risk Score: 10.4%  Patient Goals/Plan/Treatment Preferences: Pt from  home alone and current with [address] only for [facility]. SW following. Will continue to follow for home going/discharge needs post operatively.
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
## 2023-03-03 NOTE — CARE COORDINATION
DISCHARGE PLANNING EVALUATION  3/3/23, 9:15 AM EST    Reason for Referral: discharge plan  Mental Status: alert, oriented   Decision Making:  makes own decisions   Family/Social/Home Environment: patient lives at home alone, has had home health services, family support   Current Services including food security, transportation and housekeeping:  current with [facility] for home health nursing   Current Equipment:   Payment Source: [employer], medicaid   Concerns or Barriers to Discharge: anticipates need for ecf  Post-acute [facility]) provider list was provided to patient. Patient was informed of their freedom to choose [facility] provider. Discussed and offered to show the patient the relevant [facility] Providers quality and resource use measures on Medicare Compare web site via computer based on patient's goals of care and treatment preferences. Questions regarding selection process were answered. Declined list, prefers [facility], second choice [employer]       Teach Back Method used with patient regarding care plan and discharge plan  Patient  verbalizes understanding of the plan of care and contributes to goal setting. Patient goals, treatment preferences and discharge plan:  spoke with Isaías Bundy about discharge plan. He anticipates need for ecf at discharge, requests [facility]. Referral made, facility is reviewing. Unsure of medication plan yet.       Electronically signed by SONIA Davis on 3/3/2023 at 9:15 AM

## 2023-03-03 NOTE — BRIEF OP NOTE
Brief Postoperative Note      Patient: Normie Lennox  YOB: 1970  MRN: [id number]    Date of Procedure: 3/3/2023    Pre-Op Diagnosis: [facility]el[facility] of right [facility] [M86.9]    Post-Op Diagnosis: Same       Procedure(s):  RIGHT LISFRANC FOOT AMPUTATION    Surgeon(s):  Bushra Mauricio DPM    Assistant:  Resident: Sherry Delarosa DPM    Anesthesia: General    Estimated Blood Loss (mL): less than 995     Complications: None    Hemostasis: thigh tourniquet at 350 mmHg    Injectables: 20 cc of 0.5% Marcaine plain and 30 cc of 1% Lidocaine plain     Materials: 3-0 Vicryl; 3-0 monocryl; 4-0 monocryl; 2-0 Nylon      Specimens:   ID Type Source Tests Collected by Time Destination   A : Right lisfranc foot amputation  [facility] 3/3/2023 1212    B : right foot ulcer  [facility] 3/3/20[facility] 1256        Implants:  * No implants in log *      Drains: * No LDAs found *    Findings: Same as above    Electronically signed by Sherry Delarosa DPM on 3/3/2023 at 1:39 PM

## 2023-03-03 NOTE — PROGRESS NOTES
Hospitalist Progress Note    Patient:  Cata Lamb Waleska      Unit/Bed:[facility] OR (General) POOL R*    YOB: 1970    MRN: [id number]       Acct: [de-identified]     PCP: MICHELLE Lu CNP    Date of Admission: 3/1/2023    Assessment/Plan:    Right diabetic foot ulcer with osteomyelitis: follows with podiatrist Dr Melany Damico who referred patient to ED for admission. MRI that was obtained at an outside hospital 2/27 exhibited probable osteomyelitis of the third metatarsal stump. Failed outpatient doxycycline course. Continue IV Cefepime. ID following Lisfranc amputation scheduled for later today. Preoperative clearance. EKG/CXR? Labs reviewed. Met score 5.3. RCRI class II, 6% risk of cardiac event including death in the next 30 days. Accepts risk and wishes to proceed. Lactic acidosis, resolved: mild, no leukocytosis, left shift, tachycardia, tachypnea, hypotension, or fever to suggest sepsis. Continue gentle hydration. IDDM: most recent A1c per patient report, 6.0. Continue Trulicity (patient to provide) every Sunday. Accuchecks with low dose corrective coverage. BS trend has been in goal. Hypoglycemic protocol. Diabetic diet to continue postop. CAD, reported. No history of intervention: cont ASA  Essential HTN: home medications reviewed and include metoprolol, lisinopril, and midodrine. Patient endorses taking this regimen. Chart reviewed, no office notes available. Metoprolol has been continued with hold parameters. Continue to hold lisinopril and midodrine and monitor BP. HLP: cont statin  COPD: not in acute exacerbation. Cont home inhalers. Morbid obesity Body mass index is 52.11 kg/m².   Alcohol abuse: reports no alcohol intake for over one month  Tobacco abuse: NRT prn, cessation education      Chief Complaint: diabetic foot ulcer    Hospital Course:     Ralph Ruiz is a 47 y.o. male with PMHx of CAD, HTN, HLP, COPD, alcohol and tobacco abuse, IDDM with extensive h/o diabetic foot ulcerations s/p multiple amputations and surgeries of BLE who presented to [facility] at the direction of podiatrist, Dr Kelly Galarza following a MRI that was obtained at an outside hospital 2/27 that exhibited probable osteomyelitis of the third metatarsal stump. Patient alert, oriented x3, pleasant. Reports right diabetic ulcer continues to worsen despite podiatry care and Doxycycline course. Denies fever but states he has had intermittent chills and poor appetite. Denies shortness of breath, chest pain, dysuria, or diarrhea. Reports NWB status on R foot however admits to ambulating. On presentation to ED, patient afebrile, normotensive, O2 sat adequate on room air. Electrolytes and renal function with no abnormalities. Lactic acid mildly elevated (2.0). No leukocytosis or anemia. Wound cultures obtained in ED and pending. Patient received IVF, ordered per sepsis protocol however patient does not meet sepsis criteria therefore discontinued and MIV ordered. Initial dose of cefepime per podiatry recommendations initiated. Subjective: Denies pain. No CP/SOB or dizziness.        Medications:  Reviewed    Infusion Medications    sodium chloride      lactated ringers IV soln 75 mL/hr at 03/02/23 2222    dextrose       Scheduled Medications    sodium chloride flush  5-40 mL IntraVENous 2 times per day    enoxaparin  60 mg SubCUTAneous BID    nicotine  1 patch TransDERmal Daily    ceFAZolin  2,000 mg IntraVENous Q8H    aspirin  81 mg Oral Daily    atorvastatin  40 mg Oral Nightly    buPROPion  200 mg Oral BID    metoprolol succinate  25 mg Oral Daily    pantoprazole  40 mg Oral Daily    mometasone-formoterol  2 puff Inhalation BID    tiotropium  2 puff Inhalation Daily    insulin lispro  0-4 Units SubCUTAneous TID     insulin lispro  0-4 Units SubCUTAneous Nightly    [START ON 3/5/2023] Dulaglutide  0.5 mL SubCUTAneous Weekly    thiamine  100 mg Oral Daily    multivitamin  1 tablet Oral Daily     PRN Meds: sodium chloride flush, sodium chloride, ondansetron **OR** ondansetron, polyethylene glycol, acetaminophen **OR** acetaminophen, albuterol sulfate HFA, glucose, dextrose bolus **OR** dextrose bolus, glucagon (rDNA), dextrose      Intake/Output Summary (Last 24 hours) at 3/3/2023 1203  Last data filed at 3/3/2023 0543  Gross per 24 hour   Intake 1360 ml   Output 3025 ml   Net -1665 ml         Diet:  Diet NPO    Exam:  /82   Pulse 67   Temp 98 °F (36.7 °C) (Oral)   Resp 17   Ht 6' 1\" (1.854 m)   Wt (!) 395 lb (179.2 kg)   SpO2 97%   BMI 52.11 kg/m²     General appearance: No apparent distress, appears stated age and cooperative. HEENT: Pupils equal, round, and reactive to light. Conjunctivae/corneas clear. Neck: Supple, with full range of motion. No jugular venous distention. Trachea midline. Respiratory:  Normal respiratory effort. Clear to auscultation, bilaterally without Rales/Wheezes/Rhonchi. Cardiovascular: Regular rate and rhythm with normal S1/S2 without murmurs, rubs or gallops. Abdomen: Soft, non-tender, non-distended with normal bowel sounds. Musculoskeletal: passive and active ROM x 4 extremities. Skin: Skin color, texture, turgor normal.  Dressing to right foot. Neurologic:  Neurovascularly intact without any focal sensory/motor deficits. Cranial nerves: II-XII intact, grossly non-focal.  Psychiatric: Alert and oriented, thought content appropriate, normal insight  Capillary Refill: Brisk,< 3 seconds   Peripheral Pulses: +2 palpable, equal bilaterally       Labs:   Recent Labs     03/01/23  1224 03/03/23  0630   WBC 10.0 6.4   HGB 15.6 13.9*   HCT 45.9 41.9*    206       Recent Labs     03/01/23  1224 03/03/23  0630    143   K 4.2 4.5   CL 98 105   CO2 25 29   BUN 11 11   CREATININE 1.0 1.0   CALCIUM 10.0 8.9       Recent Labs     03/01/23  1224   AST 26   ALT 36   BILITOT 0.7   ALKPHOS 162*       No results for input(s): INR in the last 72 hours.   No results for input(s): CKTOTAL, TROPONINI in the last 72 hours. Urinalysis:      Lab Results   Component Value Date/Time    NITRU NEGATIVE 03/01/2023 02:20 PM    BLOODU NEGATIVE 03/01/2023 02:20 PM    SPECGRAV 1.015 08/12/2016 06:59 PM    GLUCOSEU NEGATIVE 03/01/2023 02:20 PM       Radiology:  XR CHEST PORTABLE   Final Result   1. Normal heart size. Evidence for old, healed granulomatous disease. 2. No acute findings. No infiltrates or effusions are seen. **This report has been created using voice recognition software. It may contain minor errors which are inherent in voice recognition technology. **      Final report electronically signed by Dr. Sindy Baptiste on 3/1/2023 1:29 PM          Diet: Diet NPO    DVT prophylaxis: [x] Lovenox                                 [] SCDs                                 [] SQ Heparin                                 [] Encourage ambulation           [] Already on Anticoagulation     Disposition:    [x] Home       [] TCU       [] Rehab       [] Psych       [] SNF       [] [place]       [] Other-    Code Status: Full Code    PT/OT Eval Status: ordered        Electronically signed by MICHELLE Calhoun CNP on 3/3/2023 at 12:03 PM

## 2023-03-03 NOTE — OP NOTE
Operative Note      Patient: Melba Ledesma  YOB: 1970  MRN: [id number]     Date of Procedure: 3/3/2023     Pre-Op Diagnosis: Osteomyelitis of right foot, unspecified type ([facility] [M86.9]     Post-Op Diagnosis: Same       Procedure(s):  RIGHT LISFRANC FOOT AMPUTATION  Excision of 7cm wound, right foot     Surgeon([facility]  [facility]     Assistant:  Resident: Jag Griffiths DPM     Anesthesia: General     Estimated Blood Loss (mL): less than 511      Complications: None     Hemostasis: thigh tourniquet at 350 mmHg     Injectables: 20 cc of 0.5% Marcaine plain and 30 cc of 1% Lidocaine plain      Materials: 3-0 Vicryl; 3-0 monocryl; 4-0 monocryl; 2-0 Nylon        Specimens:   ID Type Source Tests Collected by Time Destination   A : Right lisfranc foot amputation  Tissue Foot SURGICAL PATHOLOGY [facility] 3/3/2023 1212     B : right foot ulcer  Tissue Foot SURGICAL PATHOLOGY [facility] 3/3/2023 1256           Implants:  * No implants in log *      Drains: * No LDAs found *     Findings: Same as above    Detailed Description of Procedure: Indications: Patient is a 47 y.o. male with a chief complaint of osteomyelitis of the right foot with a non- healing wound who is being seen by Dr. Casandra Bolivar and being treated for said chief complaint. At this time all conservative options have been exhausted and surgical intervention is necessary. The procedure has been explained to the patient and they understand the risks, benefits and possible complications including infection, incision dehiscence, DVT, CRPS, loos of limb, loss of life. No promises have been made as to surgical outcome. Procedure: The patient was transported from the pre-op holding to the operating room and placed in a supine position. A pneumatic thigh tourniquet was applied to the right  thigh. A pre-operative injection of 20cc of 0.5% marcaine plain was injected into the right foot in a ankle block.   The right foot was then prepped and draped in the normal aspetic manner. The right foot was then exsanguinated with an esmark and the tourniquet was inflated to 350 mmHg. Attention was then directed to the right foot. A skin marker was used to draw the incision line that included excising the wound, which was 7cm, located at the distal TMA stump. The incision expanded proximally in a curved fashion. A fishmouth type incision was made with an elliptical excision of redundant tissue dorsally. A 15 blade was used to free up all soft tissue from the metatarsals. A key elevator was then used to reflect dorsal soft tissue. A 15 blade and bone clamp were used to excise metatarsals 1-4, the 5th metatarsal was previously excised. The tourniquet was let down half way through the case. Careful attention was given to any bleeding vessels, which were clamped off with a hemostat and tied off with vicryl. There was no purulence and necrotic tissue noted along the dorsal or plantar flaps. The inflammation tissue was excised from the plantar flap. The distal forefoot was then removed and sent for pathology. Any nonviable tissue was excised and any small bleeding vessels were controlled with electrocautery. The incision was flushed with copious amounts of irresept. The subcutaneous tissues were re-appoximated with 3-0vicryl. The skin was closed using 3-0 monocryl, 4-0 monocyl, and 2-0 Nylon. A post-op injection of 30 cc of 1% Lidocaine plain was injected. The incision was dressed with adaptic, 4x4's, ABDs,  kerlix, ACE. A posterior splint with cast padding and ACE wrap was then applied. The patient was transported to the PACU with VSS and NVS intact right foot stump of the right foot. No complications were noted throughout the procedure. The patient is to be discharged per PACU protocol. The patient is to follow-up with Dr. Gregg Schwab in the office.     JADE Bang, JADE, PGY2  Foot and Ankle Surgical Resident  3/3/2023  1:44 PM  .      Electronically signed by Janie Leslie DPM on 3/3/2023 at 1:44 PM

## 2023-03-03 NOTE — PROGRESS NOTES
[facility]  PHYSICAL THERAPY MISSED TREATMENT NOTE  [facility]    Date: 3/3/2023  Patient Name: Carmen Levin        MRN: [id number]   : 1970  (47 y.o.)  Gender: male                REASON FOR MISSED TREATMENT:  Order received. Pt is scheduled for surgery today for partial foot amputation and prefers to wait until tomorrow. Chris Jin.  Kaity Grewal [place] [address]

## 2023-03-03 NOTE — ANESTHESIA POSTPROCEDURE EVALUATION
Department of Anesthesiology  Postprocedure Note    Patient: Nomi Lunsford  MRN: [id number]  YOB: 1970  Date of evaluation: 3/3/2023      Procedure Summary     Date: 03/03/23 Room / Location: [address]    Anesthesia Start: 1138 Anesthesia Stop: 1587    Procedure: RIGHT LISFRANC FOOT AMPUTATION (Right: Foot) Diagnosis:       Osteomyelitis of right foot, unspecified type ([address]      (Osteomyelitis of right foot, unspecified type ([address] [M86.9])    Surgeons: Sandra Evans DPM Responsible Provider: Domo Maxwell MD    Anesthesia Type: general ASA Status: 3          Anesthesia Type: No value filed.     Garrett Phase I: Garrett Score: 9    Garrett Phase II:        Anesthesia Post Evaluation    Patient location during evaluation: PACU  Patient participation: complete - patient participated  Level of consciousness: awake and alert  Airway patency: patent  Nausea & Vomiting: no nausea  Complications: no  Cardiovascular status: blood pressure returned to baseline and hemodynamically stable  Respiratory status: acceptable and spontaneous ventilation  Hydration status: euvolemic

## 2023-03-03 NOTE — PROGRESS NOTES
1351 Awake and oriented on arrival to PACU with O2 3L NC , HOB elevated , pt c/o 9 back pain , denies any pain in Rt foot   1355 medicated with Dilaudid 0.5 mg IV  1400 pain unchanged , medicated with   1406 Accucheck 136,  no action taken   1425 talking with staff denies any needs  1432 meets criteria for discharge , transported to [address] on O2

## 2023-03-03 NOTE — H&P
[facility]  History and Physical Update    Pt Name: Pamela Poster  MRN: [id number]  YOB: 1970  Date of evaluation: 3/3/2023    I have examined the patient and reviewed the H&P/Consult and there are no changes to the patient or plans.       Emily [facility], DPNICO,FACFAS  Electronically signed 3/3/2023 at 11:44 AM

## 2023-03-03 NOTE — PLAN OF CARE
Problem: Chronic Conditions and Co-morbidities  Goal: Patient's chronic conditions and co-morbidity symptoms are monitored and maintained or improved  3/3/2023 0837 by Rory Blackburn RN  Outcome: Progressing  Flowsheets (Taken 3/3/2023 8291)  Care Plan - Patient's Chronic Conditions and Co-Morbidity Symptoms are Monitored and Maintained or Improved:   Monitor and assess patient's chronic conditions and comorbid symptoms for stability, deterioration, or improvement   Collaborate with multidisciplinary team to address chronic and comorbid conditions and prevent exacerbation or deterioration      Problem: Discharge Planning  Goal: Discharge to home or other facility with appropriate resources  3/3/2023 0837 by Rory Blackburn RN  Outcome: Progressing  Flowsheets (Taken 3/3/2023 6306)  Discharge to home or other facility with appropriate resources:   Identify barriers to discharge with patient and caregiver   Arrange for needed discharge resources and transportation as appropriate   Identify discharge learning needs (meds, wound care, etc)     Problem: Pain  Goal: Verbalizes/displays adequate comfort level or baseline comfort level  3/3/2023 0837 by Rory Blackburn RN  Outcome: Progressing  Flowsheets (Taken 3/3/2023 4045)  Verbalizes/displays adequate comfort level or baseline comfort level:   Encourage patient to monitor pain and request assistance   Assess pain using appropriate pain scale   Implement non-pharmacological measures as appropriate and evaluate response   Administer analgesics based on type and severity of pain and evaluate response     Problem: Cardiovascular - Adult  Goal: Maintains optimal cardiac output and hemodynamic stability  3/3/2023 0837 by Rory Blackburn RN  Outcome: Progressing  Flowsheets (Taken 3/3/2023 9421)  Maintains optimal cardiac output and hemodynamic stability:   Monitor blood pressure and heart rate   Monitor urine output and notify Licensed Independent Practitioner for values outside of normal range     Problem: Skin/Tissue Integrity - Adult  Goal: Skin integrity remains intact  3/3/2023 0837 by Vikas Louise RN  Outcome: Progressing  Flowsheets (Taken 3/3/2023 0091)  Skin Integrity Remains Intact:   Monitor for areas of redness and/or skin breakdown   Assess vascular access sites hourly     Problem: Musculoskeletal - Adult  Goal: Return mobility to safest level of function  3/3/2023 0837 by Vikas Louise RN  Outcome: Progressing  Flowsheets (Taken 3/3/2023 3705)  Return Mobility to Safest Level of Function:   Assess patient stability and activity tolerance for standing, transferring and ambulating with or without assistive devices   Assist with transfers and ambulation using safe patient handling equipment as needed   Ensure adequate protection for wounds/incisions during mobilization     Problem: Infection - Adult  Goal: Absence of infection during hospitalization  3/3/2023 0837 by Vikas Loiuse RN  Outcome: Progressing  Flowsheets (Taken 3/3/2023 7757)  Absence of infection during hospitalization:   Assess and monitor for signs and symptoms of infection   Monitor lab/diagnostic results   Administer medications as ordered   [place] appropriate cooling/warming therapies per order

## 2023-03-03 NOTE — PROGRESS NOTES
[facility]  OCCUPATIONAL THERAPY MISSED TREATMENT NOTE  [facility]  6E-56/056-A      Date: 3/3/2023  Patient Name: Normie Lennox        CSN: [id number]   : 1970  (47 y.o.)  Gender: male                REASON FOR MISSED TREATMENT:  per chart review; \"Order received. Pt is scheduled for surgery today for partial foot amputation and prefers to wait until tomorrow. \" OT to check back tomorrow.

## 2023-03-03 NOTE — PROGRESS NOTES
Patient arrived to [phone] by bed following surgery. Awake and oriented. States pain is 0/10 in surgical foot and 9/10 in back which is chronic. SP02 saturations 100% on 3 liters nasal cannula. Lungs clear. Incision RAHUL, but splint and dressing dry and intact. SCD's on bilaterally. IV 0.9NS infusing into LAC without complications. Care board reviewed with patient. Hourly rounding explained. Bed alarm turned on. Call light within reach.

## 2023-03-03 NOTE — PROGRESS NOTES
Patient in Pre-Op holding area with no family present. Patient verified surgical and anesthesia consents.     Patient arrived to OR with no hearing aides, jewelry, dentures, glasses or clothing    Nasal Swabs: Done prior to start of procedure  Temp: 97.8

## 2023-03-03 NOTE — PROGRESS NOTES
Patient in bed. Calm pleasant interactions with staff. Oriented to person, place, time. Speech clear. Mucous membranes pink/moist. Denies difficulty swallowing. Skin warm, dry to touch. Lung sounds clear anterior,posterior,lateral. Respirations regular, strong. No cough noted. Heart sounds regular and strong. Bowel sounds active in all four quadrants. Abdomen round, soft,non-tender to palpations. Passing gas and urine no difficulty. Radial pulse strong bilaterally. INT left forearm infusing, no redness swelling,or pain at site. Hand grasp strong bilaterally. Arm drift negative. Capillary refill less than 3 seconds. Skin turgor brisk. Edema present in lower extremities, non-pitting. Pedal pulses weak bilaterally. Pedal push and pull strong bilateral. Bed in low position. Call light in reach. Over bed table with in reach, no further needs at this time.

## 2023-03-04 LAB
ANION GAP SERPL CALC-SCNC: 9 MEQ/L (ref 8–16)
BASOPHILS ABSOLUTE: 0 THOU/MM3 (ref 0–0.1)
BASOPHILS NFR BLD AUTO: 0.5 %
BUN SERPL-MCNC: 9 MG/DL (ref 7–22)
CALCIUM SERPL-MCNC: 8.1 MG/DL (ref 8.5–10.5)
CHLORIDE SERPL-SCNC: 104 MEQ/L (ref 98–111)
CO2 SERPL-SCNC: 29 MEQ/L (ref 23–33)
CREAT SERPL-MCNC: 1 MG/DL (ref 0.4–1.2)
DEPRECATED RDW RBC AUTO: 42.5 FL (ref 35–45)
EOSINOPHIL NFR BLD AUTO: 3.3 %
EOSINOPHILS ABSOLUTE: 0.3 THOU/MM3 (ref 0–0.4)
ERYTHROCYTE [DISTWIDTH] IN BLOOD BY AUTOMATED COUNT: 12.9 % (ref 11.5–14.5)
GFR SERPL CREATININE-BSD FRML MDRD: > 60 ML/MIN/1.73M2
GLUCOSE BLD STRIP.AUTO-MCNC: 132 MG/DL (ref 70–108)
GLUCOSE BLD STRIP.AUTO-MCNC: 133 MG/DL (ref 70–108)
GLUCOSE BLD STRIP.AUTO-MCNC: 148 MG/DL (ref 70–108)
GLUCOSE BLD STRIP.AUTO-MCNC: 195 MG/DL (ref 70–108)
GLUCOSE SERPL-MCNC: 139 MG/DL (ref 70–108)
HCT VFR BLD AUTO: 34.1 % (ref 42–52)
HGB BLD-MCNC: 11.2 GM/DL (ref 14–18)
IMM GRANULOCYTES # BLD AUTO: 0.03 THOU/MM3 (ref 0–0.07)
IMM GRANULOCYTES NFR BLD AUTO: 0.4 %
LYMPHOCYTES ABSOLUTE: 1.5 THOU/MM3 (ref 1–4.8)
LYMPHOCYTES NFR BLD AUTO: 19.6 %
MCH RBC QN AUTO: 30.1 PG (ref 26–33)
MCHC RBC AUTO-ENTMCNC: 32.8 GM/DL (ref 32.2–35.5)
MCV RBC AUTO: 91.7 FL (ref 80–94)
MONOCYTES ABSOLUTE: 0.7 THOU/MM3 (ref 0.4–1.3)
MONOCYTES NFR BLD AUTO: 8.7 %
NEUTROPHILS NFR BLD AUTO: 67.5 %
NRBC BLD AUTO-RTO: 0 /100 WBC
PLATELET # BLD AUTO: 187 THOU/MM3 (ref 130–400)
PMV BLD AUTO: 9.9 FL (ref 9.4–12.4)
POTASSIUM SERPL-SCNC: 4.3 MEQ/L (ref 3.5–5.2)
RBC # BLD AUTO: 3.72 MILL/MM3 (ref 4.7–6.1)
SEGMENTED NEUTROPHILS ABSOLUTE COUNT: 5.2 THOU/MM3 (ref 1.8–7.7)
SODIUM SERPL-SCNC: 142 MEQ/L (ref 135–145)
WBC # BLD AUTO: 7.7 THOU/MM3 (ref 4.8–10.8)

## 2023-03-04 PROCEDURE — 97162 PT EVAL MOD COMPLEX 30 MIN: CPT

## 2023-03-04 PROCEDURE — 6360000002 HC RX W HCPCS

## 2023-03-04 PROCEDURE — 97166 OT EVAL MOD COMPLEX 45 MIN: CPT

## 2023-03-04 PROCEDURE — 97530 THERAPEUTIC ACTIVITIES: CPT

## 2023-03-04 PROCEDURE — 36415 COLL VENOUS BLD VENIPUNCTURE: CPT

## 2023-03-04 PROCEDURE — 80048 BASIC METABOLIC PNL TOTAL CA: CPT

## 2023-03-04 PROCEDURE — 85025 COMPLETE CBC W/AUTO DIFF WBC: CPT

## 2023-03-04 PROCEDURE — 82948 REAGENT STRIP/BLOOD GLUCOSE: CPT

## 2023-03-04 PROCEDURE — 6370000000 HC RX 637 (ALT 250 FOR IP)

## 2023-03-04 PROCEDURE — 99232 SBSQ HOSP IP/OBS MODERATE 35: CPT | Performed by: NURSE PRACTITIONER

## 2023-03-04 PROCEDURE — 1200000000 HC SEMI PRIVATE

## 2023-03-04 PROCEDURE — 94640 AIRWAY INHALATION TREATMENT: CPT

## 2023-03-04 PROCEDURE — 2580000003 HC RX 258

## 2023-03-04 RX ADMIN — MORPHINE SULFATE 4 MG: 4 INJECTION, SOLUTION INTRAMUSCULAR; INTRAVENOUS at 12:07

## 2023-03-04 RX ADMIN — Medication 2000 MG: at 17:35

## 2023-03-04 RX ADMIN — ENOXAPARIN SODIUM 60 MG: 100 INJECTION SUBCUTANEOUS at 20:22

## 2023-03-04 RX ADMIN — POLYETHYLENE GLYCOL 3350 17 G: 17 POWDER, FOR SOLUTION ORAL at 18:17

## 2023-03-04 RX ADMIN — BUPROPION HYDROCHLORIDE 200 MG: 100 TABLET, FILM COATED, EXTENDED RELEASE ORAL at 20:22

## 2023-03-04 RX ADMIN — Medication 1 TABLET: at 09:03

## 2023-03-04 RX ADMIN — MORPHINE SULFATE 4 MG: 4 INJECTION, SOLUTION INTRAMUSCULAR; INTRAVENOUS at 20:28

## 2023-03-04 RX ADMIN — DOXEPIN HYDROCHLORIDE 10 MG: 10 CAPSULE ORAL at 00:34

## 2023-03-04 RX ADMIN — Medication 100 MG: at 09:03

## 2023-03-04 RX ADMIN — SODIUM CHLORIDE: 9 INJECTION, SOLUTION INTRAVENOUS at 05:10

## 2023-03-04 RX ADMIN — MOMETASONE FUROATE AND FORMOTEROL FUMARATE DIHYDRATE 2 PUFF: 200; 5 AEROSOL RESPIRATORY (INHALATION) at 09:14

## 2023-03-04 RX ADMIN — DOXEPIN HYDROCHLORIDE 10 MG: 10 CAPSULE ORAL at 20:22

## 2023-03-04 RX ADMIN — MORPHINE SULFATE 4 MG: 4 INJECTION, SOLUTION INTRAMUSCULAR; INTRAVENOUS at 18:10

## 2023-03-04 RX ADMIN — Medication 2000 MG: at 09:11

## 2023-03-04 RX ADMIN — OXYCODONE 10 MG: 5 TABLET ORAL at 07:53

## 2023-03-04 RX ADMIN — ENOXAPARIN SODIUM 60 MG: 100 INJECTION SUBCUTANEOUS at 09:05

## 2023-03-04 RX ADMIN — OXYCODONE 10 MG: 5 TABLET ORAL at 03:43

## 2023-03-04 RX ADMIN — Medication 2000 MG: at 00:35

## 2023-03-04 RX ADMIN — PANTOPRAZOLE SODIUM 40 MG: 40 TABLET, DELAYED RELEASE ORAL at 05:18

## 2023-03-04 RX ADMIN — ATORVASTATIN CALCIUM 40 MG: 40 TABLET, FILM COATED ORAL at 20:22

## 2023-03-04 RX ADMIN — BUPROPION HYDROCHLORIDE 200 MG: 100 TABLET, FILM COATED, EXTENDED RELEASE ORAL at 09:03

## 2023-03-04 RX ADMIN — METOPROLOL SUCCINATE 25 MG: 25 TABLET, FILM COATED, EXTENDED RELEASE ORAL at 09:03

## 2023-03-04 RX ADMIN — MORPHINE SULFATE 4 MG: 4 INJECTION, SOLUTION INTRAMUSCULAR; INTRAVENOUS at 10:00

## 2023-03-04 RX ADMIN — MORPHINE SULFATE 4 MG: 4 INJECTION, SOLUTION INTRAMUSCULAR; INTRAVENOUS at 05:13

## 2023-03-04 RX ADMIN — ASPIRIN 81 MG: 81 TABLET ORAL at 09:03

## 2023-03-04 RX ADMIN — MOMETASONE FUROATE AND FORMOTEROL FUMARATE DIHYDRATE 2 PUFF: 200; 5 AEROSOL RESPIRATORY (INHALATION) at 20:11

## 2023-03-04 RX ADMIN — TIOTROPIUM BROMIDE INHALATION SPRAY 2 PUFF: 3.12 SPRAY, METERED RESPIRATORY (INHALATION) at 09:14

## 2023-03-04 RX ADMIN — MORPHINE SULFATE 4 MG: 4 INJECTION, SOLUTION INTRAMUSCULAR; INTRAVENOUS at 14:21

## 2023-03-04 ASSESSMENT — PAIN DESCRIPTION - FREQUENCY
FREQUENCY: CONTINUOUS

## 2023-03-04 ASSESSMENT — PAIN SCALES - GENERAL
PAINLEVEL_OUTOF10: 8
PAINLEVEL_OUTOF10: 9
PAINLEVEL_OUTOF10: 8
PAINLEVEL_OUTOF10: 7
PAINLEVEL_OUTOF10: 8
PAINLEVEL_OUTOF10: 8

## 2023-03-04 ASSESSMENT — PAIN DESCRIPTION - DESCRIPTORS
DESCRIPTORS_2: THROBBING
DESCRIPTORS: ACHING
DESCRIPTORS: BURNING
DESCRIPTORS: ACHING
DESCRIPTORS: BURNING
DESCRIPTORS: BURNING

## 2023-03-04 ASSESSMENT — PAIN DESCRIPTION - ORIENTATION
ORIENTATION: RIGHT
ORIENTATION_2: LOWER
ORIENTATION: RIGHT

## 2023-03-04 ASSESSMENT — PAIN DESCRIPTION - LOCATION
LOCATION: FOOT
LOCATION_2: BACK
LOCATION: FOOT
LOCATION: FOOT

## 2023-03-04 ASSESSMENT — PAIN - FUNCTIONAL ASSESSMENT
PAIN_FUNCTIONAL_ASSESSMENT: ACTIVITIES ARE NOT PREVENTED
PAIN_FUNCTIONAL_ASSESSMENT: PREVENTS OR INTERFERES SOME ACTIVE ACTIVITIES AND ADLS
PAIN_FUNCTIONAL_ASSESSMENT_SITE2: ACTIVITIES ARE NOT PREVENTED
PAIN_FUNCTIONAL_ASSESSMENT: ACTIVITIES ARE NOT PREVENTED
PAIN_FUNCTIONAL_ASSESSMENT: PREVENTS OR INTERFERES SOME ACTIVE ACTIVITIES AND ADLS
PAIN_FUNCTIONAL_ASSESSMENT: ACTIVITIES ARE NOT PREVENTED
PAIN_FUNCTIONAL_ASSESSMENT: ACTIVITIES ARE NOT PREVENTED

## 2023-03-04 ASSESSMENT — PAIN DESCRIPTION - PAIN TYPE
TYPE: SURGICAL PAIN

## 2023-03-04 ASSESSMENT — PAIN DESCRIPTION - ONSET
ONSET: ON-GOING

## 2023-03-04 ASSESSMENT — PAIN DESCRIPTION - INTENSITY: RATING_2: 6

## 2023-03-04 NOTE — PROGRESS NOTES
Assessment: This was a spiritual care encounter as a part of rounds. Patient, Jeanette Zayas, was oriented and alert. Patient appeared calm. Interventions:   provided empathic listening and facilitated storytelling with patient about his illness and its impact.  provided information regarding  services and availability. Outcomes:  Patient debriefed his recent procedure and expressed acceptance about his current situation. He expressed gratitude for the support of his brother, who has been really helpful through patient's many surgeries. Plan:  Spiritual care team will remain available to support patient and family, PRN. [address] [place], [place]. [address] [place] [address] Hany Cordero, [address]  [phone]

## 2023-03-04 NOTE — PROGRESS NOTES
[facility]  INPATIENT PHYSICAL THERAPY  EVALUATION  [facility] - 6E-56/056-A    Time In: 1350  Time Out: 1407  Timed Code Treatment Minutes: 8 Minutes  Minutes: 17          Date: 3/4/2023  Patient Name: Gracie Arroyo,  Gender:  male        MRN: [id number]  : 1970  (47 y.o.)      Referring Practitioner: Regina Grayson DPM  Diagnosis: Dm foot ulcer with osteomyelitis  Additional Pertinent Hx: Pt is a 47 y.o. male s/p RIGHT LISFRANC FOOT AMPUTATION on 3/3/23     Restrictions/Precautions:  Restrictions/Precautions: Fall Risk, Weight Bearing, General Precautions  Right Lower Extremity Weight Bearing: Non Weight Bearing    Subjective:  Chart Reviewed: Yes  Patient assessed for rehabilitation services?: Yes  Subjective: pt recently back to bed but agreeable for PT eval and then return to bed, pt stated inc pain in leg and had ice pack upon arrival    General:        Hearing: Within functional limits       Pain: not rated, BLE    Vitals: Vitals not assessed per clinical judgement, see nursing flowsheet    Social/Functional History:    Lives With: Alone  Type of Home: Apartment  Home Layout: One level  Home Access: Level entry  Home Equipment: Claudio Janus, rolling, [place], Electric scooter     Bathroom Shower/Tub: Walk-in shower, Shower chair with back  [employer]: Handicap height  Bathroom Equipment: Grab bars in shower  Bathroom Accessibility: Accessible  IADL Comments: Pt's brother is able to help with homemaking routine.        ADL Assistance: Independent  Homemaking Assistance: Needs assistance  Ambulation Assistance: Independent  Transfer Assistance: Independent    Active : No  Occupation: On disability       OBJECTIVE:  Range of Motion:  Bilateral Lower Extremity: WFL, except right ankle NT    Strength:  Bilateral Lower Extremity: >/=4/5 except right ankel NT    Balance:  Static Sitting Balance:  Supervision  Static Standing Balance: Contact Guard Assistance, to SBA at [place], maintained NWB RLE, tolerated around 1 min    Bed Mobility:  Rolling to Left: Supervision   Rolling to Right: Supervision   Supine to Sit: Stand By Assistance  Sit to Supine: Contact Guard Assistance   Scooting: Stand By Assistance  HOB up 20 degrees, no BR  Transfers:  Sit to Stand: Contact Guard Assistance  Stand to Sit:Contact Guard Assistance  Maintained NWB RLE, edge of bed, WBOS  Ambulation:  Contact Guard Assistance, to SBA  Distance: 2'x1  Surface: Level Tile  Device:Rolling Walker  Gait Deviations: Forward Flexed Posture, Slow [place]odi, and maintained NWB RLE, WBOS, fatigued quickly with c/o left wrist discomfort from hx of injury, reviewed use of platform for walker to dec WB through left wrist-trial next session    Exercise:  Verbally reviewed sup BLE therex and pt stated is always moving due to discomfort. Exercises were completed for increased independence with functional mobility. Functional Outcome Measures: Completed  -PAC Inpatient Mobility without Stair Climbing Raw Score : 15  AM-PAC Inpatient without Stair Climbing T-Scale Score : 43.03    ASSESSMENT:  Activity Tolerance:  Patient tolerance of  treatment: fair. Treatment Initiated: Treatment and education initiated within context of evaluation. Evaluation time included review of current medical information, gathering information related to past medical, social and functional history, completion of standardized testing, formal and informal observation of tasks, assessment of data and development of plan of care and goals. Treatment time included skilled education and facilitation of tasks to increase safety and independence with functional mobility for improved independence and quality of life. Assessment:   Body Structures, Functions, Activity Limitations Requiring Skilled Therapeutic Intervention: Decreased functional mobility , Decreased endurance, Decreased balance, Decreased tolerance to work activity, Decreased strength, Increased pain  Assessment: pt with pain in RLE s/p surgery, nerve pain from back into LLE-chronic per pt, NWB RLE, use of walker, Audrey Saldivar is a 47 y.o. male that presents with Dm foot ulcer with osteomyelitis. he is ind prior to admission now requiring assist for basic mobility. Pt demonstrates a decrease in baseline by way of bed mobility, transfers and ambulation secondary to decreased activity tolerance, strength, fatigue, and balance deficits. Pt will benefit from skilled PT services throughout admission and beyond hospital discharge for improvements in functional mobility and in order to decrease fall risk and return pt to PLOF. Therapy Prognosis: Good    Requires PT Follow-Up: Yes    Discharge Recommendations:  Discharge Recommendations: Continue to assess pending progress, Subacute/Skilled Nursing Facility, Patient would benefit from continued therapy after discharge    Patient Education:      .     Patient Education  Education Given To: Patient  Education Provided: Role of Therapy, Plan of Care, Precautions  Education Method: Verbal  Barriers to Learning: None  Education Outcome: Verbalized understanding, Demonstrated understanding       Equipment Recommendations:  Equipment Needed: No    Plan:  Current Treatment Recommendations: Strengthening, Balance training, Gait training, Functional mobility training, Transfer training, Endurance training, Pain management, Patient/Caregiver education & training, Safety education & training, Therapeutic activities  General Plan:  (5X O)    Goals:  Patient Goals : less pain  Short Term Goals  Time Frame for Short Term Goals: by discharge  Short Term Goal 1: bed mobility with S to get in/out of bed  Short Term Goal 2: transfer with SBA to get in/out of chairs, maintaining NWB RLE  Short Term Goal 3: amb >10'x1 with RW, NWB RLE, and SBA to walk safely to bathroom  Long Term Goals  Time Frame for Long Term Goals : no LTGs set secondary to short ELOS    Following session, patient left in safe position with all fall risk precautions in place.

## 2023-03-04 NOTE — PLAN OF CARE
Problem: Chronic Conditions and Co-morbidities  Goal: Patient's chronic conditions and co-morbidity symptoms are monitored and maintained or improved  3/3/2023 2155 by Nelly Sosa RN  Outcome: Progressing  Flowsheets (Taken 3/3/2023 2155)  Care Plan - Patient's Chronic Conditions and Co-Morbidity Symptoms are Monitored and Maintained or Improved:   Monitor and assess patient's chronic conditions and comorbid symptoms for stability, deterioration, or improvement   Collaborate with multidisciplinary team to address chronic and comorbid conditions and prevent exacerbation or deterioration   Update acute care plan with appropriate goals if chronic or comorbid symptoms are exacerbated and prevent overall improvement and discharge     Problem: Discharge Planning  Goal: Discharge to home or other facility with appropriate resources  3/3/2023 2155 by Nelly Sosa RN  Outcome: Progressing  Flowsheets (Taken 3/3/2023 2155)  Discharge to home or other facility with appropriate resources:   Identify barriers to discharge with patient and caregiver   Identify discharge learning needs (meds, wound care, etc)   Refer to discharge planning if patient needs post-hospital services based on physician order or complex needs related to functional status, cognitive ability or social support system   Arrange for needed discharge resources and transportation as appropriate     Problem: Pain  Goal: Verbalizes/displays adequate comfort level or baseline comfort level  3/3/2023 2155 by Nelly Sosa RN  Outcome: Progressing  Flowsheets (Taken 3/3/2023 2155)  Verbalizes/displays adequate comfort level or baseline comfort level:   Encourage patient to monitor pain and request assistance   Administer analgesics based on type and severity of pain and evaluate response   Assess pain using appropriate pain scale   Implement non-pharmacological measures as appropriate and evaluate response     Problem: Cardiovascular - Adult  Goal: Maintains optimal cardiac output and hemodynamic stability  3/3/2023 2155 by Lars Wasserman RN  Outcome: Progressing  Flowsheets (Taken 3/3/2023 2155)  Maintains optimal cardiac output and hemodynamic stability:   Monitor blood pressure and heart rate   Monitor urine output and notify Licensed Independent Practitioner for values outside of normal range     Problem: Skin/Tissue Integrity - Adult  Goal: Skin integrity remains intact  3/3/2023 2155 by Lars Wasserman RN  Outcome: Progressing  Flowsheets (Taken 3/3/2023 2155)  Skin Integrity Remains Intact: Monitor for areas of redness and/or skin breakdown     Problem: Skin/Tissue Integrity - Adult  Goal: Incisions, wounds, or drain sites healing without S/S of infection  3/3/2023 2155 by Lars Wasserman RN  Outcome: Progressing  Flowsheets (Taken 3/3/2023 2155)  Incisions, Wounds, or Drain Sites Healing Without Sign and Symptoms of Infection:   ADMISSION and DAILY: Assess and document risk factors for pressure ulcer development   TWICE DAILY: Assess and document skin integrity   TWICE DAILY: Assess and document dressing/incision, wound bed, drain sites and surrounding tissue     Problem: Musculoskeletal - Adult  Goal: Return mobility to safest level of function  3/3/2023 2155 by Lars Wasserman RN  Outcome: Progressing  Flowsheets (Taken 3/3/2023 2155)  Return Mobility to Safest Level of Function:   Assess patient stability and activity tolerance for standing, transferring and ambulating with or without assistive devices   Assist with transfers and ambulation using safe patient handling equipment as needed   Ensure adequate protection for wounds/incisions during mobilization   Obtain physical therapy/occupational therapy consults as needed   Instruct patient/family in ordered activity level     Problem: Musculoskeletal - Adult  Goal: Maintain proper alignment of affected body part  3/3/2023 2155 by Lars Wasserman RN  Outcome: Progressing  Flowsheets (Taken 3/3/2023 2155)  Maintain proper alignment of affected body part:   Support and protect limb and body alignment per provider's orders   Instruct and reinforce with patient and family use of appropriate assistive device and precautions (e.g. spinal or hip dislocation precautions)     Problem: Musculoskeletal - Adult  Goal: Return ADL status to a safe level of function  3/3/2023 2155 by Deepika Spears RN  Outcome: Progressing  Flowsheets (Taken 3/3/2023 2155)  Return ADL Status to a Safe Level of Function:   Administer medication as ordered   Assess activities of daily living deficits and provide assistive devices as needed   Obtain physical therapy/occupational therapy consults as needed   Assist and instruct patient to increase activity and self care as tolerated     Problem: Infection - Adult  Goal: Absence of infection during hospitalization  3/3/2023 2155 by Deepika Spears RN  Outcome: Progressing  Flowsheets (Taken 3/3/2023 2155)  Absence of infection during hospitalization:   Assess and monitor for signs and symptoms of infection   Monitor lab/diagnostic results   Monitor all insertion sites i.e., indwelling lines, tubes and drains   [place] appropriate cooling/warming therapies per order   Administer medications as ordered   Instruct and encourage patient and family to use good hand hygiene technique   Identify and instruct in appropriate isolation precautions for identified infection/condition     Problem: Safety - Adult  Goal: Free from fall injury  Outcome: Progressing  Flowsheets (Taken 3/3/2023 2155)  Free From Fall Injury:   Instruct family/caregiver on patient safety   Based on caregiver fall risk screen, instruct family/caregiver to ask for assistance with transferring infant if caregiver noted to have fall risk factors   Care plan reviewed with patient. Patient verbalized understanding of the plan of care and contribute to goal setting.

## 2023-03-04 NOTE — PROGRESS NOTES
Hospitalist Progress Note    Patient:  Claudine Martínez      Unit/Bed:6E-56/056-A    YOB: 1970    MRN: [id number]       Acct: [de-identified]     PCP: MICHELLE Maldonado CNP    Date of Admission: 3/1/2023    Assessment/Plan:    Right diabetic foot ulcer with osteomyelitis s/p lisfranc foot amputation: MRI that was obtained at an outside hospital 2/27 exhibited probable osteomyelitis of the third metatarsal stump. Failed outpatient doxycycline course. Continue IV Cefepime. ID following. Acute blood loss anemia, postoperative and dilutional. Preop hgb 15.6, down to 11.2 this AM. Monitor. Lactic acidosis, resolved: mild, no leukocytosis, left shift, tachycardia, tachypnea, hypotension, or fever to suggest sepsis. Continue gentle hydration. IDDM: A1c 6.0%. Continue Trulicity (patient to provide) every Sunday. Accuchecks with low dose corrective coverage. BS trend has been in goal. Hypoglycemic protocol. Diabetic diet. CAD, reported. No history of intervention: cont ASA  Essential HTN: home medications reviewed and include metoprolol, lisinopril, and midodrine. Patient endorses taking this regimen. Chart reviewed, no office notes available. Metoprolol has been continued with hold parameters. Continue to hold lisinopril and midodrine and monitor BP. HLP: cont statin  COPD: not in acute exacerbation. Cont home inhalers. Morbid obesity Body mass index is 52.11 kg/m².   Alcohol abuse: reports no alcohol intake for over one month  Tobacco abuse: NRT prn, cessation education      Chief Complaint: diabetic foot ulcer    Hospital Course:     Claudine Martínez is a 47 y.o. male with PMHx of CAD, HTN, HLP, COPD, alcohol and tobacco abuse, IDDM with extensive h/o diabetic foot ulcerations s/p multiple amputations and surgeries of BLE who presented to [facility] at the direction of podiatrist, Dr Royal Desir following a MRI that was obtained at an outside hospital 2/27 that exhibited probable osteomyelitis of the third metatarsal stump. Patient alert, oriented x3, pleasant. Reports right diabetic ulcer continues to worsen despite podiatry care and Doxycycline course. Denies fever but states he has had intermittent chills and poor appetite. Denies shortness of breath, chest pain, dysuria, or diarrhea. Reports NWB status on R foot however admits to ambulating.      On presentation to ED, patient afebrile, normotensive, O2 sat adequate on room air. Electrolytes and renal function with no abnormalities. Lactic acid mildly elevated (2.0). No leukocytosis or anemia. Wound cultures obtained in ED and pending. Patient received IVF, ordered per sepsis protocol however patient does not meet sepsis criteria therefore discontinued and MIV ordered. Initial dose of cefepime per podiatry recommendations initiated.        Subjective: Pain 8/10 to right foot. No n/v/d. Denies CP/SOB      Medications:  Reviewed    Infusion Medications    sodium chloride 125 mL/hr at 03/04/23 0510    sodium chloride      sodium chloride      lactated ringers IV soln 75 mL/hr at 03/02/23 2222    dextrose       Scheduled Medications    sodium chloride flush  5-40 mL IntraVENous 2 times per day    sodium chloride flush  5-40 mL IntraVENous 2 times per day    enoxaparin  60 mg SubCUTAneous BID    nicotine  1 patch TransDERmal Daily    ceFAZolin  2,000 mg IntraVENous Q8H    aspirin  81 mg Oral Daily    atorvastatin  40 mg Oral Nightly    buPROPion  200 mg Oral BID    metoprolol succinate  25 mg Oral Daily    pantoprazole  40 mg Oral Daily    mometasone-formoterol  2 puff Inhalation BID    tiotropium  2 puff Inhalation Daily    insulin lispro  0-4 Units SubCUTAneous TID     insulin lispro  0-4 Units SubCUTAneous Nightly    [START ON 3/5/2023] Dulaglutide  0.5 mL SubCUTAneous Weekly    thiamine  100 mg Oral Daily    multivitamin  1 tablet Oral Daily     PRN Meds: sodium chloride flush, sodium chloride, ondansetron **OR** ondansetron, oxyCODONE **OR** oxyCODONE, morphine  **OR** morphine, doxepin, sodium chloride flush, sodium chloride, polyethylene glycol, acetaminophen **OR** acetaminophen, albuterol sulfate HFA, glucose, dextrose bolus **OR** dextrose bolus, glucagon (rDNA), dextrose      Intake/Output Summary (Last 24 hours) at 3/4/2023 1045  Last data filed at 3/4/2023 0913  Gross per 24 hour   Intake 3917.26 ml   Output 2945 ml   Net 972.26 ml         Diet:  ADULT DIET; Regular    Exam:  /68   Pulse 94   Temp 97.9 °F (36.6 °C) (Oral)   Resp 20   Ht 6' 1\" (1.854 m)   Wt (!) 395 lb (179.2 kg)   SpO2 100%   BMI 52.11 kg/m²     General appearance: No apparent distress, appears stated age and cooperative.  HEENT: Pupils equal, round, and reactive to light. Conjunctivae/corneas clear.  Neck: Supple, with full range of motion. No jugular venous distention. Trachea midline.  Respiratory:  Normal respiratory effort. Clear to auscultation, bilaterally without Rales/Wheezes/Rhonchi.  Cardiovascular: Regular rate and rhythm with normal S1/S2 without murmurs, rubs or gallops.  Abdomen: Soft, non-tender, non-distended with normal bowel sounds.  Musculoskeletal: passive and active ROM x 4 extremities.  Skin: Skin color, texture, turgor normal. Surgical dressing to right foot.   Neurologic:  Neurovascularly intact without any focal sensory/motor deficits. Cranial nerves: II-XII intact, grossly non-focal.  Psychiatric: Alert and oriented, thought content appropriate, normal insight  Capillary Refill: Brisk,< 3 seconds   Peripheral Pulses: +2 palpable, equal bilaterally       Labs:   Recent Labs     03/01/23  1224 03/03/23  0630 03/04/23  0636   WBC 10.0 6.4 7.7   HGB 15.6 13.9* 11.2*   HCT 45.9 41.9* 34.1*    206 187       Recent Labs     03/01/23  1224 03/03/23  0630 03/04/23  0636    143 142   K 4.2 4.5 4.3   CL 98 105 104   CO2 25 29 29   BUN 11 11 9   CREATININE 1.0 1.0 1.0   CALCIUM 10.0 8.9 8.1*       Recent Labs     03/01/23  1224   AST 26   ALT 36   BILITOT 0.7  ALKPHOS 162*       No results for input(s): INR in the last 72 hours. No results for input(s): Nadir Amber in the last 72 hours. Urinalysis:      Lab Results   Component Value Date/Time    NITRU NEGATIVE 03/01/2023 02:20 PM    BLOODU NEGATIVE 03/01/2023 02:20 PM    SPECGRAV 1.015 08/12/2016 06:59 PM    GLUCOSEU NEGATIVE 03/01/2023 02:20 PM       Radiology:  XR CHEST PORTABLE   Final Result   1. Normal heart size. Evidence for old, healed granulomatous disease. 2. No acute findings. No infiltrates or effusions are seen. **This report has been created using voice recognition software. It may contain minor errors which are inherent in voice recognition technology. **      Final report electronically signed by Dr. Alice Whaley on 3/1/2023 1:29 PM          Diet: ADULT DIET;  Regular    DVT prophylaxis: [x] Lovenox                                 [] SCDs                                 [] SQ Heparin                                 [] Encourage ambulation           [] Already on Anticoagulation     Disposition:    [x] Home       [] TCU       [] Rehab       [] Psych       [] SNF       [] [place]       [] Other-    Code Status: Full Code    PT/OT Eval Status: ordered    Electronically signed by Thomes Skiff, APRN - CNP on 3/4/2023 at 10:45 AM

## 2023-03-04 NOTE — PLAN OF CARE
Problem: Chronic Conditions and Co-morbidities  Goal: Patient's chronic conditions and co-morbidity symptoms are monitored and maintained or improved  Outcome: Progressing  Flowsheets (Taken 3/4/2023 0740)  Care Plan - Patient's Chronic Conditions and Co-Morbidity Symptoms are Monitored and Maintained or Improved:   Monitor and assess patient's chronic conditions and comorbid symptoms for stability, deterioration, or improvement   Collaborate with multidisciplinary team to address chronic and comorbid conditions and prevent exacerbation or deterioration   Update acute care plan with appropriate goals if chronic or comorbid symptoms are exacerbated and prevent overall improvement and discharge     Problem: Discharge Planning  Goal: Discharge to home or other facility with appropriate resources  Outcome: Progressing  Flowsheets (Taken 3/4/2023 0740)  Discharge to home or other facility with appropriate resources:   Identify barriers to discharge with patient and caregiver   Arrange for needed discharge resources and transportation as appropriate   Refer to discharge planning if patient needs post-hospital services based on physician order or complex needs related to functional status, cognitive ability or social support system   Identify discharge learning needs (meds, wound care, etc)     Problem: Pain  Goal: Verbalizes/displays adequate comfort level or baseline comfort level  Outcome: Progressing  Flowsheets (Taken 3/4/2023 0740)  Verbalizes/displays adequate comfort level or baseline comfort level:   Encourage patient to monitor pain and request assistance   Assess pain using appropriate pain scale   Administer analgesics based on type and severity of pain and evaluate response   Implement non-pharmacological measures as appropriate and evaluate response   Notify Licensed Independent Practitioner if interventions unsuccessful or patient reports new pain     Problem: Cardiovascular - Adult  Goal: Maintains optimal cardiac output and hemodynamic stability  Outcome: Progressing  Flowsheets (Taken 3/4/2023 0740)  Maintains optimal cardiac output and hemodynamic stability:   Monitor blood pressure and heart rate   Monitor urine output and notify Licensed Independent Practitioner for values outside of normal range     Problem: Skin/Tissue Integrity - Adult  Goal: Skin integrity remains intact  Outcome: Progressing  Flowsheets  Taken 3/4/2023 1438  Skin Integrity Remains Intact:   Monitor for areas of redness and/or skin breakdown   Assess vascular access sites hourly  Taken 3/4/2023 0740  Skin Integrity Remains Intact:   Monitor for areas of redness and/or skin breakdown   Assess vascular access sites hourly     Problem: Skin/Tissue Integrity - Adult  Goal: Incisions, wounds, or drain sites healing without S/S of infection  Outcome: Progressing  Flowsheets  Taken 3/4/2023 1438  Incisions, Wounds, or Drain Sites Healing Without Sign and Symptoms of Infection: TWICE DAILY: Assess and document dressing/incision, wound bed, drain sites and surrounding tissue  Taken 3/4/2023 0740  Incisions, Wounds, or Drain Sites Healing Without Sign and Symptoms of Infection: TWICE DAILY: Assess and document skin integrity     Problem: Musculoskeletal - Adult  Goal: Return mobility to safest level of function  Outcome: Progressing  Flowsheets (Taken 3/4/2023 0740)  Return Mobility to Safest Level of Function:   Assess patient stability and activity tolerance for standing, transferring and ambulating with or without assistive devices   Assist with transfers and ambulation using safe patient handling equipment as needed   Ensure adequate protection for wounds/incisions during mobilization   Obtain physical therapy/occupational therapy consults as needed     Problem: Musculoskeletal - Adult  Goal: Maintain proper alignment of affected body part  Outcome: Progressing  Flowsheets (Taken 3/4/2023 0740)  Maintain proper alignment of affected body part:   Support and protect limb and body alignment per provider's orders   Instruct and reinforce with patient and family use of appropriate assistive device and precautions (e.g. spinal or hip dislocation precautions)     Problem: Musculoskeletal - Adult  Goal: Return ADL status to a safe level of function  Outcome: Progressing  Flowsheets (Taken 3/4/2023 0740)  Return ADL Status to a Safe Level of Function:   Administer medication as ordered   Assess activities of daily living deficits and provide assistive devices as needed   Obtain physical therapy/occupational therapy consults as needed   Assist and instruct patient to increase activity and self care as tolerated     Problem: Infection - Adult  Goal: Absence of infection during hospitalization  Outcome: Progressing  Flowsheets (Taken 3/4/2023 0740)  Absence of infection during hospitalization:   Assess and monitor for signs and symptoms of infection   Monitor lab/diagnostic results   Monitor all insertion sites i.e., indwelling lines, tubes and drains   [place] appropriate cooling/warming therapies per order   Administer medications as ordered   Instruct and encourage patient and family to use good hand hygiene technique     Problem: Safety - Adult  Goal: Free from fall injury  Outcome: Progressing  Flowsheets (Taken 3/3/2023 2155 by Geeta Hamilton RN)  Free From Fall Injury:   Instruct family/caregiver on patient safety   Based on caregiver fall risk screen, instruct family/caregiver to ask for assistance with transferring infant if caregiver noted to have fall risk factors     Problem: Cardiovascular - Adult  Goal: Absence of cardiac dysrhythmias or at baseline  Recent Flowsheet Documentation  Taken 3/4/2023 0740 by Sandra Bermudez RN  Absence of cardiac dysrhythmias or at baseline: Monitor cardiac rate and rhythm   Care plan reviewed with patient. Patient verbalize understanding of the plan of care and contribute to goal setting.

## 2023-03-04 NOTE — PROGRESS NOTES
Aparna Shelby 59  INPATIENT OCCUPATIONAL THERAPY  [facility] 6E  EVALUATION    Time:    Time In: 6358  Time Out: 1040  Timed Code Treatment Minutes: 15 Minutes  Minutes: 25          Date: 3/4/2023  Patient Name: Gracie Arroyo,   Gender: male      MRN: [id number]  : 1970  (47 y.o.)  Referring Practitioner: Dang Sousa DPM  Diagnosis: Diabetic foot ulcer with osteromyelitis  Additional Pertinent Hx: Pt is a 47 y.o. male s/p RIGHT LISFRANC FOOT AMPUTATION on 3/3    Restrictions/Precautions:  Restrictions/Precautions: Fall Risk, Weight Bearing, General Precautions  Right Lower Extremity Weight Bearing: Non Weight Bearing    Subjective  Chart Reviewed: Yes, Orders, Progress Notes  Patient assessed for rehabilitation services?: Yes    Subjective: RN okayed session. Pt was resting in bed upon arrival, pleasant and agreeable to OT    Pain: 9/10: R foot    Vitals: Vitals not assessed per clinical judgement, see nursing flowsheet    Social/Functional History:  Lives With: Alone  Type of Home: Apartment  Home Layout: One level  Home Access: Level entry  Home Equipment: Walker, rolling, Wheelchair-manual, Electric scooter   Bathroom Shower/Tub: Walk-in shower, Shower chair with back  [employer]: Handicap height  Bathroom Equipment: Grab bars in shower  Bathroom Accessibility: Accessible  IADL Comments: Pt's brother is able to help with homemaking routine. ADL Assistance: Independent  Homemaking Assistance: Needs assistance  Ambulation Assistance: Independent  Transfer Assistance: Independent    Active : No  Patient's  Info: friends  Occupation: On disability       VISION:WFL    HEARING:  WFL    COGNITION: WFL    RANGE OF MOTION:  Bilateral Upper Extremity:  WFL    STRENGTH:  Bilateral Upper Extremity:  WFL    SENSATION:   WFL    ADL:   Lower Extremity Dressing: Moderate Assistance. Donning socks . BALANCE:  Sitting Balance:  Supervision.  On EOB  Standing Balance: Contact Guard Assistance. Standing at [facility] with BUE support. Pt steady throughout with good maintenance of NWB to RLE. BED MOBILITY:  Supine to Sit: Stand By Assistance with increased time  Scooting: Stand By Assistance      TRANSFERS:  Sit to Stand:  Contact Guard Assistance. From EOB, with bed elevated  Stand to Sit: Contact Guard Assistance. To recliner, with min VC for body positioning prior to t/f. Good safety awareness. FUNCTIONAL MOBILITY:  Assistive Device: Rolling Walker  Assist Level:  Contact Guard Assistance. Distance:  4' EOB to recliner  Slow pace, no LOB. Pt able to maintain NWB to RLE with no VC     Activity Tolerance:  Patient tolerance of  treatment: Good treatment tolerance - slightly limited by increased pain; however, pt motivated and engaged. Assessment:  Assessment: Pt presented with the listed deficits s/p admission with R lisfrac foot amputation. Pt with decreased balance, endurance, and activity tolerance and currently requires increased A with ADLs and IADLs. Pt would benefit from continued OT to restore PLOF maximize pt's indep with ADLs and IADLs in prep for a safe return home at max level of indep. Performance deficits / Impairments: Decreased functional mobility , Decreased high-level IADLs, Decreased ADL status, Decreased endurance, Decreased strength, Decreased balance, Decreased posture, Decreased safe awareness  Prognosis: Good  REQUIRES OT FOLLOW-UP: Yes  Decision Making: Medium Complexity    Treatment Initiated: Treatment and education initiated within context of evaluation. Evaluation time included review of current medical information, gathering information related to past medical, social and functional history, completion of standardized testing, formal and informal observation of tasks, assessment of data and development of plan of care and goals.   Treatment time included skilled education and facilitation of tasks to increase safety and independence with ADL's for improved functional independence and quality of life. Discharge Recommendations:  Continue to assess pending progress, Patient would benefit from continued therapy after discharge    Patient Education:     Patient Education  Education Given To: Patient  Education Provided: Role of Therapy, Plan of Care, Precautions, ADL Adaptive Strategies, Transfer Training, Energy Conservation, IADL Safety  Education Method: Demonstration, Verbal  Barriers to Learning: None  Education Outcome: Verbalized understanding, Demonstrated understanding    Equipment Recommendations:  Equipment Needed: Yes  Other: continue to monitor needs for [facility]    Plan:  Times Per Week: 6x  Current Treatment Recommendations: Strengthening, Balance training, Functional mobility training, Endurance training, Safety education & training, Return to work related activity, Self-Care / ADL, Patient/Caregiver education & training. See long-term goal time frame for expected duration of plan of care. If no long-term goals established, a short length of stay is anticipated. Goals:  Patient goals : return home, decrease pain  Short Term Goals  Time Frame for Short Term Goals: by disharge  Short Term Goal 1: Pt will safely navigate to/from bathroom with SBA and no LOB while maintaining NWB to RLE to increase indep with self care  Short Term Goal 2: Pt will complete toilet t/fs with no > than SBA and min VC for maintaining NWB to RLE to increase indep with toileting  Short Term Goal 3: Pt will complete LB dressing with SBA and AE prn for increased indep with self care  Short Term Goal 4: Pt will tolerate dynamic standing x5-6min with SBA and unilateral release to increase indep with grooming         Following session, patient left in safe position with all fall risk precautions in place.

## 2023-03-05 LAB
ANION GAP SERPL CALC-SCNC: 7 MEQ/L (ref 8–16)
BASOPHILS ABSOLUTE: 0 THOU/MM3 (ref 0–0.1)
BASOPHILS NFR BLD AUTO: 0.4 %
BUN SERPL-MCNC: 7 MG/DL (ref 7–22)
CALCIUM SERPL-MCNC: 8.2 MG/DL (ref 8.5–10.5)
CHLORIDE SERPL-SCNC: 103 MEQ/L (ref 98–111)
CO2 SERPL-SCNC: 29 MEQ/L (ref 23–33)
CREAT SERPL-MCNC: 0.8 MG/DL (ref 0.4–1.2)
DEPRECATED RDW RBC AUTO: 42 FL (ref 35–45)
EOSINOPHIL NFR BLD AUTO: 5.2 %
EOSINOPHILS ABSOLUTE: 0.4 THOU/MM3 (ref 0–0.4)
ERYTHROCYTE [DISTWIDTH] IN BLOOD BY AUTOMATED COUNT: 12.8 % (ref 11.5–14.5)
GFR SERPL CREATININE-BSD FRML MDRD: > 60 ML/MIN/1.73M2
GLUCOSE BLD STRIP.AUTO-MCNC: 129 MG/DL (ref 70–108)
GLUCOSE BLD STRIP.AUTO-MCNC: 148 MG/DL (ref 70–108)
GLUCOSE BLD STRIP.AUTO-MCNC: 151 MG/DL (ref 70–108)
GLUCOSE SERPL-MCNC: 134 MG/DL (ref 70–108)
HCT VFR BLD AUTO: 34.5 % (ref 42–52)
HGB BLD-MCNC: 11.6 GM/DL (ref 14–18)
IMM GRANULOCYTES # BLD AUTO: 0.04 THOU/MM3 (ref 0–0.07)
IMM GRANULOCYTES NFR BLD AUTO: 0.5 %
LYMPHOCYTES ABSOLUTE: 1.2 THOU/MM3 (ref 1–4.8)
LYMPHOCYTES NFR BLD AUTO: 15.5 %
MCH RBC QN AUTO: 30.7 PG (ref 26–33)
MCHC RBC AUTO-ENTMCNC: 33.6 GM/DL (ref 32.2–35.5)
MCV RBC AUTO: 91.3 FL (ref 80–94)
MONOCYTES ABSOLUTE: 0.7 THOU/MM3 (ref 0.4–1.3)
MONOCYTES NFR BLD AUTO: 8.6 %
NEUTROPHILS NFR BLD AUTO: 69.8 %
NRBC BLD AUTO-RTO: 0 /100 WBC
PLATELET # BLD AUTO: 190 THOU/MM3 (ref 130–400)
PMV BLD AUTO: 10.1 FL (ref 9.4–12.4)
POTASSIUM SERPL-SCNC: 4.1 MEQ/L (ref 3.5–5.2)
RBC # BLD AUTO: 3.78 MILL/MM3 (ref 4.7–6.1)
SEGMENTED NEUTROPHILS ABSOLUTE COUNT: 5.5 THOU/MM3 (ref 1.8–7.7)
SODIUM SERPL-SCNC: 139 MEQ/L (ref 135–145)
WBC # BLD AUTO: 7.9 THOU/MM3 (ref 4.8–10.8)

## 2023-03-05 PROCEDURE — 82948 REAGENT STRIP/BLOOD GLUCOSE: CPT

## 2023-03-05 PROCEDURE — 2580000003 HC RX 258

## 2023-03-05 PROCEDURE — 6370000000 HC RX 637 (ALT 250 FOR IP)

## 2023-03-05 PROCEDURE — 99232 SBSQ HOSP IP/OBS MODERATE 35: CPT | Performed by: NURSE PRACTITIONER

## 2023-03-05 PROCEDURE — 85025 COMPLETE CBC W/AUTO DIFF WBC: CPT

## 2023-03-05 PROCEDURE — 6360000002 HC RX W HCPCS

## 2023-03-05 PROCEDURE — 80048 BASIC METABOLIC PNL TOTAL CA: CPT

## 2023-03-05 PROCEDURE — 36415 COLL VENOUS BLD VENIPUNCTURE: CPT

## 2023-03-05 PROCEDURE — 1200000000 HC SEMI PRIVATE

## 2023-03-05 PROCEDURE — 94640 AIRWAY INHALATION TREATMENT: CPT

## 2023-03-05 RX ADMIN — ENOXAPARIN SODIUM 60 MG: 100 INJECTION SUBCUTANEOUS at 19:42

## 2023-03-05 RX ADMIN — ASPIRIN 81 MG: 81 TABLET ORAL at 08:52

## 2023-03-05 RX ADMIN — MOMETASONE FUROATE AND FORMOTEROL FUMARATE DIHYDRATE 2 PUFF: 200; 5 AEROSOL RESPIRATORY (INHALATION) at 08:52

## 2023-03-05 RX ADMIN — MOMETASONE FUROATE AND FORMOTEROL FUMARATE DIHYDRATE 2 PUFF: 200; 5 AEROSOL RESPIRATORY (INHALATION) at 18:02

## 2023-03-05 RX ADMIN — BUPROPION HYDROCHLORIDE 200 MG: 100 TABLET, FILM COATED, EXTENDED RELEASE ORAL at 19:42

## 2023-03-05 RX ADMIN — SODIUM CHLORIDE 25 ML: 9 INJECTION, SOLUTION INTRAVENOUS at 02:01

## 2023-03-05 RX ADMIN — BUPROPION HYDROCHLORIDE 200 MG: 100 TABLET, FILM COATED, EXTENDED RELEASE ORAL at 08:24

## 2023-03-05 RX ADMIN — Medication 100 MG: at 08:24

## 2023-03-05 RX ADMIN — DOXEPIN HYDROCHLORIDE 10 MG: 10 CAPSULE ORAL at 19:43

## 2023-03-05 RX ADMIN — MORPHINE SULFATE 4 MG: 4 INJECTION, SOLUTION INTRAMUSCULAR; INTRAVENOUS at 15:15

## 2023-03-05 RX ADMIN — MORPHINE SULFATE 4 MG: 4 INJECTION, SOLUTION INTRAMUSCULAR; INTRAVENOUS at 09:42

## 2023-03-05 RX ADMIN — Medication 1 TABLET: at 08:24

## 2023-03-05 RX ADMIN — MORPHINE SULFATE 4 MG: 4 INJECTION, SOLUTION INTRAMUSCULAR; INTRAVENOUS at 02:04

## 2023-03-05 RX ADMIN — PANTOPRAZOLE SODIUM 40 MG: 40 TABLET, DELAYED RELEASE ORAL at 06:09

## 2023-03-05 RX ADMIN — Medication 2000 MG: at 02:03

## 2023-03-05 RX ADMIN — MORPHINE SULFATE 4 MG: 4 INJECTION, SOLUTION INTRAMUSCULAR; INTRAVENOUS at 06:09

## 2023-03-05 RX ADMIN — TIOTROPIUM BROMIDE INHALATION SPRAY 2 PUFF: 3.12 SPRAY, METERED RESPIRATORY (INHALATION) at 08:52

## 2023-03-05 RX ADMIN — METOPROLOL SUCCINATE 25 MG: 25 TABLET, FILM COATED, EXTENDED RELEASE ORAL at 11:08

## 2023-03-05 RX ADMIN — SODIUM CHLORIDE, PRESERVATIVE FREE 10 ML: 5 INJECTION INTRAVENOUS at 19:42

## 2023-03-05 RX ADMIN — ATORVASTATIN CALCIUM 40 MG: 40 TABLET, FILM COATED ORAL at 19:43

## 2023-03-05 RX ADMIN — ENOXAPARIN SODIUM 60 MG: 100 INJECTION SUBCUTANEOUS at 08:24

## 2023-03-05 RX ADMIN — POLYETHYLENE GLYCOL 3350 17 G: 17 POWDER, FOR SOLUTION ORAL at 16:33

## 2023-03-05 RX ADMIN — Medication 2000 MG: at 09:44

## 2023-03-05 RX ADMIN — Medication 2000 MG: at 16:31

## 2023-03-05 RX ADMIN — MORPHINE SULFATE 4 MG: 4 INJECTION, SOLUTION INTRAMUSCULAR; INTRAVENOUS at 19:46

## 2023-03-05 ASSESSMENT — PAIN - FUNCTIONAL ASSESSMENT
PAIN_FUNCTIONAL_ASSESSMENT: PREVENTS OR INTERFERES SOME ACTIVE ACTIVITIES AND ADLS
PAIN_FUNCTIONAL_ASSESSMENT: ACTIVITIES ARE NOT PREVENTED
PAIN_FUNCTIONAL_ASSESSMENT: ACTIVITIES ARE NOT PREVENTED
PAIN_FUNCTIONAL_ASSESSMENT: PREVENTS OR INTERFERES SOME ACTIVE ACTIVITIES AND ADLS

## 2023-03-05 ASSESSMENT — PAIN DESCRIPTION - DESCRIPTORS
DESCRIPTORS: ACHING;BURNING
DESCRIPTORS: BURNING
DESCRIPTORS: BURNING
DESCRIPTORS: ACHING;BURNING

## 2023-03-05 ASSESSMENT — PAIN SCALES - GENERAL
PAINLEVEL_OUTOF10: 8
PAINLEVEL_OUTOF10: 8
PAINLEVEL_OUTOF10: 7
PAINLEVEL_OUTOF10: 7
PAINLEVEL_OUTOF10: 5
PAINLEVEL_OUTOF10: 7

## 2023-03-05 ASSESSMENT — PAIN DESCRIPTION - ORIENTATION
ORIENTATION: RIGHT

## 2023-03-05 ASSESSMENT — PAIN DESCRIPTION - LOCATION
LOCATION: FOOT

## 2023-03-05 ASSESSMENT — PAIN DESCRIPTION - FREQUENCY
FREQUENCY: CONTINUOUS

## 2023-03-05 ASSESSMENT — PAIN DESCRIPTION - ONSET
ONSET: ON-GOING

## 2023-03-05 ASSESSMENT — PAIN DESCRIPTION - PAIN TYPE
TYPE: SURGICAL PAIN

## 2023-03-05 NOTE — PROGRESS NOTES
Progress note: Infectious diseases    Patient - Oli Diallo,  Age - 47 y.o.    - 1970      Room Number - 6E-56/056-A   MRN -  [id number]   [facility]  Date of Admission -  3/1/2023 11:16 AM    SUBJECTIVE:   No new complaints. Operative note read  OBJECTIVE   VITALS    height is 6' 1\" (1.854 m) and weight is 395 lb (179.2 kg) (abnormal). His oral temperature is 98.6 °F (37 °C). His blood pressure is 104/54 (abnormal) and his pulse is 82. His respiration is 18 and oxygen saturation is 96%.        Wt Readings from Last 3 Encounters:   23 (!) 395 lb (179.2 kg)   22 (!) 376 lb 5.2 oz (170.7 kg)   22 (!) 369 lb (167.4 kg)       I/O (24 Hours)    Intake/Output Summary (Last 24 hours) at 3/5/2023 1145  Last data filed at 3/5/2023 6199  Gross per 24 hour   Intake 3267.94 ml   Output 3375 ml   Net -107.06 ml         General Appearance  Awake, alert, oriented,  not  In acute distress  HEENT - normocephalic, atraumatic, pink conjunctiva,  anicteric sclera  Neck - Supple, no mass  Lungs -  Bilateral good air entry, no rhonchi, no wheeze  Cardiovascular - Heart sounds are normal.     Abdomen - soft, not distended, nontender,   Neurologic -oriented  Skin - No bruising or bleeding  Extremities -  dressed foot wound (post surgery)    MEDICATIONS:      sodium chloride flush  5-40 mL IntraVENous 2 times per day    sodium chloride flush  5-40 mL IntraVENous 2 times per day    enoxaparin  60 mg SubCUTAneous BID    nicotine  1 patch TransDERmal Daily    ceFAZolin  2,000 mg IntraVENous Q8H    aspirin  81 mg Oral Daily    atorvastatin  40 mg Oral Nightly    buPROPion  200 mg Oral BID    metoprolol succinate  25 mg Oral Daily    pantoprazole  40 mg Oral Daily    mometasone-formoterol  2 puff Inhalation BID    tiotropium  2 puff Inhalation Daily    insulin lispro  0-4 Units SubCUTAneous TID WC    insulin lispro 0-4 Units SubCUTAneous Nightly    Dulaglutide  0.5 mL SubCUTAneous Weekly    thiamine  100 mg Oral Daily    multivitamin  1 tablet Oral Daily      sodium chloride Stopped (03/04/23 2148)    sodium chloride Stopped (03/05/23 0241)    sodium chloride      lactated ringers IV soln 75 mL/hr at 03/02/23 2222    dextrose       sodium chloride flush, sodium chloride, ondansetron **OR** ondansetron, oxyCODONE **OR** oxyCODONE, morphine **OR** morphine, doxepin, sodium chloride flush, sodium chloride, polyethylene glycol, acetaminophen **OR** acetaminophen, albuterol sulfate HFA, glucose, dextrose bolus **OR** dextrose bolus, glucagon (rDNA), dextrose      LABS:     CBC:   Recent Labs     03/03/23 0630 03/04/23 0636 03/05/23  0609   WBC 6.4 7.7 7.9   HGB 13.9* 11.2* 11.6*    187 190       BMP:    Recent Labs     03/03/23 0630 03/04/23  0636 03/05/23  0609    142 139   K 4.5 4.3 4.1    104 103   CO2 29 29 29   BUN 11 9 7   CREATININE 1.0 1.0 0.8   GLUCOSE 132* 139* 134*       Calcium:  Recent Labs     03/05/23  0609   CALCIUM 8.2*        Recent Labs     03/04/23 2020 03/05/23  0718 03/05/23  1117   POCGLU 148* 148* 129*          CULTURES:   UA:   No results for input(s): SPECGRAV, PHUR, COLORU, CLARITYU, MUCUS, PROTEINU, BLOODU, RBCUA, WBCUA, BACTERIA, NITRU, GLUCOSEU, BILIRUBINUR, UROBILINOGEN, KETUA, LABCAST, LABCASTTY, AMORPHOS in the last 72 hours. Invalid input(s): CRYSTALS    Micro:   Lab Results   Component Value Date/Time    BC No growth 24 hours. No growth 48 hours. 03/01/2023 12:24 PM    BC No growth 24 hours. No growth 48 hours.  03/01/2023 12:24 PM            Problem list of patient:     Patient Active Problem List   Diagnosis Code    Hypertension I10    Hyperlipidemia E78.5    CAD (coronary artery disease) I25.10    Family history of premature coronary heart disease Z82.49    Dizziness R42    Acute renal failure (ARF) (HCC) N17.9    Chronic low back pain M54.50, G89.29    PTSD (post-traumatic stress disorder) F43.10    Foot ulceration, right, with fat layer exposed ([address] L97.512    Type 2 diabetes mellitus without complication, with long-term current use of insulin (HCC) E11.9, Z79.4    Cellulitis of right foot L03.115    Osteomyelitis ([facility]) M86.9    Diabetic foot ulcer with osteomyelitis (formerly [facility]) E11.621, E11.69, L97.509, M86.9         ASSESSMENT/PLAN   Diabetic foot ulcer with OM  Obesity  He had revision of the stump  Continue iv devyn Rodriguez MD, MD, FACP 3/5/2023 11:45 AM

## 2023-03-05 NOTE — PLAN OF CARE
Problem: Chronic Conditions and Co-morbidities  Goal: Patient's chronic conditions and co-morbidity symptoms are monitored and maintained or improved  Outcome: Progressing  Flowsheets (Taken 3/5/2023 1438)  Care Plan - Patient's Chronic Conditions and Co-Morbidity Symptoms are Monitored and Maintained or Improved: Monitor and assess patient's chronic conditions and comorbid symptoms for stability, deterioration, or improvement     Problem: Discharge Planning  Goal: Discharge to home or other facility with appropriate resources  Outcome: Progressing  Flowsheets (Taken 3/5/2023 1438)  Discharge to home or other facility with appropriate resources: Identify barriers to discharge with patient and caregiver     Problem: Pain  Goal: Verbalizes/displays adequate comfort level or baseline comfort level  Outcome: Progressing  Flowsheets (Taken 3/5/2023 1438)  Verbalizes/displays adequate comfort level or baseline comfort level:   Encourage patient to monitor pain and request assistance   Administer analgesics based on type and severity of pain and evaluate response   Assess pain using appropriate pain scale   Implement non-pharmacological measures as appropriate and evaluate response     Problem: Respiratory - Adult  Goal: Clear lung sounds  3/5/2023 0859 by Mary Cui RCP  Outcome: Progressing     Problem: Musculoskeletal - Adult  Goal: Return mobility to safest level of function  Outcome: Progressing  Flowsheets (Taken 3/5/2023 1438)  Return Mobility to Safest Level of Function: Assess patient stability and activity tolerance for standing, transferring and ambulating with or without assistive devices     Problem: Infection - Adult  Goal: Absence of infection during hospitalization  Outcome: Progressing  Flowsheets (Taken 3/5/2023 1438)  Absence of infection during hospitalization:   Assess and monitor for signs and symptoms of infection   Monitor lab/diagnostic results   Monitor all insertion sites i.e., indwelling lines, tubes and drains   Administer medications as ordered

## 2023-03-05 NOTE — PLAN OF CARE
Problem: Respiratory - Adult  Goal: Clear lung sounds  Outcome: Progressing   Continue inhalers to improve aeration of lungs. Patient mutually agreed on goals.

## 2023-03-05 NOTE — PROGRESS NOTES
Podiatric Progress Note  Wanda Galeano  Subjective :     3/5/23  Patient seen bedside today on behalf of Dr Waleska Laughlin. Patient appeared pleasant, was oriented to person, place and time and in no acute distress. Patient states he is having 8/10 pain to the RLE. He denies calf pain. Patient denies any N/V/F/C/SOB or CP. Patient has no further pedal concerns at this point in time. 3/2/23  Patient seen bedside today alongside of Dr Waleska Laughlin. Patient appeared pleasant, was oriented to person, place and time and in no acute distress. Patient relates that this morning he wanted to proceed with a BKA, but at this time he wants to proceed with a lisfranc amputation. The patient will consider going to a SNF at time of discharge. Patient denies any N/V/F/C/SOB or CP. Patient has no further pedal concerns at this point in time. 3/1/23  Patient seen bedside today on behalf of Dr. Waleska Laughlin. Patient appeared pleasant, was oriented to person, place and time and in no acute distress. Patient relates that he is leaning towards a BKA rather than a lisfranc amputation. The patient relates that he has been thinking about it for the past few days. The patient relates that he doesn't think that he wants a lisfranc amputation at this time because he believes that he will have reoccurring wound and the amputation site will not heal secondary to him not being able to stay off of it. He relates that he tries to stay off of the wound he currently has, but will sometimes accidentally use it and apply pressure. The patient relates that he is unable to use a knee scooter because he is about 100 pounds over the weight limit. The patient relates that he will \"sleep on it\" in regards to a decision, but is still leaning towards a BKA at this time. Patient denies any N/V/F/C/SOB or CP. Patient has no further pedal concerns at this point in time.     3/1/23  HISTORY OF PRESENT ILLNESS:                 The patient is a 47 y.o. male with significant past medical history of CAD, ARF, CHF, COPD, T2DM, HLD, HTN who is being seen at bedside on behalf of Dr. Jaret Vicente. Patient was seen earlier this morning by Dr. Jaret Vicente in clinic, and was sent to [facility] emergency department for surgical intervention of the right foot later this week or early next week. He is well-known to the podiatry service. Patient has had multiple amputations and surgeries to the bilateral feet, most recently a TMA on the right foot with revision for nonhealing plantar wound. MRI was obtained at outside hospital exhibiting suggestion of osteomyelitis of the third metatarsal stump. On exam in the ED, patient was resting comfortably in no acute distress. He admitted to feeling tired for the past week, with increased lack of appetite. He denied any fevers, chills, chest pain, shortness of breath, nausea, vomiting, diarrhea. Patient is aware of plan for further amputation. No other pedal concerns.     Current Medications:    Current Facility-Administered Medications: 0.9 % sodium chloride infusion, , IntraVENous, Continuous  sodium chloride flush 0.9 % injection 5-40 mL, 5-40 mL, IntraVENous, 2 times per day  sodium chloride flush 0.9 % injection 5-40 mL, 5-40 mL, IntraVENous, PRN  0.9 % sodium chloride infusion, , IntraVENous, PRN  ondansetron (ZOFRAN-ODT) disintegrating tablet 4 mg, 4 mg, Oral, Q8H PRN **OR** ondansetron (ZOFRAN) injection 4 mg, 4 mg, IntraVENous, Q6H PRN  oxyCODONE (ROXICODONE) immediate release tablet 5 mg, 5 mg, Oral, Q4H PRN **OR** oxyCODONE (ROXICODONE) immediate release tablet 10 mg, 10 mg, Oral, Q4H PRN  morphine (PF) injection 2 mg, 2 mg, IntraVENous, Q2H PRN **OR** morphine injection 4 mg, 4 mg, IntraVENous, Q2H PRN  doxepin (SINEQUAN) capsule 10 mg, 10 mg, Oral, Nightly PRN  sodium chloride flush 0.9 % injection 5-40 mL, 5-40 mL, IntraVENous, 2 times per day  sodium chloride flush 0.9 % injection 5-40 mL, 5-40 mL, IntraVENous, PRN  0.9 % sodium chloride infusion, , IntraVENous, PRN  enoxaparin (LOVENOX) injection 60 mg, 60 mg, SubCUTAneous, BID  polyethylene glycol (GLYCOLAX) packet 17 g, 17 g, Oral, Daily PRN  acetaminophen (TYLENOL) tablet 650 mg, 650 mg, Oral, Q6H PRN **OR** acetaminophen (TYLENOL) suppository 650 mg, 650 mg, Rectal, Q6H PRN  nicotine (NICODERM CQ) 14 MG/24HR 1 patch, 1 patch, TransDERmal, Daily  lactated ringers IV soln infusion, , IntraVENous, Continuous  ceFAZolin (ANCEF) 2000 mg in 0.9% sodium chloride 50 mL IVPB, 2,000 mg, IntraVENous, Q8H  albuterol sulfate HFA (PROVENTIL;VENTOLIN;PROAIR) 108 (90 Base) MCG/ACT inhaler 2 puff, 2 puff, Inhalation, Q4H PRN  aspirin EC tablet 81 mg, 81 mg, Oral, Daily  atorvastatin (LIPITOR) tablet 40 mg, 40 mg, Oral, Nightly  buPROPion (WELLBUTRIN SR) extended release tablet 200 mg, 200 mg, Oral, BID  metoprolol succinate (TOPROL XL) extended release tablet 25 mg, 25 mg, Oral, Daily  pantoprazole (PROTONIX) tablet 40 mg, 40 mg, Oral, Daily  mometasone-formoterol (DULERA) 200-5 MCG/ACT inhaler 2 puff, 2 puff, Inhalation, BID  tiotropium (SPIRIVA RESPIMAT) 2.5 MCG/ACT inhaler 2 puff, 2 puff, Inhalation, Daily  glucose chewable tablet 16 g, 4 tablet, Oral, PRN  dextrose bolus 10% 125 mL, 125 mL, IntraVENous, PRN **OR** dextrose bolus 10% 250 mL, 250 mL, IntraVENous, PRN  glucagon (rDNA) injection 1 mg, 1 mg, SubCUTAneous, PRN  dextrose 10 % infusion, , IntraVENous, Continuous PRN  insulin lispro (HUMALOG) injection vial 0-4 Units, 0-4 Units, SubCUTAneous, TID WC  insulin lispro (HUMALOG) injection vial 0-4 Units, 0-4 Units, SubCUTAneous, Nightly  dulaglutide 0.75 mg/0.5 mL injection (Patient Supplied), 0.5 mL, SubCUTAneous, Weekly  thiamine tablet 100 mg, 100 mg, Oral, Daily  multivitamin 1 tablet, 1 tablet, Oral, Daily    Objective     /77   Pulse 76   Temp 98.2 °F (36.8 °C) (Oral)   Resp 18   Ht 6' 1\" (1.854 m)   Wt (!) 395 lb (179.2 kg)   SpO2 98%   BMI 52.11 kg/m²      I/O:  Intake/Output Summary (Last 24 hours) at 3/5/2023 0948  Last data filed at 3/5/2023 9710  Gross per 24 hour   Intake 3267.94 ml   Output 3725 ml   Net -457.06 ml              Wt Readings from Last 3 Encounters:   03/01/23 (!) 395 lb (179.2 kg)   07/21/22 (!) 376 lb 5.2 oz (170.7 kg)   01/19/22 (!) 369 lb (167.4 kg)       LABS:    Recent Labs     03/04/23  0636 03/05/23  0609   WBC 7.7 7.9   HGB 11.2* 11.6*   HCT 34.1* 34.5*    190        Recent Labs     03/05/23  0609      K 4.1      CO2 29   BUN 7   CREATININE 0.8        No results for input(s): PROT, INR, APTT in the last 72 hours. No results for input(s): CKTOTAL, CKMB, CKMBINDEX, TROPONINI in the last 72 hours. Focused Lower Extremity Examination:    Vitals:    /77   Pulse 76   Temp 98.2 °F (36.8 °C) (Oral)   Resp 18   Ht 6' 1\" (1.854 m)   Wt (!) 395 lb (179.2 kg)   SpO2 98%   BMI 52.11 kg/m²      Posterior splint to RLE clean, dry, intact. Images  XR CHEST PORTABLE   Final Result   1. Normal heart size. Evidence for old, healed granulomatous disease. 2. No acute findings. No infiltrates or effusions are seen. **This report has been created using voice recognition software. It may contain minor errors which are inherent in voice recognition technology. **      Final report electronically signed by Dr. Lashon Montiel on 3/1/2023 1:29 PM          ASSESSMENT: Pt. is a 47 y.o. male with:  Principle  1. Nonhealing diabetic foot ulceration, right foot  2.  S/P Chopart amputation right    Chronic  Patient Active Problem List   Diagnosis    Hypertension    Hyperlipidemia    CAD (coronary artery disease)    Family history of premature coronary heart disease    Dizziness    Acute renal failure (ARF) (HCC)    Chronic low back pain    PTSD (post-traumatic stress disorder)    Foot ulceration, right, with fat layer exposed ([address]    Type 2 diabetes mellitus without complication, with long-term current use of insulin ([facility])    Cellulitis of right foot Osteomyelitis [address]    Diabetic foot ulcer with osteomyelitis ([facility]       PLAN:     -Patient initially examined and evaluated  -WBC 7.9; Afebrile  -Imaging reviewed: MRI from [facility] exhibiting suggestion of osteomyelitis to right third metatarsal stump.  -Swab cultures taken of right foot wound; Staph coag positive   -Dressings left intact this visit  -Weight bearing status: Non weight bearing to RLE  -Podiatry will change dressings tomorrow 3/6  -Patient verbalized agreement and understanding with treatment plan discussed.  All questions answered to their satisfaction.  -Podiatry will continue to follow patient    DISPO: Dressing change 3/6 by Roman Fortune [id number]  3/5/2023   9:48 AM

## 2023-03-05 NOTE — PROGRESS NOTES
Hospitalist Progress Note    Patient:  Irene Cummings      Unit/Bed:6E-56/056-A    YOB: 1970    MRN: [id number]       Acct: [de-identified]     PCP: MICHELLE Jules CNP    Date of Admission: 3/1/2023    Assessment/Plan:    Right diabetic foot ulcer with osteomyelitis s/p lisfranc foot amputation: MRI that was obtained at an outside hospital 2/27 exhibited probable osteomyelitis of the third metatarsal stump. Failed outpatient doxycycline course. Continue IV Ancef. ID following. Podiatry planning to change dressing tomorrow. Non weight bearing to RLE. Acute blood loss anemia, postoperative and dilutional. Preop hgb 15.6, down to 11.2. Stable at 11.5 this AM.   Lactic acidosis, resolved: mild, no leukocytosis, left shift, tachycardia, tachypnea, hypotension, or fever to suggest sepsis. Tolerating diet. Stop IVF. IDDM: A1c 6.0%. Continue Trulicity (patient to provide) every Sunday. Accuchecks with low dose corrective coverage. BS trend has been in goal. Hypoglycemic protocol. Diabetic diet. CAD, reported. No history of intervention: cont ASA  Essential HTN: home medications reviewed and include metoprolol, lisinopril, and midodrine. Patient endorses taking this regimen. Chart reviewed, no office notes available. Metoprolol has been continued with hold parameters. Continue to hold lisinopril and midodrine and monitor BP. HLP: cont statin  COPD: not in acute exacerbation. Cont home inhalers. Morbid obesity Body mass index is 52.11 kg/m². Alcohol abuse: reports no alcohol intake for over one month  Tobacco abuse: NRT prn, cessation education    Dispo: home when ok with ID and Podiatry.     Chief Complaint: diabetic foot ulcer    Hospital Course:     Irene Cummings is a 47 y.o. male with PMHx of CAD, HTN, HLP, COPD, alcohol and tobacco abuse, IDDM with extensive h/o diabetic foot ulcerations s/p multiple amputations and surgeries of BLE who presented to [facility] at the direction of podiatrist, Dr Mendoza Bolus following a MRI that was obtained at an outside hospital 2/27 that exhibited probable osteomyelitis of the third metatarsal stump. Patient alert, oriented x3, pleasant. Reports right diabetic ulcer continues to worsen despite podiatry care and Doxycycline course. Denies fever but states he has had intermittent chills and poor appetite. Denies shortness of breath, chest pain, dysuria, or diarrhea. Reports NWB status on R foot however admits to ambulating. On presentation to ED, patient afebrile, normotensive, O2 sat adequate on room air. Electrolytes and renal function with no abnormalities. Lactic acid mildly elevated (2.0). No leukocytosis or anemia. Wound cultures obtained in ED and pending. Patient received IVF, ordered per sepsis protocol however patient does not meet sepsis criteria therefore discontinued and MIV ordered. Initial dose of cefepime per podiatry recommendations initiated. Subjective: Continues with 8/10 RLE pain. Also report chronic left thigh pain. No CP/SOB. Some dizziness yesterday after pain medications. That has resolved today.        Medications:  Reviewed    Infusion Medications    sodium chloride Stopped (03/04/23 2145)    sodium chloride Stopped (03/05/23 0241)    sodium chloride      lactated ringers IV soln 75 mL/hr at 03/02/23 2222    dextrose       Scheduled Medications    sodium chloride flush  5-40 mL IntraVENous 2 times per day    sodium chloride flush  5-40 mL IntraVENous 2 times per day    enoxaparin  60 mg SubCUTAneous BID    nicotine  1 patch TransDERmal Daily    ceFAZolin  2,000 mg IntraVENous Q8H    aspirin  81 mg Oral Daily    atorvastatin  40 mg Oral Nightly    buPROPion  200 mg Oral BID    metoprolol succinate  25 mg Oral Daily    pantoprazole  40 mg Oral Daily    mometasone-formoterol  2 puff Inhalation BID    tiotropium  2 puff Inhalation Daily    insulin lispro  0-4 Units SubCUTAneous TID WC    insulin lispro  0-4 Units SubCUTAneous Nightly Dulaglutide  0.5 mL SubCUTAneous Weekly    thiamine  100 mg Oral Daily    multivitamin  1 tablet Oral Daily     PRN Meds: sodium chloride flush, sodium chloride, ondansetron **OR** ondansetron, oxyCODONE **OR** oxyCODONE, morphine **OR** morphine, doxepin, sodium chloride flush, sodium chloride, polyethylene glycol, acetaminophen **OR** acetaminophen, albuterol sulfate HFA, glucose, dextrose bolus **OR** dextrose bolus, glucagon (rDNA), dextrose      Intake/Output Summary (Last 24 hours) at 3/5/2023 1405  Last data filed at 3/5/2023 1256  Gross per 24 hour   Intake 2867.94 ml   Output 2975 ml   Net -107.06 ml         Diet:  ADULT DIET; Regular    Exam:  BP (!) 104/54   Pulse 82   Temp 98.6 °F (37 °C) (Oral)   Resp 18   Ht 6' 1\" (1.854 m)   Wt (!) 395 lb (179.2 kg)   SpO2 96%   BMI 52.11 kg/m²     General appearance: No apparent distress, appears stated age and cooperative. HEENT: Pupils equal, round, and reactive to light. Conjunctivae/corneas clear. Neck: Supple, with full range of motion. No jugular venous distention. Trachea midline. Respiratory:  Normal respiratory effort. Clear to auscultation, bilaterally without Rales/Wheezes/Rhonchi. Cardiovascular: Regular rate and rhythm with normal S1/S2 without murmurs, rubs or gallops. Abdomen: Soft, non-tender, non-distended with normal bowel sounds. Musculoskeletal: passive and active ROM x 4 extremities. Skin: Skin color, texture, turgor normal. Surgical dressing to right foot. Neurologic:  Neurovascularly intact without any focal sensory/motor deficits.  Cranial nerves: II-XII intact, grossly non-focal.  Psychiatric: Alert and oriented, thought content appropriate, normal insight  Capillary Refill: Brisk,< 3 seconds   Peripheral Pulses: +2 palpable, equal bilaterally       Labs:   Recent Labs     03/03/23  0630 03/04/23  0636 03/05/23  0609   WBC 6.4 7.7 7.9   HGB 13.9* 11.2* 11.6*   HCT 41.9* 34.1* 34.5*    187 190       Recent Labs 03/03/23  0630 03/04/23  0636 03/05/23  0609    142 139   K 4.5 4.3 4.1    104 103   CO2 29 29 29   BUN 11 9 7   CREATININE 1.0 1.0 0.8   CALCIUM 8.9 8.1* 8.2*       No results for input(s): AST, ALT, BILIDIR, BILITOT, ALKPHOS in the last 72 hours. No results for input(s): INR in the last 72 hours. No results for input(s): Trang Risk in the last 72 hours. Urinalysis:      Lab Results   Component Value Date/Time    NITRU NEGATIVE 03/01/2023 02:20 PM    BLOODU NEGATIVE 03/01/2023 02:20 PM    SPECGRAV 1.015 08/12/2016 06:59 PM    GLUCOSEU NEGATIVE 03/01/2023 02:20 PM       Radiology:  XR CHEST PORTABLE   Final Result   1. Normal heart size. Evidence for old, healed granulomatous disease. 2. No acute findings. No infiltrates or effusions are seen. **This report has been created using voice recognition software. It may contain minor errors which are inherent in voice recognition technology. **      Final report electronically signed by Dr. Amado Urbano on 3/1/2023 1:29 PM          Diet: ADULT DIET;  Regular    DVT prophylaxis: [x] Lovenox                                 [] SCDs                                 [] SQ Heparin                                 [] Encourage ambulation           [] Already on Anticoagulation     Disposition:    [x] Home       [] TCU       [] Rehab       [] Psych       [] SNF       [] [place]       [] Other-    Code Status: Full Code    PT/OT Eval Status: ordered    Electronically signed by MICHELLE Ortiz CNP on 3/5/2023 at 2:05 PM

## 2023-03-06 LAB
BACTERIA BLD AEROBE CULT: NORMAL
BACTERIA BLD AEROBE CULT: NORMAL
BACTERIA SPEC AEROBE CULT: ABNORMAL
BACTERIA SPEC AEROBE CULT: ABNORMAL
BACTERIA SPEC ANAEROBE CULT: ABNORMAL
GLUCOSE BLD STRIP.AUTO-MCNC: 127 MG/DL (ref 70–108)
GLUCOSE BLD STRIP.AUTO-MCNC: 153 MG/DL (ref 70–108)
GLUCOSE BLD STRIP.AUTO-MCNC: 153 MG/DL (ref 70–108)
GLUCOSE BLD STRIP.AUTO-MCNC: 201 MG/DL (ref 70–108)
GRAM STN SPEC: ABNORMAL
ORGANISM: ABNORMAL

## 2023-03-06 PROCEDURE — 2580000003 HC RX 258

## 2023-03-06 PROCEDURE — 6360000002 HC RX W HCPCS

## 2023-03-06 PROCEDURE — 6370000000 HC RX 637 (ALT 250 FOR IP)

## 2023-03-06 PROCEDURE — 97530 THERAPEUTIC ACTIVITIES: CPT

## 2023-03-06 PROCEDURE — 82948 REAGENT STRIP/BLOOD GLUCOSE: CPT

## 2023-03-06 PROCEDURE — 1200000000 HC SEMI PRIVATE

## 2023-03-06 PROCEDURE — 97110 THERAPEUTIC EXERCISES: CPT

## 2023-03-06 PROCEDURE — 36410 VNPNXR 3YR/> PHY/QHP DX/THER: CPT

## 2023-03-06 PROCEDURE — 05HD33Z INSERTION OF INFUSION DEVICE INTO RIGHT CEPHALIC VEIN, PERCUTANEOUS APPROACH: ICD-10-PCS | Performed by: INTERNAL MEDICINE

## 2023-03-06 PROCEDURE — 94640 AIRWAY INHALATION TREATMENT: CPT

## 2023-03-06 PROCEDURE — C1751 CATH, INF, PER/CENT/MIDLINE: HCPCS

## 2023-03-06 PROCEDURE — 76937 US GUIDE VASCULAR ACCESS: CPT

## 2023-03-06 PROCEDURE — 97535 SELF CARE MNGMENT TRAINING: CPT

## 2023-03-06 PROCEDURE — 99232 SBSQ HOSP IP/OBS MODERATE 35: CPT | Performed by: NURSE PRACTITIONER

## 2023-03-06 RX ORDER — SODIUM CHLORIDE 0.9 % (FLUSH) 0.9 %
5-40 SYRINGE (ML) INJECTION PRN
Status: DISCONTINUED | OUTPATIENT
Start: 2023-03-06 | End: 2023-03-08 | Stop reason: HOSPADM

## 2023-03-06 RX ORDER — LIDOCAINE HYDROCHLORIDE 10 MG/ML
5 INJECTION, SOLUTION EPIDURAL; INFILTRATION; INTRACAUDAL; PERINEURAL ONCE
Status: DISCONTINUED | OUTPATIENT
Start: 2023-03-06 | End: 2023-03-08 | Stop reason: HOSPADM

## 2023-03-06 RX ORDER — SODIUM CHLORIDE 9 MG/ML
25 INJECTION, SOLUTION INTRAVENOUS PRN
Status: DISCONTINUED | OUTPATIENT
Start: 2023-03-06 | End: 2023-03-08 | Stop reason: HOSPADM

## 2023-03-06 RX ORDER — SODIUM CHLORIDE 0.9 % (FLUSH) 0.9 %
5-40 SYRINGE (ML) INJECTION EVERY 12 HOURS SCHEDULED
Status: DISCONTINUED | OUTPATIENT
Start: 2023-03-06 | End: 2023-03-08 | Stop reason: HOSPADM

## 2023-03-06 RX ADMIN — SODIUM CHLORIDE 25 ML: 9 INJECTION, SOLUTION INTRAVENOUS at 02:03

## 2023-03-06 RX ADMIN — ENOXAPARIN SODIUM 60 MG: 100 INJECTION SUBCUTANEOUS at 08:11

## 2023-03-06 RX ADMIN — ATORVASTATIN CALCIUM 40 MG: 40 TABLET, FILM COATED ORAL at 19:47

## 2023-03-06 RX ADMIN — MORPHINE SULFATE 4 MG: 4 INJECTION, SOLUTION INTRAMUSCULAR; INTRAVENOUS at 11:37

## 2023-03-06 RX ADMIN — SODIUM CHLORIDE, PRESERVATIVE FREE 10 ML: 5 INJECTION INTRAVENOUS at 08:36

## 2023-03-06 RX ADMIN — SODIUM CHLORIDE: 9 INJECTION, SOLUTION INTRAVENOUS at 09:07

## 2023-03-06 RX ADMIN — BUPROPION HYDROCHLORIDE 200 MG: 100 TABLET, FILM COATED, EXTENDED RELEASE ORAL at 08:15

## 2023-03-06 RX ADMIN — SODIUM CHLORIDE, PRESERVATIVE FREE 10 ML: 5 INJECTION INTRAVENOUS at 19:30

## 2023-03-06 RX ADMIN — OXYCODONE 10 MG: 5 TABLET ORAL at 08:11

## 2023-03-06 RX ADMIN — ENOXAPARIN SODIUM 60 MG: 100 INJECTION SUBCUTANEOUS at 19:36

## 2023-03-06 RX ADMIN — MORPHINE SULFATE 4 MG: 4 INJECTION, SOLUTION INTRAMUSCULAR; INTRAVENOUS at 22:55

## 2023-03-06 RX ADMIN — DOXEPIN HYDROCHLORIDE 10 MG: 10 CAPSULE ORAL at 22:55

## 2023-03-06 RX ADMIN — TIOTROPIUM BROMIDE INHALATION SPRAY 2 PUFF: 3.12 SPRAY, METERED RESPIRATORY (INHALATION) at 10:00

## 2023-03-06 RX ADMIN — POLYETHYLENE GLYCOL 3350 17 G: 17 POWDER, FOR SOLUTION ORAL at 08:20

## 2023-03-06 RX ADMIN — MOMETASONE FUROATE AND FORMOTEROL FUMARATE DIHYDRATE 2 PUFF: 200; 5 AEROSOL RESPIRATORY (INHALATION) at 10:00

## 2023-03-06 RX ADMIN — PANTOPRAZOLE SODIUM 40 MG: 40 TABLET, DELAYED RELEASE ORAL at 07:10

## 2023-03-06 RX ADMIN — METOPROLOL SUCCINATE 25 MG: 25 TABLET, FILM COATED, EXTENDED RELEASE ORAL at 11:37

## 2023-03-06 RX ADMIN — Medication 100 MG: at 08:14

## 2023-03-06 RX ADMIN — Medication 2000 MG: at 02:03

## 2023-03-06 RX ADMIN — MORPHINE SULFATE 4 MG: 4 INJECTION, SOLUTION INTRAMUSCULAR; INTRAVENOUS at 14:34

## 2023-03-06 RX ADMIN — Medication 1 TABLET: at 08:14

## 2023-03-06 RX ADMIN — ASPIRIN 81 MG: 81 TABLET ORAL at 08:11

## 2023-03-06 RX ADMIN — Medication 2000 MG: at 16:56

## 2023-03-06 RX ADMIN — MORPHINE SULFATE 4 MG: 4 INJECTION, SOLUTION INTRAMUSCULAR; INTRAVENOUS at 19:27

## 2023-03-06 RX ADMIN — MOMETASONE FUROATE AND FORMOTEROL FUMARATE DIHYDRATE 2 PUFF: 200; 5 AEROSOL RESPIRATORY (INHALATION) at 20:46

## 2023-03-06 RX ADMIN — Medication 2000 MG: at 09:08

## 2023-03-06 RX ADMIN — BUPROPION HYDROCHLORIDE 200 MG: 100 TABLET, FILM COATED, EXTENDED RELEASE ORAL at 21:04

## 2023-03-06 ASSESSMENT — PAIN DESCRIPTION - DESCRIPTORS
DESCRIPTORS: ACHING
DESCRIPTORS: ACHING

## 2023-03-06 ASSESSMENT — PAIN DESCRIPTION - ONSET: ONSET: ON-GOING

## 2023-03-06 ASSESSMENT — PAIN DESCRIPTION - ORIENTATION
ORIENTATION: LOWER;POSTERIOR
ORIENTATION: RIGHT
ORIENTATION: LEFT

## 2023-03-06 ASSESSMENT — PAIN SCALES - GENERAL
PAINLEVEL_OUTOF10: 4
PAINLEVEL_OUTOF10: 7
PAINLEVEL_OUTOF10: 6
PAINLEVEL_OUTOF10: 7
PAINLEVEL_OUTOF10: 7
PAINLEVEL_OUTOF10: 5
PAINLEVEL_OUTOF10: 7
PAINLEVEL_OUTOF10: 5

## 2023-03-06 ASSESSMENT — PAIN DESCRIPTION - LOCATION
LOCATION: BACK
LOCATION: BACK
LOCATION: FOOT
LOCATION: BACK
LOCATION: BACK

## 2023-03-06 ASSESSMENT — PAIN DESCRIPTION - FREQUENCY: FREQUENCY: CONTINUOUS

## 2023-03-06 NOTE — PROGRESS NOTES
[facility]  Occupational Therapy  Daily Note  Time:   Time In: 1347  Time Out: 1500  Timed Code Treatment Minutes: 25 Minutes  Minutes: 25          Date: 3/6/2023  Patient Name: Harpreet Briones,   Gender: male      Room: 6E-56/056-A  MRN: [id number]  : 1970  (47 y.o.)  Referring Practitioner: Mandy Boyd DPM  Diagnosis: Diabetic foot ulcer with osteromyelitis  Additional Pertinent Hx: Pt is a 47 y.o. male s/p RIGHT LISFRANC FOOT AMPUTATION on 3/3    Restrictions/Precautions:  Restrictions/Precautions: Fall Risk, Weight Bearing, General Precautions  Right Lower Extremity Weight Bearing: Non Weight Bearing  Position Activity Restriction  Other position/activity restrictions: Hx R wrist injury- okay to WB     SUBJECTIVE: Nurse Adolfo Wakefield ok'd session, IN bed upon arrival, agreeable to OT session    PAIN: DId not rate number    Vitals: Vitals not assessed per clinical judgement, see nursing flowsheet    COGNITION: WNL    ADL:   Educated patient on purchasing options for hip kit, Explained items, voiced understanding and appreciation . ADDITIONAL ACTIVITIES:  Provided pt with energy conservation/work simplification handout for safe transition home. Reviewed education and pt actively participating in education with good understanding of education.        ASSESSMENT:     Activity Tolerance:  Patient tolerance of  treatment: Good treatment tolerance      Discharge Recommendations: ECF with OT  Equipment Recommendations: Equipment Needed: Yes  Other: continue to monitor needs for [facility]  Plan: Times Per Week: 6x  Current Treatment Recommendations: Strengthening, Balance training, Functional mobility training, Endurance training, Safety education & training, Return to work related activity, Self-Care / ADL, Patient/Caregiver education & training    Patient Education  Patient Education: Energy Conservation and Equipment Education    Goals  Short Term Goals  Time Frame for Short Term Goals: by [place] Term Goal 1: Pt will safely navigate to/from bathroom with SBA and no LOB while maintaining NWB to RLE to increase indep with self care  Short Term Goal 2: Pt will complete toilet t/fs with no > than SBA and min VC for maintaining NWB to RLE to increase indep with toileting  Short Term Goal 3: Pt will complete LB dressing with SBA and AE prn for increased indep with self care  Short Term Goal 4: Pt will tolerate dynamic standing x5-6min with SBA and unilateral release to increase indep with grooming    Following session, patient left in safe position with all fall risk precautions in place.

## 2023-03-06 NOTE — PROGRESS NOTES
Patient lost peripheral access this morning after Ancef. IV team assessed and after multiple attempts and with ultrasound placed a peripheral line. Patient had burning with morphine injection. Order obtained for midline.

## 2023-03-06 NOTE — PROGRESS NOTES
[address] [address]  INPATIENT PHYSICAL THERAPY  DAILY NOTE  [facility] 6E - 6E-56/056-A    Time In: 4173  Time Out: 6768  Timed Code Treatment Minutes: 23 Minutes  Minutes: 23          Date: 3/6/2023  Patient Name: Melba Ledesma,  Gender:  male        MRN: [id number]  : 1970  (47 y.o.)     Referring Practitioner: Brigid Levy DPM  Diagnosis: Dm foot ulcer with osteomyelitis  Additional Pertinent Hx: Pt is a 47 y.o. male s/p RIGHT LISFRANC FOOT AMPUTATION on 3/3/23--osteomyelitis to right third metatarsal stump     Prior Level of Function:  Lives With: Alone  Type of Home: Apartment  Home Layout: One level  Home Access: Level entry  Home Equipment: Walker, rolling, [address], Electric scooter   Bathroom Shower/Tub: Walk-in shower, Shower chair with back  Bathroom Toilet: Handicap height  Bathroom Equipment: Grab bars in shower  Bathroom Accessibility: Accessible    ADL Assistance: Independent  Homemaking Assistance: Needs assistance  Ambulation Assistance: Independent  Transfer Assistance: Independent  Active : No  IADL Comments: Pt's brother is able to help with homemaking routine. Restrictions/Precautions:  Restrictions/Precautions: Fall Risk, Weight Bearing, General Precautions  Right Lower Extremity Weight Bearing: Non Weight Bearing  Position Activity Restriction  Other position/activity restrictions: Hx R wrist injury- okay to WB     SUBJECTIVE: RN approved session. Pt in bed upon arrival and agrees to therapy. Pt had a dressing change completed prior to session.  Pt A&O x4    PAIN: back and L thigh 6/10    Vitals: Oxygen: 94% on room air   Heart Rate: 98    OBJECTIVE:  Bed Mobility:  Sit to Supine: Stand By Assistance   Scooting: Stand By Assistance    Transfers:  Sit to Stand: [employer], with verbal cues  Stand to [facility], Minimal Assistance, with increased time for completion, cues for hand placement, with verbal cues, poor safety with alignment ot surface, decreased eccentric control  Good carryover with NWB    Ambulation:  Contact Guard Assistance, with verbal cues , with increased time for completion  Distance: 5ft  Surface: Level Tile  Device:Rolling Walker  Gait Deviations: Forward Flexed Posture, Decreased Gait Speed, Unsteady Gait, and decreased hop height and lengths, fatigued quickly  Good carryover with NWB    Balance:  Dynamic Sitting Balance: Supervision  Sat EOB ~15 mins for completion of therex, pt demos retro trunk lean at times- cues for hand placement    Exercise:  Patient was guided in 1 set(s) 10 reps of exercise to both lower extremities. General LE therex while seated . Exercises were completed for increased independence with functional mobility. Functional Outcome Measures: Completed  AM-PAC Inpatient Mobility without Stair Climbing Raw Score : 15  AM-PAC Inpatient without Stair Climbing T-Scale Score : 43.03    ASSESSMENT:  Assessment: Patient progressing toward established goals. Activity Tolerance:  Patient tolerance of  treatment: good. Pt demos decreased strength, endurance, and independence with mobility. Pt unsteady on feet and requires hands on assist for safety with mobility. Pt will benefit from cont PT at this time to ensure safety, to decrease the risk for falls and to return to PLOF.       Equipment Recommendations:Equipment Needed: No  Discharge Recommendations: Subacte/Skilled Nursing Facility  Plan: Current Treatment Recommendations: Strengthening, Balance training, Gait training, Functional mobility training, Transfer training, Endurance training, Pain management, Patient/Caregiver education & training, Safety education & training, Therapeutic activities  General Plan:  (5X O)    Patient Education  Patient Education: Plan of Care, Bed Mobility, Transfers, Gait, Verbal Exercise Instruction    Goals:  Patient Goals : less pain  Short Term Goals  Time Frame for Short Term Goals: by discharge  Short Term Goal 1: bed mobility with S to get in/out of bed  Short Term Goal 2: transfer with SBA to get in/out of chairs, maintaining NWB RLE  Short Term Goal 3: amb >10'x1 with RW, NWB RLE, and SBA to walk safely to bathroom  Long Term Goals  Time Frame for Long Term Goals : no LTGs set secondary to short ELOS    Following session, patient left in safe position with all fall risk precautions in place.

## 2023-03-06 NOTE — CARE COORDINATION
3/6/23, 9:03 AM EST    DISCHARGE ON GOING EVALUATION    [facility] day: 5  Location: -/056-A Reason for admit: Diabetic foot ulcer with osteomyelitis ([facility] [address] [E11.621, E11.69, L97.509, M86.9]  Osteomyelitis of left foot, unspecified type ([facility] [address] [M86.9]   Procedure:   3/1: CXR: Normal heart size. Evidence for old, healed granulomatous disease. No acute findings. No infiltrates or effusions are seen. 3/3 RIGHT LISFRANC FOOT AMPUTATION  Excision of 7cm wound, right foot    Barriers to Discharge: PT/OT, ID following, right foot ulcer (+) Corynebacterium species, staphylococcus aureus, diabetes management, Hgb 11.6, PT/OT, Asa, Lipitor, IV Ancef, Lovenox, pain control, Protonix. PCP: MICHELLE Milian - [facility]  Readmission Risk Score: 13.3%  Patient Goals/Plan/Treatment Preferences: From home alone. Planning [facility]. SW following. Refer to SW note.

## 2023-03-06 NOTE — CARE COORDINATION
3/6/23, 7:49 AM EST    DISCHARGE PLANNING EVALUATION    Planning [facility], precert needed for ecf once IV antibiotic plan is determined. Spoke with [facility] staff, they will need final antibiotic and wound care plan to cost out care to determine if they will accept.

## 2023-03-06 NOTE — PLAN OF CARE
Problem: Chronic Conditions and Co-morbidities  Goal: Patient's chronic conditions and co-morbidity symptoms are monitored and maintained or improved  3/6/2023 0901 by Rory Blackburn RN  Outcome: Progressing  Flowsheets (Taken 3/6/2023 0901)  Care Plan - Patient's Chronic Conditions and Co-Morbidity Symptoms are Monitored and Maintained or Improved: Monitor and assess patient's chronic conditions and comorbid symptoms for stability, deterioration, or improvement     Problem: Discharge Planning  Goal: Discharge to home or other facility with appropriate resources  3/6/2023 0901 by Rory Blackburn RN  Outcome: Progressing  Flowsheets (Taken 3/6/2023 0901)  Discharge to home or other facility with appropriate resources:   Identify barriers to discharge with patient and caregiver   Arrange for needed discharge resources and transportation as appropriate   Identify discharge learning needs (meds, wound care, etc)     Problem: Pain  Goal: Verbalizes/displays adequate comfort level or baseline comfort level  3/6/2023 0901 by Rory Blackburn RN  Outcome: Progressing  Flowsheets (Taken 3/6/2023 0901)  Verbalizes/displays adequate comfort level or baseline comfort level:   Assess pain using appropriate pain scale   Encourage patient to monitor pain and request assistance   Administer analgesics based on type and severity of pain and evaluate response   Implement non-pharmacological measures as appropriate and evaluate response     Problem: Skin/Tissue Integrity - Adult  Goal: Skin integrity remains intact  3/6/2023 0901 by Rory Blackburn RN  Outcome: Progressing  Flowsheets (Taken 3/6/2023 0901)  Skin Integrity Remains Intact:   Monitor for areas of redness and/or skin breakdown   Assess vascular access sites hourly     Problem: Skin/Tissue Integrity - Adult  Goal: Incisions, wounds, or drain sites healing without S/S of infection  3/6/2023 0901 by Rory Blackburn RN  Outcome: Progressing  Flowsheets (Taken 3/6/2023 0901)  Incisions, Wounds, or Drain Sites Healing Without Sign and Symptoms of Infection: TWICE DAILY: Assess and document dressing/incision, wound bed, drain sites and surrounding tissue     Problem: Musculoskeletal - Adult  Goal: Return mobility to safest level of function  3/6/2023 0901 by Beverley Huggins RN  Outcome: Progressing  Flowsheets (Taken 3/6/2023 0901)  Return Mobility to Safest Level of Function:   Assess patient stability and activity tolerance for standing, transferring and ambulating with or without assistive devices   Assist with transfers and ambulation using safe patient handling equipment as needed   Ensure adequate protection for wounds/incisions during mobilization   Obtain physical therapy/occupational therapy consults as needed     Problem: Infection - Adult  Goal: Absence of infection during hospitalization  3/6/2023 0901 by Beverley Huggins RN  Outcome: Progressing  Flowsheets (Taken 3/6/2023 0901)  Absence of infection during hospitalization:   Monitor lab/diagnostic results   Assess and monitor for signs and symptoms of infection   Administer medications as ordered   Monitor all insertion sites i.e., indwelling lines, tubes and drains   Instruct and encourage patient and family to use good hand hygiene technique     Problem: Safety - Adult  Goal: Free from fall injury  3/6/2023 0901 by Beverley Huggins RN  Outcome: Progressing  Flowsheets (Taken 3/6/2023 0901)  Free From Fall Injury: Instruct family/caregiver on patient safety

## 2023-03-06 NOTE — PROGRESS NOTES
Hospitalist Progress Note    Patient:  Galileo Leong      Unit/Bed:6E-56/056-A    YOB: 1970    MRN: [id number]       Acct: [de-identified]     PCP: MICHELLE Wallace CNP    Date of Admission: 3/1/2023    Assessment/Plan:    Right diabetic foot ulcer with osteomyelitis s/p lisfranc foot amputation: MRI that was obtained at an outside hospital 2/27 exhibited probable osteomyelitis of the third metatarsal stump. Failed outpatient doxycycline course. Continue IV Ancef. ID following. Podiatry changes dressing today. Non weight bearing to RLE. Need ATB plan for SNF. Acute blood loss anemia, postoperative and dilutional. Preop hgb 15.6, down to 11.2. HH has stabilized. Lactic acidosis, resolved: mild, no leukocytosis, left shift, tachycardia, tachypnea, hypotension, or fever to suggest sepsis. Tolerating diet. Stop IVF. IDDM: A1c 6.0%. Continue Trulicity (patient to provide) every Sunday. Accuchecks with low dose corrective coverage. BS trend has been in goal. Hypoglycemic protocol. Diabetic diet. CAD, reported. No history of intervention: cont ASA  Essential HTN: home medications reviewed and include metoprolol, lisinopril, and midodrine. Patient endorses taking this regimen. Chart reviewed, no office notes available. Metoprolol has been continued with hold parameters. Continue to hold lisinopril and midodrine and monitor BP. HLP: cont statin  COPD: not in acute exacerbation. Cont home inhalers. Morbid obesity Body mass index is 52.11 kg/m². Alcohol abuse: reports no alcohol intake for over one month  Tobacco abuse: NRT prn, cessation education  Deconditioning. PT/OT. Precert to [facility].        Chief Complaint: diabetic foot ulcer    Hospital Course:     Galileo Leong is a 47 y.o. male with PMHx of CAD, HTN, HLP, COPD, alcohol and tobacco abuse, IDDM with extensive h/o diabetic foot ulcerations s/p multiple amputations and surgeries of BLE who presented to [facility] at the direction of podiatrist, Dr Casandra Bolivar following a MRI that was obtained at an outside hospital 2/27 that exhibited probable osteomyelitis of the third metatarsal stump. Patient alert, oriented x3, pleasant. Reports right diabetic ulcer continues to worsen despite podiatry care and Doxycycline course. Denies fever but states he has had intermittent chills and poor appetite. Denies shortness of breath, chest pain, dysuria, or diarrhea. Reports NWB status on R foot however admits to ambulating. On presentation to ED, patient afebrile, normotensive, O2 sat adequate on room air. Electrolytes and renal function with no abnormalities. Lactic acid mildly elevated (2.0). No leukocytosis or anemia. Wound cultures obtained in ED and pending. Patient received IVF, ordered per sepsis protocol however patient does not meet sepsis criteria therefore discontinued and MIV ordered. Initial dose of cefepime per podiatry recommendations initiated. Subjective: No new complaints. Continues with left thigh and right foot pain that is tolerable.  No CP/SOB      Medications:  Reviewed    Infusion Medications    sodium chloride 50 mL/hr at 03/06/23 0907    sodium chloride      dextrose       Scheduled Medications    sodium chloride flush  5-40 mL IntraVENous 2 times per day    sodium chloride flush  5-40 mL IntraVENous 2 times per day    enoxaparin  60 mg SubCUTAneous BID    nicotine  1 patch TransDERmal Daily    ceFAZolin  2,000 mg IntraVENous Q8H    aspirin  81 mg Oral Daily    atorvastatin  40 mg Oral Nightly    buPROPion  200 mg Oral BID    metoprolol succinate  25 mg Oral Daily    pantoprazole  40 mg Oral Daily    mometasone-formoterol  2 puff Inhalation BID    tiotropium  2 puff Inhalation Daily    insulin lispro  0-4 Units SubCUTAneous TID     insulin lispro  0-4 Units SubCUTAneous Nightly    Dulaglutide  0.5 mL SubCUTAneous Weekly    thiamine  100 mg Oral Daily    multivitamin  1 tablet Oral Daily     PRN Meds: sodium chloride flush, sodium chloride, ondansetron **OR** ondansetron, oxyCODONE **OR** oxyCODONE, morphine **OR** morphine, doxepin, sodium chloride flush, sodium chloride, polyethylene glycol, acetaminophen **OR** acetaminophen, albuterol sulfate HFA, glucose, dextrose bolus **OR** dextrose bolus, glucagon (rDNA), dextrose      Intake/Output Summary (Last 24 hours) at 3/6/2023 1116  Last data filed at 3/6/2023 1019  Gross per 24 hour   Intake 1184.32 ml   Output 3625 ml   Net -2440.68 ml         Diet:  ADULT DIET; Regular    Exam:  /67   Pulse 75   Temp 98.2 °F (36.8 °C) (Oral)   Resp 18   Ht 6' 1\" (1.854 m)   Wt (!) 395 lb (179.2 kg)   SpO2 97%   BMI 52.11 kg/m²     General appearance: No apparent distress, appears stated age and cooperative. HEENT: Pupils equal, round, and reactive to light. Conjunctivae/corneas clear. Neck: Supple, with full range of motion. No jugular venous distention. Trachea midline. Respiratory:  Normal respiratory effort. Clear to auscultation, bilaterally without Rales/Wheezes/Rhonchi. Cardiovascular: Regular rate and rhythm with normal S1/S2 without murmurs, rubs or gallops. Abdomen: Soft, non-tender, non-distended with normal bowel sounds. Musculoskeletal: passive and active ROM x 4 extremities. Skin: Skin color, texture, turgor normal. Dressing to right foot. Neurologic:  Neurovascularly intact without any focal sensory/motor deficits.  Cranial nerves: II-XII intact, grossly non-focal.  Psychiatric: Alert and oriented, thought content appropriate, normal insight  Capillary Refill: Brisk,< 3 seconds   Peripheral Pulses: +2 palpable, equal bilaterally       Labs:   Recent Labs     03/04/23  0636 03/05/23  0609   WBC 7.7 7.9   HGB 11.2* 11.6*   HCT 34.1* 34.5*    190       Recent Labs     03/04/23  0636 03/05/23  0609    139   K 4.3 4.1    103   CO2 29 29   BUN 9 7   CREATININE 1.0 0.8   CALCIUM 8.1* 8.2*       No results for input(s): AST, ALT, BILIDIR, BILITOT, ALKPHOS in the last 72 hours. No results for input(s): INR in the last 72 hours. No results for input(s): Marion Dross in the last 72 hours. Urinalysis:      Lab Results   Component Value Date/Time    NITRU NEGATIVE 03/01/2023 02:20 PM    BLOODU NEGATIVE 03/01/2023 02:20 PM    SPECGRAV 1.015 08/12/2016 06:59 PM    GLUCOSEU NEGATIVE 03/01/2023 02:20 PM       Radiology:  XR CHEST PORTABLE   Final Result   1. Normal heart size. Evidence for old, healed granulomatous disease. 2. No acute findings. No infiltrates or effusions are seen. **This report has been created using voice recognition software. It may contain minor errors which are inherent in voice recognition technology. **      Final report electronically signed by Dr. Sima Enamorado on 3/1/2023 1:29 PM          Diet: ADULT DIET;  Regular    DVT prophylaxis: [x] Lovenox                                 [] SCDs                                 [] SQ Heparin                                 [] Encourage ambulation           [] Already on Anticoagulation     Disposition:    [x] Home       [] TCU       [] Rehab       [] Psych       [] SNF       [] [place]       [] Other-    Code Status: Full Code    PT/OT Eval Status: ordered    Electronically signed by MICHELLE Parekh CNP on 3/6/2023 at 11:16 AM

## 2023-03-06 NOTE — PROGRESS NOTES
Continued Stay Note  [facility]     Patient Name: Dacia Nelson  MRN: [id number]  Today's Date: 9/24/2018    Admit Date: 9/13/2018          Discharge Plan     Row Name 09/24/18 1116       Plan    Plan [facility] vs other    Patient/Family in Agreement with Plan yes    Plan Comments Met with patient and her daughter in the room to discuss the discharge plan. They would like a referral to [facility]. Referral called to Svitlana, and she will visit patient today. Per discussion in rounds, patient is medically ready for discharge. Anticipate discharge to [facility] tomorrow pending a bed offer. CM will continue to follow.               Discharge Codes    No documentation.       Expected Discharge Date and Time     Expected Discharge Date Expected Discharge Time    Sep 26, 2018             Melanie Alonso     Midline insertion Procedure Note    Deena Galeano   Admitted- 3/1/2023 11:16 AM  Admission diagnosis- Diabetic foot ulcer with osteomyelitis ([facility] [E11.621, E11.69, L97.509, M86.9]  Osteomyelitis of left foot, unspecified type [facility]) [M86.9]      Attending Physician- Kathrene Jeans, *  Ordering Physician- Dr. Zulay Haas  Indication for Insertion: Poor Vascular Access    Catheter Insertion Date- 3/6/2023   Catheter Brand-Bard  Lot Number- [id number]  Gauge- 3  Lumen-single    Insertion Site- ELEANOR Cephalic  Vein Diameter- 2 mm  Catheter Length- 20 cm  Internal Length- 20 cm  Exposed Catheter Length- 0cm   Midline Tip Terminates in the Axillary- Yes  Upper Arm Circumference- 45cm  Easy insertion- Yes  Able to Aspirate blood- Yes  Easy Flush- Yes    Midline insertion successful- Yes  Ultrasound- yes    Okay To Use Midline- Yes    Electronically signed by Yusuf Mead, RN, RN on 3/6/2023 at 2:55 PM

## 2023-03-06 NOTE — PROGRESS NOTES
Podiatric Progress Note  Julia Galeano  Subjective :   3/6/2023  Patient seen at bedside this morning on behalf of Dr. Jessica Quiroz. Patient is status post right Lisfranc foot amputation (DOS 3/3/2023). Patient is pleasant, and is alert and oriented to person, place, time. Patient endorses mild to moderate pain to the RLE. He denies any N/V/F/C/SOB/CP or bilateral calf tenderness. Patient has no other pedal complaints at this time. Patient is aware that he will be transferred to SNF/ECF for further care. 3/5/23  Patient seen bedside today on behalf of Dr Jessica Quiroz. Patient appeared pleasant, was oriented to person, place and time and in no acute distress. Patient states he is having 8/10 pain to the RLE. He denies calf pain. Patient denies any N/V/F/C/SOB or CP. Patient has no further pedal concerns at this point in time. 3/2/23  Patient seen bedside today alongside of Dr Jessica Quiroz. Patient appeared pleasant, was oriented to person, place and time and in no acute distress. Patient relates that this morning he wanted to proceed with a BKA, but at this time he wants to proceed with a lisfranc amputation. The patient will consider going to a SNF at time of discharge. Patient denies any N/V/F/C/SOB or CP. Patient has no further pedal concerns at this point in time. 3/1/23  Patient seen bedside today on behalf of Dr. Jsesica Quiroz. Patient appeared pleasant, was oriented to person, place and time and in no acute distress. Patient relates that he is leaning towards a BKA rather than a lisfranc amputation. The patient relates that he has been thinking about it for the past few days. The patient relates that he doesn't think that he wants a lisfranc amputation at this time because he believes that he will have reoccurring wound and the amputation site will not heal secondary to him not being able to stay off of it.  He relates that he tries to stay off of the wound he currently has, but will sometimes accidentally use it and apply pressure. The patient relates that he is unable to use a knee scooter because he is about 100 pounds over the weight limit. The patient relates that he will \"sleep on it\" in regards to a decision, but is still leaning towards a BKA at this time. Patient denies any N/V/F/C/SOB or CP. Patient has no further pedal concerns at this point in time. 3/1/23  HISTORY OF PRESENT ILLNESS:                 The patient is a 47 y.o. male with significant past medical history of CAD, ARF, CHF, COPD, T2DM, HLD, HTN who is being seen at bedside on behalf of Dr. Krissy Espinosa. Patient was seen earlier this morning by Dr. Krissy Espinosa in clinic, and was sent to [facility] emergency department for surgical intervention of the right foot later this week or early next week. He is well-known to the podiatry service. Patient has had multiple amputations and surgeries to the bilateral feet, most recently a TMA on the right foot with revision for nonhealing plantar wound. MRI was obtained at outside hospital exhibiting suggestion of osteomyelitis of the third metatarsal stump. On exam in the ED, patient was resting comfortably in no acute distress. He admitted to feeling tired for the past week, with increased lack of appetite. He denied any fevers, chills, chest pain, shortness of breath, nausea, vomiting, diarrhea. Patient is aware of plan for further amputation. No other pedal concerns.     Current Medications:    Current Facility-Administered Medications: sodium chloride flush 0.9 % injection 5-40 mL, 5-40 mL, IntraVENous, 2 times per day  sodium chloride flush 0.9 % injection 5-40 mL, 5-40 mL, IntraVENous, PRN  0.9 % sodium chloride infusion, , IntraVENous, PRN  ondansetron (ZOFRAN-ODT) disintegrating tablet 4 mg, 4 mg, Oral, Q8H PRN **OR** ondansetron (ZOFRAN) injection 4 mg, 4 mg, IntraVENous, Q6H PRN  oxyCODONE (ROXICODONE) immediate release tablet 5 mg, 5 mg, Oral, Q4H PRN **OR** oxyCODONE (ROXICODONE) immediate release tablet 10 mg, 10 mg, Oral, Q4H PRN  morphine (PF) injection 2 mg, 2 mg, IntraVENous, Q2H PRN **OR** morphine injection 4 mg, 4 mg, IntraVENous, Q2H PRN  doxepin (SINEQUAN) capsule 10 mg, 10 mg, Oral, Nightly PRN  sodium chloride flush 0.9 % injection 5-40 mL, 5-40 mL, IntraVENous, 2 times per day  sodium chloride flush 0.9 % injection 5-40 mL, 5-40 mL, IntraVENous, PRN  0.9 % sodium chloride infusion, , IntraVENous, PRN  enoxaparin (LOVENOX) injection 60 mg, 60 mg, SubCUTAneous, BID  polyethylene glycol (GLYCOLAX) packet 17 g, 17 g, Oral, Daily PRN  acetaminophen (TYLENOL) tablet 650 mg, 650 mg, Oral, Q6H PRN **OR** acetaminophen (TYLENOL) suppository 650 mg, 650 mg, Rectal, Q6H PRN  nicotine (NICODERM CQ) 14 MG/24HR 1 patch, 1 patch, TransDERmal, Daily  ceFAZolin (ANCEF) 2000 mg in 0.9% sodium chloride 50 mL IVPB, 2,000 mg, IntraVENous, Q8H  albuterol sulfate HFA (PROVENTIL;VENTOLIN;PROAIR) 108 (90 Base) MCG/ACT inhaler 2 puff, 2 puff, Inhalation, Q4H PRN  aspirin EC tablet 81 mg, 81 mg, Oral, Daily  atorvastatin (LIPITOR) tablet 40 mg, 40 mg, Oral, Nightly  buPROPion (WELLBUTRIN SR) extended release tablet 200 mg, 200 mg, Oral, BID  metoprolol succinate (TOPROL XL) extended release tablet 25 mg, 25 mg, Oral, Daily  pantoprazole (PROTONIX) tablet 40 mg, 40 mg, Oral, Daily  mometasone-formoterol (DULERA) 200-5 MCG/ACT inhaler 2 puff, 2 puff, Inhalation, BID  tiotropium (SPIRIVA RESPIMAT) 2.5 MCG/ACT inhaler 2 puff, 2 puff, Inhalation, Daily  glucose chewable tablet 16 g, 4 tablet, Oral, PRN  dextrose bolus 10% 125 mL, 125 mL, IntraVENous, PRN **OR** dextrose bolus 10% 250 mL, 250 mL, IntraVENous, PRN  glucagon (rDNA) injection 1 mg, 1 mg, SubCUTAneous, PRN  dextrose 10 % infusion, , IntraVENous, Continuous PRN  insulin lispro (HUMALOG) injection vial 0-4 Units, 0-4 Units, SubCUTAneous, TID WC  insulin lispro (HUMALOG) injection vial 0-4 Units, 0-4 Units, SubCUTAneous, Nightly  dulaglutide 0.75 mg/0.5 mL injection (Patient Supplied), 0.5 mL, SubCUTAneous, Weekly  thiamine tablet 100 mg, 100 mg, Oral, Daily  multivitamin 1 tablet, 1 tablet, Oral, Daily    Objective     /67   Pulse 75   Temp 98.2 °F (36.8 °C) (Oral)   Resp 18   Ht 6' 1\" (1.854 m)   Wt (!) 395 lb (179.2 kg)   SpO2 97%   BMI 52.11 kg/m²      I/O:  Intake/Output Summary (Last 24 hours) at 3/6/2023 0842  Last data filed at 3/6/2023 0412  Gross per 24 hour   Intake 1424.32 ml   Output 3350 ml   Net -1925.68 ml              Wt Readings from Last 3 Encounters:   03/01/23 (!) 395 lb (179.2 kg)   07/21/22 (!) 376 lb 5.2 oz (170.7 kg)   01/19/22 (!) 369 lb (167.4 kg)       LABS:    Recent Labs     03/04/23  0636 03/05/23  0609   WBC 7.7 7.9   HGB 11.2* 11.6*   HCT 34.1* 34.5*    190        Recent Labs     03/05/23  0609      K 4.1      CO2 29   BUN 7   CREATININE 0.8        No results for input(s): PROT, INR, APTT in the last 72 hours. No results for input(s): CKTOTAL, CKMB, CKMBINDEX, TROPONINI in the last 72 hours. Focused Lower Extremity Examination:    Vitals:    /67   Pulse 75   Temp 98.2 °F (36.8 °C) (Oral)   Resp 18   Ht 6' 1\" (1.854 m)   Wt (!) 395 lb (179.2 kg)   SpO2 97%   BMI 52.11 kg/m²      Posterior splint to RLE clean, dry, intact. Images  XR CHEST PORTABLE   Final Result   1. Normal heart size. Evidence for old, healed granulomatous disease. 2. No acute findings. No infiltrates or effusions are seen. **This report has been created using voice recognition software. It may contain minor errors which are inherent in voice recognition technology. **      Final report electronically signed by Dr. Anna Hidalgo on 3/1/2023 1:29 PM          ASSESSMENT: Pt. is a 47 y.o. male with:  Principle  1. Nonhealing diabetic foot ulceration, right foot  2.  S/P Chopart amputation right    Chronic  Patient Active Problem List   Diagnosis    Hypertension    Hyperlipidemia    CAD (coronary artery disease)    Family history of premature coronary heart disease    Dizziness    Acute renal failure (ARF) (HCC)    Chronic low back pain    PTSD (post-traumatic stress disorder)    Foot ulceration, right, with fat layer exposed ([address]    Type 2 diabetes mellitus without complication, with long-term current use of insulin (HCC)    Cellulitis of right foot    Osteomyelitis ([address]    Diabetic foot ulcer with osteomyelitis ([address]       PLAN:     -Patient initially examined and evaluated  -WBC 7.9 on 3/5/2023  -Patient currently on IV Ancef  -S/p right foot Chopart's joint amputation (3/3/2023)  -Discussed with patient that incision appears to have a mild amount of drainage from the central aspect of the incision. There are no surrounding signs of infection, however. We will continue with Betadine wet-to-dry dressings to keep the area clean and dry.  -Imaging reviewed: MRI from OSH exhibiting suggestion of osteomyelitis to right third metatarsal stump.  -Swab cultures taken of right foot wound; Rene jimenezg positive   -Change dressings at today's visit; applied Betadine soaked gauze to incision, and covered with Kerlix roll, multiple rolls of cast padding, Ace bandage, and reapplied posterior splint secured with another Ace bandage.  -Weight bearing status: Non weight bearing to RLE  -Patient verbalized agreement and understanding with treatment plan discussed.  All questions answered to their satisfaction.  -Precertification for [facility] pending  -Podiatry will continue to follow patient    DISPO: Pending precert to [id number] [place] [address], DP PGY-3  3/6/2023   8:42 AM

## 2023-03-06 NOTE — PLAN OF CARE
Problem: Chronic Conditions and Co-morbidities  Goal: Patient's chronic conditions and co-morbidity symptoms are monitored and maintained or improved  3/5/2023 2011 by Tiffanie Zamora RN  Outcome: Progressing  Flowsheets (Taken 3/5/2023 2011)  Care Plan - Patient's Chronic Conditions and Co-Morbidity Symptoms are Monitored and Maintained or Improved:   Monitor and assess patient's chronic conditions and comorbid symptoms for stability, deterioration, or improvement   Update acute care plan with appropriate goals if chronic or comorbid symptoms are exacerbated and prevent overall improvement and discharge   Collaborate with multidisciplinary team to address chronic and comorbid conditions and prevent exacerbation or deterioration     Problem: Discharge Planning  Goal: Discharge to home or other facility with appropriate resources  3/5/2023 2011 by Tiffanie Zamora RN  Outcome: Progressing  [address] (Taken 3/5/2023 2011)  Discharge to home or other facility with appropriate resources:   Identify barriers to discharge with patient and caregiver   Identify discharge learning needs (meds, wound care, etc)   Refer to discharge planning if patient needs post-hospital services based on physician order or complex needs related to functional status, cognitive ability or social support system   Arrange for needed discharge resources and transportation as appropriate     Problem: Pain  Goal: Verbalizes/displays adequate comfort level or baseline comfort level  3/5/2023 2011 by Tiffanie Zamora RN  Outcome: Progressing  Flowsheets (Taken 3/5/2023 2011)  Verbalizes/displays adequate comfort level or baseline comfort level:   Encourage patient to monitor pain and request assistance   Administer analgesics based on type and severity of pain and evaluate response   Implement non-pharmacological measures as appropriate and evaluate response   Assess pain using appropriate pain scale     Problem: Skin/Tissue Integrity - Adult  Goal: Skin integrity remains intact  Outcome: Progressing  Flowsheets (Taken 3/5/2023 2011)  Skin Integrity Remains Intact: Monitor for areas of redness and/or skin breakdown     Problem: Skin/Tissue Integrity - Adult  Goal: Incisions, wounds, or drain sites healing without S/S of infection  Outcome: Progressing  Flowsheets (Taken 3/5/2023 2011)  Incisions, Wounds, or Drain Sites Healing Without Sign and Symptoms of Infection:   ADMISSION and DAILY: Assess and document risk factors for pressure ulcer development   TWICE DAILY: Assess and document skin integrity   TWICE DAILY: Assess and document dressing/incision, wound bed, drain sites and surrounding tissue     Problem: Musculoskeletal - Adult  Goal: Return mobility to safest level of function  3/5/2023 2011 by Amador Patton RN  Outcome: Progressing  Flowsheets (Taken 3/5/2023 2011)  Return Mobility to Safest Level of Function:   Assess patient stability and activity tolerance for standing, transferring and ambulating with or without assistive devices   Assist with transfers and ambulation using safe patient handling equipment as needed   Ensure adequate protection for wounds/incisions during mobilization   Obtain physical therapy/occupational therapy consults as needed   Instruct patient/family in ordered activity level     Problem: Musculoskeletal - Adult  Goal: Return ADL status to a safe level of function  Outcome: Progressing  Flowsheets (Taken 3/5/2023 2011)  Return ADL Status to a Safe Level of Function:   Administer medication as ordered   Obtain physical therapy/occupational therapy consults as needed   Assist and instruct patient to increase activity and self care as tolerated   Assess activities of daily living deficits and provide assistive devices as needed     Problem: Infection - Adult  Goal: Absence of infection during hospitalization  3/5/2023 2011 by Amador Patton RN  Outcome: Progressing  Flowsheets (Taken 3/5/2023 2011)  Absence of infection during hospitalization:   Assess and monitor for signs and symptoms of infection   Monitor lab/diagnostic results   Monitor all insertion sites i.e., indwelling lines, tubes and drains   [place] appropriate cooling/warming therapies per order   Administer medications as ordered   Instruct and encourage patient and family to use good hand hygiene technique     Problem: Safety - Adult  Goal: Free from fall injury  Outcome: Progressing  Flowsheets (Taken 3/5/2023 2011)  Free From Fall Injury:   Instruct family/caregiver on patient safety   Based on caregiver fall risk screen, instruct family/caregiver to ask for assistance with transferring infant if caregiver noted to have fall risk factors   Care plan reviewed with patient. Patient verbalized understanding of the plan of care and contribute to goal setting.

## 2023-03-06 NOTE — PROGRESS NOTES
Progress note: Infectious diseases    Patient - Blanca Meng,  Age - 47 y.o.    - 1970      Room Number - 6E-56/056-A   MRN -  [id number]   Acct # - [de-identified]  Date of Admission -  3/1/2023 11:16 AM    SUBJECTIVE:   No new complaints. OBJECTIVE   VITALS    height is 6' 1\" (1.854 m) and weight is 395 lb (179.2 kg) (abnormal). His oral temperature is 98.2 °F (36.8 °C). His blood pressure is 123/67 and his pulse is 75. His respiration is 18 and oxygen saturation is 97%.        Wt Readings from Last 3 Encounters:   23 (!) 395 lb (179.2 kg)   22 (!) 376 lb 5.2 oz (170.7 kg)   22 (!) 369 lb (167.4 kg)       I/O (24 Hours)    Intake/Output Summary (Last 24 hours) at 3/6/2023 1052  Last data filed at 3/6/2023 1019  Gross per 24 hour   Intake 1184.32 ml   Output 3625 ml   Net -2440.68 ml         General Appearance  Awake, alert, oriented,  not  In acute distress  HEENT - normocephalic, atraumatic, pink conjunctiva,  anicteric sclera  Neck - Supple, no mass  Lungs -  Bilateral good air entry, no rhonchi, no wheeze  Cardiovascular - Heart sounds are normal.     Abdomen - soft, not distended, nontender,   Neurologic -oriented  Skin - No bruising or bleeding  Extremities -  dressed foot wound (post surgery)    MEDICATIONS:      sodium chloride flush  5-40 mL IntraVENous 2 times per day    sodium chloride flush  5-40 mL IntraVENous 2 times per day    enoxaparin  60 mg SubCUTAneous BID    nicotine  1 patch TransDERmal Daily    ceFAZolin  2,000 mg IntraVENous Q8H    aspirin  81 mg Oral Daily    atorvastatin  40 mg Oral Nightly    buPROPion  200 mg Oral BID    metoprolol succinate  25 mg Oral Daily    pantoprazole  40 mg Oral Daily    mometasone-formoterol  2 puff Inhalation BID    tiotropium  2 puff Inhalation Daily    insulin lispro  0-4 Units SubCUTAneous TID WC    insulin lispro  0-4 Units SubCUTAneous Nightly    Dulaglutide  0.5 mL SubCUTAneous Weekly    thiamine  100 mg Oral Daily    multivitamin  1 tablet Oral Daily      sodium chloride 50 mL/hr at 03/06/23 0907    sodium chloride      dextrose       sodium chloride flush, sodium chloride, ondansetron **OR** ondansetron, oxyCODONE **OR** oxyCODONE, morphine **OR** morphine, doxepin, sodium chloride flush, sodium chloride, polyethylene glycol, acetaminophen **OR** acetaminophen, albuterol sulfate HFA, glucose, dextrose bolus **OR** dextrose bolus, glucagon (rDNA), dextrose      LABS:     CBC:   Recent Labs     03/04/23  0636 03/05/23  0609   WBC 7.7 7.9   HGB 11.2* 11.6*    190       BMP:    Recent Labs     03/04/23  0636 03/05/23  0609    139   K 4.3 4.1    103   CO2 29 29   BUN 9 7   CREATININE 1.0 0.8   GLUCOSE 139* 134*       Calcium:  Recent Labs     03/05/23  0609   CALCIUM 8.2*        Recent Labs     03/05/23  1117 03/05/23  1941 03/06/23  0806   POCGLU 129* 151* 153*          CULTURES:   UA:   No results for input(s): SPECGRAV, PHUR, COLORU, CLARITYU, MUCUS, PROTEINU, BLOODU, RBCUA, WBCUA, BACTERIA, NITRU, GLUCOSEU, BILIRUBINUR, UROBILINOGEN, KETUA, LABCAST, LABCASTTY, AMORPHOS in the last 72 hours. Invalid input(s): CRYSTALS    Micro:   Lab Results   Component Value Date/Time    BC No growth 24 hours. No growth 48 hours. 03/01/2023 12:24 PM    BC No growth 24 hours. No growth 48 hours.  03/01/2023 12:24 PM            Problem list of patient:     Patient Active Problem List   Diagnosis Code    Hypertension I10    Hyperlipidemia E78.5    CAD (coronary artery disease) I25.10    Family history of premature coronary heart disease Z82.49    Dizziness R42    Acute renal failure (ARF) (HCC) N17.9    Chronic low back pain M54.50, G89.29    PTSD (post-traumatic stress disorder) F43.10    Foot ulceration, right, with fat layer exposed ([facility] L97.512    Type 2 diabetes mellitus without complication, with long-term current use of insulin ([facility] E11.9, Z79.4    Cellulitis of right foot L03.115    Osteomyelitis ([facility]) M86.9    Diabetic foot ulcer with osteomyelitis ([facility] E11.621, E11.69, L97.509, M86.9         ASSESSMENT/PLAN   Diabetic foot ulcer with OM  Obesity  He had revision of the stump  Continue iv ancef  Will discuss with podiatry about the  margin of the bone      Nubia Taylor MD, MD, FACP 3/6/2023 10:52 AM

## 2023-03-06 NOTE — PLAN OF CARE
Problem: Respiratory - Adult  Goal: Clear lung sounds  Outcome: Progressing   Patients breath sounds were clear. Continue treatments as ordered.

## 2023-03-07 LAB
ANION GAP SERPL CALC-SCNC: 11 MEQ/L (ref 8–16)
BUN SERPL-MCNC: 11 MG/DL (ref 7–22)
CALCIUM SERPL-MCNC: 8.6 MG/DL (ref 8.5–10.5)
CHLORIDE SERPL-SCNC: 102 MEQ/L (ref 98–111)
CO2 SERPL-SCNC: 26 MEQ/L (ref 23–33)
CREAT SERPL-MCNC: 0.9 MG/DL (ref 0.4–1.2)
GFR SERPL CREATININE-BSD FRML MDRD: > 60 ML/MIN/1.73M2
GLUCOSE BLD STRIP.AUTO-MCNC: 133 MG/DL (ref 70–108)
GLUCOSE BLD STRIP.AUTO-MCNC: 141 MG/DL (ref 70–108)
GLUCOSE BLD STRIP.AUTO-MCNC: 143 MG/DL (ref 70–108)
GLUCOSE BLD STRIP.AUTO-MCNC: 172 MG/DL (ref 70–108)
GLUCOSE SERPL-MCNC: 212 MG/DL (ref 70–108)
HCT VFR BLD AUTO: 34.3 % (ref 42–52)
HGB BLD-MCNC: 11.5 GM/DL (ref 14–18)
POTASSIUM SERPL-SCNC: 4.4 MEQ/L (ref 3.5–5.2)
SODIUM SERPL-SCNC: 139 MEQ/L (ref 135–145)

## 2023-03-07 PROCEDURE — 6360000002 HC RX W HCPCS

## 2023-03-07 PROCEDURE — 97530 THERAPEUTIC ACTIVITIES: CPT

## 2023-03-07 PROCEDURE — 85014 HEMATOCRIT: CPT

## 2023-03-07 PROCEDURE — 36415 COLL VENOUS BLD VENIPUNCTURE: CPT

## 2023-03-07 PROCEDURE — 80048 BASIC METABOLIC PNL TOTAL CA: CPT

## 2023-03-07 PROCEDURE — 97110 THERAPEUTIC EXERCISES: CPT

## 2023-03-07 PROCEDURE — 6370000000 HC RX 637 (ALT 250 FOR IP)

## 2023-03-07 PROCEDURE — 94640 AIRWAY INHALATION TREATMENT: CPT

## 2023-03-07 PROCEDURE — 1200000000 HC SEMI PRIVATE

## 2023-03-07 PROCEDURE — 82948 REAGENT STRIP/BLOOD GLUCOSE: CPT

## 2023-03-07 PROCEDURE — 2580000003 HC RX 258

## 2023-03-07 PROCEDURE — 2580000003 HC RX 258: Performed by: INTERNAL MEDICINE

## 2023-03-07 PROCEDURE — 94760 N-INVAS EAR/PLS OXIMETRY 1: CPT

## 2023-03-07 PROCEDURE — 99232 SBSQ HOSP IP/OBS MODERATE 35: CPT | Performed by: NURSE PRACTITIONER

## 2023-03-07 PROCEDURE — 85018 HEMOGLOBIN: CPT

## 2023-03-07 RX ADMIN — MORPHINE SULFATE 4 MG: 4 INJECTION, SOLUTION INTRAMUSCULAR; INTRAVENOUS at 04:29

## 2023-03-07 RX ADMIN — Medication 2000 MG: at 01:27

## 2023-03-07 RX ADMIN — TIOTROPIUM BROMIDE INHALATION SPRAY 2 PUFF: 3.12 SPRAY, METERED RESPIRATORY (INHALATION) at 07:43

## 2023-03-07 RX ADMIN — DOXEPIN HYDROCHLORIDE 10 MG: 10 CAPSULE ORAL at 21:58

## 2023-03-07 RX ADMIN — BUPROPION HYDROCHLORIDE 200 MG: 100 TABLET, FILM COATED, EXTENDED RELEASE ORAL at 19:32

## 2023-03-07 RX ADMIN — ENOXAPARIN SODIUM 60 MG: 100 INJECTION SUBCUTANEOUS at 19:30

## 2023-03-07 RX ADMIN — ASPIRIN 81 MG: 81 TABLET ORAL at 08:22

## 2023-03-07 RX ADMIN — MOMETASONE FUROATE AND FORMOTEROL FUMARATE DIHYDRATE 2 PUFF: 200; 5 AEROSOL RESPIRATORY (INHALATION) at 17:01

## 2023-03-07 RX ADMIN — MORPHINE SULFATE 4 MG: 4 INJECTION, SOLUTION INTRAMUSCULAR; INTRAVENOUS at 15:40

## 2023-03-07 RX ADMIN — ENOXAPARIN SODIUM 60 MG: 100 INJECTION SUBCUTANEOUS at 08:22

## 2023-03-07 RX ADMIN — OXYCODONE 10 MG: 5 TABLET ORAL at 17:44

## 2023-03-07 RX ADMIN — METOPROLOL SUCCINATE 25 MG: 25 TABLET, FILM COATED, EXTENDED RELEASE ORAL at 08:23

## 2023-03-07 RX ADMIN — BUPROPION HYDROCHLORIDE 200 MG: 100 TABLET, FILM COATED, EXTENDED RELEASE ORAL at 08:23

## 2023-03-07 RX ADMIN — ATORVASTATIN CALCIUM 40 MG: 40 TABLET, FILM COATED ORAL at 19:32

## 2023-03-07 RX ADMIN — OXYCODONE 10 MG: 5 TABLET ORAL at 08:22

## 2023-03-07 RX ADMIN — Medication 100 MG: at 08:24

## 2023-03-07 RX ADMIN — PANTOPRAZOLE SODIUM 40 MG: 40 TABLET, DELAYED RELEASE ORAL at 06:29

## 2023-03-07 RX ADMIN — MORPHINE SULFATE 4 MG: 4 INJECTION, SOLUTION INTRAMUSCULAR; INTRAVENOUS at 21:58

## 2023-03-07 RX ADMIN — MORPHINE SULFATE 4 MG: 4 INJECTION, SOLUTION INTRAMUSCULAR; INTRAVENOUS at 01:32

## 2023-03-07 RX ADMIN — POLYETHYLENE GLYCOL 3350 17 G: 17 POWDER, FOR SOLUTION ORAL at 08:21

## 2023-03-07 RX ADMIN — Medication 2000 MG: at 17:00

## 2023-03-07 RX ADMIN — SODIUM CHLORIDE, PRESERVATIVE FREE 10 ML: 5 INJECTION INTRAVENOUS at 15:40

## 2023-03-07 RX ADMIN — Medication 1 TABLET: at 08:23

## 2023-03-07 RX ADMIN — Medication 2000 MG: at 08:32

## 2023-03-07 RX ADMIN — MORPHINE SULFATE 4 MG: 4 INJECTION, SOLUTION INTRAMUSCULAR; INTRAVENOUS at 09:44

## 2023-03-07 RX ADMIN — MOMETASONE FUROATE AND FORMOTEROL FUMARATE DIHYDRATE 2 PUFF: 200; 5 AEROSOL RESPIRATORY (INHALATION) at 07:43

## 2023-03-07 RX ADMIN — SODIUM CHLORIDE, PRESERVATIVE FREE 10 ML: 5 INJECTION INTRAVENOUS at 08:24

## 2023-03-07 RX ADMIN — SODIUM CHLORIDE, PRESERVATIVE FREE 10 ML: 5 INJECTION INTRAVENOUS at 19:31

## 2023-03-07 RX ADMIN — MORPHINE SULFATE 4 MG: 4 INJECTION, SOLUTION INTRAMUSCULAR; INTRAVENOUS at 19:31

## 2023-03-07 ASSESSMENT — PAIN DESCRIPTION - LOCATION
LOCATION: BACK

## 2023-03-07 ASSESSMENT — PAIN - FUNCTIONAL ASSESSMENT
PAIN_FUNCTIONAL_ASSESSMENT: PREVENTS OR INTERFERES SOME ACTIVE ACTIVITIES AND ADLS
PAIN_FUNCTIONAL_ASSESSMENT: PREVENTS OR INTERFERES SOME ACTIVE ACTIVITIES AND ADLS
PAIN_FUNCTIONAL_ASSESSMENT: ACTIVITIES ARE NOT PREVENTED

## 2023-03-07 ASSESSMENT — PAIN SCALES - GENERAL
PAINLEVEL_OUTOF10: 8
PAINLEVEL_OUTOF10: 7
PAINLEVEL_OUTOF10: 6
PAINLEVEL_OUTOF10: 8
PAINLEVEL_OUTOF10: 8
PAINLEVEL_OUTOF10: 7
PAINLEVEL_OUTOF10: 5
PAINLEVEL_OUTOF10: 8
PAINLEVEL_OUTOF10: 8
PAINLEVEL_OUTOF10: 7

## 2023-03-07 ASSESSMENT — PAIN DESCRIPTION - ORIENTATION
ORIENTATION: LEFT;LOWER

## 2023-03-07 ASSESSMENT — PAIN DESCRIPTION - ONSET: ONSET: ON-GOING

## 2023-03-07 ASSESSMENT — PAIN DESCRIPTION - DESCRIPTORS
DESCRIPTORS: ACHING

## 2023-03-07 ASSESSMENT — PAIN DESCRIPTION - FREQUENCY
FREQUENCY: CONTINUOUS
FREQUENCY: CONTINUOUS

## 2023-03-07 ASSESSMENT — PAIN DESCRIPTION - PAIN TYPE: TYPE: CHRONIC PAIN

## 2023-03-07 NOTE — PROGRESS NOTES
Teaching Note:    I was present with the resident during the visit. I discussed the case with the resident and agree with the findings and the plan as documented in the residents note.     JADE Albright, [address] [place] [address] Surgery       [employer] [facility]

## 2023-03-07 NOTE — PLAN OF CARE
Problem: Chronic Conditions and Co-morbidities  Goal: Patient's chronic conditions and co-morbidity symptoms are monitored and maintained or improved  Outcome: Progressing  Flowsheets (Taken 3/6/2023 1925)  Care Plan - Patient's Chronic Conditions and Co-Morbidity Symptoms are Monitored and Maintained or Improved:   Monitor and assess patient's chronic conditions and comorbid symptoms for stability, deterioration, or improvement   Collaborate with multidisciplinary team to address chronic and comorbid conditions and prevent exacerbation or deterioration   Update acute care plan with appropriate goals if chronic or comorbid symptoms are exacerbated and prevent overall improvement and discharge     Problem: Discharge Planning  Goal: Discharge to home or other facility with appropriate resources  Outcome: Progressing  Flowsheets (Taken 3/6/2023 1925)  Discharge to home or other facility with appropriate resources:   Identify barriers to discharge with patient and caregiver   Arrange for needed discharge resources and transportation as appropriate   Identify discharge learning needs (meds, wound care, etc)   Refer to discharge planning if patient needs post-hospital services based on physician order or complex needs related to functional status, cognitive ability or social support system     Problem: Pain  Goal: Verbalizes/displays adequate comfort level or baseline comfort level  Outcome: Progressing  Flowsheets (Taken 3/6/2023 2332)  Verbalizes/displays adequate comfort level or baseline comfort level:   Encourage patient to monitor pain and request assistance   Administer analgesics based on type and severity of pain and evaluate response   Implement non-pharmacological measures as appropriate and evaluate response   Assess pain using appropriate pain scale     Problem: Respiratory - Adult  Goal: Clear lung sounds  3/6/2023 1008 by Sunil Pereria RCP  Outcome: Progressing     Problem: Skin/Tissue Integrity - Adult  Goal: Skin integrity remains intact  Outcome: Progressing  Flowsheets (Taken 3/6/2023 1925)  Skin Integrity Remains Intact:   Monitor for areas of redness and/or skin breakdown   Assess vascular access sites hourly  Goal: Incisions, wounds, or drain sites healing without S/S of infection  Outcome: Progressing  Flowsheets (Taken 3/6/2023 1925)  Incisions, Wounds, or Drain Sites Healing Without Sign and Symptoms of Infection:   TWICE DAILY: Assess and document skin integrity   TWICE DAILY: Assess and document dressing/incision, wound bed, drain sites and surrounding tissue   Implement wound care per orders   ADMISSION and DAILY: Assess and document risk factors for pressure ulcer development     Problem: Musculoskeletal - Adult  Goal: Return mobility to safest level of function  Outcome: Progressing  Flowsheets (Taken 3/6/2023 2332)  Return Mobility to Safest Level of Function:   Assist with transfers and ambulation using safe patient handling equipment as needed   Assess patient stability and activity tolerance for standing, transferring and ambulating with or without assistive devices   Ensure adequate protection for wounds/incisions during mobilization   Obtain physical therapy/occupational therapy consults as needed  Goal: Maintain proper alignment of affected body part  Outcome: Progressing  Flowsheets (Taken 3/6/2023 2332)  Maintain proper alignment of affected body part:   Support and protect limb and body alignment per provider's orders   Instruct and reinforce with patient and family use of appropriate assistive device and precautions (e.g. spinal or hip dislocation precautions)  Goal: Return ADL status to a safe level of function  Outcome: Progressing  Flowsheets (Taken 3/5/2023 2011 by Koko Pérez RN)  Return ADL Status to a Safe Level of Function:   Administer medication as ordered   Obtain physical therapy/occupational therapy consults as needed   Assist and instruct patient to increase activity and self care as tolerated   Assess activities of daily living deficits and provide assistive devices as needed     Problem: Infection - Adult  Goal: Absence of infection during hospitalization  Outcome: Progressing  Flowsheets (Taken 3/6/2023 1925)  Absence of infection during hospitalization:   Assess and monitor for signs and symptoms of infection   Monitor lab/diagnostic results   Monitor all insertion sites i.e., indwelling lines, tubes and drains   Administer medications as ordered   Instruct and encourage patient and family to use good hand hygiene technique     Problem: Safety - Adult  Goal: Free from fall injury  Outcome: Progressing  Flowsheets (Taken 3/6/2023 2332)  Free From Fall Injury: Instruct family/caregiver on patient safety    Care plan reviewed with patient. Patient verbalized understanding of the plan of care and contribute to goal setting.

## 2023-03-07 NOTE — PROGRESS NOTES
[facility]  Occupational Therapy  Daily Note  Time:   Time In: 806  Time Out: 7857  Timed Code Treatment Minutes: 23 Minutes  Minutes: 23          Date: 3/7/2023  Patient Name: Galileo Leong,   Gender: male      Room: -56/056-A  MRN: [id number]  : 1970  (47 y.o.)  Referring Practitioner: Loni Carbajal DPM  Diagnosis: Diabetic foot ulcer with osteromyelitis  Additional Pertinent Hx: Pt is a 47 y.o. male s/p RIGHT LISFRANC FOOT AMPUTATION on 3/3    Restrictions/Precautions:  Restrictions/Precautions: Fall Risk, Weight Bearing, General Precautions  Right Lower Extremity Weight Bearing: Non Weight Bearing  Position Activity Restriction  Other position/activity restrictions: Hx R wrist injury- okay to WB     SUBJECTIVE: Nurse [place] mcguire session, sitting at EOB with nursing upon arrival, agreeable to OT session,     PAIN: 7/10: back    Vitals: Nurse checked vitals prior to session    COGNITION: WNL    ADL:   Feeding: Independent. Able to open all containers . BALANCE:  Standing Balance: Stand By Assistance. X 3 minutes with 1 UE support    BED MOBILITY:  Not Tested    TRANSFERS:  Sit to Stand:  Contact Guard Assistance. EOB  Stand to Sit: Contact Guard Assistance. Bedside chair    FUNCTIONAL MOBILITY:  Assistive Device: Rolling Walker  Assist Level:  Contact Guard Assistance. Distance: EOB to bedside chair     ADDITIONAL ACTIVITIES:  Patient completed BUE strengthening exercises with skilled education on HEP: completed x10 reps x1 set with a yellow band in all joints and all planes in order to improve UE strength and activity tolerance required for BADL routine and toilet / shower transfers. Patient tolerated well, requiring  minimal rest breaks. Patient also required min cues for technique.          ASSESSMENT:     Activity Tolerance:  Patient tolerance of  treatment: Good treatment tolerance      Discharge Recommendations: ECF with OT  Equipment Recommendations: Equipment Needed: Yes  Other: continue to monitor needs for [facility]  Plan: Times Per Week: 6x  Current Treatment Recommendations: Strengthening, Balance training, Functional mobility training, Endurance training, Safety education & training, Return to work related activity, Self-Care / ADL, Patient/Caregiver education & training    Patient Education  Patient Education: Home Exercise Program    Goals  Short Term Goals  Time Frame for Short Term Goals: by disharge  Short Term Goal 1: Pt will safely navigate to/from bathroom with SBA and no LOB while maintaining NWB to RLE to increase indep with self care  Short Term Goal 2: Pt will complete toilet t/fs with no > than SBA and min VC for maintaining NWB to RLE to increase indep with toileting  Short Term Goal 3: Pt will complete LB dressing with SBA and AE prn for increased indep with self care  Short Term Goal 4: Pt will tolerate dynamic standing x5-6min with SBA and unilateral release to increase indep with grooming    Following session, patient left in safe position with all fall risk precautions in place.

## 2023-03-07 NOTE — PLAN OF CARE
Problem: Respiratory - Adult  Goal: Clear lung sounds  Outcome: Progressing  Note: Pt had no questions on maintenance medication   Mutually agreed upon goals

## 2023-03-07 NOTE — PROGRESS NOTES
Pt was sitting on the chair beside his bed. His nurse was attending with him. He was dealing with diabetic foot ulcer with osteomyelitis. He was hopeful and prayer appreciated.     03/07/23 1500   Encounter Summary   Service Provided For: Patient   Referral/Consult From: [facility] Family members   Last Encounter  03/07/23   Complexity of Encounter Moderate   Begin Time 1421   End Time  1431   Total Time Calculated 10 min   Spiritual/Emotional needs   Type Spiritual Support   Assessment/Intervention/Outcome   Assessment Calm   Intervention Empowerment   Outcome Acceptance

## 2023-03-07 NOTE — DISCHARGE INSTR - COC
Continuity of Care Form    Patient Name: Harpreet Briones   :  1970  MRN:  [id number]    Admit date:  3/1/2023  Discharge date:  23    Code Status Order: Full Code   Advance Directives:     Admitting Physician:  No admitting provider for patient encounter.   PCP: MICHELLE Constantino CNP    Discharging Nurse: [address]  Unit/Room#: 6E-56/056-A  Discharging Unit Phone Number: [phone]    Emergency Contact:   Extended Emergency Contact Information  Primary Emergency Contact: Mila Hemphill [facility]  Mobile Phone: [phone]  Relation: Parent    Past Surgical History:  Past Surgical History:   Procedure Laterality Date    BACK SURGERY      x2     CARDIAC CATHETERIZATION      2013    CARPAL TUNNEL RELEASE Bilateral     COLONOSCOPY      DENTAL SURGERY      FOOT AMPUTATION Right 3/3/2023    RIGHT LISFRANC FOOT AMPUTATION performed by Abdi Murdock DPM at [address] [facility] Right     FOOT SURGERY Bilateral 2014    FOOT SURGERY      FOOT SURGERY      HERNIA REPAIR      NOSE SURGERY      TOE AMPUTATION Right 2022    Right 4th & 5th Partial Amputation performed by Juan Francisco Villalba DPM at [address] Left        Immunization History:   Immunization History   Administered Date(s) Administered    COVID-19, MODERNA Bivalent BOOSTER, (age 12y+), IM, 48 mcg/0.5 mL 10/18/2022    COVID-19, [place] Vaccine, Vaccine Unspecified 2021, 2021    Influenza Virus Vaccine 2015, 2020, 10/13/2021       Active Problems:  Patient Active Problem List   Diagnosis Code    Hypertension I10    Hyperlipidemia E78.5    CAD (coronary artery disease) I25.10    Family history of premature coronary heart disease Z82.49    Dizziness R42    Acute renal failure (ARF) (HCC) N17.9    Chronic low back pain M54.50, G89.29    PTSD (post-traumatic stress disorder) F43.10    Foot ulceration, right, with fat layer exposed ([facility] L97.512    Type 2 diabetes mellitus without complication, with long-term current use of insulin ([facility]) E11.9, Z79.4    Cellulitis of right foot L03.115    Osteomyelitis ([facility]) M86.9    Diabetic foot ulcer with osteomyelitis ([facility] E11.621, E11.69, L97.509, M86.9       Isolation/Infection:   Isolation            No Isolation          Patient Infection Status       None to display            Nurse Assessment:  Last Vital Signs: /73   Pulse 72   Temp 98 °F (36.7 °C) (Oral)   Resp 18   Ht 6' 1\" (1.854 m)   Wt (!) 395 lb (179.2 kg)   SpO2 95%   BMI 52.11 kg/m²     Last documented pain score (0-10 scale): Pain Level: 8  Last Weight:   Wt Readings from Last 1 Encounters:   03/01/23 (!) 395 lb (179.2 kg)     Mental Status:  oriented and alert    IV Access:  - PICC - site  {Anatomy; iv placement site:[id number]}, insertion date: ***    Nursing Mobility/ADLs:  Walking   Assisted  Transfer  Assisted  Bathing  Assisted  Dressing  Assisted  Toileting  Assisted  Feeding  Independent  Med Admin  Independent  Med Delivery   whole    Wound Care Documentation and Therapy:  Incision 07/22/22 Foot Anterior;Right (Active)   Number of days: 228       Incision 03/03/23 Right;Plantar (Active)   Dressing Status New dressing applied 03/07/23 0800   Incision Cleansed Wound cleanser 03/03/23 1308   Dressing/Treatment Splint; Ace wrap 03/07/23 0800   Closure Sutures 03/07/23 0800   Margins Other (Comment) 03/07/23 0800   Drainage Amount None 03/07/23 0800   Odor None 03/07/23 0800   Number of days: 3        Elimination:  Continence: Bowel: Yes  Bladder: Yes  Urinary Catheter: None   Colostomy/Ileostomy/Ileal Conduit: No       Date of Last BM: 03-08-23      Intake/Output Summary (Last 24 hours) at 3/7/2023 1058  Last data filed at 3/7/2023 0151  Gross per 24 hour   Intake 1920 ml   Output 560 ml   Net 1360 ml     I/O last 3 completed shifts: In: 2914.3 [P.O.:2720;  I.V.:90.7; IV Piggyback:103.6]  Out: [facility] Concerns: At Risk for Falls    Impairments/Disabilities:      Amputation - right metatarsal    Nutrition Therapy:  Current Nutrition Therapy:   - Regular diet with 4 carb choices    Routes of Feeding: Oral  Liquids: No Restrictions  Daily Fluid Restriction: no  Last Modified Barium Swallow with Video (Video Swallowing Test): not done    Treatments at the Time of Hospital Discharge:   Respiratory Treatments: Incentive spirometer every 2 hours while awake  Oxygen Therapy:  is not on home oxygen therapy. Ventilator:    - No ventilator support    Rehab Therapies: Physical Therapy and Occupational Therapy to eval and treat  Weight Bearing Status/Restrictions: Non-weight bearing on right leg  Other Medical Equipment (for information only, NOT a DME order):  walker and gait belt  Other Treatments: Reinforce right foot/leg dressing if strikethrough drainage is noted or dressing becomes loose  Patient's personal belongings (please select all that are sent with patient):  Dentures upper and lower    RN SIGNATURE:  Electronically signed by Jessica Benjamin RN on 3/8/23 at 12:02 PM EST    CASE MANAGEMENT/SOCIAL WORK SECTION    Inpatient Status Date: 03/01/2023    Readmission Risk Assessment Score:  Readmission Risk              Risk of Unplanned Readmission:  20           Discharging to Facility/ Agency   Name: Ivania Cardenas  Address: [address] [place] [address]  Phone: [phone]  Fax: [phone]    Dialysis Facility (if applicable)   Name:  Address:  Dialysis Schedule:  Phone:  Fax:    / signature: Electronically signed by SONIA Chung on 3/7/23 at 10:59 AM EST    PHYSICIAN SECTION    Prognosis: Good    Condition at Discharge: Stable    Rehab Potential (if transferring to Rehab):     Recommended Labs or Other Treatments After Discharge: Continue ancef antibiotic for 4 weeks, 2 grams IV injection every 8 hours.  Do not do any weight bear activity on Right leg. Physician Certification: I certify the above information and transfer of Lyndal Carrel  is necessary for the continuing treatment of the diagnosis listed and that he requires [facility] for less 30 days.      Update Admission H&P: No change in H&P    PHYSICIAN SIGNATURE:  Electronically signed by Georgie Carranza MD on 3/8/23 at 3:06 PM EST

## 2023-03-07 NOTE — PROGRESS NOTES
Hospitalist Progress Note    Patient:  Aravind Mcdonough      Unit/Bed:6E-56/056-A    YOB: 1970    MRN: [id number]       Acct: [de-identified]     PCP: MICHELLE Priest CNP    Date of Admission: 3/1/2023    Assessment/Plan:    Right diabetic foot ulcer with osteomyelitis s/p lisfranc foot amputation: MRI that was obtained at an outside hospital 2/27 exhibited probable osteomyelitis of the third metatarsal stump. Failed outpatient doxycycline course. Continue IV Ancef. ID following. Podiatry changes dressing today. Non weight bearing to RLE. Need ATB plan for SNF. Dizziness. Possibly medication related. Check orthostatics, we have been holding Midodrine. Check BMP and HH. BS trend notes no hypoglycemia. Acute blood loss anemia, postoperative and dilutional. Preop hgb 15.6, down to 11.2. HH has stabilized. Lactic acidosis, resolved: mild, no leukocytosis, left shift, tachycardia, tachypnea, hypotension, or fever to suggest sepsis. Tolerating diet. Stop IVF. IDDM: A1c 6.0%. Continue Trulicity (patient to provide) every Sunday. Accuchecks with low dose corrective coverage. BS trend has been in goal. Hypoglycemic protocol. Diabetic diet. CAD, reported. No history of intervention: cont ASA  Essential HTN: home medications reviewed and include metoprolol, lisinopril, and midodrine. Patient endorses taking this regimen. Chart reviewed, no office notes available. Metoprolol has been continued with hold parameters. Continue to hold lisinopril and midodrine and monitor BP. HLP: cont statin  COPD: not in acute exacerbation. Cont home inhalers. Morbid obesity Body mass index is 52.11 kg/m². Alcohol abuse: reports no alcohol intake for over one month  Tobacco abuse: NRT prn, cessation education  Physical Deconditioning. PT/OT. Precert to [facility]. Dispo: to SNF once precert is back.     Chief Complaint: diabetic foot ulcer    Hospital Course:     Aravind Mcdonough is a 47 y.o. male with PMHx of CAD, HTN, HLP, COPD, alcohol and tobacco abuse, IDDM with extensive h/o diabetic foot ulcerations s/p multiple amputations and surgeries of BLE who presented to [facility] at the direction of podiatrist, Dr Wendy Baca following a MRI that was obtained at an outside hospital 2/27 that exhibited probable osteomyelitis of the third metatarsal stump. Patient alert, oriented x3, pleasant. Reports right diabetic ulcer continues to worsen despite podiatry care and Doxycycline course. Denies fever but states he has had intermittent chills and poor appetite. Denies shortness of breath, chest pain, dysuria, or diarrhea. Reports NWB status on R foot however admits to ambulating. On presentation to ED, patient afebrile, normotensive, O2 sat adequate on room air. Electrolytes and renal function with no abnormalities. Lactic acid mildly elevated (2.0). No leukocytosis or anemia. Wound cultures obtained in ED and pending. Patient received IVF, ordered per sepsis protocol however patient does not meet sepsis criteria therefore discontinued and MIV ordered. Initial dose of cefepime per podiatry recommendations initiated. Subjective: Had some recurrent dizziness he felt was medication related. No CP/SOB. Appetite good.  No n/v.      Medications:  Reviewed    Infusion Medications    sodium chloride      sodium chloride 50 mL/hr at 03/06/23 0907    sodium chloride      dextrose       Scheduled Medications    lidocaine 1 % injection  5 mL IntraDERmal Once    sodium chloride flush  5-40 mL IntraVENous 2 times per day    sodium chloride flush  5-40 mL IntraVENous 2 times per day    sodium chloride flush  5-40 mL IntraVENous 2 times per day    enoxaparin  60 mg SubCUTAneous BID    nicotine  1 patch TransDERmal Daily    ceFAZolin  2,000 mg IntraVENous Q8H    aspirin  81 mg Oral Daily    atorvastatin  40 mg Oral Nightly    buPROPion  200 mg Oral BID    metoprolol succinate  25 mg Oral Daily    pantoprazole  40 mg Oral Daily mometasone-formoterol  2 puff Inhalation BID    tiotropium  2 puff Inhalation Daily    insulin lispro  0-4 Units SubCUTAneous TID WC    insulin lispro  0-4 Units SubCUTAneous Nightly    Dulaglutide  0.5 mL SubCUTAneous Weekly    thiamine  100 mg Oral Daily    multivitamin  1 tablet Oral Daily     PRN Meds: sodium chloride flush, sodium chloride, sodium chloride flush, sodium chloride, ondansetron **OR** ondansetron, oxyCODONE **OR** oxyCODONE, morphine **OR** morphine, doxepin, sodium chloride flush, sodium chloride, polyethylene glycol, acetaminophen **OR** acetaminophen, albuterol sulfate HFA, glucose, dextrose bolus **OR** dextrose bolus, glucagon (rDNA), dextrose      Intake/Output Summary (Last 24 hours) at 3/7/2023 1910  Last data filed at 3/7/2023 0151  Gross per 24 hour   Intake 1970 ml   Output 1060 ml   Net 910 ml         Diet:  ADULT DIET; Regular    Exam:  /73   Pulse 70   Temp 98 °F (36.7 °C) (Oral)   Resp 14   Ht 6' 1\" (1.854 m)   Wt (!) 395 lb (179.2 kg)   SpO2 93%   BMI 52.11 kg/m²     General appearance: No apparent distress, appears stated age and cooperative. HEENT: Pupils equal, round, and reactive to light. Conjunctivae/corneas clear. Neck: Supple, with full range of motion. No jugular venous distention. Trachea midline. Respiratory:  Normal respiratory effort. Clear to auscultation, bilaterally without Rales/Wheezes/Rhonchi. Cardiovascular: Regular rate and rhythm with normal S1/S2 without murmurs, rubs or gallops. Abdomen: Soft, non-tender, non-distended with normal bowel sounds. Musculoskeletal: passive and active ROM x 4 extremities. Skin: Skin color, texture, turgor normal. Dressing to right foot. Neurologic:  Neurovascularly intact without any focal sensory/motor deficits.  Cranial nerves: II-XII intact, grossly non-focal.  Psychiatric: Alert and oriented, thought content appropriate, normal insight  Capillary Refill: Brisk,< 3 seconds   Peripheral Pulses: +2 palpable, equal bilaterally       Labs:   Recent Labs     03/05/23  0609   WBC 7.9   HGB 11.6*   HCT 34.5*          Recent Labs     03/05/23  0609      K 4.1      CO2 29   BUN 7   CREATININE 0.8   CALCIUM 8.2*       No results for input(s): AST, ALT, BILIDIR, BILITOT, ALKPHOS in the last 72 hours. No results for input(s): INR in the last 72 hours. No results for input(s): Daria Ill in the last 72 hours. Urinalysis:      Lab Results   Component Value Date/Time    NITRU NEGATIVE 03/01/2023 02:20 PM    BLOODU NEGATIVE 03/01/2023 02:20 PM    SPECGRAV 1.015 08/12/2016 06:59 PM    GLUCOSEU NEGATIVE 03/01/2023 02:20 PM       Radiology:  XR CHEST PORTABLE   Final Result   1. Normal heart size. Evidence for old, healed granulomatous disease. 2. No acute findings. No infiltrates or effusions are seen. **This report has been created using voice recognition software. It may contain minor errors which are inherent in voice recognition technology. **      Final report electronically signed by Dr. Mariangel Lopez on 3/1/2023 1:29 PM          Diet: ADULT DIET;  Regular    DVT prophylaxis: [x] Lovenox                                 [] SCDs                                 [] SQ Heparin                                 [] Encourage ambulation           [] Already on Anticoagulation     Disposition:    [] Home       [] TCU       [] Rehab       [] Psych       [x] SNF       [] [place]       [] Other-    Code Status: Full Code    PT/OT Eval Status: following    Electronically signed by MICHELLE Chung CNP on 3/7/2023 at 6:37 AM

## 2023-03-07 NOTE — CARE COORDINATION
3/7/23, 9:13 AM EST    DISCHARGE ON GOING EVALUATION    [facility] day: 6  Location: -/056-A Reason for admit: Diabetic foot ulcer with osteomyelitis ([address] [E11.621, E11.69, L97.509, M86.9]  Osteomyelitis of left foot, unspecified type ([facility] [facility] [M86.9]   Procedure:   3/1: CXR: Normal heart size. Evidence for old, healed granulomatous disease. No acute findings. No infiltrates or effusions are seen. 3/3 RIGHT LISFRANC FOOT AMPUTATION  Excision of 7cm wound, right foot    Barriers to Discharge:  PT/OT, ID following, right foot ulcer (+) Corynebacterium species, staphylococcus aureus, diabetes management, PT/OT, Asa, Lipitor, IV Ancef, Lovenox, pain control, Protonix. PCP: MICHELLE Saldivar CNP  Readmission Risk Score: 12.1%  Patient Goals/Plan/Treatment Preferences: From home alone. Referral made to [facility]. SW following. Refer to GIL note.

## 2023-03-07 NOTE — PROGRESS NOTES
Podiatric Progress Note  Osmani NICO Waleska  Subjective :     3/7/2023  Patient seen at bedside this morning alongside of Dr. Andujar. Patient is status post right Lisfranc foot amputation (DOS 3/3/2023). Patient is pleasant, and is alert and oriented to person, place, time.  Patient endorses mild pain to the RLE. He denies any N/V/F/C/SOB/CP or bilateral calf tenderness.  Patient has no other pedal complaints at this time.  Patient is aware that he will be transferred to SNF/ECF and is curious on when that will be. He relates that his mother and family will be visiting him today.     3/6/2023  Patient seen at bedside this morning on behalf of Dr. Andujar. Patient is status post right Lisfranc foot amputation (DOS 3/3/2023). Patient is pleasant, and is alert and oriented to person, place, time.  Patient endorses mild to moderate pain to the RLE.  He denies any N/V/F/C/SOB/CP or bilateral calf tenderness.  Patient has no other pedal complaints at this time.  Patient is aware that he will be transferred to SNF/ECF for further care.    3/5/23  Patient seen bedside today on behalf of Dr Andujar. Patient appeared pleasant, was oriented to person, place and time and in no acute distress.   Patient states he is having 8/10 pain to the RLE. He denies calf pain. Patient denies any N/V/F/C/SOB or CP. Patient has no further pedal concerns at this point in time.    3/2/23  Patient seen bedside today alongside of Dr Andujar. Patient appeared pleasant, was oriented to person, place and time and in no acute distress.   Patient relates that this morning he wanted to proceed with a BKA, but at this time he wants to proceed with a lisfranc amputation. The patient will consider going to a SNF at time of discharge. Patient denies any N/V/F/C/SOB or CP. Patient has no further pedal concerns at this point in time.       3/1/23  Patient seen bedside today on behalf of Dr. Andujar. Patient appeared pleasant, was oriented to person, place and time and in no  acute distress. Patient relates that he is leaning towards a BKA rather than a lisfranc amputation. The patient relates that he has been thinking about it for the past few days. The patient relates that he doesn't think that he wants a lisfranc amputation at this time because he believes that he will have reoccurring wound and the amputation site will not heal secondary to him not being able to stay off of it. He relates that he tries to stay off of the wound he currently has, but will sometimes accidentally use it and apply pressure. The patient relates that he is unable to use a knee scooter because he is about 100 pounds over the weight limit. The patient relates that he will \"sleep on it\" in regards to a decision, but is still leaning towards a BKA at this time. Patient denies any N/V/F/C/SOB or CP. Patient has no further pedal concerns at this point in time. 3/1/23  HISTORY OF PRESENT ILLNESS:                 The patient is a 47 y.o. male with significant past medical history of CAD, ARF, CHF, COPD, T2DM, HLD, HTN who is being seen at bedside on behalf of Dr. Lee Fitch. Patient was seen earlier this morning by Dr. Lee Fitch in clinic, and was sent to [place] [address] emergency department for surgical intervention of the right foot later this week or early next week. He is well-known to the podiatry service. Patient has had multiple amputations and surgeries to the bilateral feet, most recently a TMA on the right foot with revision for nonhealing plantar wound. MRI was obtained at outside hospital exhibiting suggestion of osteomyelitis of the third metatarsal stump. On exam in the ED, patient was resting comfortably in no acute distress. He admitted to feeling tired for the past week, with increased lack of appetite. He denied any fevers, chills, chest pain, shortness of breath, nausea, vomiting, diarrhea. Patient is aware of plan for further amputation. No other pedal concerns.     Current Medications:    Current Facility-Administered Medications: lidocaine PF 1 % injection 5 mL, 5 mL, IntraDERmal, Once  sodium chloride flush 0.9 % injection 5-40 mL, 5-40 mL, IntraVENous, 2 times per day  sodium chloride flush 0.9 % injection 5-40 mL, 5-40 mL, IntraVENous, PRN  0.9 % sodium chloride infusion, 25 mL, IntraVENous, PRN  sodium chloride flush 0.9 % injection 5-40 mL, 5-40 mL, IntraVENous, 2 times per day  sodium chloride flush 0.9 % injection 5-40 mL, 5-40 mL, IntraVENous, PRN  0.9 % sodium chloride infusion, , IntraVENous, PRN  ondansetron (ZOFRAN-ODT) disintegrating tablet 4 mg, 4 mg, Oral, Q8H PRN **OR** ondansetron (ZOFRAN) injection 4 mg, 4 mg, IntraVENous, Q6H PRN  oxyCODONE (ROXICODONE) immediate release tablet 5 mg, 5 mg, Oral, Q4H PRN **OR** oxyCODONE (ROXICODONE) immediate release tablet 10 mg, 10 mg, Oral, Q4H PRN  morphine (PF) injection 2 mg, 2 mg, IntraVENous, Q2H PRN **OR** morphine injection 4 mg, 4 mg, IntraVENous, Q2H PRN  doxepin (SINEQUAN) capsule 10 mg, 10 mg, Oral, Nightly PRN  sodium chloride flush 0.9 % injection 5-40 mL, 5-40 mL, IntraVENous, 2 times per day  sodium chloride flush 0.9 % injection 5-40 mL, 5-40 mL, IntraVENous, PRN  0.9 % sodium chloride infusion, , IntraVENous, PRN  enoxaparin (LOVENOX) injection 60 mg, 60 mg, SubCUTAneous, BID  polyethylene glycol (GLYCOLAX) packet 17 g, 17 g, Oral, Daily PRN  acetaminophen (TYLENOL) tablet 650 mg, 650 mg, Oral, Q6H PRN **OR** acetaminophen (TYLENOL) suppository 650 mg, 650 mg, Rectal, Q6H PRN  nicotine (NICODERM CQ) 14 MG/24HR 1 patch, 1 patch, TransDERmal, Daily  ceFAZolin (ANCEF) 2000 mg in 0.9% sodium chloride 50 mL IVPB, 2,000 mg, IntraVENous, Q8H  albuterol sulfate HFA (PROVENTIL;VENTOLIN;PROAIR) 108 (90 Base) MCG/ACT inhaler 2 puff, 2 puff, Inhalation, Q4H PRN  aspirin EC tablet 81 mg, 81 mg, Oral, Daily  atorvastatin (LIPITOR) tablet 40 mg, 40 mg, Oral, Nightly  buPROPion (WELLBUTRIN SR) extended release tablet 200 mg, 200 mg, Oral, BID  metoprolol succinate (TOPROL XL) extended release tablet 25 mg, 25 mg, Oral, Daily  pantoprazole (PROTONIX) tablet 40 mg, 40 mg, Oral, Daily  mometasone-formoterol (DULERA) 200-5 MCG/ACT inhaler 2 puff, 2 puff, Inhalation, BID  tiotropium (SPIRIVA RESPIMAT) 2.5 MCG/ACT inhaler 2 puff, 2 puff, Inhalation, Daily  glucose chewable tablet 16 g, 4 tablet, Oral, PRN  dextrose bolus 10% 125 mL, 125 mL, IntraVENous, PRN **OR** dextrose bolus 10% 250 mL, 250 mL, IntraVENous, PRN  glucagon (rDNA) injection 1 mg, 1 mg, SubCUTAneous, PRN  dextrose 10 % infusion, , IntraVENous, Continuous PRN  insulin lispro (HUMALOG) injection vial 0-4 Units, 0-4 Units, SubCUTAneous, TID WC  insulin lispro (HUMALOG) injection vial 0-4 Units, 0-4 Units, SubCUTAneous, Nightly  dulaglutide 0.75 mg/0.5 mL injection (Patient Supplied), 0.5 mL, SubCUTAneous, Weekly  thiamine tablet 100 mg, 100 mg, Oral, Daily  multivitamin 1 tablet, 1 tablet, Oral, Daily    Objective     /73   Pulse 72   Temp 98 °F (36.7 °C) (Oral)   Resp 18   Ht 6' 1\" (1.854 m)   Wt (!) 395 lb (179.2 kg)   SpO2 95%   BMI 52.11 kg/m²      I/O:  Intake/Output Summary (Last 24 hours) at 3/7/2023 0903  Last data filed at 3/7/2023 0151  Gross per 24 hour   Intake 1920 ml   Output 1060 ml   Net 860 ml                Wt Readings from Last 3 Encounters:   03/01/23 (!) 395 lb (179.2 kg)   07/21/22 (!) 376 lb 5.2 oz (170.7 kg)   01/19/22 (!) 369 lb (167.4 kg)       LABS:    Recent Labs     03/05/23  0609   WBC 7.9   HGB 11.6*   HCT 34.5*             Recent Labs     03/05/23  0609      K 4.1      CO2 29   BUN 7   CREATININE 0.8          No results for input(s): PROT, INR, APTT in the last 72 hours. No results for input(s): CKTOTAL, CKMB, CKMBINDEX, TROPONINI in the last 72 hours.     Focused Lower Extremity Examination:    Vitals:    /73   Pulse 72   Temp 98 °F (36.7 °C) (Oral)   Resp 18   Ht 6' 1\" (1.854 m)   Wt (!) 395 lb (179.2 kg)   SpO2 95%   BMI 52.11 kg/m²        Posterior splint to RLE clean, dry, intact. VASC: CFT WNL to the RLE Lisfranc stump. DP & PT pulses are palpable. Edema present to amputation site. NEURO: Light touch sensation is diminished b/l. DERM: The right Lisfranc amputation site is well coapted with sutures remaining intact. There is no dehiscence. There is minimal to no sanguinous drianage. There is no malodor. There is small macerated area noted to the central location of the incision site. No ecchymosis. There is post op edema and erythema. MSK: Deferred. No pain on palpation of the RLE Lisfranc amputation stump. Images  XR CHEST PORTABLE   Final Result   1. Normal heart size. Evidence for old, healed granulomatous disease. 2. No acute findings. No infiltrates or effusions are seen. **This report has been created using voice recognition software. It may contain minor errors which are inherent in voice recognition technology. **      Final report electronically signed by Dr. Sapphire Marcano on 3/1/2023 1:29 PM          ASSESSMENT: Pt. is a 47 y.o. male with:  Principle  1. Nonhealing diabetic foot ulceration, right foot  2.  S/P Chopart amputation right    Chronic  Patient Active Problem List   Diagnosis    Hypertension    Hyperlipidemia    CAD (coronary artery disease)    Family history of premature coronary heart disease    Dizziness    Acute renal failure (ARF) (HCC)    Chronic low back pain    PTSD (post-traumatic stress disorder)    Foot ulceration, right, with fat layer exposed ([address]    Type 2 diabetes mellitus without complication, with long-term current use of insulin ([address]    Cellulitis of right foot    Osteomyelitis ([address]    Diabetic foot ulcer with osteomyelitis ([address]       PLAN:     -Patient initially examined and evaluated  -WBC 7.9 on 3/5/2023  -Patient currently on IV Ancef  -S/p right foot Lisfranc  joint amputation (3/3/2023)  -There are no surrounding signs of infection, however. We will continue with Betadine wet-to-dry dressings to keep the area clean and dry.  -Imaging reviewed: MRI from OSH exhibiting suggestion of osteomyelitis to right third metatarsal stump.  -Swab cultures taken of right foot wound; Rene rhoades positive   -Change dressings at today's visit; applied Betadine soaked gauze to incision, and covered with Kerlix roll, multiple rolls of cast padding, Ace bandage, and reapplied posterior splint secured with another Ace bandage.  -Weight bearing status: Non weight bearing to RLE  -Patient verbalized agreement and understanding with treatment plan discussed. All questions answered to their satisfaction.  -Precertification for [facility] pending  -Podiatry will continue to follow patient    [facility]: [facility]; Okay to discharge from a podiatry standpoint.      Alondra Cruz DPM PGY-2  3/7/2023   9:03 AM

## 2023-03-07 NOTE — PROGRESS NOTES
[address] [address]  INPATIENT PHYSICAL THERAPY  DAILY NOTE  [facility] 6E - 6E-56/056-A      Time In: 0939  Time Out: 5281  Timed Code Treatment Minutes: 10 Minutes  Minutes: 10          Date: 3/7/2023  Patient Name: Мария Brantley,  Gender:  male        MRN: [id number]  : 1970  (47 y.o.)     Referring Practitioner: Perri Nj DPM  Diagnosis: Dm foot ulcer with osteomyelitis  Additional Pertinent Hx: Pt is a 47 y.o. male s/p RIGHT LISFRANC FOOT AMPUTATION on 3/3/23--osteomyelitis to right third metatarsal stump     Prior Level of Function:  Lives With: Alone  Type of Home: Apartment  Home Layout: One level  Home Access: Level entry  Home Equipment: Walker, rolling, [employer], Electric scooter   Bathroom Shower/Tub: Walk-in shower, Shower chair with back  [employer]: Handicap height  Bathroom Equipment: Grab bars in shower  Bathroom Accessibility: Accessible    ADL Assistance: Independent  Homemaking Assistance: Needs assistance  Ambulation Assistance: Independent  Transfer Assistance: Independent  Active : No  IADL Comments: Pt's brother is able to help with homemaking routine. Restrictions/Precautions:  Restrictions/Precautions: Fall Risk, Weight Bearing, General Precautions  Right Lower Extremity Weight Bearing: Non Weight Bearing  Position Activity Restriction  Other position/activity restrictions: Hx R wrist injury- okay to WB       SUBJECTIVE: pt in bed on arrival reported that he had just gotten back to bed, pt agreeable for ex in bed- pt also stated that he has chronic back issues had previous back sx and awaiting for nerve stimulator     PAIN: pt reported soreness in back but no number given     Vitals: Vitals not assessed per clinical judgement, see nursing flowsheet    OBJECTIVE:  Bed Mobility:  Rolling to Left: Contact Guard Assistance, however pt uses rail to assist    Exercise:  Patient was guided in 1 set(s) 10 reps of exercise to both lower extremities.   Glut sets, [employer], Heelslides, Short arc quads, Hip abduction/adduction, and left ankle pumps . Exercises were completed for increased independence with functional mobility. Functional Outcome Measures: Completed  AM-PAC Inpatient Mobility without Stair Climbing Raw Score : 14  AM-PAC Inpatient without Stair Climbing T-Scale Score : 40.85    ASSESSMENT:  Assessment:  Pt cont to demonstrate generalized weakness and pain and decreased endurance, he would benefit from cont skilled therapy prior to return home alone   Activity Tolerance:  Patient tolerance of  treatment: fair. Equipment Recommendations:Equipment Needed: No  Discharge Recommendations: Subacte/Skilled Nursing Facility  Plan: Current Treatment Recommendations: Strengthening, Balance training, Gait training, Functional mobility training, Transfer training, Endurance training, Pain management, Patient/Caregiver education & training, Safety education & training, Therapeutic activities  General Plan:  (5X O)    Patient Education  Patient Education: Plan of Care    Goals:  Patient Goals : less pain  Short Term Goals  Time Frame for Short Term Goals: by discharge  Short Term Goal 1: bed mobility with S to get in/out of bed  Short Term Goal 2: transfer with SBA to get in/out of chairs, maintaining NWB RLE  Short Term Goal 3: amb >10'x1 with RW, NWB RLE, and SBA to walk safely to bathroom  Long Term Goals  Time Frame for Long Term Goals : no LTGs set secondary to short ELOS    Following session, patient left in safe position with all fall risk precautions in place.

## 2023-03-07 NOTE — PLAN OF CARE
Problem: Chronic Conditions and Co-morbidities  Goal: Patient's chronic conditions and co-morbidity symptoms are monitored and maintained or improved  3/7/2023 1013 by Namita Montejo RN  Outcome: Progressing  Flowsheets (Taken 3/7/2023 1013)  Care Plan - Patient's Chronic Conditions and Co-Morbidity Symptoms are Monitored and Maintained or Improved: Monitor and assess patient's chronic conditions and comorbid symptoms for stability, deterioration, or improvement     Problem: Discharge Planning  Goal: Discharge to home or other facility with appropriate resources  3/7/2023 1013 by Namita Montejo RN  Outcome: Progressing  Flowsheets (Taken 3/7/2023 1013)  Discharge to home or other facility with appropriate resources:   Identify barriers to discharge with patient and caregiver   Arrange for needed discharge resources and transportation as appropriate   Identify discharge learning needs (meds, wound care, etc)     Problem: Pain  Goal: Verbalizes/displays adequate comfort level or baseline comfort level  3/7/2023 1013 by Namita Montejo RN  Outcome: Progressing  Flowsheets (Taken 3/7/2023 1013)  Verbalizes/displays adequate comfort level or baseline comfort level:   Encourage patient to monitor pain and request assistance   Assess pain using appropriate pain scale   Administer analgesics based on type and severity of pain and evaluate response   Implement non-pharmacological measures as appropriate and evaluate response     Problem: Skin/Tissue Integrity - Adult  Goal: Incisions, wounds, or drain sites healing without S/S of infection  3/7/2023 1013 by Namita Montejo RN  Outcome: Progressing  Flowsheets (Taken 3/7/2023 1013)  Incisions, Wounds, or Drain Sites Healing Without Sign and Symptoms of Infection: TWICE DAILY: Assess and document dressing/incision, wound bed, drain sites and surrounding tissue     Problem: Musculoskeletal - Adult  Goal: Return mobility to safest level of function  3/7/2023 1013 by Maryann Schaefer Homero Jesus RN  Outcome: Progressing  Flowsheets (Taken 3/7/2023 1013)  Return Mobility to Safest Level of Function:   Assess patient stability and activity tolerance for standing, transferring and ambulating with or without assistive devices   Assist with transfers and ambulation using safe patient handling equipment as needed   Ensure adequate protection for wounds/incisions during mobilization     Problem: Musculoskeletal - Adult  Goal: Return ADL status to a safe level of function  3/7/2023 1013 by Jada Perez RN  Outcome: Progressing  Flowsheets (Taken 3/7/2023 1013)  Return ADL Status to a Safe Level of Function:   Administer medication as ordered   Assess activities of daily living deficits and provide assistive devices as needed     Problem: Infection - Adult  Goal: Absence of infection during hospitalization  3/7/2023 1013 by Jada Perez RN  Outcome: Progressing  Flowsheets (Taken 3/7/2023 1013)  Absence of infection during hospitalization:   Assess and monitor for signs and symptoms of infection   Monitor lab/diagnostic results   Monitor all insertion sites i.e., indwelling lines, tubes and drains     Problem: Safety - Adult  Goal: Free from fall injury  3/7/2023 1013 by Jada Perez RN  Outcome: Progressing  Flowsheets (Taken 3/7/2023 1013)  Free From Fall Injury: Instruct family/caregiver on patient safety

## 2023-03-07 NOTE — CARE COORDINATION
3/7/23, 8:37 AM EST    DISCHARGE PLANNING EVALUATION    Call made to [facility] to update on IV antibiotic and dressing changes. Facility is reviewing, will need precert if accepting.

## 2023-03-07 NOTE — PROGRESS NOTES
Progress note: Infectious diseases    Patient - Jerry Cruz,  Age - 47 y.o.    - 1970      Room Number - 6E-56/056-A   MRN -  [id number]   [facility]  Date of Admission -  3/1/2023 11:16 AM    SUBJECTIVE:   No new issues. OBJECTIVE   VITALS    height is 6' 1\" (1.854 m) and weight is 395 lb (179.2 kg) (abnormal). His oral temperature is 98 °F (36.7 °C). His blood pressure is 126/73 and his pulse is 72. His respiration is 18 and oxygen saturation is 95%.        Wt Readings from Last 3 Encounters:   23 (!) 395 lb (179.2 kg)   22 (!) 376 lb 5.2 oz (170.7 kg)   22 (!) 369 lb (167.4 kg)       I/O (24 Hours)    Intake/Output Summary (Last 24 hours) at 3/7/2023 1252  Last data filed at 3/7/2023 0151  Gross per 24 hour   Intake 1920 ml   Output 560 ml   Net 1360 ml         General Appearance  Awake, alert, oriented,  not  In acute distress  HEENT - normocephalic, atraumatic, pink conjunctiva,  anicteric sclera  Neck - Supple, no mass  Lungs -  Bilateral   air entry, no rhonchi, no wheeze  Cardiovascular - Heart sounds are normal.     Abdomen - soft, not distended, nontender,   Neurologic -oriented  Skin - No bruising or bleeding  Extremities -  dressed foot wound (post surgery)    MEDICATIONS:      lidocaine 1 % injection  5 mL IntraDERmal Once    sodium chloride flush  5-40 mL IntraVENous 2 times per day    sodium chloride flush  5-40 mL IntraVENous 2 times per day    sodium chloride flush  5-40 mL IntraVENous 2 times per day    enoxaparin  60 mg SubCUTAneous BID    nicotine  1 patch TransDERmal Daily    ceFAZolin  2,000 mg IntraVENous Q8H    aspirin  81 mg Oral Daily    atorvastatin  40 mg Oral Nightly    buPROPion  200 mg Oral BID    metoprolol succinate  25 mg Oral Daily    pantoprazole  40 mg Oral Daily    mometasone-formoterol  2 puff Inhalation BID    tiotropium  2 puff Inhalation Daily insulin lispro  0-4 Units SubCUTAneous TID     insulin lispro  0-4 Units SubCUTAneous Nightly    Dulaglutide  0.5 mL SubCUTAneous Weekly    thiamine  100 mg Oral Daily    multivitamin  1 tablet Oral Daily      sodium chloride      sodium chloride 50 mL/hr at 03/06/23 0907    sodium chloride      dextrose       sodium chloride flush, sodium chloride, sodium chloride flush, sodium chloride, ondansetron **OR** ondansetron, oxyCODONE **OR** oxyCODONE, morphine **OR** morphine, doxepin, sodium chloride flush, sodium chloride, polyethylene glycol, acetaminophen **OR** acetaminophen, albuterol sulfate HFA, glucose, dextrose bolus **OR** dextrose bolus, glucagon (rDNA), dextrose      LABS:     CBC:   Recent Labs     03/05/23  0609 03/07/23  0925   WBC 7.9  --    HGB 11.6* 11.5*     --        BMP:    Recent Labs     03/05/23  0609 03/07/23  0925    139   K 4.1 4.4    102   CO2 29 26   BUN 7 11   CREATININE 0.8 0.9   GLUCOSE 134* 212*       Calcium:  Recent Labs     03/07/23  0925   CALCIUM 8.6        Recent Labs     03/06/23  1933 03/07/23  0806 03/07/23  1150   POCGLU 201* 141* 133*          CULTURES:   UA:   No results for input(s): SPECGRAV, PHUR, COLORU, CLARITYU, MUCUS, PROTEINU, BLOODU, RBCUA, WBCUA, BACTERIA, NITRU, GLUCOSEU, BILIRUBINUR, UROBILINOGEN, KETUA, LABCAST, LABCASTTY, AMORPHOS in the last 72 hours. Invalid input(s): CRYSTALS    Micro:   Lab Results   Component Value Date/Time    [facility]  03/01/2023 12:24 PM     No growth 24 hours. No growth 48 hours. No growth at 5 days    [facility]  03/01/2023 12:24 PM     No growth 24 hours. No growth 48 hours.  No growth at 5 days            Problem list of patient:     Patient Active Problem List   Diagnosis Code    Hypertension I10    Hyperlipidemia E78.5    CAD (coronary artery disease) I25.10    Family history of premature coronary heart disease Z82.49    Dizziness R42    Acute renal failure (ARF) (HCC) N17.9    Chronic low back pain M54.50, G89.29    PTSD (post-traumatic stress disorder) F43.10    Foot ulceration, right, with fat layer exposed ([address] L97.512    Type 2 diabetes mellitus without complication, with long-term current use of insulin ([facility]) E11.9, Z79.4    Cellulitis of right foot L03.115    Osteomyelitis ([facility]) M86.9    Diabetic foot ulcer with osteomyelitis ([facility]) E11.621, E11.69, L97.509, M86.9         ASSESSMENT/PLAN   Diabetic foot ulcer with OM  Obesity  He had revision of the stump  Continue iv ancef. If ECF unable to give iv ancef, will place him on oral keflex  Discussed with podiatry.         Rafaela Atwood MD, MD, FACP 3/7/2023 12:52 PM

## 2023-03-08 VITALS
RESPIRATION RATE: 16 BRPM | BODY MASS INDEX: 41.75 KG/M2 | DIASTOLIC BLOOD PRESSURE: 97 MMHG | HEIGHT: 73 IN | HEART RATE: 66 BPM | TEMPERATURE: 97.9 F | OXYGEN SATURATION: 99 % | WEIGHT: 315 LBS | SYSTOLIC BLOOD PRESSURE: 129 MMHG

## 2023-03-08 LAB
GLUCOSE BLD STRIP.AUTO-MCNC: 138 MG/DL (ref 70–108)
GLUCOSE BLD STRIP.AUTO-MCNC: 151 MG/DL (ref 70–108)

## 2023-03-08 PROCEDURE — 97530 THERAPEUTIC ACTIVITIES: CPT

## 2023-03-08 PROCEDURE — 94640 AIRWAY INHALATION TREATMENT: CPT

## 2023-03-08 PROCEDURE — 2580000003 HC RX 258

## 2023-03-08 PROCEDURE — 6360000002 HC RX W HCPCS

## 2023-03-08 PROCEDURE — 97110 THERAPEUTIC EXERCISES: CPT

## 2023-03-08 PROCEDURE — 6370000000 HC RX 637 (ALT 250 FOR IP)

## 2023-03-08 PROCEDURE — 82948 REAGENT STRIP/BLOOD GLUCOSE: CPT

## 2023-03-08 PROCEDURE — 99239 HOSP IP/OBS DSCHRG MGMT >30: CPT | Performed by: STUDENT IN AN ORGANIZED HEALTH CARE EDUCATION/TRAINING PROGRAM

## 2023-03-08 RX ADMIN — TIOTROPIUM BROMIDE INHALATION SPRAY 2 PUFF: 3.12 SPRAY, METERED RESPIRATORY (INHALATION) at 06:21

## 2023-03-08 RX ADMIN — MORPHINE SULFATE 4 MG: 4 INJECTION, SOLUTION INTRAMUSCULAR; INTRAVENOUS at 13:07

## 2023-03-08 RX ADMIN — SODIUM CHLORIDE, PRESERVATIVE FREE 10 ML: 5 INJECTION INTRAVENOUS at 09:29

## 2023-03-08 RX ADMIN — Medication 2000 MG: at 01:23

## 2023-03-08 RX ADMIN — BUPROPION HYDROCHLORIDE 200 MG: 100 TABLET, FILM COATED, EXTENDED RELEASE ORAL at 09:35

## 2023-03-08 RX ADMIN — Medication 2000 MG: at 09:33

## 2023-03-08 RX ADMIN — MORPHINE SULFATE 4 MG: 4 INJECTION, SOLUTION INTRAMUSCULAR; INTRAVENOUS at 09:29

## 2023-03-08 RX ADMIN — MORPHINE SULFATE 4 MG: 4 INJECTION, SOLUTION INTRAMUSCULAR; INTRAVENOUS at 15:13

## 2023-03-08 RX ADMIN — MOMETASONE FUROATE AND FORMOTEROL FUMARATE DIHYDRATE 2 PUFF: 200; 5 AEROSOL RESPIRATORY (INHALATION) at 06:21

## 2023-03-08 RX ADMIN — ASPIRIN 81 MG: 81 TABLET ORAL at 09:35

## 2023-03-08 RX ADMIN — PANTOPRAZOLE SODIUM 40 MG: 40 TABLET, DELAYED RELEASE ORAL at 05:28

## 2023-03-08 RX ADMIN — Medication 1 TABLET: at 09:35

## 2023-03-08 RX ADMIN — METOPROLOL SUCCINATE 25 MG: 25 TABLET, FILM COATED, EXTENDED RELEASE ORAL at 09:37

## 2023-03-08 RX ADMIN — Medication 100 MG: at 09:35

## 2023-03-08 RX ADMIN — ENOXAPARIN SODIUM 60 MG: 100 INJECTION SUBCUTANEOUS at 09:35

## 2023-03-08 ASSESSMENT — PAIN SCALES - GENERAL
PAINLEVEL_OUTOF10: 7
PAINLEVEL_OUTOF10: 8
PAINLEVEL_OUTOF10: 6
PAINLEVEL_OUTOF10: 6
PAINLEVEL_OUTOF10: 7

## 2023-03-08 ASSESSMENT — PAIN DESCRIPTION - LOCATION
LOCATION: BACK

## 2023-03-08 ASSESSMENT — PAIN - FUNCTIONAL ASSESSMENT
PAIN_FUNCTIONAL_ASSESSMENT: ACTIVITIES ARE NOT PREVENTED

## 2023-03-08 ASSESSMENT — PAIN DESCRIPTION - DESCRIPTORS
DESCRIPTORS: STABBING
DESCRIPTORS: SHARP

## 2023-03-08 NOTE — PROGRESS NOTES
Progress note: Infectious diseases    Patient - Tatum Olivo,  Age - 47 y.o.    - 1970      Room Number - 6E-56/056-A   N -  [id number]   Acct # - [de-identified]  Date of Admission -  3/1/2023 11:16 AM    SUBJECTIVE:   No new issues. discharge plan to [facility]. OBJECTIVE   VITALS    height is 6' 1\" (1.854 m) and weight is 395 lb (179.2 kg) (abnormal). His oral temperature is 97.9 °F (36.6 °C). His blood pressure is 129/97 (abnormal) and his pulse is 66. His respiration is 16 and oxygen saturation is 99%. Wt Readings from Last 3 Encounters:   23 (!) 395 lb (179.2 kg)   22 (!) 376 lb 5.2 oz (170.7 kg)   22 (!) 369 lb (167.4 kg)       I/O (24 Hours)    Intake/Output Summary (Last 24 hours) at 3/8/2023 1336  Last data filed at 3/8/2023 1223  Gross per 24 hour   Intake 900 ml   Output 3350 ml   Net -2450 ml         General Appearance  Awake, alert, oriented,  not  In acute distress  HEENT - normocephalic, atraumatic, pink conjunctiva,  anicteric sclera  Neck - Supple, no mass  Lungs -  Bilateral   air entry, no rhonchi, no wheeze  Cardiovascular - Heart sounds are normal.     Abdomen - soft, not distended, nontender,   Neurologic -oriented  Skin - No bruising or bleeding  Extremities -  dressed foot wound.     MEDICATIONS:      lidocaine 1 % injection  5 mL IntraDERmal Once    sodium chloride flush  5-40 mL IntraVENous 2 times per day    sodium chloride flush  5-40 mL IntraVENous 2 times per day    sodium chloride flush  5-40 mL IntraVENous 2 times per day    enoxaparin  60 mg SubCUTAneous BID    nicotine  1 patch TransDERmal Daily    ceFAZolin  2,000 mg IntraVENous Q8H    aspirin  81 mg Oral Daily    atorvastatin  40 mg Oral Nightly    buPROPion  200 mg Oral BID    metoprolol succinate  25 mg Oral Daily    pantoprazole  40 mg Oral Daily    mometasone-formoterol  2 puff Inhalation BID    tiotropium  2 puff Inhalation Daily    insulin lispro  0-4 Units SubCUTAneous TID WC    insulin lispro  0-4 Units SubCUTAneous Nightly    Dulaglutide  0.5 mL SubCUTAneous Weekly    thiamine  100 mg Oral Daily    multivitamin  1 tablet Oral Daily      sodium chloride      sodium chloride 30 mL/hr at 03/07/23 0830    sodium chloride      dextrose       sodium chloride flush, sodium chloride, sodium chloride flush, sodium chloride, ondansetron **OR** ondansetron, oxyCODONE **OR** oxyCODONE, morphine **OR** morphine, doxepin, sodium chloride flush, sodium chloride, polyethylene glycol, acetaminophen **OR** acetaminophen, albuterol sulfate HFA, glucose, dextrose bolus **OR** dextrose bolus, glucagon (rDNA), dextrose      LABS:     CBC:   Recent Labs     03/07/23  0925   HGB 11.5*       BMP:    Recent Labs     03/07/23  0925      K 4.4      CO2 26   BUN 11   CREATININE 0.9   GLUCOSE 212*       Calcium:  Recent Labs     03/07/23  0925   CALCIUM 8.6        Recent Labs     03/07/23  1928 03/08/23  0733 03/08/23  1146   POCGLU 172* 151* 138*          CULTURES:   UA:   No results for input(s): SPECGRAV, PHUR, COLORU, CLARITYU, MUCUS, PROTEINU, BLOODU, RBCUA, WBCUA, BACTERIA, NITRU, GLUCOSEU, BILIRUBINUR, UROBILINOGEN, KETUA, LABCAST, LABCASTTY, AMORPHOS in the last 72 hours. Invalid input(s): CRYSTALS    Micro:   Lab Results   Component Value Date/Time    [facility]  03/01/2023 12:24 PM     No growth 24 hours. No growth 48 hours. No growth at 5 days    [facility]  03/01/2023 12:24 PM     No growth 24 hours. No growth 48 hours.  No growth at 5 days            Problem list of patient:     Patient Active Problem List   Diagnosis Code    Hypertension I10    Hyperlipidemia E78.5    CAD (coronary artery disease) I25.10    Family history of premature coronary heart disease Z82.49    Dizziness R42    Acute renal failure (ARF) (HCC) N17.9    Chronic low back pain M54.50, G89.29    PTSD (post-traumatic stress disorder) F43.10    Foot ulceration, right, with fat layer exposed ([facility] L97.512    Type 2 diabetes mellitus without complication, with long-term current use of insulin (HCC) E11.9, Z79.4    Cellulitis of right foot L03.115    Osteomyelitis ([facility]) M86.9    Diabetic foot ulcer with osteomyelitis ([facility] E11.621, E11.69, L97.509, M86.9         ASSESSMENT/PLAN   Diabetic foot ulcer with OM: had resection  Obesity   He will be discharged with iv ancef for 4 wks  Follow up in two wks at [facility] wound clinic           Lori Su MD, MD, FACP 3/8/2023 1:36 PM

## 2023-03-08 NOTE — PROGRESS NOTES
[facility]  [facility] 6E  Occupational Therapy  Daily Note  Time:   Time In: 1142  Time Out: 1205  Timed Code Treatment Minutes: 23 Minutes  Minutes: 23          Date: 3/8/2023  Patient Name: Osmani Galeano,   Gender: male      Room: -56/056-A  MRN: [id number]  : 1970  (52 y.o.)  Referring Practitioner: Ramya Petit DPM  Diagnosis: Diabetic foot ulcer with osteromyelitis  Additional Pertinent Hx: Pt is a 52 y.o. male s/p RIGHT LISFRANC FOOT AMPUTATION on 3/3    Restrictions/Precautions:  Restrictions/Precautions: Fall Risk, Weight Bearing, General Precautions  Right Lower Extremity Weight Bearing: Non Weight Bearing  Position Activity Restriction  Other position/activity restrictions: Hx R wrist injury- okay to WB     SUBJECTIVE: Pt lying supine upon arrival, agreeable to OT session.    PAIN: No c/o.    Vitals: Nurse checked vitals prior to session    COGNITION: WFL    ADL:   No ADL's completed this session.  Pt declined .    BALANCE:  Sitting Balance:  Supervision. EOB  Standing Balance: Contact Guard Assistance.   X1 minute in prep for mobility- good adherence for RLE NWB.    BED MOBILITY:  Supine to Sit: Supervision    Scooting: Supervision EOB    TRANSFERS:  Sit to Stand:  Contact Guard Assistance.   Stand to Sit: Contact Guard Assistance.     FUNCTIONAL MOBILITY:  Assistive Device: Rolling Walker   Assist Level:  Contact Guard Assistance.   Distance:   Completed functional mobility short distance within pt room EOB to chair at slow pace, no LOB noted. Pt requires no cues for walker safety and able to maintain NWB- seated rest break after trial of mobility, min fatigue noted.     ADDITIONAL ACTIVITIES:  Patient identified a personal goal to increase UB strength and improve overall endurance so they can complete their toilet & shower transfers; skilled edu on UE strengthening. Completed BUE exercises x10 reps x1 sets using mod resistance band in all joints/planes to increase  strength and endurance required for ADLs. Pt required brief rest break between each exercise and min v/c for proper technique. ASSESSMENT:     Activity Tolerance:  Patient tolerance of  treatment: Good treatment tolerance      Discharge Recommendations: Subacute/skilled nursing facility  Equipment Recommendations: Equipment Needed: Yes  Other: continue to monitor needs for [facility]  Plan: Times Per Week: 6x  Current Treatment Recommendations: Strengthening, Balance training, Functional mobility training, Endurance training, Safety education & training, Return to work related activity, Self-Care / ADL, Patient/Caregiver education & training    Patient Education  Patient Education: Home Exercise Program, Precautions, Importance of Increasing Activity, Assistive Device Safety, and Safety with transfers and mobility. Goals  Short Term Goals  Time Frame for Short Term Goals: by disharge  Short Term Goal 1: Pt will safely navigate to/from bathroom with SBA and no LOB while maintaining NWB to RLE to increase indep with self care  Short Term Goal 2: Pt will complete toilet t/fs with no > than SBA and min VC for maintaining NWB to RLE to increase indep with toileting  Short Term Goal 3: Pt will complete LB dressing with SBA and AE prn for increased indep with self care  Short Term Goal 4: Pt will tolerate dynamic standing x5-6min with SBA and unilateral release to increase indep with grooming    Following session, patient left in safe position with all fall risk precautions in place.

## 2023-03-08 NOTE — PLAN OF CARE
Problem: Chronic Conditions and Co-morbidities  Goal: Patient's chronic conditions and co-morbidity symptoms are monitored and maintained or improved  Outcome: Progressing  Flowsheets (Taken 3/7/2023 1013 by Rory Blackburn RN)  Care Plan - Patient's Chronic Conditions and Co-Morbidity Symptoms are Monitored and Maintained or Improved: Monitor and assess patient's chronic conditions and comorbid symptoms for stability, deterioration, or improvement     Problem: Discharge Planning  Goal: Discharge to home or other facility with appropriate resources  Outcome: Progressing  Flowsheets (Taken 3/7/2023 1013 by Rory Blackburn RN)  Discharge to home or other facility with appropriate resources:   Identify barriers to discharge with patient and caregiver   Arrange for needed discharge resources and transportation as appropriate   Identify discharge learning needs (meds, wound care, etc)     Problem: Pain  Goal: Verbalizes/displays adequate comfort level or baseline comfort level  Outcome: Progressing  Flowsheets (Taken 3/7/2023 1013 by Rory Blackburn RN)  Verbalizes/displays adequate comfort level or baseline comfort level:   Encourage patient to monitor pain and request assistance   Assess pain using appropriate pain scale   Administer analgesics based on type and severity of pain and evaluate response   Implement non-pharmacological measures as appropriate and evaluate response     Problem: Skin/Tissue Integrity - Adult  Goal: Skin integrity remains intact  Outcome: Progressing  Flowsheets (Taken 3/8/2023 0128)  Skin Integrity Remains Intact: Monitor for areas of redness and/or skin breakdown  Goal: Incisions, wounds, or drain sites healing without S/S of infection  Outcome: Progressing  Flowsheets (Taken 3/8/2023 0128)  Incisions, Wounds, or Drain Sites Healing Without Sign and Symptoms of Infection: ADMISSION and DAILY: Assess and document risk factors for pressure ulcer development     Problem: Musculoskeletal - Adult  Goal: Return mobility to safest level of function  Outcome: Progressing  Flowsheets (Taken 3/7/2023 1013 by Titus Melendez RN)  Return Mobility to Safest Level of Function:   Assess patient stability and activity tolerance for standing, transferring and ambulating with or without assistive devices   Assist with transfers and ambulation using safe patient handling equipment as needed   Ensure adequate protection for wounds/incisions during mobilization  Goal: Maintain proper alignment of affected body part  Outcome: Progressing  Flowsheets (Taken 3/6/2023 2332 by Wily Garcia RN)  Maintain proper alignment of affected body part:   Support and protect limb and body alignment per provider's orders   Instruct and reinforce with patient and family use of appropriate assistive device and precautions (e.g. spinal or hip dislocation precautions)  Goal: Return ADL status to a safe level of function  Outcome: Progressing  Flowsheets (Taken 3/7/2023 1013 by Titus Melendez RN)  Return ADL Status to a Safe Level of Function:   Administer medication as ordered   Assess activities of daily living deficits and provide assistive devices as needed     Problem: Infection - Adult  Goal: Absence of infection during hospitalization  Outcome: Progressing  Flowsheets (Taken 3/7/2023 1013 by Titus Melendez RN)  Absence of infection during hospitalization:   Assess and monitor for signs and symptoms of infection   Monitor lab/diagnostic results   Monitor all insertion sites i.e., indwelling lines, tubes and drains     Problem: Safety - Adult  Goal: Free from fall injury  Outcome: Progressing  Flowsheets (Taken 3/7/2023 1013 by Titus Melendez RN)  Free From Fall Injury: Instruct family/caregiver on patient safety   Care plan reviewed with patient. Patient verbalizes understanding of the plan of care and contribute to goal setting.

## 2023-03-08 NOTE — PLAN OF CARE
Problem: Respiratory - Adult  Goal: Clear lung sounds  Outcome: Progressing     Spiriva and Dulera for airway maintenance. Patient on room air. Patient mutually agreed on goals.

## 2023-03-08 NOTE — CARE COORDINATION
3/8/23, 7:34 AM EST    DISCHARGE PLANNING EVALUATION    Precert is approved, [facility] accept today. Family plans to transport at discharge.

## 2023-03-08 NOTE — CARE COORDINATION
3/8/23, 2:28 PM EST    DISCHARGE PLANNING EVALUATION    Spoke with [facility], confirmed plan for discharge to ecf today, family plans to transport at 4:00. Fax number for [facility], when completed, [phone].    3/8/23, 2:29 PM EST    Patient goals/plan/ treatment preferences discussed by  and . Patient goals/plan/ treatment preferences reviewed with patient/ family. Patient/ family verbalize understanding of discharge plan and are in agreement with goal/plan/treatment preferences. Understanding was demonstrated using the teach back method. AVS provided by RN at time of discharge, which includes all necessary medical information pertaining to the patients current course of illness, treatment, post-discharge goals of care, and treatment preferences.      Services At/After Discharge: [facility] (SNF)       IMM Letter  IMM Letter given to Patient/Family/Significant other/Guardian/POA/by[de-identified] CM  IMM Letter date given[de-identified] 03/08/23  IMM Letter time given[de-identified] 1485

## 2023-03-08 NOTE — DISCHARGE SUMMARY
Hospital Medicine Discharge Summary      Patient Identification:   Blanca Meng   : 1970  MRN: [id number]   Account: [de-identified]   Patient's PCP: MICHELLE Smith CNP    Admit Date: 3/1/2023   Discharge Date:   3/8/2023    Admitting Physician: No admitting provider for patient encounter. Discharge Physician: Kartik Call MD       Discharge Diagnoses:  Right diabetic foot ulcer with osteomyelitis s/p lisfranc foot amputation: MRI that was obtained at an outside hospital  exhibited probable osteomyelitis of the third metatarsal stump. Failed outpatient doxycycline course. Continue IV Ancef for 4 weeks as per ID. Non weight bearing to RLE. Acute blood loss anemia, postoperative and dilutional. Preop hgb 15.6, down to 11.2. HH has stabilized. Lactic acidosis, resolved  IDDM: Noted  CAD, reported. No history of intervention: cont ASA  Essential HTN: Noted  HLP: cont statin  COPD: not in acute exacerbation. Cont home inhalers. Morbid obesity Body mass index is 52.11 kg/m². Alcohol abuse: reports no alcohol intake for over one month  Tobacco abuse: NRT prn, cessation education  Physical Deconditioning. PT/OT. Precert to [employer]. Hospital Course:   Blanca Meng is a 47 y.o. male with PMHx of CAD, HTN, HLP, COPD, alcohol and tobacco abuse, IDDM with extensive h/o diabetic foot ulcerations s/p multiple amputations and surgeries of BLE who presented to [facility] at the direction of podiatrist, Dr Carmen Dixon following a MRI that was obtained at an outside hospital  that exhibited probable osteomyelitis of the third metatarsal stump. Patient alert, oriented x3, pleasant. Reports right diabetic ulcer continues to worsen despite podiatry care and Doxycycline course. Denies fever but states he has had intermittent chills and poor appetite. Denies shortness of breath, chest pain, dysuria, or diarrhea. Reports NWB status on R foot however admits to ambulating.       On presentation to ED, patient afebrile, normotensive, O2 sat adequate on room air. Electrolytes and renal function with no abnormalities. Lactic acid mildly elevated (2.0). No leukocytosis or anemia. Wound cultures obtained in ED and pending. Patient received IVF, ordered per sepsis protocol however patient does not meet sepsis criteria therefore discontinued and MIV ordered. Initial dose of cefepime per podiatry recommendations initiated. Podiatry was consulted patient went for right foot amputation excision of 7 cm wound by podiatry for osteomyelitis of right foot. ID was consulted recommended to treat for 4 weeks of antibiotics for MSSA with Ancef. On the day of discharge patient is hemodynamically and vitally stable, respiratory rate 16, Tmax 97.9, pulse 66, blood pressure 129/97. The patient was seen and examined on day of discharge and this discharge summary is in conjunction with any daily progress note from day of discharge. The patient is discharged in stable condition. Exam:   Vitals:  Vitals:    03/08/23 0959 03/08/23 1142 03/08/23 1307 03/08/23 1513   BP:  (!) 129/97     Pulse:  66     Resp: 16 16 16 16   Temp:  97.9 °F (36.6 °C)     TempSrc:  Oral     SpO2:  99%     Weight:       Height:         Weight: Weight: (!) 395 lb (179.2 kg)     General appearance: No apparent distress, well developed, appears stated age. Eyes:  Pupils equal, round, and reactive to light. Conjunctivae/corneas clear. HENT: Head normal in appearance. External nares normal.  Oral mucosa moist without lesions. Hearing grossly intact. Neck: Supple, with full range of motion. Trachea midline. No gross JVD appreciated. Respiratory:  Normal respiratory effort. Clear to auscultation, bilaterally without rales or wheezes or rhonchi. Cardiovascular: Normal rate, regular rhythm with normal S1/S2 without murmurs. No lower extremity edema. Abdomen: Soft, non-tender, non-distended with normal bowel sounds.   Musculoskeletal: There is no joint swelling or tenderness. Normal tone. No abnormal movements. Right lower extremity wound is wrapped. Skin: Warm and dry. No rashes or lesions. Neurologic:  No focal sensory/motor deficits in the upper and lower extremities. Cranial nerves:  grossly non-focal 2-12. Psychiatric: Alert and oriented, normal insight and thought content. Capillary Refill: Brisk,< 3 seconds. Peripheral Pulses: +2 palpable, equal bilaterally. Significant Diagnostic Studies    Labs: For convenience and continuity at follow-up the following most recent labs are provided:  CBC:    Lab Results   Component Value Date/Time    WBC 7.9 03/05/2023 06:09 AM    HGB 11.5 03/07/2023 09:25 AM    HCT 34.3 03/07/2023 09:25 AM     03/05/2023 06:09 AM     Renal:    Lab Results   Component Value Date/Time     03/07/2023 09:25 AM    K 4.4 03/07/2023 09:25 AM    K 4.0 07/26/2022 05:24 AM     03/07/2023 09:25 AM    CO2 26 03/07/2023 09:25 AM    BUN 11 03/07/2023 09:25 AM    CREATININE 0.9 03/07/2023 09:25 AM    CALCIUM 8.6 03/07/2023 09:25 AM       Radiology:   XR CHEST PORTABLE   Final Result   1. Normal heart size. Evidence for old, healed granulomatous disease. 2. No acute findings. No infiltrates or effusions are seen. **This report has been created using voice recognition software. It may contain minor errors which are inherent in voice recognition technology. **      Final report electronically signed by Dr. Cain Cook on 3/1/2023 1:29 PM             Consults:   IP CONSULT TO INFECTIOUS DISEASES  IP CONSULT TO PODIATRY  IP CONSULT TO SOCIAL WORK      Disposition: SNF  Condition at Discharge: Stable    Code Status:  Full Code     Patient Instructions:    Discharge lab work: None  Activity: no weightbearing activity on right lower extremity  Diet: ADULT DIET;  Regular; 4 carb choices (60 gm/meal)      Follow-up visits:   Annette Jin / [facility] [address] [place] [address], [address] [address] [phone]    Schedule an appointment as soon as possible for a visit  Wednesday March 15th @ 8:45am in [place] office    Flakita PARKER [address]  [phone]    Schedule an appointment as soon as possible for a visit in 1 week(s)      Juany Lopez, [address]  [phone]    Follow up  virtual visit in 2 weeks. Message left with wound clinic to call for follow up         Discharge Medications:        Medication List        START taking these medications      ceFAZolin  infusion  Commonly known as: ANCEF  Infuse 2,000 mg intravenously in the morning and 2,000 mg at noon and 2,000 mg in the evening. Do all this for 28 days. Compound per protocol.             CONTINUE taking these medications      albuterol sulfate  (90 Base) MCG/ACT inhaler  Commonly known as: PROVENTIL;VENTOLIN;PROAIR     aspirin 81 MG tablet     atorvastatin 40 MG tablet  Commonly known as: LIPITOR     buPROPion 200 MG extended release tablet  Commonly known as: WELLBUTRIN SR     Dulaglutide 0.75 MG/0.5ML Sopn     furosemide 20 MG tablet  Commonly known as: LASIX     isosorbide mononitrate 60 MG extended release tablet  Commonly known as: IMDUR     metoprolol tartrate 25 MG tablet  Commonly known as: LOPRESSOR     midodrine 2.5 MG tablet  Commonly known as: PROAMATINE     pantoprazole 40 MG tablet  Commonly known as: PROTONIX     potassium chloride 20 MEQ Tbcr extended release tablet  Commonly known as: KLOR-CON M     pregabalin 150 MG capsule  Commonly known as: LYRICA     PROBIOTIC ACIDOPHILUS PO     ramelteon 8 MG tablet  Commonly known as: ROZEREM     tamsulosin 0.4 MG capsule  Commonly known as: FLOMAX     thera/beta-carotene Tabs     TRELEGY ELLIPTA IN     vilazodone HCl 10 MG Tabs  Commonly known as: VIIBRYD     vitamin C 1000 MG tablet     vitamin D3 125 MCG (5000 UT) Caps            STOP taking these medications      lisinopril 20 MG tablet  Commonly known as: PRINIVIL;ZESTRIL               Where to Get Your Medications        You can get these medications from any pharmacy    Bring a paper prescription for each of these medications  ceFAZolin  infusion              Time Spent on discharge is 45 minutes in the examination, evaluation, counseling and review of medications and discharge plan. Thank you MICHELLE Bermudez CNP for the opportunity to be involved in this patient's care.       Signed:    Electronically signed by Ross Wilks MD on 3/8/23 at 2:07 PM EST

## 2023-03-29 ENCOUNTER — HOSPITAL ENCOUNTER (OUTPATIENT)
Dept: WOUND CARE | Age: 53
Discharge: HOME | End: 2023-03-29

## 2023-03-29 ENCOUNTER — TELEPHONE (OUTPATIENT)
Dept: WOUND CARE | Age: 53
End: 2023-03-29

## 2023-03-29 NOTE — TELEPHONE ENCOUNTER
Attempted to call patient this morning regarding virtual visit with Dr Kristin Weiner. He states he did not know about the virtual visit and is currently at another appointment at this time. He states he is at [place] currently for his IV antibioti management. He states he thinks his PICC line was partially pulled out and they are supposed to be coming with Xray to confirm placement prior to using again. [facility] [place] to reschedule virtual visit for next week with Dr Kristin Weiner as his antibiotics will be finished then. Patient does have another appointment that Wednesday at 0830 with Dr Anisha Latham in [facility] [place] but will be back to facility by 10 or so he thinks. VV scheduled for 1030. Visit works with facility per Bernie and he was educated on the process for virtual visits. He verbalizes understanding. Patient updated on date and time of virtual visit and verbalizes understanding.

## 2023-04-01 LAB
EKG ATRIAL RATE: 81 BPM
EKG P AXIS: 60 DEGREES
EKG P-R INTERVAL: 148 MS
EKG Q-T INTERVAL: 346 MS
EKG QRS DURATION: 96 MS
EKG QTC CALCULATION (BAZETT): 401 MS
EKG R AXIS: 9 DEGREES
EKG T AXIS: 46 DEGREES
EKG VENTRICULAR RATE: 81 BPM

## 2023-04-05 ENCOUNTER — HOSPITAL ENCOUNTER (OUTPATIENT)
Dept: WOUND CARE | Age: 53
Discharge: HOME | End: 2023-04-05
Payer: MEDICARE

## 2023-04-05 VITALS
HEART RATE: 77 BPM | TEMPERATURE: 98.1 F | DIASTOLIC BLOOD PRESSURE: 70 MMHG | RESPIRATION RATE: 18 BRPM | SYSTOLIC BLOOD PRESSURE: 138 MMHG

## 2023-04-05 PROCEDURE — 99212 OFFICE O/P EST SF 10 MIN: CPT

## 2023-04-05 NOTE — PROGRESS NOTES
Virtual Visit Wound Care  Clinic Level of Care   NAME:  Yoli Payne  YOB: 1970 GENDER: male  MEDICAL RECORD NUMBER:  [id number]   DATE:  4/5/2023     Patient Type Points   No documentation completed by nursing staff. []   0   Nursing staff documented in the navigator for an ESTABLISHED patient including Episode, Patient ID, Chief Complaint, Travel Screen, Allergies, Latex Allergy, Home Medication, History, Psychosocial Screen, C-SSRS Screen, Fall Risk, Nutritional Screen, Advanced Directive, Education and Plan of Care, and Discharge Instructions. The Functional Screening tab is only required if the patient's status changes. []   1   Nursing staff documented in the navigator for a NEW patient including Patient ID, Chief Complaint, Travel Screen, Allergies, Latex Allergy, Home Medication, History, Psychosocial Screen, C-SSRS Screen, Fall Risk, Nutritional Screen, Advanced Directive, Functional Screen, Education and Plan of Care, and Discharge Instructions. [x]   2   Nursing staff documented in the navigator for a CONSULT patient including Episode, Patient ID, Chief Complaint, Travel Screen, Allergies, Latex Allergy, Home Medication, History, Psychosocial Screen, C-SSRS Screen, Fall Risk, Nutritional Screen, Advanced Directive, Functional Screen, Education and Plan of Care, and Discharge Instructions. []   2     Wound Description Points   Unable to obtain image of Wound. For example, patient/caregiver is instructed not to remove dressing, is unable to correctly position smart phone, no smart phone is available, patient is unable to maintain connectivity or the patient's wound is healed. [x]   0   1-3 wound images annotated. Images of the wound(s) is obtained and annotated along with completed description in [address] [place] [address]. []   1   4-5 wound images annotated. Images of the wound(s) is obtained and annotated along with completed description in [address] [place] [address]. []   2   Greater than 6 wound images annotated.  Images

## 2023-04-05 NOTE — PLAN OF CARE
Problem: Wound:  Goal: Will show signs of wound healing; wound closure and no evidence of infection  Description: Will show signs of wound healing; wound closure and no evidence of infection  Outcome: Completed   Virtual visit completed with patient and nurse at [address] IV antibiotics after last dose today. Remove PICC line after last dose of antibiotics today. Okay to discharge home today or tomorrow after last dose of antibiotics. We will call patient if additional oral antibiotic is needed. Discharge instructions/orders faxed to [facility]. Follow up visit:  As needed. Care plan reviewed with patient and nurse. Patient and nurse verbalize understanding of the plan of care and contribute to goal setting.

## 2023-04-05 NOTE — PROGRESS NOTES
[place], was evaluated through a synchronous (real-time) audio-video encounter. The patient (or guardian if applicable) is aware that this is a billable service, which includes applicable co-pays. This Virtual Visit was conducted with patient's (and/or legal guardian's) consent. The visit was conducted pursuant to the emergency declaration under the [facility], [address] [place] [address] authority and the [employer] and [employer] General Act. Patient identification was verified, and a caregiver was present when appropriate. The patient was located at [facility]: [place] Prows crest - Jaquan Pedraza  Provider was located at [place] [address] (Appt Dept): [address]  [facility] RAZIA TIERNEY II.WINNIE,  [address]         Total time spent for this encounter:  30 minutes    --Kamila Henderson RN on 4/5/2023 at 12:17 PM    An electronic signature was used to authenticate this note.

## 2023-04-05 NOTE — DISCHARGE INSTRUCTIONS
Visit Discharge/Physician Orders:  - Discontinue IV antibiotics after last dose today. - Remove PICC line after last dose of antibiotics today.  - Okay to discharge home today or tomorrow after last dose of antibiotics. - We will call patient if additional oral antibiotic is needed. Home Care: [facility]    Wound Location: Right foot    Dressing orders: Per Dr. Eleanor Dave    Follow up visit:  As needed    Keep next scheduled appointment. Please give 24 hour notice if unable to keep appointment. [phone]    If you experience any of the following, please call the Wound Care Service during business hours: Monday through Friday 8:00 am - 4:30 pm  ([phone]). *Increase in pain   *Temperature over 101   *Increase in drainage from your wound or a foul odor   *Uncontrolled swelling   *Need for compression bandage changes due to slippage, breakthrough drainage    If you need medical attention outside of business hours, please contact your Primary Care Doctor or go to the nearest emergency room.

## 2023-04-05 NOTE — PROGRESS NOTES
2023    TELEHEALTH EVALUATION -- Audio/Visual (During WNSVM-28 public health emergency)    HPI:    Celsa Barraza (:  1970) has requested an audio/video evaluation for the following concern(s): On iv antibiotic due to foot infection  He has compelted 4 wks of iv antibiotic  Last day today  The wound is improving  Lab reviewed  Prior to Visit Medications    Medication Sig Taking? Authorizing Provider   ceFAZolin (ANCEF) infusion Infuse 2,000 mg intravenously in the morning and 2,000 mg at noon and 2,000 mg in the evening. Do all this for 28 days. Compound per protocol. Dwain Loredo MD   midodrine (PROAMATINE) 2.5 MG tablet Take 1 tablet by mouth in the morning and at bedtime  Historical Provider, MD   Lactobacillus (PROBIOTIC ACIDOPHILUS PO) Take 1 capsule by mouth  Historical Provider, MD   Dulaglutide 0.75 MG/0.5ML SOPN Inject 0.75 mg into the skin once a week On   Historical Provider, MD   Fluticasone-Umeclidin-Vilant (TRELEGY ELLIPTA IN) Inhale 1 puff into the lungs daily  Historical Provider, MD   Ascorbic Acid (VITAMIN C) 1000 MG tablet Take 1 tablet by mouth 2 times daily  Historical Provider, MD   buPROPion (WELLBUTRIN SR) 200 MG extended release tablet Take 1 tablet by mouth 2 times daily  Historical Provider, MD   pregabalin (LYRICA) 150 MG capsule TAKE 1 CAPSULE BY MOUTH TWICE DAILY  Historical Provider, MD   potassium chloride (KLOR-CON M) 20 MEQ TBCR extended release tablet TAKE 1 TABLET BY MOUTH ONCE DAILY AS NEEDED - TAKE ONLY IF TAKING LASIX.   Historical Provider, MD   furosemide (LASIX) 20 MG tablet TAKE 1 TABLET BY MOUTH ONCE DAILY AS NEEDED SWELLING  Historical Provider, MD   Cholecalciferol (VITAMIN D3) 125 MCG (5000 UT) CAPS TAKE 1 CAPSULE BY MOUTH ONCE DAILY  Historical Provider, MD   metoprolol tartrate (LOPRESSOR) 25 MG tablet Take 1 tablet by mouth daily  Historical Provider, MD   ramelteon (ROZEREM) 8 MG tablet Take 1 tablet by mouth nightly  Historical Provider, MD

## 2023-06-06 ENCOUNTER — TELEPHONE (OUTPATIENT)
Dept: BARIATRICS/WEIGHT MGMT | Age: 53
End: 2023-06-06

## 2023-06-06 NOTE — TELEPHONE ENCOUNTER
Waldo Vanegas calls asking to rejoin our program - he was dismissed in 1-2022 due to alcohol relapse- was advised to completed alcohol relapse prevention program- he has done that - I gave him our fax # and told him to have that tx summary sent to us and a statement from his counselor that he was stable and ready to engage to our program.  I asked if he was nicotine free -\"almost\"- I'm still smoking a few a day. I told pt to be nicotine free - have his PCP order a nicotine test -3 weeks after his last cigarette and if that is negative -that is acceptable -we also have to review his alcohol txment notes. He voiced understanding.

## 2023-09-22 RX ORDER — VILAZODONE HYDROCHLORIDE 40 MG/1
40 TABLET ORAL DAILY
COMMUNITY

## 2023-09-22 RX ORDER — PRAZOSIN HYDROCHLORIDE 1 MG/1
5 CAPSULE ORAL NIGHTLY
COMMUNITY

## 2023-09-22 RX ORDER — FEXOFENADINE HCL 180 MG/1
180 TABLET ORAL DAILY
COMMUNITY

## 2023-09-22 RX ORDER — FLUTICASONE PROPIONATE 220 UG/1
2 AEROSOL, METERED RESPIRATORY (INHALATION) 2 TIMES DAILY
COMMUNITY

## 2023-09-22 NOTE — PROGRESS NOTES
Follow all instructions given by your physician  NPO after midnight  Sips of water am of surgery with allowed medications  Bring insurance info and 's license  Wear clean comfortable , loose-fitting clothing  No jewelry or contact lenses to be worn day of surgery  No glue on dentures morning of surgery; you will be asked to remove them for surgery. Case for glasses. Shower the night before and the morning of surgery with a liquid antibacterial soap, dry with new fresh clean towel after each shower, no lotions, creams or powder. Clean sheets and pillowcase on bed night before surgery  Bring medications in original bottles  Bring CPAP/BIPAP machine if you have one ( you may be charged if one is needed in recovery room )    Our pharmacy has a Meds to [facility] program where they will deliver any new prescriptions you may have to your room before you leave. Our Pharmacy will clear it through your insurance; for example (same co pay). This enables you to take your new RX as soon as you need when you get home and avoids stop/wait delays on the way home. Please have a form of payment with you and have someone designated as your Pharmacy contact with their phone # as you may not feel well or still be under the influence of anesthesia. Please refer to the SSI-Surgical Site Infection Flyer you hopefully received in the mail-together we can prevent infections; signs and symptoms reviewed. When discharged be sure you understand how to care for your wound and that you have the Dr./office phone # to call if you have any concerns or questions about your wound.      needed at discharge and someone over 18 to stay with you for 24 hours overnight (surgery may be cancelled if you don't have this)  Report to [facility] on 2nd floor  If you would become ill prior to surgery, please call the surgeon  May have a visitor with you, we request that you limit to 2 visitors in pre-op area  Masks are recommended but not required, new

## 2023-09-27 NOTE — PROGRESS NOTES
Called Joselyn at Dr Fernando Curry office as well as Venice Rivas at [facility] for Dr Maritza Rizo.

## 2023-09-29 ENCOUNTER — HOSPITAL ENCOUNTER (OUTPATIENT)
Age: 53
Setting detail: OBSERVATION
Discharge: HOME | End: 2023-09-30
Attending: STUDENT IN AN ORGANIZED HEALTH CARE EDUCATION/TRAINING PROGRAM
Payer: MEDICARE

## 2023-09-29 ENCOUNTER — ANESTHESIA (OUTPATIENT)
Dept: OPERATING ROOM | Age: 53
End: 2023-09-29
Payer: MEDICARE

## 2023-09-29 ENCOUNTER — ANESTHESIA EVENT (OUTPATIENT)
Dept: OPERATING ROOM | Age: 53
End: 2023-09-29
Payer: MEDICARE

## 2023-09-29 ENCOUNTER — APPOINTMENT (OUTPATIENT)
Dept: GENERAL RADIOLOGY | Age: 53
End: 2023-09-29
Attending: STUDENT IN AN ORGANIZED HEALTH CARE EDUCATION/TRAINING PROGRAM
Payer: MEDICARE

## 2023-09-29 PROBLEM — J96.00 ACUTE RESPIRATORY FAILURE (HCC): Status: ACTIVE | Noted: 2023-09-29

## 2023-09-29 LAB
ALBUMIN SERPL BCG-MCNC: 4 G/DL (ref 3.5–5.1)
ALP SERPL-CCNC: 153 U/L (ref 38–126)
ALT SERPL W/O P-5'-P-CCNC: 32 U/L (ref 11–66)
ANION GAP SERPL CALC-SCNC: 11 MEQ/L (ref 8–16)
AST SERPL-CCNC: 28 U/L (ref 5–40)
BASOPHILS ABSOLUTE: 0 THOU/MM3 (ref 0–0.1)
BASOPHILS NFR BLD AUTO: 0.2 %
BILIRUB SERPL-MCNC: 0.5 MG/DL (ref 0.3–1.2)
BUN SERPL-MCNC: 17 MG/DL (ref 7–22)
CALCIUM SERPL-MCNC: 9.2 MG/DL (ref 8.5–10.5)
CHLORIDE SERPL-SCNC: 96 MEQ/L (ref 98–111)
CO2 SERPL-SCNC: 26 MEQ/L (ref 23–33)
CREAT SERPL-MCNC: 1.1 MG/DL (ref 0.4–1.2)
DEPRECATED RDW RBC AUTO: 43.5 FL (ref 35–45)
EOSINOPHIL NFR BLD AUTO: 0.1 %
EOSINOPHILS ABSOLUTE: 0 THOU/MM3 (ref 0–0.4)
ERYTHROCYTE [DISTWIDTH] IN BLOOD BY AUTOMATED COUNT: 13.1 % (ref 11.5–14.5)
GFR SERPL CREATININE-BSD FRML MDRD: > 60 ML/MIN/1.73M2
GLUCOSE BLD STRIP.AUTO-MCNC: 194 MG/DL (ref 70–108)
GLUCOSE SERPL-MCNC: 407 MG/DL (ref 70–108)
HCT VFR BLD AUTO: 47.5 % (ref 42–52)
HGB BLD-MCNC: 16 GM/DL (ref 14–18)
IMM GRANULOCYTES # BLD AUTO: 0.08 THOU/MM3 (ref 0–0.07)
IMM GRANULOCYTES NFR BLD AUTO: 0.6 %
LYMPHOCYTES ABSOLUTE: 0.7 THOU/MM3 (ref 1–4.8)
LYMPHOCYTES NFR BLD AUTO: 4.9 %
MCH RBC QN AUTO: 30.9 PG (ref 26–33)
MCHC RBC AUTO-ENTMCNC: 33.7 GM/DL (ref 32.2–35.5)
MCV RBC AUTO: 91.7 FL (ref 80–94)
MONOCYTES ABSOLUTE: 0.1 THOU/MM3 (ref 0.4–1.3)
MONOCYTES NFR BLD AUTO: 1.1 %
NEUTROPHILS NFR BLD AUTO: 93.1 %
NRBC BLD AUTO-RTO: 0 /100 WBC
PLATELET # BLD AUTO: 238 THOU/MM3 (ref 130–400)
PMV BLD AUTO: 10.6 FL (ref 9.4–12.4)
POTASSIUM SERPL-SCNC: 5.3 MEQ/L (ref 3.5–5.2)
PROT SERPL-MCNC: 6.6 G/DL (ref 6.1–8)
RBC # BLD AUTO: 5.18 MILL/MM3 (ref 4.7–6.1)
SEGMENTED NEUTROPHILS ABSOLUTE COUNT: 12.4 THOU/MM3 (ref 1.8–7.7)
SODIUM SERPL-SCNC: 133 MEQ/L (ref 135–145)
WBC # BLD AUTO: 13.3 THOU/MM3 (ref 4.8–10.8)

## 2023-09-29 PROCEDURE — 6370000000 HC RX 637 (ALT 250 FOR IP): Performed by: PHYSICIAN ASSISTANT

## 2023-09-29 PROCEDURE — 2580000003 HC RX 258: Performed by: NURSE ANESTHETIST, CERTIFIED REGISTERED

## 2023-09-29 PROCEDURE — 94761 N-INVAS EAR/PLS OXIMETRY MLT: CPT

## 2023-09-29 PROCEDURE — 94660 CPAP INITIATION&MGMT: CPT

## 2023-09-29 PROCEDURE — 2500000003 HC RX 250 WO HCPCS: Performed by: NURSE ANESTHETIST, CERTIFIED REGISTERED

## 2023-09-29 PROCEDURE — 3600000003 HC SURGERY LEVEL 3 BASE: Performed by: STUDENT IN AN ORGANIZED HEALTH CARE EDUCATION/TRAINING PROGRAM

## 2023-09-29 PROCEDURE — G0378 HOSPITAL OBSERVATION PER HR: HCPCS

## 2023-09-29 PROCEDURE — 6360000002 HC RX W HCPCS: Performed by: STUDENT IN AN ORGANIZED HEALTH CARE EDUCATION/TRAINING PROGRAM

## 2023-09-29 PROCEDURE — 80053 COMPREHEN METABOLIC PANEL: CPT

## 2023-09-29 PROCEDURE — 7100000000 HC PACU RECOVERY - FIRST 15 MIN: Performed by: STUDENT IN AN ORGANIZED HEALTH CARE EDUCATION/TRAINING PROGRAM

## 2023-09-29 PROCEDURE — 96372 THER/PROPH/DIAG INJ SC/IM: CPT

## 2023-09-29 PROCEDURE — 6370000000 HC RX 637 (ALT 250 FOR IP)

## 2023-09-29 PROCEDURE — 2709999900 HC NON-CHARGEABLE SUPPLY: Performed by: STUDENT IN AN ORGANIZED HEALTH CARE EDUCATION/TRAINING PROGRAM

## 2023-09-29 PROCEDURE — 99223 1ST HOSP IP/OBS HIGH 75: CPT | Performed by: PHYSICIAN ASSISTANT

## 2023-09-29 PROCEDURE — 6360000002 HC RX W HCPCS: Performed by: PHYSICIAN ASSISTANT

## 2023-09-29 PROCEDURE — 85025 COMPLETE CBC W/AUTO DIFF WBC: CPT

## 2023-09-29 PROCEDURE — 3700000001 HC ADD 15 MINUTES (ANESTHESIA): Performed by: STUDENT IN AN ORGANIZED HEALTH CARE EDUCATION/TRAINING PROGRAM

## 2023-09-29 PROCEDURE — 2720000010 HC SURG SUPPLY STERILE: Performed by: STUDENT IN AN ORGANIZED HEALTH CARE EDUCATION/TRAINING PROGRAM

## 2023-09-29 PROCEDURE — 2700000000 HC OXYGEN THERAPY PER DAY

## 2023-09-29 PROCEDURE — 2500000003 HC RX 250 WO HCPCS: Performed by: STUDENT IN AN ORGANIZED HEALTH CARE EDUCATION/TRAINING PROGRAM

## 2023-09-29 PROCEDURE — C1778 LEAD, NEUROSTIMULATOR: HCPCS | Performed by: STUDENT IN AN ORGANIZED HEALTH CARE EDUCATION/TRAINING PROGRAM

## 2023-09-29 PROCEDURE — 3700000000 HC ANESTHESIA ATTENDED CARE: Performed by: STUDENT IN AN ORGANIZED HEALTH CARE EDUCATION/TRAINING PROGRAM

## 2023-09-29 PROCEDURE — 3600000013 HC SURGERY LEVEL 3 ADDTL 15MIN: Performed by: STUDENT IN AN ORGANIZED HEALTH CARE EDUCATION/TRAINING PROGRAM

## 2023-09-29 PROCEDURE — C1822 GEN, NEURO, HF, RECHG BAT: HCPCS | Performed by: STUDENT IN AN ORGANIZED HEALTH CARE EDUCATION/TRAINING PROGRAM

## 2023-09-29 PROCEDURE — 7100000001 HC PACU RECOVERY - ADDTL 15 MIN: Performed by: STUDENT IN AN ORGANIZED HEALTH CARE EDUCATION/TRAINING PROGRAM

## 2023-09-29 PROCEDURE — 36415 COLL VENOUS BLD VENIPUNCTURE: CPT

## 2023-09-29 PROCEDURE — 2580000003 HC RX 258: Performed by: PHYSICIAN ASSISTANT

## 2023-09-29 PROCEDURE — C1889 IMPLANT/INSERT DEVICE, NOC: HCPCS | Performed by: STUDENT IN AN ORGANIZED HEALTH CARE EDUCATION/TRAINING PROGRAM

## 2023-09-29 PROCEDURE — 94640 AIRWAY INHALATION TREATMENT: CPT

## 2023-09-29 PROCEDURE — 6360000002 HC RX W HCPCS: Performed by: NURSE ANESTHETIST, CERTIFIED REGISTERED

## 2023-09-29 PROCEDURE — 82948 REAGENT STRIP/BLOOD GLUCOSE: CPT

## 2023-09-29 DEVICE — BLUE LEAD KIT, 70CM WITH 5MM SPACING
Type: IMPLANTABLE DEVICE | Status: FUNCTIONAL
Brand: NEVRO®

## 2023-09-29 DEVICE — N300 LEAD ANCHOR KIT
Type: IMPLANTABLE DEVICE | Status: FUNCTIONAL
Brand: NEVRO®

## 2023-09-29 DEVICE — SENZA OMNIA IPG KIT
Type: IMPLANTABLE DEVICE | Status: FUNCTIONAL
Brand: OMNIA

## 2023-09-29 RX ORDER — ONDANSETRON 4 MG/1
4 TABLET, ORALLY DISINTEGRATING ORAL EVERY 8 HOURS PRN
Status: DISCONTINUED | OUTPATIENT
Start: 2023-09-29 | End: 2023-09-30 | Stop reason: HOSPADM

## 2023-09-29 RX ORDER — CETIRIZINE HYDROCHLORIDE 10 MG/1
10 TABLET ORAL DAILY
Status: DISCONTINUED | OUTPATIENT
Start: 2023-09-29 | End: 2023-09-30 | Stop reason: HOSPADM

## 2023-09-29 RX ORDER — ONDANSETRON 2 MG/ML
4 INJECTION INTRAMUSCULAR; INTRAVENOUS EVERY 6 HOURS PRN
Status: DISCONTINUED | OUTPATIENT
Start: 2023-09-29 | End: 2023-09-30 | Stop reason: HOSPADM

## 2023-09-29 RX ORDER — VANCOMYCIN HYDROCHLORIDE 1 G/20ML
INJECTION, POWDER, LYOPHILIZED, FOR SOLUTION INTRAVENOUS PRN
Status: DISCONTINUED | OUTPATIENT
Start: 2023-09-29 | End: 2023-09-29 | Stop reason: ALTCHOICE

## 2023-09-29 RX ORDER — IPRATROPIUM BROMIDE AND ALBUTEROL SULFATE 2.5; .5 MG/3ML; MG/3ML
SOLUTION RESPIRATORY (INHALATION)
Status: COMPLETED
Start: 2023-09-29 | End: 2023-09-29

## 2023-09-29 RX ORDER — SODIUM CHLORIDE 0.9 % (FLUSH) 0.9 %
10 SYRINGE (ML) INJECTION PRN
Status: DISCONTINUED | OUTPATIENT
Start: 2023-09-29 | End: 2023-09-30 | Stop reason: HOSPADM

## 2023-09-29 RX ORDER — ASPIRIN 81 MG/1
81 TABLET, CHEWABLE ORAL NIGHTLY
Status: DISCONTINUED | OUTPATIENT
Start: 2023-09-29 | End: 2023-09-30 | Stop reason: HOSPADM

## 2023-09-29 RX ORDER — VILAZODONE HYDROCHLORIDE 40 MG/1
40 TABLET ORAL DAILY
Status: DISCONTINUED | OUTPATIENT
Start: 2023-09-29 | End: 2023-09-30 | Stop reason: HOSPADM

## 2023-09-29 RX ORDER — SODIUM CHLORIDE 9 MG/ML
INJECTION, SOLUTION INTRAVENOUS CONTINUOUS PRN
Status: DISCONTINUED | OUTPATIENT
Start: 2023-09-29 | End: 2023-09-29 | Stop reason: SDUPTHER

## 2023-09-29 RX ORDER — CEPHALEXIN 500 MG/1
500 CAPSULE ORAL 4 TIMES DAILY
Qty: 40 CAPSULE | Refills: 0 | Status: SHIPPED | OUTPATIENT
Start: 2023-09-29 | End: 2023-10-09

## 2023-09-29 RX ORDER — ENOXAPARIN SODIUM 100 MG/ML
60 INJECTION SUBCUTANEOUS 2 TIMES DAILY
Status: DISCONTINUED | OUTPATIENT
Start: 2023-09-29 | End: 2023-09-30 | Stop reason: HOSPADM

## 2023-09-29 RX ORDER — FENTANYL CITRATE 50 UG/ML
INJECTION, SOLUTION INTRAMUSCULAR; INTRAVENOUS PRN
Status: DISCONTINUED | OUTPATIENT
Start: 2023-09-29 | End: 2023-09-29 | Stop reason: SDUPTHER

## 2023-09-29 RX ORDER — SODIUM CHLORIDE 9 MG/ML
INJECTION, SOLUTION INTRAVENOUS PRN
Status: DISCONTINUED | OUTPATIENT
Start: 2023-09-29 | End: 2023-09-30 | Stop reason: HOSPADM

## 2023-09-29 RX ORDER — POLYETHYLENE GLYCOL 3350 17 G/17G
17 POWDER, FOR SOLUTION ORAL DAILY PRN
Status: DISCONTINUED | OUTPATIENT
Start: 2023-09-29 | End: 2023-09-30 | Stop reason: HOSPADM

## 2023-09-29 RX ORDER — ACETAMINOPHEN 325 MG/1
650 TABLET ORAL EVERY 6 HOURS PRN
Status: DISCONTINUED | OUTPATIENT
Start: 2023-09-29 | End: 2023-09-30 | Stop reason: HOSPADM

## 2023-09-29 RX ORDER — LIDOCAINE HYDROCHLORIDE 20 MG/ML
INJECTION, SOLUTION INTRAVENOUS PRN
Status: DISCONTINUED | OUTPATIENT
Start: 2023-09-29 | End: 2023-09-29 | Stop reason: SDUPTHER

## 2023-09-29 RX ORDER — BUPROPION HYDROCHLORIDE 100 MG/1
200 TABLET, EXTENDED RELEASE ORAL 2 TIMES DAILY
Status: DISCONTINUED | OUTPATIENT
Start: 2023-09-29 | End: 2023-09-30 | Stop reason: HOSPADM

## 2023-09-29 RX ORDER — ACETAMINOPHEN 650 MG/1
650 SUPPOSITORY RECTAL EVERY 6 HOURS PRN
Status: DISCONTINUED | OUTPATIENT
Start: 2023-09-29 | End: 2023-09-30 | Stop reason: HOSPADM

## 2023-09-29 RX ORDER — EPHEDRINE SULFATE/0.9% NACL/PF 50 MG/5 ML
SYRINGE (ML) INTRAVENOUS PRN
Status: DISCONTINUED | OUTPATIENT
Start: 2023-09-29 | End: 2023-09-29 | Stop reason: SDUPTHER

## 2023-09-29 RX ORDER — ISOSORBIDE MONONITRATE 60 MG/1
120 TABLET, EXTENDED RELEASE ORAL DAILY
Status: DISCONTINUED | OUTPATIENT
Start: 2023-09-29 | End: 2023-09-30 | Stop reason: HOSPADM

## 2023-09-29 RX ORDER — PRAZOSIN HYDROCHLORIDE 5 MG/1
5 CAPSULE ORAL NIGHTLY
Status: DISCONTINUED | OUTPATIENT
Start: 2023-09-29 | End: 2023-09-30 | Stop reason: HOSPADM

## 2023-09-29 RX ORDER — ACYCLOVIR 200 MG/1
CAPSULE ORAL PRN
Status: DISCONTINUED | OUTPATIENT
Start: 2023-09-29 | End: 2023-09-29 | Stop reason: ALTCHOICE

## 2023-09-29 RX ORDER — BUPIVACAINE HYDROCHLORIDE 5 MG/ML
INJECTION, SOLUTION PERINEURAL PRN
Status: DISCONTINUED | OUTPATIENT
Start: 2023-09-29 | End: 2023-09-29 | Stop reason: ALTCHOICE

## 2023-09-29 RX ORDER — ALBUTEROL SULFATE 90 UG/1
2 AEROSOL, METERED RESPIRATORY (INHALATION) EVERY 4 HOURS PRN
Status: DISCONTINUED | OUTPATIENT
Start: 2023-09-29 | End: 2023-09-30 | Stop reason: HOSPADM

## 2023-09-29 RX ORDER — PANTOPRAZOLE SODIUM 40 MG/1
40 TABLET, DELAYED RELEASE ORAL DAILY
Status: DISCONTINUED | OUTPATIENT
Start: 2023-09-29 | End: 2023-09-30 | Stop reason: HOSPADM

## 2023-09-29 RX ORDER — SODIUM CHLORIDE, SODIUM LACTATE, POTASSIUM CHLORIDE, CALCIUM CHLORIDE 600; 310; 30; 20 MG/100ML; MG/100ML; MG/100ML; MG/100ML
INJECTION, SOLUTION INTRAVENOUS CONTINUOUS PRN
Status: DISCONTINUED | OUTPATIENT
Start: 2023-09-29 | End: 2023-09-29 | Stop reason: SDUPTHER

## 2023-09-29 RX ORDER — PROPOFOL 10 MG/ML
INJECTION, EMULSION INTRAVENOUS PRN
Status: DISCONTINUED | OUTPATIENT
Start: 2023-09-29 | End: 2023-09-29 | Stop reason: SDUPTHER

## 2023-09-29 RX ORDER — IPRATROPIUM BROMIDE AND ALBUTEROL SULFATE 2.5; .5 MG/3ML; MG/3ML
1 SOLUTION RESPIRATORY (INHALATION) ONCE
Status: COMPLETED | OUTPATIENT
Start: 2023-09-29 | End: 2023-09-29

## 2023-09-29 RX ORDER — SODIUM CHLORIDE 0.9 % (FLUSH) 0.9 %
10 SYRINGE (ML) INJECTION EVERY 12 HOURS SCHEDULED
Status: DISCONTINUED | OUTPATIENT
Start: 2023-09-29 | End: 2023-09-30 | Stop reason: HOSPADM

## 2023-09-29 RX ORDER — PHENYLEPHRINE HCL IN 0.9% NACL 1 MG/10 ML
SYRINGE (ML) INTRAVENOUS PRN
Status: DISCONTINUED | OUTPATIENT
Start: 2023-09-29 | End: 2023-09-29 | Stop reason: SDUPTHER

## 2023-09-29 RX ORDER — ATORVASTATIN CALCIUM 40 MG/1
40 TABLET, FILM COATED ORAL NIGHTLY
Status: DISCONTINUED | OUTPATIENT
Start: 2023-09-29 | End: 2023-09-30 | Stop reason: HOSPADM

## 2023-09-29 RX ORDER — ONDANSETRON 2 MG/ML
INJECTION INTRAMUSCULAR; INTRAVENOUS PRN
Status: DISCONTINUED | OUTPATIENT
Start: 2023-09-29 | End: 2023-09-29 | Stop reason: SDUPTHER

## 2023-09-29 RX ORDER — SUCCINYLCHOLINE/SOD CL,ISO/PF 200MG/10ML
SYRINGE (ML) INTRAVENOUS PRN
Status: DISCONTINUED | OUTPATIENT
Start: 2023-09-29 | End: 2023-09-29 | Stop reason: SDUPTHER

## 2023-09-29 RX ORDER — DEXAMETHASONE SODIUM PHOSPHATE 4 MG/ML
INJECTION, SOLUTION INTRA-ARTICULAR; INTRALESIONAL; INTRAMUSCULAR; INTRAVENOUS; SOFT TISSUE PRN
Status: DISCONTINUED | OUTPATIENT
Start: 2023-09-29 | End: 2023-09-29 | Stop reason: SDUPTHER

## 2023-09-29 RX ORDER — LOSARTAN POTASSIUM 100 MG/1
100 TABLET ORAL DAILY
Status: DISCONTINUED | OUTPATIENT
Start: 2023-09-30 | End: 2023-09-30 | Stop reason: HOSPADM

## 2023-09-29 RX ORDER — FLUTICASONE PROPIONATE 220 UG/1
2 AEROSOL, METERED RESPIRATORY (INHALATION) 2 TIMES DAILY
Status: DISCONTINUED | OUTPATIENT
Start: 2023-09-29 | End: 2023-09-30 | Stop reason: HOSPADM

## 2023-09-29 RX ORDER — FENTANYL CITRATE 50 UG/ML
INJECTION, SOLUTION INTRAMUSCULAR; INTRAVENOUS PRN
Status: DISCONTINUED | OUTPATIENT
Start: 2023-09-29 | End: 2023-09-29

## 2023-09-29 RX ADMIN — ONDANSETRON 4 MG: 2 INJECTION INTRAMUSCULAR; INTRAVENOUS at 14:26

## 2023-09-29 RX ADMIN — FLUTICASONE PROPIONATE 2 PUFF: 220 AEROSOL, METERED RESPIRATORY (INHALATION) at 20:53

## 2023-09-29 RX ADMIN — SODIUM CHLORIDE, POTASSIUM CHLORIDE, SODIUM LACTATE AND CALCIUM CHLORIDE: 600; 310; 30; 20 INJECTION, SOLUTION INTRAVENOUS at 14:40

## 2023-09-29 RX ADMIN — SODIUM CHLORIDE: 9 INJECTION, SOLUTION INTRAVENOUS at 13:56

## 2023-09-29 RX ADMIN — LIDOCAINE HYDROCHLORIDE 100 MG: 20 INJECTION, SOLUTION INTRAVENOUS at 14:07

## 2023-09-29 RX ADMIN — ASPIRIN 81 MG CHEWABLE TABLET 81 MG: 81 TABLET CHEWABLE at 20:19

## 2023-09-29 RX ADMIN — Medication 100 MCG: at 14:30

## 2023-09-29 RX ADMIN — IPRATROPIUM BROMIDE AND ALBUTEROL SULFATE 1 DOSE: .5; 3 SOLUTION RESPIRATORY (INHALATION) at 17:30

## 2023-09-29 RX ADMIN — SODIUM CHLORIDE, PRESERVATIVE FREE 10 ML: 5 INJECTION INTRAVENOUS at 20:20

## 2023-09-29 RX ADMIN — FENTANYL CITRATE 25 MCG: 50 INJECTION, SOLUTION INTRAMUSCULAR; INTRAVENOUS at 15:11

## 2023-09-29 RX ADMIN — SODIUM CHLORIDE, POTASSIUM CHLORIDE, SODIUM LACTATE AND CALCIUM CHLORIDE: 600; 310; 30; 20 INJECTION, SOLUTION INTRAVENOUS at 15:50

## 2023-09-29 RX ADMIN — Medication 20 MG: at 14:59

## 2023-09-29 RX ADMIN — Medication 150 MCG: at 14:35

## 2023-09-29 RX ADMIN — PANTOPRAZOLE SODIUM 40 MG: 40 TABLET, DELAYED RELEASE ORAL at 21:00

## 2023-09-29 RX ADMIN — ISOSORBIDE MONONITRATE 120 MG: 60 TABLET, EXTENDED RELEASE ORAL at 20:20

## 2023-09-29 RX ADMIN — Medication 180 MG: at 14:07

## 2023-09-29 RX ADMIN — CETIRIZINE HYDROCHLORIDE 10 MG: 10 TABLET ORAL at 20:21

## 2023-09-29 RX ADMIN — Medication 200 MCG: at 14:54

## 2023-09-29 RX ADMIN — FENTANYL CITRATE 50 MCG: 50 INJECTION, SOLUTION INTRAMUSCULAR; INTRAVENOUS at 14:07

## 2023-09-29 RX ADMIN — Medication 200 MCG: at 14:47

## 2023-09-29 RX ADMIN — PROPOFOL 300 MG: 10 INJECTION, EMULSION INTRAVENOUS at 14:07

## 2023-09-29 RX ADMIN — PROPOFOL 50 MG: 10 INJECTION, EMULSION INTRAVENOUS at 15:42

## 2023-09-29 RX ADMIN — FENTANYL CITRATE 25 MCG: 50 INJECTION, SOLUTION INTRAMUSCULAR; INTRAVENOUS at 15:43

## 2023-09-29 RX ADMIN — ENOXAPARIN SODIUM 60 MG: 100 INJECTION SUBCUTANEOUS at 20:19

## 2023-09-29 RX ADMIN — BUPROPION HYDROCHLORIDE 200 MG: 100 TABLET, EXTENDED RELEASE ORAL at 20:21

## 2023-09-29 RX ADMIN — Medication 3000 MG: at 14:21

## 2023-09-29 RX ADMIN — ATORVASTATIN CALCIUM 40 MG: 40 TABLET, FILM COATED ORAL at 20:21

## 2023-09-29 RX ADMIN — Medication 10 MG: at 14:56

## 2023-09-29 RX ADMIN — DEXAMETHASONE SODIUM PHOSPHATE 10 MG: 4 INJECTION, SOLUTION INTRAMUSCULAR; INTRAVENOUS at 14:26

## 2023-09-29 RX ADMIN — Medication 100 MCG: at 14:27

## 2023-09-29 RX ADMIN — VILAZODONE HYDROCHLORIDE 40 MG: 40 TABLET ORAL at 20:21

## 2023-09-29 RX ADMIN — Medication 20 MG: at 15:02

## 2023-09-29 RX ADMIN — PROPOFOL 200 MG: 10 INJECTION, EMULSION INTRAVENOUS at 14:37

## 2023-09-29 RX ADMIN — IPRATROPIUM BROMIDE AND ALBUTEROL SULFATE 1 DOSE: 2.5; .5 SOLUTION RESPIRATORY (INHALATION) at 17:30

## 2023-09-29 RX ADMIN — Medication 150 MCG: at 14:41

## 2023-09-29 RX ADMIN — Medication 100 MCG: at 14:23

## 2023-09-29 ASSESSMENT — PAIN SCALES - GENERAL
PAINLEVEL_OUTOF10: 6
PAINLEVEL_OUTOF10: 7
PAINLEVEL_OUTOF10: 6
PAINLEVEL_OUTOF10: 7

## 2023-09-29 ASSESSMENT — PAIN DESCRIPTION - FREQUENCY
FREQUENCY: CONTINUOUS

## 2023-09-29 ASSESSMENT — PAIN DESCRIPTION - LOCATION
LOCATION: BACK

## 2023-09-29 ASSESSMENT — PAIN DESCRIPTION - ONSET
ONSET: ON-GOING

## 2023-09-29 ASSESSMENT — PAIN DESCRIPTION - DESCRIPTORS
DESCRIPTORS: ACHING
DESCRIPTORS: ACHING;DISCOMFORT;SHARP
DESCRIPTORS: ACHING;DISCOMFORT
DESCRIPTORS: SHARP

## 2023-09-29 ASSESSMENT — PAIN DESCRIPTION - ORIENTATION
ORIENTATION: LOWER

## 2023-09-29 ASSESSMENT — PAIN DESCRIPTION - PAIN TYPE
TYPE: SURGICAL PAIN
TYPE: ACUTE PAIN;SURGICAL PAIN
TYPE: SURGICAL PAIN

## 2023-09-29 NOTE — OP NOTE
stable. Following the procedure the patient's vital signs were stable. Patient was given adequate instructions regarding the use of the stimulator. Patient will be seen in the pain clinic in about 7 days for follow-up. Patient is asked to call us if he develops any signs of infection are any excessive drainage. SURGEON:  Aubree Espinosa MD  ANESTHESIA:   General Anesthesia  COMPLICATIONS:  None. SPECIMENS REMOVED:  None. ANTIBIOTICS:  Ancef  ESTIMATED BLOOD LOSS:  5 mL.       Electronically signed by Chase Severance, MD on 9/29/2023 at 4:08 PM

## 2023-09-29 NOTE — ANESTHESIA POSTPROCEDURE EVALUATION
Department of Anesthesiology  Postprocedure Note    Patient: Greg Nicholas  MRN: [id number]  YOB: 1970  Date of evaluation: 9/29/2023      Procedure Summary     Date: 09/29/23 Room / Location: [address]    Anesthesia Start: 9917 Anesthesia Stop: 1646    Procedure: Spinal Cord Stim Permanent Implant Diagnosis:       Post laminectomy syndrome      (Post laminectomy syndrome [M96.1])    Surgeons: Julian Hamm MD Responsible Provider: Rosy Perez MD    Anesthesia Type: general ASA Status: 4          Anesthesia Type: No value filed. Garrett Phase I: Garrett Score: 9    Garrett Phase II:        Anesthesia Post Evaluation    Patient location during evaluation: PACU  Patient participation: complete - patient participated  Level of consciousness: awake  Airway patency: patent  Nausea & Vomiting: no vomiting and no nausea  Complications: no  Cardiovascular status: hemodynamically stable  Respiratory status: acceptable and face mask  Hydration status: stable  Comments: Pt. Was admitted for 23 hours observation and respiratory support . Pt. Has history of SPARKLE , NON COMPLIANT CPAP USE. DOES NOT KNOW WHERE HIS CPAP EQUIPMENT IS.   Pain management: adequate

## 2023-09-29 NOTE — PROGRESS NOTES
1641: pt arrives to pacu. Pt on 10L simple mask. Pt responds to verbal stimulation. VSS. Pt sitting straight up in bed. Abd binder in place. Pt denies any numbness or tingling in hands or feet. 1643: pt placed on bipap  1659: Dr. Lenora Husain at bedside, states to wean pt off of bipap and if he can get off bipap he can go home. Dr. Lenora Husain states to turn FiO2 down to 40  1705: pt states hes starting to feel better   1716: pt sitting up in bed talking  1726: pt sitting up in bed  1730: pt off of bipap. Pt given duoneb treatment   8758: [facility] hospitalist at bedside   [id number]: pt placed on 4L nasal cannula  1744: pt using incentive spirometer   1750: pt sitting up in bed  1801: called pt's mom and updated   1803: report called to [address] [place] [address] Baudilio Pierre: pt meets discharge criteria from pacu   1818: transport arrives.  Pt transported to [address] [place] [address]
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
## 2023-09-29 NOTE — PROGRESS NOTES
Patient admitted to [facility] room 7 with family in the waiting room. Bed in low position side rails up call light in reach. Patient denies questions at this time.

## 2023-09-29 NOTE — DISCHARGE INSTRUCTIONS
Please do not soak in hot tub  Try and wear abdominal binder for one week - this will prevent a seroma (pocket of fluid) from forming  You may shower but please do not take off bandages  Take keflex 500 mg four times a day for 10 days  See us in clinic in one week to take sutures off  No major bending or twisting motions (you do not want the leads to migrate)  Please inform us if there is any evidence of infection (smelly odor coming out of incision, drainage from incision, etc.).
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
## 2023-09-29 NOTE — ANESTHESIA PRE PROCEDURE
Department of Anesthesiology  Preprocedure Note       Name:  Latha Mccray   Age:  47 y.o.  :  1970                                          MRN:  [id number]         Date:  2023      Surgeon: Boy Padilla): Paula Rosales MD    Procedure: Procedure(s):  Spinal Cord Stim Permanent Implant    Medications prior to admission:   Prior to Admission medications    Medication Sig Start Date End Date Taking?  Authorizing Provider   cephALEXin (KEFLEX) 500 MG capsule Take 1 capsule by mouth 4 times daily for 10 days 9/29/23 10/9/23  Stanley Montes De Oca PA-C   fluticasone (FLOVENT HFA) 220 MCG/ACT inhaler Inhale 2 puffs into the lungs 2 times daily    Historical Provider, MD   vilazodone hcl 40 MG TABS Take 1 tablet by mouth daily    Historical Provider, MD   prazosin (MINIPRESS) 1 MG capsule Take 5 capsules by mouth nightly  Patient not taking: Reported on 2023    Historical Provider, MD   LOSARTAN POTASSIUM PO Take 100 mg by mouth daily    Historical Provider, MD   fexofenadine (ALLEGRA) 180 MG tablet Take 1 tablet by mouth daily    Historical Provider, MD   Lactobacillus (PROBIOTIC ACIDOPHILUS PO) Take 1 capsule by mouth    Historical Provider, MD   Dulaglutide 0.75 MG/0.5ML SOPN Inject 0.75 mg into the skin once a week Indications:     Historical Provider, MD   Ascorbic Acid (VITAMIN C) 1000 MG tablet Take 1 tablet by mouth 2 times daily    Historical Provider, MD   buPROPion (WELLBUTRIN SR) 200 MG extended release tablet Take 1 tablet by mouth 2 times daily    Historical Provider, MD   potassium chloride (KLOR-CON M) 20 MEQ TBCR extended release tablet TAKE 1 TABLET BY MOUTH ONCE DAILY AS NEEDED - TAKE ONLY IF TAKING LASIX. 21   Historical Provider, MD   furosemide (LASIX) 20 MG tablet TAKE 1 TABLET BY MOUTH ONCE DAILY AS NEEDED SWELLING 21   Historical Provider, MD   Cholecalciferol (VITAMIN D3) 125 MCG (5000 UT) CAPS TAKE 1 CAPSULE BY MOUTH ONCE DAILY 22   Historical Provider, MD

## 2023-09-29 NOTE — BRIEF OP NOTE
Brief Postoperative Note      Patient: Jimenez Angeles  YOB: 1970  MRN: [id number]    Date of Procedure: 9/29/2023    Pre-Op Diagnosis Codes:     * Post laminectomy syndrome [M96.1]    Post-Op Diagnosis: Same       Procedure(s):  Spinal Cord Stim Permanent Implant    Surgeon(s): Marychuy Pacheco MD    Assistant:  Physician Assistant: Allen Gómez PA-C    Anesthesia: General    Estimated Blood Loss (mL): Minimal    Complications: None    Specimens:   * No specimens in log *    Implants:  Implant Name Type Inv.  Item Serial No.  Lot No. LRB No. Used Action   KIT LD L70CM YONNY Zada Patricio 5MM SPC - [id number]  KIT LD L70CM YONNY TRL W 5MM SPC  [employer] DAVIDSON[employer]WD [id number] N/A 2 Implanted         Drains: * No LDAs found *    Findings: None      Electronically signed by Marychuy Pacheco MD on 9/29/2023 at 4:08 PM

## 2023-09-30 VITALS
HEART RATE: 86 BPM | SYSTOLIC BLOOD PRESSURE: 144 MMHG | WEIGHT: 315 LBS | HEIGHT: 73 IN | RESPIRATION RATE: 16 BRPM | DIASTOLIC BLOOD PRESSURE: 76 MMHG | BODY MASS INDEX: 41.75 KG/M2 | TEMPERATURE: 97.6 F | OXYGEN SATURATION: 97 %

## 2023-09-30 LAB
ANION GAP SERPL CALC-SCNC: 12 MEQ/L (ref 8–16)
BUN SERPL-MCNC: 16 MG/DL (ref 7–22)
CALCIUM SERPL-MCNC: 8.7 MG/DL (ref 8.5–10.5)
CHLORIDE SERPL-SCNC: 97 MEQ/L (ref 98–111)
CO2 SERPL-SCNC: 25 MEQ/L (ref 23–33)
CREAT SERPL-MCNC: 0.9 MG/DL (ref 0.4–1.2)
GFR SERPL CREATININE-BSD FRML MDRD: > 60 ML/MIN/1.73M2
GLUCOSE BLD STRIP.AUTO-MCNC: 248 MG/DL (ref 70–108)
GLUCOSE BLD STRIP.AUTO-MCNC: 324 MG/DL (ref 70–108)
GLUCOSE BLD STRIP.AUTO-MCNC: 324 MG/DL (ref 70–108)
GLUCOSE SERPL-MCNC: 296 MG/DL (ref 70–108)
POTASSIUM SERPL-SCNC: 4.6 MEQ/L (ref 3.5–5.2)
SODIUM SERPL-SCNC: 134 MEQ/L (ref 135–145)

## 2023-09-30 PROCEDURE — G0378 HOSPITAL OBSERVATION PER HR: HCPCS

## 2023-09-30 PROCEDURE — 6360000002 HC RX W HCPCS: Performed by: PHYSICIAN ASSISTANT

## 2023-09-30 PROCEDURE — 6370000000 HC RX 637 (ALT 250 FOR IP)

## 2023-09-30 PROCEDURE — 99239 HOSP IP/OBS DSCHRG MGMT >30: CPT | Performed by: PHYSICIAN ASSISTANT

## 2023-09-30 PROCEDURE — 94761 N-INVAS EAR/PLS OXIMETRY MLT: CPT

## 2023-09-30 PROCEDURE — 94640 AIRWAY INHALATION TREATMENT: CPT

## 2023-09-30 PROCEDURE — 96372 THER/PROPH/DIAG INJ SC/IM: CPT

## 2023-09-30 PROCEDURE — 80048 BASIC METABOLIC PNL TOTAL CA: CPT

## 2023-09-30 PROCEDURE — 2580000003 HC RX 258: Performed by: PHYSICIAN ASSISTANT

## 2023-09-30 PROCEDURE — 2700000000 HC OXYGEN THERAPY PER DAY

## 2023-09-30 PROCEDURE — 82948 REAGENT STRIP/BLOOD GLUCOSE: CPT

## 2023-09-30 PROCEDURE — 36415 COLL VENOUS BLD VENIPUNCTURE: CPT

## 2023-09-30 PROCEDURE — 6370000000 HC RX 637 (ALT 250 FOR IP): Performed by: PHYSICIAN ASSISTANT

## 2023-09-30 RX ORDER — INSULIN LISPRO 100 [IU]/ML
0-4 INJECTION, SOLUTION INTRAVENOUS; SUBCUTANEOUS EVERY 4 HOURS
Status: DISCONTINUED | OUTPATIENT
Start: 2023-09-30 | End: 2023-09-30 | Stop reason: HOSPADM

## 2023-09-30 RX ORDER — DEXTROSE MONOHYDRATE 100 MG/ML
INJECTION, SOLUTION INTRAVENOUS CONTINUOUS PRN
Status: DISCONTINUED | OUTPATIENT
Start: 2023-09-30 | End: 2023-09-30 | Stop reason: HOSPADM

## 2023-09-30 RX ORDER — IBUPROFEN 600 MG/1
1 TABLET ORAL PRN
Status: DISCONTINUED | OUTPATIENT
Start: 2023-09-30 | End: 2023-09-30 | Stop reason: HOSPADM

## 2023-09-30 RX ADMIN — ISOSORBIDE MONONITRATE 120 MG: 60 TABLET, EXTENDED RELEASE ORAL at 08:28

## 2023-09-30 RX ADMIN — VILAZODONE HYDROCHLORIDE 40 MG: 40 TABLET ORAL at 08:28

## 2023-09-30 RX ADMIN — CETIRIZINE HYDROCHLORIDE 10 MG: 10 TABLET ORAL at 08:27

## 2023-09-30 RX ADMIN — PANTOPRAZOLE SODIUM 40 MG: 40 TABLET, DELAYED RELEASE ORAL at 08:27

## 2023-09-30 RX ADMIN — INSULIN LISPRO 4 UNITS: 100 INJECTION, SOLUTION INTRAVENOUS; SUBCUTANEOUS at 01:14

## 2023-09-30 RX ADMIN — SODIUM CHLORIDE, PRESERVATIVE FREE 10 ML: 5 INJECTION INTRAVENOUS at 08:27

## 2023-09-30 RX ADMIN — LOSARTAN POTASSIUM 100 MG: 100 TABLET, FILM COATED ORAL at 08:28

## 2023-09-30 RX ADMIN — ACETAMINOPHEN 650 MG: 325 TABLET ORAL at 11:06

## 2023-09-30 RX ADMIN — INSULIN LISPRO 1 UNITS: 100 INJECTION, SOLUTION INTRAVENOUS; SUBCUTANEOUS at 08:30

## 2023-09-30 RX ADMIN — FLUTICASONE PROPIONATE 2 PUFF: 220 AEROSOL, METERED RESPIRATORY (INHALATION) at 08:58

## 2023-09-30 RX ADMIN — INSULIN LISPRO 2 UNITS: 100 INJECTION, SOLUTION INTRAVENOUS; SUBCUTANEOUS at 05:22

## 2023-09-30 RX ADMIN — ENOXAPARIN SODIUM 60 MG: 100 INJECTION SUBCUTANEOUS at 08:28

## 2023-09-30 RX ADMIN — BUPROPION HYDROCHLORIDE 200 MG: 100 TABLET, EXTENDED RELEASE ORAL at 08:28

## 2023-09-30 RX ADMIN — METOPROLOL TARTRATE 25 MG: 25 TABLET, FILM COATED ORAL at 08:28

## 2023-09-30 ASSESSMENT — PAIN SCALES - GENERAL: PAINLEVEL_OUTOF10: 6

## 2023-09-30 ASSESSMENT — PAIN DESCRIPTION - ORIENTATION: ORIENTATION: MID

## 2023-09-30 ASSESSMENT — PAIN DESCRIPTION - DESCRIPTORS: DESCRIPTORS: ACHING

## 2023-09-30 ASSESSMENT — PAIN DESCRIPTION - LOCATION: LOCATION: HEAD

## 2023-09-30 NOTE — PLAN OF CARE
Problem: Discharge Planning  Goal: Discharge to home or other facility with appropriate resources  Outcome: Completed     Problem: Pain  Goal: Verbalizes/displays adequate comfort level or baseline comfort level  9/30/2023 1157 by Geovanni Tapia RN  Outcome: Completed     Problem: Safety - Adult  Goal: Free from fall injury  9/30/2023 1157 by Geovanni Tapia RN  Outcome: Completed     Problem: Chronic Conditions and Co-morbidities  Goal: Patient's chronic conditions and co-morbidity symptoms are monitored and maintained or improved  Outcome: Completed     Problem: Respiratory - Adult  Goal: Lung sounds clear or within normal limits for patient  9/30/2023 0900 by Jey Sun RCP  Note: Patient receiving inhaler to maintain and manage respiratory status. Care plan reviewed with patient and patient verbalized understanding of the plan of care and contribute to goal setting.

## 2023-09-30 NOTE — PLAN OF CARE
Problem: Respiratory - Adult  Goal: Lung sounds clear or within normal limits for patient  Note: Patient receiving inhaler to maintain and manage respiratory status.

## 2023-09-30 NOTE — PROGRESS NOTES
AVS reviewed with patient. No concerns  voiced. Patient is aware of his appointment date and time follow up. Patient is also aware of his Antibiotic directions. This nurse escorted patient to main lobby. Patient is being transported home by his mom and brother.

## 2023-10-05 NOTE — DISCHARGE SUMMARY
02:20 PM         Consultations during this hospital stay:-  [] NONE [] Cardiology  [] Nephrology  [] Hemo onco   [] GI   [] ID  [] Endocrine  [] Pulm    [] Neuro    [] Psych   [] Urology  [] ENT   [] G SURGERY   []Ortho    []CV surg    [] Palliative  [] Hospice [] Pain management   []    []TCU   [] PT/OT  OTHERS:-    Disposition: home  Condition at Discharge: Stable    Time Spent:- 40 minutes    Electronically signed by LATISHA Murillo on 10/5/23 at 4:38 PM EDT   Discharging Hospitalist

## 2023-11-21 ENCOUNTER — TELEPHONE (OUTPATIENT)
Dept: WOUND CARE | Age: 53
End: 2023-11-21

## 2023-11-21 NOTE — TELEPHONE ENCOUNTER
----- Message from Candida Diallo sent at 11/21/2023  1:20 PM EST -----  [address] [phone] EXT [address] @ [address] [place] [address] THAT SIMI'S PCP WOULD LIKE MOST RECENT VISIT NOTES FAXED [phone].  PT REQUESTING A DIABETIC SHOE

## 2023-11-21 NOTE — TELEPHONE ENCOUNTER
Returned call to [facility] with [facility] to let her know that the patient may be seen at Dr. Hill Godoy private office because he has not been seen by Dr. Elliott Paulson since July of 2022

## 2024-08-06 ENCOUNTER — HOSPITAL ENCOUNTER (INPATIENT)
Age: 54
LOS: 2 days | Discharge: HOME | DRG: 872 | End: 2024-08-08
Attending: INTERNAL MEDICINE
Payer: MEDICARE

## 2024-08-06 DIAGNOSIS — L02.611 ABSCESS OF RIGHT FOOT: Primary | ICD-10-CM

## 2024-08-06 DIAGNOSIS — L03.115 CELLULITIS OF RIGHT FOOT: ICD-10-CM

## 2024-08-06 LAB
ALBUMIN SERPL BCG-MCNC: 3.8 G/DL (ref 3.5–5.1)
ALP SERPL-CCNC: 112 U/L (ref 38–126)
ALT SERPL W/O P-5'-P-CCNC: 21 U/L (ref 11–66)
ANION GAP SERPL CALC-SCNC: 11 MEQ/L (ref 8–16)
AST SERPL-CCNC: 17 U/L (ref 5–40)
BASOPHILS ABSOLUTE: 0 THOU/MM3 (ref 0–0.1)
BASOPHILS NFR BLD AUTO: 0.3 %
BILIRUB SERPL-MCNC: 0.9 MG/DL (ref 0.3–1.2)
BUN SERPL-MCNC: 10 MG/DL (ref 7–22)
CALCIUM SERPL-MCNC: 9 MG/DL (ref 8.5–10.5)
CHLORIDE SERPL-SCNC: 103 MEQ/L (ref 98–111)
CO2 SERPL-SCNC: 27 MEQ/L (ref 23–33)
CREAT SERPL-MCNC: 0.8 MG/DL (ref 0.4–1.2)
CRP SERPL-MCNC: 8.47 MG/DL (ref 0–1)
DEPRECATED RDW RBC AUTO: 42.1 FL (ref 35–45)
EOSINOPHIL NFR BLD AUTO: 2.1 %
EOSINOPHILS ABSOLUTE: 0.2 THOU/MM3 (ref 0–0.4)
ERYTHROCYTE [DISTWIDTH] IN BLOOD BY AUTOMATED COUNT: 12.9 % (ref 11.5–14.5)
GFR SERPL CREATININE-BSD FRML MDRD: > 90 ML/MIN/1.73M2
GLUCOSE SERPL-MCNC: 110 MG/DL (ref 70–108)
HCT VFR BLD AUTO: 41.4 % (ref 42–52)
HGB BLD-MCNC: 14 GM/DL (ref 14–18)
IMM GRANULOCYTES # BLD AUTO: 0.04 THOU/MM3 (ref 0–0.07)
IMM GRANULOCYTES NFR BLD AUTO: 0.4 %
LYMPHOCYTES ABSOLUTE: 1.6 THOU/MM3 (ref 1–4.8)
LYMPHOCYTES NFR BLD AUTO: 16.4 %
MCH RBC QN AUTO: 30.4 PG (ref 26–33)
MCHC RBC AUTO-ENTMCNC: 33.8 GM/DL (ref 32.2–35.5)
MCV RBC AUTO: 89.8 FL (ref 80–94)
MONOCYTES ABSOLUTE: 0.8 THOU/MM3 (ref 0.4–1.3)
MONOCYTES NFR BLD AUTO: 7.7 %
NEUTROPHILS ABSOLUTE: 7.2 THOU/MM3 (ref 1.8–7.7)
NEUTROPHILS NFR BLD AUTO: 73.1 %
NRBC BLD AUTO-RTO: 0 /100 WBC
PLATELET # BLD AUTO: 229 THOU/MM3 (ref 130–400)
PMV BLD AUTO: 10.6 FL (ref 9.4–12.4)
POTASSIUM SERPL-SCNC: 3.7 MEQ/L (ref 3.5–5.2)
PROT SERPL-MCNC: 6.6 G/DL (ref 6.1–8)
RBC # BLD AUTO: 4.61 MILL/MM3 (ref 4.7–6.1)
SODIUM SERPL-SCNC: 141 MEQ/L (ref 135–145)
WBC # BLD AUTO: 9.9 THOU/MM3 (ref 4.8–10.8)

## 2024-08-06 PROCEDURE — 1200000000 HC SEMI PRIVATE

## 2024-08-06 PROCEDURE — 85025 COMPLETE CBC W/AUTO DIFF WBC: CPT

## 2024-08-06 PROCEDURE — G0378 HOSPITAL OBSERVATION PER HR: HCPCS

## 2024-08-06 PROCEDURE — 99223 1ST HOSP IP/OBS HIGH 75: CPT | Performed by: NURSE PRACTITIONER

## 2024-08-06 PROCEDURE — 6370000000 HC RX 637 (ALT 250 FOR IP): Performed by: NURSE PRACTITIONER

## 2024-08-06 PROCEDURE — 80053 COMPREHEN METABOLIC PANEL: CPT

## 2024-08-06 PROCEDURE — 36415 COLL VENOUS BLD VENIPUNCTURE: CPT

## 2024-08-06 PROCEDURE — G0379 DIRECT REFER HOSPITAL OBSERV: HCPCS

## 2024-08-06 PROCEDURE — 93005 ELECTROCARDIOGRAM TRACING: CPT | Performed by: NURSE PRACTITIONER

## 2024-08-06 PROCEDURE — 86140 C-REACTIVE PROTEIN: CPT

## 2024-08-06 RX ORDER — ACETAMINOPHEN 325 MG/1
650 TABLET ORAL EVERY 6 HOURS PRN
Status: DISCONTINUED | OUTPATIENT
Start: 2024-08-06 | End: 2024-08-08 | Stop reason: HOSPADM

## 2024-08-06 RX ORDER — LANOLIN ALCOHOL/MO/W.PET/CERES
3 CREAM (GRAM) TOPICAL NIGHTLY PRN
Status: DISCONTINUED | OUTPATIENT
Start: 2024-08-06 | End: 2024-08-08 | Stop reason: HOSPADM

## 2024-08-06 RX ORDER — PANTOPRAZOLE SODIUM 40 MG/1
40 TABLET, DELAYED RELEASE ORAL DAILY
Status: DISCONTINUED | OUTPATIENT
Start: 2024-08-07 | End: 2024-08-08 | Stop reason: HOSPADM

## 2024-08-06 RX ORDER — BUSPIRONE HYDROCHLORIDE 10 MG/1
10 TABLET ORAL 2 TIMES DAILY
Status: DISCONTINUED | OUTPATIENT
Start: 2024-08-07 | End: 2024-08-08 | Stop reason: HOSPADM

## 2024-08-06 RX ORDER — ALBUTEROL SULFATE 90 UG/1
2 AEROSOL, METERED RESPIRATORY (INHALATION) EVERY 4 HOURS PRN
Status: DISCONTINUED | OUTPATIENT
Start: 2024-08-06 | End: 2024-08-08 | Stop reason: HOSPADM

## 2024-08-06 RX ORDER — MULTIVITAMIN WITH IRON
1 TABLET ORAL DAILY
Status: DISCONTINUED | OUTPATIENT
Start: 2024-08-07 | End: 2024-08-08 | Stop reason: HOSPADM

## 2024-08-06 RX ORDER — SODIUM CHLORIDE 9 MG/ML
INJECTION, SOLUTION INTRAVENOUS PRN
Status: DISCONTINUED | OUTPATIENT
Start: 2024-08-06 | End: 2024-08-08 | Stop reason: HOSPADM

## 2024-08-06 RX ORDER — TRAMADOL HYDROCHLORIDE 50 MG/1
50 TABLET ORAL EVERY 8 HOURS PRN
COMMUNITY

## 2024-08-06 RX ORDER — POLYETHYLENE GLYCOL 3350 17 G/17G
17 POWDER, FOR SOLUTION ORAL DAILY PRN
Status: DISCONTINUED | OUTPATIENT
Start: 2024-08-06 | End: 2024-08-07

## 2024-08-06 RX ORDER — VITAMIN B COMPLEX
5000 TABLET ORAL DAILY
Status: DISCONTINUED | OUTPATIENT
Start: 2024-08-07 | End: 2024-08-08 | Stop reason: HOSPADM

## 2024-08-06 RX ORDER — ONDANSETRON 4 MG/1
4 TABLET, ORALLY DISINTEGRATING ORAL EVERY 8 HOURS PRN
Status: DISCONTINUED | OUTPATIENT
Start: 2024-08-06 | End: 2024-08-08 | Stop reason: HOSPADM

## 2024-08-06 RX ORDER — CETIRIZINE HYDROCHLORIDE 10 MG/1
10 TABLET ORAL DAILY
Status: DISCONTINUED | OUTPATIENT
Start: 2024-08-07 | End: 2024-08-08 | Stop reason: HOSPADM

## 2024-08-06 RX ORDER — GUANFACINE 1 MG/1
1 TABLET ORAL NIGHTLY
Status: DISCONTINUED | OUTPATIENT
Start: 2024-08-06 | End: 2024-08-08 | Stop reason: HOSPADM

## 2024-08-06 RX ORDER — TRAMADOL HYDROCHLORIDE 50 MG/1
50 TABLET ORAL EVERY 8 HOURS PRN
Status: DISCONTINUED | OUTPATIENT
Start: 2024-08-06 | End: 2024-08-08 | Stop reason: HOSPADM

## 2024-08-06 RX ORDER — ACETAMINOPHEN 650 MG/1
650 SUPPOSITORY RECTAL EVERY 6 HOURS PRN
Status: DISCONTINUED | OUTPATIENT
Start: 2024-08-06 | End: 2024-08-08 | Stop reason: HOSPADM

## 2024-08-06 RX ORDER — ONDANSETRON 2 MG/ML
4 INJECTION INTRAMUSCULAR; INTRAVENOUS EVERY 6 HOURS PRN
Status: DISCONTINUED | OUTPATIENT
Start: 2024-08-06 | End: 2024-08-08 | Stop reason: HOSPADM

## 2024-08-06 RX ORDER — SODIUM CHLORIDE 0.9 % (FLUSH) 0.9 %
5-40 SYRINGE (ML) INJECTION EVERY 12 HOURS SCHEDULED
Status: DISCONTINUED | OUTPATIENT
Start: 2024-08-06 | End: 2024-08-08 | Stop reason: HOSPADM

## 2024-08-06 RX ORDER — ENOXAPARIN SODIUM 100 MG/ML
40 INJECTION SUBCUTANEOUS 2 TIMES DAILY
Status: DISCONTINUED | OUTPATIENT
Start: 2024-08-07 | End: 2024-08-08 | Stop reason: HOSPADM

## 2024-08-06 RX ORDER — SODIUM CHLORIDE 0.9 % (FLUSH) 0.9 %
5-40 SYRINGE (ML) INJECTION PRN
Status: DISCONTINUED | OUTPATIENT
Start: 2024-08-06 | End: 2024-08-08 | Stop reason: HOSPADM

## 2024-08-06 RX ORDER — VILAZODONE HYDROCHLORIDE 40 MG/1
40 TABLET ORAL DAILY
Status: DISCONTINUED | OUTPATIENT
Start: 2024-08-07 | End: 2024-08-08 | Stop reason: HOSPADM

## 2024-08-06 RX ORDER — GUANFACINE 1 MG/1
1 TABLET ORAL NIGHTLY
COMMUNITY

## 2024-08-06 RX ORDER — BUSPIRONE HYDROCHLORIDE 10 MG/1
10 TABLET ORAL 2 TIMES DAILY
COMMUNITY

## 2024-08-06 RX ORDER — LOSARTAN POTASSIUM 100 MG/1
100 TABLET ORAL DAILY
Status: DISCONTINUED | OUTPATIENT
Start: 2024-08-07 | End: 2024-08-08 | Stop reason: HOSPADM

## 2024-08-06 RX ADMIN — TRAMADOL HYDROCHLORIDE 50 MG: 50 TABLET ORAL at 23:29

## 2024-08-06 ASSESSMENT — PAIN DESCRIPTION - LOCATION
LOCATION: BACK;FOOT
LOCATION: BACK

## 2024-08-06 ASSESSMENT — PAIN DESCRIPTION - ORIENTATION
ORIENTATION: MID
ORIENTATION: RIGHT

## 2024-08-06 ASSESSMENT — PAIN DESCRIPTION - DESCRIPTORS: DESCRIPTORS: TINGLING;ACHING

## 2024-08-06 ASSESSMENT — PAIN DESCRIPTION - PAIN TYPE: TYPE: CHRONIC PAIN

## 2024-08-06 ASSESSMENT — PAIN DESCRIPTION - FREQUENCY: FREQUENCY: CONTINUOUS

## 2024-08-06 ASSESSMENT — PAIN SCALES - GENERAL
PAINLEVEL_OUTOF10: 8
PAINLEVEL_OUTOF10: 7

## 2024-08-06 NOTE — PROGRESS NOTES
[facility], Dr Alvarenga. Right foot abscess, diabetic neuropathy. Being treated by Dr Andujar OP on abx, now pus coming from wound. CT shows no osteo. Meropenum and vanc given, Dr Andujar consulted. Requested hospitalist admit. Vitals 115/66, 75, 15, 96% on room air, T 96.9F.

## 2024-08-07 ENCOUNTER — APPOINTMENT (OUTPATIENT)
Dept: GENERAL RADIOLOGY | Age: 54
DRG: 872 | End: 2024-08-07
Attending: INTERNAL MEDICINE
Payer: MEDICARE

## 2024-08-07 PROBLEM — L97.509 DIABETIC FOOT ULCER WITH OSTEOMYELITIS (HCC): Status: RESOLVED | Noted: 2023-03-01 | Resolved: 2024-08-07

## 2024-08-07 PROBLEM — J96.00 ACUTE RESPIRATORY FAILURE (HCC): Status: RESOLVED | Noted: 2023-09-29 | Resolved: 2024-08-07

## 2024-08-07 PROBLEM — M86.9 OSTEOMYELITIS (HCC): Status: RESOLVED | Noted: 2022-07-19 | Resolved: 2024-08-07

## 2024-08-07 PROBLEM — L97.512 FOOT ULCERATION, RIGHT, WITH FAT LAYER EXPOSED (HCC): Status: RESOLVED | Noted: 2021-05-25 | Resolved: 2024-08-07

## 2024-08-07 PROBLEM — N17.9 ACUTE RENAL FAILURE (ARF) (HCC): Status: RESOLVED | Noted: 2019-08-02 | Resolved: 2024-08-07

## 2024-08-07 PROBLEM — E11.621 DIABETIC FOOT ULCER WITH OSTEOMYELITIS (HCC): Status: RESOLVED | Noted: 2023-03-01 | Resolved: 2024-08-07

## 2024-08-07 PROBLEM — A41.9: Status: ACTIVE | Noted: 2024-08-07

## 2024-08-07 PROBLEM — G62.81: Status: ACTIVE | Noted: 2024-08-07

## 2024-08-07 PROBLEM — E11.69 DIABETIC FOOT ULCER WITH OSTEOMYELITIS (HCC): Status: RESOLVED | Noted: 2023-03-01 | Resolved: 2024-08-07

## 2024-08-07 PROBLEM — R65.20: Status: ACTIVE | Noted: 2024-08-07

## 2024-08-07 PROBLEM — M86.9 DIABETIC FOOT ULCER WITH OSTEOMYELITIS (HCC): Status: RESOLVED | Noted: 2023-03-01 | Resolved: 2024-08-07

## 2024-08-07 LAB
EKG ATRIAL RATE: 81 BPM
EKG P AXIS: 58 DEGREES
EKG P-R INTERVAL: 134 MS
EKG Q-T INTERVAL: 366 MS
EKG QRS DURATION: 98 MS
EKG QTC CALCULATION (BAZETT): 425 MS
EKG R AXIS: -13 DEGREES
EKG T AXIS: 35 DEGREES
EKG VENTRICULAR RATE: 81 BPM
GLUCOSE BLD STRIP.AUTO-MCNC: 119 MG/DL (ref 70–108)
GLUCOSE BLD STRIP.AUTO-MCNC: 127 MG/DL (ref 70–108)

## 2024-08-07 PROCEDURE — 87070 CULTURE OTHR SPECIMN AEROBIC: CPT

## 2024-08-07 PROCEDURE — 0JBQ0ZZ EXCISION OF RIGHT FOOT SUBCUTANEOUS TISSUE AND FASCIA, OPEN APPROACH: ICD-10-PCS | Performed by: PODIATRIST

## 2024-08-07 PROCEDURE — 94761 N-INVAS EAR/PLS OXIMETRY MLT: CPT

## 2024-08-07 PROCEDURE — 96372 THER/PROPH/DIAG INJ SC/IM: CPT

## 2024-08-07 PROCEDURE — 96365 THER/PROPH/DIAG IV INF INIT: CPT

## 2024-08-07 PROCEDURE — G0378 HOSPITAL OBSERVATION PER HR: HCPCS

## 2024-08-07 PROCEDURE — 73630 X-RAY EXAM OF FOOT: CPT

## 2024-08-07 PROCEDURE — 99223 1ST HOSP IP/OBS HIGH 75: CPT | Performed by: INTERNAL MEDICINE

## 2024-08-07 PROCEDURE — 96368 THER/DIAG CONCURRENT INF: CPT

## 2024-08-07 PROCEDURE — 2580000003 HC RX 258: Performed by: NURSE PRACTITIONER

## 2024-08-07 PROCEDURE — 87075 CULTR BACTERIA EXCEPT BLOOD: CPT

## 2024-08-07 PROCEDURE — 6360000002 HC RX W HCPCS: Performed by: NURSE PRACTITIONER

## 2024-08-07 PROCEDURE — 87077 CULTURE AEROBIC IDENTIFY: CPT

## 2024-08-07 PROCEDURE — 87186 SC STD MICRODIL/AGAR DIL: CPT

## 2024-08-07 PROCEDURE — 6370000000 HC RX 637 (ALT 250 FOR IP): Performed by: NURSE PRACTITIONER

## 2024-08-07 PROCEDURE — 6370000000 HC RX 637 (ALT 250 FOR IP)

## 2024-08-07 PROCEDURE — 87205 SMEAR GRAM STAIN: CPT

## 2024-08-07 PROCEDURE — 93010 ELECTROCARDIOGRAM REPORT: CPT | Performed by: INTERNAL MEDICINE

## 2024-08-07 PROCEDURE — 82948 REAGENT STRIP/BLOOD GLUCOSE: CPT

## 2024-08-07 PROCEDURE — 96366 THER/PROPH/DIAG IV INF ADDON: CPT

## 2024-08-07 PROCEDURE — 87147 CULTURE TYPE IMMUNOLOGIC: CPT

## 2024-08-07 PROCEDURE — 1200000000 HC SEMI PRIVATE

## 2024-08-07 PROCEDURE — 0HBMXZZ EXCISION OF RIGHT FOOT SKIN, EXTERNAL APPROACH: ICD-10-PCS | Performed by: PODIATRIST

## 2024-08-07 RX ORDER — DEXTROSE MONOHYDRATE 100 MG/ML
INJECTION, SOLUTION INTRAVENOUS CONTINUOUS PRN
Status: DISCONTINUED | OUTPATIENT
Start: 2024-08-07 | End: 2024-08-08 | Stop reason: HOSPADM

## 2024-08-07 RX ORDER — INSULIN LISPRO 100 [IU]/ML
0-4 INJECTION, SOLUTION INTRAVENOUS; SUBCUTANEOUS NIGHTLY
Status: DISCONTINUED | OUTPATIENT
Start: 2024-08-07 | End: 2024-08-08 | Stop reason: HOSPADM

## 2024-08-07 RX ORDER — GLUCAGON 1 MG/ML
1 KIT INJECTION PRN
Status: DISCONTINUED | OUTPATIENT
Start: 2024-08-07 | End: 2024-08-08 | Stop reason: HOSPADM

## 2024-08-07 RX ORDER — INSULIN LISPRO 100 [IU]/ML
0-4 INJECTION, SOLUTION INTRAVENOUS; SUBCUTANEOUS
Status: DISCONTINUED | OUTPATIENT
Start: 2024-08-07 | End: 2024-08-08 | Stop reason: HOSPADM

## 2024-08-07 RX ORDER — POLYETHYLENE GLYCOL 3350 17 G/17G
17 POWDER, FOR SOLUTION ORAL DAILY
Status: DISCONTINUED | OUTPATIENT
Start: 2024-08-07 | End: 2024-08-08 | Stop reason: HOSPADM

## 2024-08-07 RX ORDER — SENNOSIDES A AND B 8.6 MG/1
1 TABLET, FILM COATED ORAL NIGHTLY
Status: DISCONTINUED | OUTPATIENT
Start: 2024-08-07 | End: 2024-08-08 | Stop reason: HOSPADM

## 2024-08-07 RX ADMIN — ENOXAPARIN SODIUM 40 MG: 100 INJECTION SUBCUTANEOUS at 19:40

## 2024-08-07 RX ADMIN — TRAMADOL HYDROCHLORIDE 50 MG: 50 TABLET ORAL at 15:40

## 2024-08-07 RX ADMIN — MEROPENEM 1000 MG: 1 INJECTION INTRAVENOUS at 21:41

## 2024-08-07 RX ADMIN — ENOXAPARIN SODIUM 40 MG: 100 INJECTION SUBCUTANEOUS at 08:59

## 2024-08-07 RX ADMIN — VILAZODONE HYDROCHLORIDE 40 MG: 40 TABLET, FILM COATED ORAL at 08:59

## 2024-08-07 RX ADMIN — Medication 1500 MG: at 17:02

## 2024-08-07 RX ADMIN — BUSPIRONE HYDROCHLORIDE 10 MG: 10 TABLET ORAL at 08:59

## 2024-08-07 RX ADMIN — SODIUM CHLORIDE, PRESERVATIVE FREE 10 ML: 5 INJECTION INTRAVENOUS at 19:40

## 2024-08-07 RX ADMIN — CETIRIZINE HYDROCHLORIDE 10 MG: 10 TABLET, FILM COATED ORAL at 08:59

## 2024-08-07 RX ADMIN — MEROPENEM 1000 MG: 1 INJECTION INTRAVENOUS at 06:14

## 2024-08-07 RX ADMIN — TRAMADOL HYDROCHLORIDE 50 MG: 50 TABLET ORAL at 08:59

## 2024-08-07 RX ADMIN — MEROPENEM 1000 MG: 1 INJECTION INTRAVENOUS at 13:52

## 2024-08-07 RX ADMIN — TRAMADOL HYDROCHLORIDE 50 MG: 50 TABLET ORAL at 23:30

## 2024-08-07 RX ADMIN — LOSARTAN POTASSIUM 100 MG: 100 TABLET, FILM COATED ORAL at 08:59

## 2024-08-07 RX ADMIN — SODIUM CHLORIDE, PRESERVATIVE FREE 10 ML: 5 INJECTION INTRAVENOUS at 09:00

## 2024-08-07 RX ADMIN — SENNOSIDES 8.6 MG: 8.6 TABLET, FILM COATED ORAL at 19:40

## 2024-08-07 RX ADMIN — METOPROLOL TARTRATE 25 MG: 25 TABLET, FILM COATED ORAL at 08:59

## 2024-08-07 RX ADMIN — Medication 5000 UNITS: at 08:59

## 2024-08-07 RX ADMIN — BUSPIRONE HYDROCHLORIDE 10 MG: 10 TABLET ORAL at 15:40

## 2024-08-07 RX ADMIN — GUANFACINE 1 MG: 1 TABLET ORAL at 22:23

## 2024-08-07 RX ADMIN — Medication 1 TABLET: at 08:59

## 2024-08-07 RX ADMIN — POLYETHYLENE GLYCOL 3350 17 G: 17 POWDER, FOR SOLUTION ORAL at 17:10

## 2024-08-07 RX ADMIN — GUANFACINE 1 MG: 1 TABLET ORAL at 00:04

## 2024-08-07 RX ADMIN — PANTOPRAZOLE SODIUM 40 MG: 40 TABLET, DELAYED RELEASE ORAL at 09:00

## 2024-08-07 RX ADMIN — Medication 1500 MG: at 06:14

## 2024-08-07 ASSESSMENT — PAIN SCALES - GENERAL
PAINLEVEL_OUTOF10: 7

## 2024-08-07 ASSESSMENT — PAIN DESCRIPTION - LOCATION
LOCATION: BACK;FOOT
LOCATION: BACK

## 2024-08-07 ASSESSMENT — PAIN DESCRIPTION - DESCRIPTORS: DESCRIPTORS: SHARP

## 2024-08-07 ASSESSMENT — PAIN DESCRIPTION - ORIENTATION: ORIENTATION: MID

## 2024-08-07 NOTE — PROGRESS NOTES
[facility]   Pharmacy Pharmacokinetic Monitoring Service - Vancomycin     Osmani Galeano is a 53 y.o. male starting on vancomycin therapy for  Diabetic Foot Infection. Pharmacy consulted by LUISITO Parish for monitoring and adjustment.    Target Concentration: Goal AUC/TRUPTI 400-600 mg*hr/L    Additional Antimicrobials: Merrem    Pertinent Laboratory Values:   Wt Readings from Last 1 Encounters:   08/06/24 (!) 173.7 kg (383 lb)     Temp Readings from Last 1 Encounters:   08/07/24 98.2 °F (36.8 °C) (Oral)     Estimated Creatinine Clearance: 175 mL/min (based on SCr of 0.8 mg/dL).  Recent Labs     08/06/24  2156   CREATININE 0.8   BUN 10   WBC 9.9     Pertinent Cultures:  Date Source Results   N/A     MRSA Nasal Swab: N/A. Non-respiratory infection.    Plan:  Dosing recommendations based on Bayesian software  Received Vancomycin IV 1500 mg x1 at 1145 on 8/6/2024! To be followed by Vancomycin IV 1500 mg q12h  Anticipated AUC of 466 and trough concentration of 11.1 at steady state  Renal labs as indicated   Vancomycin concentration not ordered @ this time   Pharmacy will continue to monitor patient and adjust therapy as indicated    Thank you for the consult,  Bambi Clayton RPh 8/7/2024 5:21 AM

## 2024-08-07 NOTE — RT PROTOCOL NOTE
RT Inhaler-Nebulizer Bronchodilator Protocol Note    There is a bronchodilator order in the chart from a provider indicating to follow the RT Bronchodilator Protocol and there is an “Initiate RT Inhaler-Nebulizer Bronchodilator Protocol” order as well (see protocol at bottom of note).    CXR Findings:  No results found.    The findings from the last RT Protocol Assessment were as follows:   History Pulmonary Disease: Chronic pulmonary disease (COPD +Asthma)  Respiratory Pattern: Regular pattern and RR 12-20 bpm  Breath Sounds: Clear breath sounds  Cough: Strong, spontaneous, non-productive  Indication for Bronchodilator Therapy: On home bronchodilators  Bronchodilator Assessment Score: 2    Aerosolized bronchodilator medication orders have been revised according to the RT Inhaler-Nebulizer Bronchodilator Protocol below.    Respiratory Therapist to perform RT Therapy Protocol Assessment initially then follow the protocol.  Repeat RT Therapy Protocol Assessment PRN for score 0-3 or on second treatment, BID, and PRN for scores above 3.    No Indications - adjust the frequency to every 6 hours PRN wheezing or bronchospasm, if no treatments needed after 48 hours then discontinue using Per Protocol order mode.     If indication present, adjust the RT bronchodilator orders based on the Bronchodilator Assessment Score as indicated below.  Use Inhaler orders unless patient has one or more of the following: on home nebulizer, not able to hold breath for 10 seconds, is not alert and oriented, cannot activate and use MDI correctly, or respiratory rate 25 breaths per minute or more, then use the equivalent nebulizer order(s) with same Frequency and PRN reasons based on the score.  If a patient is on this medication at home then do not decrease Frequency below that used at home.    0-3 - enter or revise RT bronchodilator order(s) to equivalent RT Bronchodilator order with Frequency of every 4 hours PRN for wheezing or increased  work of breathing using Per Protocol order mode.        4-6 - enter or revise RT Bronchodilator order(s) to two equivalent RT bronchodilator orders with one order with BID Frequency and one order with Frequency of every 4 hours PRN wheezing or increased work of breathing using Per Protocol order mode.        7-10 - enter or revise RT Bronchodilator order(s) to two equivalent RT bronchodilator orders with one order with TID Frequency and one order with Frequency of every 4 hours PRN wheezing or increased work of breathing using Per Protocol order mode.       11-13 - enter or revise RT Bronchodilator order(s) to one equivalent RT bronchodilator order with QID Frequency and an Albuterol order with Frequency of every 4 hours PRN wheezing or increased work of breathing using Per Protocol order mode.      Greater than 13 - enter or revise RT Bronchodilator order(s) to one equivalent RT bronchodilator order with every 4 hours Frequency and an Albuterol order with Frequency of every 2 hours PRN wheezing or increased work of breathing using Per Protocol order mode.     RT to enter RT Home Evaluation for COPD & MDI Assessment order using Per Protocol order mode.    Electronically signed by Sugey Aparicio RCP on 8/7/2024 at 5:54 PM

## 2024-08-07 NOTE — PROGRESS NOTES
Pharmacist Review and Automatic Dose Adjustment of Prophylactic Enoxaparin or Heparin    Reviewed reason for admission/hospital problem list    The reviewing pharmacist has made an adjustment to the ordered enoxaparin or heparin dose or converted to heparin per the approved [facility] protocol and table as identified below.      Recent Labs     08/06/24 2156   CREATININE 0.8     Estimated Creatinine Clearance: 175 mL/min (based on SCr of 0.8 mg/dL).    Recent Labs     08/06/24  2156   HGB 14.0   HCT 41.4*        No results for input(s): \"INR\" in the last 72 hours.    Height:   Ht Readings from Last 1 Encounters:   09/29/23 1.854 m (6' 1\")     Weight:  Wt Readings from Last 1 Encounters:   08/06/24 (!) 173.7 kg (383 lb)         Plan: Based upon the patient's weight and renal function, enoxaparin 40 mg subq daily will be changed to enoxaparin 40 mg subq BID.     Thank you,  Luna Olivera [facility], BCPS, BCGP  8/6/2024     11:58 PM
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
## 2024-08-07 NOTE — PROGRESS NOTES
PROGRESS NOTE      Patient:  Osmani Galeano  Unit/Bed:6K-15/015-A  YOB: 1970  MRN: [id number]   Acct: [id number]    PCP: Elvie Shukla APRN - CNP    Date of Admission: 8/6/2024 LOS: 1    Date of Evaluation:  8/7/2024    Anticipated Discharge: Pending clinical course.    Assessment/Plan:    Sepsis with acute dysfunction, improved => [facility] on [facility]/2024 => lactic acid went from 2.4-0.9, suspected secondary to abscess seen in right foot  Patient has purulent abscess of the right foot with RLE cellulitis, CT imaging done on 8/6 from [facility] facility shows distal and plantar subcutaneous fluid collection => not suggestive of osteomyelitis  Continue vancomycin and meropenem.  Podiatry following, appreciate recommendations => patient scheduled for x-rays of right foot.  SPARKLE on CPAP  Okay to use home CPAP, ordered by admitting provider.  Essential HTN  Continue losartan and Lopressor with hold parameters  Type 2 diabetes  Last recorded A1c is 6.1 in EMR on 1/7/2022 => start on 4 carb diet => low-dose insulin sliding scale started on 8/7 => hypoglycemia protocol with Accu-Cheks  Dyslipidemia: History of HLD, not on Lipitor as of 9/2023  Follow-up with PCP as outpatient for further management  Chronic lower back pain:  Outpatient medications include tramadol, continue during hospital admission  Constipation, last bowel movement 8/6, on chronic opioids, no abdominal distention or abdominal pain no N/V/D.  Daily MiraLAX and daily senna  Depression: Continue BuSpar and Viibryd  AUD, noted per chart hx  Morbid obesity  Body mass index is 50.53 kg/m².  Follow-up with PCP, encourage healthy lifestyle modifications.    Chief Complaint: Right foot abscess    Subjective/HPI:   Osmani Galeano feels better overall, complains of chronic back pain that is unchanged, he is on chronic tramadol for this.  He also states that he has a history of constipation, last bowel  movement was on 8/6, patient is agreeable to daily bowel regimen he denies any acute complaints, drainage from abscess is improved from yesterday.  Patient was transferred from [facility] ED.  He denies HA, SOB, CP, N/V/D.    PMH, SURGICAL HX, FH, SOCIAL HX reviewed and updated as needed.    Medications:  Reviewed    Infusion Medications    dextrose      sodium chloride       Scheduled Medications    meropenem  1,000 mg IntraVENous Q8H    vancomycin  1,500 mg IntraVENous Q12H    vancomycin (VANCOCIN) intermittent dosing (placeholder)   Other RX Placeholder    polyethylene glycol  17 g Oral Daily    senna  1 tablet Oral Nightly    insulin lispro  0-4 Units SubCUTAneous TID WC    insulin lispro  0-4 Units SubCUTAneous Nightly    sodium chloride flush  5-40 mL IntraVENous 2 times per day    enoxaparin  40 mg SubCUTAneous BID    busPIRone  10 mg Oral BID    Vitamin D  5,000 Units Oral Daily    cetirizine  10 mg Oral Daily    guanFACINE  1 mg Oral Nightly    losartan  100 mg Oral Daily    metoprolol tartrate  25 mg Oral Daily    multivitamin  1 tablet Oral Daily    pantoprazole  40 mg Oral Daily    vilazodone HCl  40 mg Oral Daily     PRN Meds: glucose, dextrose bolus **OR** dextrose bolus, glucagon (rDNA), dextrose, sodium chloride flush, sodium chloride, ondansetron **OR** ondansetron, acetaminophen **OR** acetaminophen, melatonin, albuterol sulfate HFA, traMADol      Intake/Output Summary (Last 24 hours) at 8/7/2024 1608  Last data filed at 8/7/2024 1337  Gross per 24 hour   Intake 240 ml   Output 900 ml   Net -660 ml       Exam:  /66   Pulse 74   Temp 97.9 °F (36.6 °C) (Oral)   Resp 20   Ht 1.854 m (6' 1\")   Wt (!) 173.7 kg (383 lb)   SpO2 97%   BMI 50.53 kg/m²     Physical Exam  Constitutional:       Appearance: Normal appearance.   HENT:      Head: Normocephalic and atraumatic.      Mouth/Throat:      Mouth: Mucous membranes are moist.      Pharynx: Oropharynx is clear. No oropharyngeal exudate.   Eyes:

## 2024-08-07 NOTE — PROCEDURES
PROCEDURE NOTE  Date: 8/6/2024   Name: Osmani Galeano  YOB: 1970    Procedures        EKG was completed and handed to RN

## 2024-08-07 NOTE — CONSULTS
Podiatric Surgery Consult    Patient Osmani Galeano  MEDICAL RECORD NUMBER:  [id number]  AGE: 53 y.o.   GENDER: male  : 1970  EPISODE DATE:  2024    Reason for Consult: Draining wound    Requesting Physician:  Travis Mcnally DO    CHIEF COMPLAINT:  Foot abscess, right    HISTORY OF PRESENT ILLNESS:                The patient is a 53 y.o. male with significant past medical history of cellulitis, osteomyelitis, coronary artery disease, type 2 diabetes mellitus, hyperlipidemia.  Who is being seen at bedside on behalf of Dr. Andujar. Patient went to the [facility] ED yesterday morning because he had a lot of draining coming from the plantar aspect of his foot.  The patient related that the night before he went to the [facility] ED he woke up with sweats and chills.  The patient said he also started to feel pain in his foot that he usually does not have pain in and do that it was time to going to the ED.  While at the [facility] ED they decided to have him come get admitted at [facility] for further evaluation and continuity of care.  I spoke with the nurse last night when he was first admitted to [facility] and she said he was in good spirit and afebrile.  Patient stated that he feels like his foot is full with a lot of pressure.  Patient said that he is a little worried that this time there just can have to take the whole foot.  Patient currently denies  N//V/D/SOB    Addendum, 2024 5:10 PM.  Patient was seen at bedside on behalf of Dr. Andujar.  The wound was reexplored with a hemostat into the plantar vault of the distal foot.  Manual expression of the wound produced no purulence or fluid, only francis blood was seen.  It is likely at this time that any fluid or purulent collection has already been drained and therefore we will continue with a daily Mesalt packing and dry gauze dressing to the foot.  X-ray reviewed of the right foot, no noted bony destruction or lytic changes, no emphysema seen.       Number of Occurrences:   1    Nursing communication     Notify provider when medication reconciliation completed     Standing Status:   Standing     Number of Occurrences:   1    Vital signs per unit routine     Standing Status:   Standing     Number of Occurrences:   1    Daily weights     Standing Status:   Standing     Number of Occurrences:   1    Intake and output     Call for urine ouput less than 120ml in 4 hours     Standing Status:   Standing     Number of Occurrences:   12    Initiate Hypoglycemia Treatment     If patient has hypoglycemia symptoms or blood glucose reading <70mg/dL on lab draw, obtain POC blood glucose.      If a POC blood glucose is less than 70mg/dL and Hypoglycemia Treatment Orders are not already active, then Nurse to enter Hypoglycemia Treatment Orders using Per Protocol without Cosign order mode, follow orders, and notify provider.      If Hypoglycemia Treatment Orders are already active, follow orders.  Hypoglycemia symptoms include but are not limited to: lightheadedness, sweating, tachycardia, and altered mental status.       Standing Status:   Standing     Number of Occurrences:   1    Admission/Observation order previously placed     Standing Status:   Standing     Number of Occurrences:   1    Notify physician     Notify physician for pulse less than 50 or greater than 120, respiratory rate less than 12 or greater than 25, oral temperature greater than 101.3 F (38.5 C), systolic BP less than 90 or greater than 180, diastolic BP less than 50 or greater than 100.     Standing Status:   Standing     Number of Occurrences:   1    Bedrest with bedside commode     Standing Status:   Standing     Number of Occurrences:   1    Neurovascular checks     Standing Status:   Standing     Number of Occurrences:   1    Full Code     Standing Status:   Standing     Number of Occurrences:   1    Inpatient consult to Respiratory Care     Standing Status:   Standing     Number of Occurrences:

## 2024-08-08 VITALS
TEMPERATURE: 97.5 F | BODY MASS INDEX: 41.75 KG/M2 | HEIGHT: 73 IN | WEIGHT: 315 LBS | DIASTOLIC BLOOD PRESSURE: 55 MMHG | HEART RATE: 71 BPM | SYSTOLIC BLOOD PRESSURE: 104 MMHG | RESPIRATION RATE: 18 BRPM | OXYGEN SATURATION: 96 %

## 2024-08-08 LAB
ALBUMIN SERPL BCG-MCNC: 3.5 G/DL (ref 3.5–5.1)
ALP SERPL-CCNC: 103 U/L (ref 38–126)
ALT SERPL W/O P-5'-P-CCNC: 20 U/L (ref 11–66)
ANION GAP SERPL CALC-SCNC: 9 MEQ/L (ref 8–16)
AST SERPL-CCNC: 16 U/L (ref 5–40)
BILIRUB CONJ SERPL-MCNC: 0.2 MG/DL (ref 0.1–13.8)
BILIRUB SERPL-MCNC: 0.7 MG/DL (ref 0.3–1.2)
BUN SERPL-MCNC: 10 MG/DL (ref 7–22)
CALCIUM SERPL-MCNC: 8.8 MG/DL (ref 8.5–10.5)
CHLORIDE SERPL-SCNC: 102 MEQ/L (ref 98–111)
CO2 SERPL-SCNC: 27 MEQ/L (ref 23–33)
CREAT SERPL-MCNC: 0.9 MG/DL (ref 0.4–1.2)
DEPRECATED RDW RBC AUTO: 41.2 FL (ref 35–45)
ERYTHROCYTE [DISTWIDTH] IN BLOOD BY AUTOMATED COUNT: 12.7 % (ref 11.5–14.5)
GFR SERPL CREATININE-BSD FRML MDRD: > 90 ML/MIN/1.73M2
GLUCOSE BLD STRIP.AUTO-MCNC: 111 MG/DL (ref 70–108)
GLUCOSE BLD STRIP.AUTO-MCNC: 113 MG/DL (ref 70–108)
GLUCOSE SERPL-MCNC: 107 MG/DL (ref 70–108)
HCT VFR BLD AUTO: 39.6 % (ref 42–52)
HGB BLD-MCNC: 13.3 GM/DL (ref 14–18)
MCH RBC QN AUTO: 30 PG (ref 26–33)
MCHC RBC AUTO-ENTMCNC: 33.6 GM/DL (ref 32.2–35.5)
MCV RBC AUTO: 89.4 FL (ref 80–94)
PLATELET # BLD AUTO: 228 THOU/MM3 (ref 130–400)
PMV BLD AUTO: 10.4 FL (ref 9.4–12.4)
POTASSIUM SERPL-SCNC: 4 MEQ/L (ref 3.5–5.2)
PROT SERPL-MCNC: 6.1 G/DL (ref 6.1–8)
RBC # BLD AUTO: 4.43 MILL/MM3 (ref 4.7–6.1)
SODIUM SERPL-SCNC: 138 MEQ/L (ref 135–145)
VANCOMYCIN SERPL-MCNC: 6.9 UG/ML (ref 0.1–39.9)
WBC # BLD AUTO: 7.9 THOU/MM3 (ref 4.8–10.8)

## 2024-08-08 PROCEDURE — 6370000000 HC RX 637 (ALT 250 FOR IP)

## 2024-08-08 PROCEDURE — 36415 COLL VENOUS BLD VENIPUNCTURE: CPT

## 2024-08-08 PROCEDURE — 82948 REAGENT STRIP/BLOOD GLUCOSE: CPT

## 2024-08-08 PROCEDURE — 6360000002 HC RX W HCPCS: Performed by: NURSE PRACTITIONER

## 2024-08-08 PROCEDURE — 99239 HOSP IP/OBS DSCHRG MGMT >30: CPT | Performed by: INTERNAL MEDICINE

## 2024-08-08 PROCEDURE — 85027 COMPLETE CBC AUTOMATED: CPT

## 2024-08-08 PROCEDURE — G0378 HOSPITAL OBSERVATION PER HR: HCPCS

## 2024-08-08 PROCEDURE — 6370000000 HC RX 637 (ALT 250 FOR IP): Performed by: NURSE PRACTITIONER

## 2024-08-08 PROCEDURE — 2580000003 HC RX 258: Performed by: NURSE PRACTITIONER

## 2024-08-08 PROCEDURE — 96366 THER/PROPH/DIAG IV INF ADDON: CPT

## 2024-08-08 PROCEDURE — 82248 BILIRUBIN DIRECT: CPT

## 2024-08-08 PROCEDURE — 80053 COMPREHEN METABOLIC PANEL: CPT

## 2024-08-08 PROCEDURE — 96368 THER/DIAG CONCURRENT INF: CPT

## 2024-08-08 PROCEDURE — 96372 THER/PROPH/DIAG INJ SC/IM: CPT

## 2024-08-08 PROCEDURE — 80202 ASSAY OF VANCOMYCIN: CPT

## 2024-08-08 RX ORDER — DOXYCYCLINE HYCLATE 100 MG
100 TABLET ORAL 2 TIMES DAILY
Qty: 20 TABLET | Refills: 0 | Status: SHIPPED | OUTPATIENT
Start: 2024-08-08 | End: 2024-08-18

## 2024-08-08 RX ORDER — CHOLECALCIFEROL (VITAMIN D3) 125 MCG
1 CAPSULE ORAL DAILY
Qty: 30 CAPSULE | Refills: 0 | Status: SHIPPED | OUTPATIENT
Start: 2024-08-08 | End: 2024-09-07

## 2024-08-08 RX ADMIN — ENOXAPARIN SODIUM 40 MG: 100 INJECTION SUBCUTANEOUS at 08:17

## 2024-08-08 RX ADMIN — PANTOPRAZOLE SODIUM 40 MG: 40 TABLET, DELAYED RELEASE ORAL at 08:17

## 2024-08-08 RX ADMIN — Medication 1500 MG: at 05:04

## 2024-08-08 RX ADMIN — METOPROLOL TARTRATE 25 MG: 25 TABLET, FILM COATED ORAL at 08:17

## 2024-08-08 RX ADMIN — CETIRIZINE HYDROCHLORIDE 10 MG: 10 TABLET, FILM COATED ORAL at 08:17

## 2024-08-08 RX ADMIN — Medication 1 TABLET: at 08:17

## 2024-08-08 RX ADMIN — TRAMADOL HYDROCHLORIDE 50 MG: 50 TABLET ORAL at 08:17

## 2024-08-08 RX ADMIN — VILAZODONE HYDROCHLORIDE 40 MG: 40 TABLET, FILM COATED ORAL at 08:16

## 2024-08-08 RX ADMIN — POLYETHYLENE GLYCOL 3350 17 G: 17 POWDER, FOR SOLUTION ORAL at 08:18

## 2024-08-08 RX ADMIN — LOSARTAN POTASSIUM 100 MG: 100 TABLET, FILM COATED ORAL at 08:17

## 2024-08-08 RX ADMIN — MEROPENEM 1000 MG: 1 INJECTION INTRAVENOUS at 05:03

## 2024-08-08 RX ADMIN — Medication 5000 UNITS: at 08:17

## 2024-08-08 RX ADMIN — BUSPIRONE HYDROCHLORIDE 10 MG: 10 TABLET ORAL at 08:17

## 2024-08-08 ASSESSMENT — PAIN DESCRIPTION - ORIENTATION: ORIENTATION: MID

## 2024-08-08 ASSESSMENT — PAIN SCALES - GENERAL: PAINLEVEL_OUTOF10: 7

## 2024-08-08 ASSESSMENT — PAIN - FUNCTIONAL ASSESSMENT: PAIN_FUNCTIONAL_ASSESSMENT: PREVENTS OR INTERFERES SOME ACTIVE ACTIVITIES AND ADLS

## 2024-08-08 ASSESSMENT — PAIN DESCRIPTION - DESCRIPTORS: DESCRIPTORS: ACHING

## 2024-08-08 ASSESSMENT — PAIN DESCRIPTION - LOCATION: LOCATION: HIP;BACK

## 2024-08-08 NOTE — PROGRESS NOTES
Discharge teaching and instructions for diagnosis/procedure of abscess of right foot completed with patient using teachback method. AVS reviewed. Patient voiced understanding regarding prescriptions, follow up appointments, and care of self at home. Discharged in a wheelchair to  home with support per family

## 2024-08-08 NOTE — PROGRESS NOTES
Podiatric Surgery Consult    Patient Osmani Galeano  MEDICAL RECORD NUMBER:  [id number]  AGE: 53 y.o.   GENDER: male  : 1970  EPISODE DATE:  2024    Reason for Consult: Draining wound    Requesting Physician:  Travis Mcnally DO    CHIEF COMPLAINT:  Abscess of right foot    HISTORY OF PRESENT ILLNESS:                The patient is a 53 y.o. male with significant past medical history of cellulitis, osteomyelitis, coronary artery disease, type 2 diabetes mellitus, hyperlipidemia.  Who is being seen at bedside on behalf of Dr. Andujar. Patient went to the [facility] ED yesterday morning because he had a lot of draining coming from the plantar aspect of his foot.  The patient related that the night before he went to the [facility] ED he woke up with sweats and chills.  The patient said he also started to feel pain in his foot that he usually does not have pain in and do that it was time to going to the ED.  While at the [facility] ED they decided to have him come get admitted at [facility] for further evaluation and continuity of care.  I spoke with the nurse last night when he was first admitted to [facility] and she said he was in good spirit and afebrile.  Patient stated that he feels like his foot is full with a lot of pressure.  Patient said that he is a little worried that this time there just can have to take the whole foot.  Patient currently denies  N//V/D/SOB    Addendum, 2024 5:10 PM.  Patient was seen at bedside on behalf of Dr. Andujar.  The wound was reexplored with a hemostat into the plantar vault of the distal foot.  Manual expression of the wound produced no purulence or fluid, only francis blood was seen.  It is likely at this time that any fluid or purulent collection has already been drained and therefore we will continue with a daily Mesalt packing and dry gauze dressing to the foot.  X-ray reviewed of the right foot, no noted bony destruction or lytic changes, no emphysema seen.   nebulizer, not able to hold breath for 10 seconds, is not alert and oriented, cannot activate and use MDI correctly, or respiratory rate 25 breaths per minute or more, then use the equivalent nebulizer order(s) with same Frequency and PRN reasons based on the score.  If a patient is on this medication at home then do not decrease Frequency below that used at home.    0-3 - enter or revise RT bronchodilator order(s) to equivalent RT Bronchodilator order with Frequency of every 4 hours PRN for wheezing or increased work of breathing using Per Protocol order mode.        4-6 - enter or revise RT Bronchodilator order(s) to two equivalent RT bronchodilator orders with one order with BID Frequency and one order with Frequency of every 4 hours PRN wheezing or increased work of breathing using Per Protocol order mode.         7-10 - enter or revise RT Bronchodilator order(s) to two equivalent RT bronchodilator orders with one order with TID Frequency and one order with Frequency of every 4 hours PRN wheezing or increased work of breathing using Per Protocol order mode.       11-13 - enter or revise RT Bronchodilator order(s) to one equivalent RT bronchodilator order with QID Frequency and an Albuterol order with Frequency of every 4 hours PRN wheezing or increased work of breathing using Per Protocol order mode.      Greater than 13 - enter or revise RT Bronchodilator order(s) to one equivalent RT bronchodilator order with every 4 hours Frequency and an Albuterol order with Frequency of every 2 hours PRN wheezing or increased work of breathing using Per Protocol order mode.     For patients with COPD, RT to enter RT Home Evaluation for COPD & MDI Assessment order using Per Protocol order mode.       Standing Status:   Standing     Number of Occurrences:   77717    POCT glucose     If blood sugar is below 110 at bedtime, then check blood glucose at 0200.     Standing Status:   Standing     Number of Occurrences:   25    POCT  Glucose     Repeat blood glucose 15 minutes following intervention.  If blood glucose is LESS THAN 70 mg/dL, repeat treatment and recheck blood glucose in 15 minutes x 2.  If blood glucose remains LESS THAN 70 mg/dL, call ordering provider for further instruction.   If patient experienced a hypoglycemic event in last 24 hours, obtain blood glucose at 0200.     Standing Status:   Standing     Number of Occurrences:   38961    EKG 12 Lead     Standing Status:   Standing     Number of Occurrences:   1     Order Specific Question:   Reason for Exam?     Answer:   Irregular heart rate    ADMIT TO INPATIENT     Standing Status:   Standing     Number of Occurrences:   1     Order Specific Question:   Telemetry/Cardiac Monitoring Required?     Answer:   No     Order Specific Question:   Discharge Plan:     Answer:   Home with Office Follow-up       Assessment and Plan  Principle  1.  Abscess of right foot.  2.  Type 2 diabetes mellitus    - Patient initially examined and evaluated  - WBC 7.9;Afebrile,   -Doxycycline P.O  - Applied dressing consisting of: Mesalt packing, DSD, Kerlix, Ace  - Weight bearing status: Partial weightbearing to the heel.  -Discussed with patient the nature and location of the wound and that we have now seen serous fluid drainage with no evidence of purulence.  Discussed with the patient that overall the foot and leg looked like they had restored the color.  Discussed with the patient that there was no evidence of acute signs of infection.  And that he would be able to be discharged outpatient from our standpoint with some oral antibiotics.  - All of patient's questions and concerns addressed at today's encounter.      DISPO: Patient is cleared from podiatry standpoint to be discharged follow-up with Dr. Andujar outpatient.        Thank you for the consultation allowing podiatry to assist in the medical welfare of this patient.   Manish Marshall DPM -PGY1  Foot and Ankle Surgical Resident  8/8/2024

## 2024-08-08 NOTE — PROGRESS NOTES
[facility]   Pharmacy Pharmacokinetic Monitoring Service - Vancomycin    Indication: Diabetic foot infection   Target Concentration: Goal AUC/TRUPTI 400-600 mg*hr/L  Day of Therapy: 3  Additional Antimicrobials: Meropenem    Pertinent Laboratory Values:   Wt Readings from Last 1 Encounters:   08/08/24 (!) 174.8 kg (385 lb 5.8 oz)     Temp Readings from Last 1 Encounters:   08/08/24 98.1 °F (36.7 °C) (Oral)     Estimated Creatinine Clearance: 156 mL/min (based on SCr of 0.9 mg/dL).  Recent Labs     08/06/24  2156 08/08/24  0454   CREATININE 0.8 0.9   BUN 10 10   WBC 9.9 7.9     Pertinent Cultures:  Date Source Results   8/7/24 Right foot swab Rare segmented neutrophils, no bacteria    MRSA Nasal Swab: N/A. Non-respiratory infection.    Recent vancomycin administrations                     vancomycin (VANCOCIN) 1500 mg in sodium chloride 0.9 % 250 mL IVPB (mg) 1,500 mg New Bag 08/08/24 0504     1,500 mg New Bag 08/07/24 1702     1,500 mg New Bag  0614                    Assessment:  Date/Time Current Dose Concentration Timing of Concentration (h) AUC C trough,ss   8/8/2024 0454 1500 mg Q12H 6.9 11 h 52 m 420 7.3   Note: Serum concentrations collected for AUC dosing may appear elevated if collected in close proximity to the dose administered, this is not necessarily an indication of toxicity    Plan:  Current dosing regimen is  low end of goal -600  Increase dose to 1750 mg Q12H, anticipated , trough 8.5  Repeat vancomycin concentration 8/10/2024- not ordered yet   Pharmacy will continue to monitor patient and adjust therapy as indicated    Thank you for the consult,  Mouna Kapadia PharmD 8/8/2024 6:26 AM

## 2024-08-08 NOTE — CARE COORDINATION
8/8/24, 2:35 PM EDT    Patient goals/plan/ treatment preferences discussed by  and .  Patient goals/plan/ treatment preferences reviewed with patient/ family.  Patient/ family verbalize understanding of discharge plan and are in agreement with goal/plan/treatment preferences.  Understanding was demonstrated using the teach back method.  AVS provided by RN at time of discharge, which includes all necessary medical information pertaining to the patients current course of illness, treatment, post-discharge goals of care, and treatment preferences.     Services At/After Discharge: None    Osmani is discharging home with plans to follow OP with podiatry.

## 2024-08-08 NOTE — PROGRESS NOTES
Teaching Note:    I was present with the resident during the visit.  I discussed the case with the resident and agree with the findings and the plan as documented in the residents note.    Crispin Andujar DPM, FACFAS  Podiatric Medicine & Surgery       [facility]

## 2024-08-08 NOTE — PROGRESS NOTES
Osmani palencia a is a 53 year old male laying in his bed. Patient is awake, alone, approachable, and hopeful. Patient shared with me that he has abscess in his foot. He shared with me he might need surgery on it but not sure yet. Patient also shared with me that he is receiving good support from his mother and his brother. Patient said he is not affiliated with a [employer] at this time but hope to find one soon when he is stable in his health situation. Patient is receptive to chaplains and hopeful for a good outcome.  provided this active listening, nurtured hope.

## 2024-08-08 NOTE — DISCHARGE SUMMARY
DISCHARGE SUMMARY      Patient Identification:   Osmani Galeano   : 1970  MRN: [id number]   Account: [id number]      Patient's PCP: Elvie Shukla, MICHELLE - CNP    Admit Date: 2024     Discharge Date: 2024      Admitting Physician: No admitting provider for patient encounter.     Discharge Physician: Moira Fournier MD     Discharge Diagnoses:    Active Hospital Problems    Diagnosis Date Noted    Cellulitis of right foot [L03.115] 2022     Priority: Medium    Sepsis with critical illness polyneuropathy (HCC) [A41.9, R65.20, G62.81] 2024    Abscess of right foot [L02.611] 2024    Type 2 diabetes mellitus without complication, with long-term current use of insulin (HCC) [E11.9, Z79.4] 2021    Chronic low back pain [M54.50, G89.29] 2019    PTSD (post-traumatic stress disorder) [F43.10] 2019    Hypertension [I10]     Hyperlipidemia [E78.5]     CAD (coronary artery disease) [I25.10]        The patient was seen and examined on day of discharge and this discharge summary is in conjunction with any daily progress note from day of discharge.    Hospital Course:   As per original H&P  \"Osmani Galeano is a 53-year-old male presented to [facility] 2024 as a transfer from [facility] with chief complaint of right foot infected wound.     Patient has past medical history significant for former smoker,SPARKLE-CPAP (Auto CPAP 6-18 cwp), PFT  no strong evidence of obstructive disease, CAD,  Nuc Myocard Perf Stress Test negative for ischemia, TTE  LVEF 65%, Essential HPTN, DMT2, Dyslipidemia, Depression, Osteomyelitis, AUD, nerve stimulator-.     Patient presented [facility] 24 with complaint of chills, diaphoresis and fever.  Patient states he went to change his right foot dressing and noted purulent foul-smelling drainage coming from wound.  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  Patient has had a metatarsal  08/08/2024 04:54 AM    HCT 39.6 08/08/2024 04:54 AM     08/08/2024 04:54 AM       Renal:    Lab Results   Component Value Date/Time     08/08/2024 04:54 AM    K 4.0 08/08/2024 04:54 AM    K 5.3 09/29/2023 10:47 PM     08/08/2024 04:54 AM    CO2 27 08/08/2024 04:54 AM    BUN 10 08/08/2024 04:54 AM    CREATININE 0.9 08/08/2024 04:54 AM    CALCIUM 8.8 08/08/2024 04:54 AM         Significant Diagnostic Studies    Radiology:   XR FOOT RIGHT (MIN 3 VIEWS)   Final Result   Soft tissue swelling and amputation changes.            **This report has been created using voice recognition software. It may contain   minor errors which are inherent in voice recognition technology.**            Electronically signed by Dr Irwin Dsouza             Consults:     IP CONSULT TO RESPIRATORY CARE  IP CONSULT TO PODIATRY  IP CONSULT TO PHARMACY    Disposition:    [x] Home       [] TCU       [] Rehab       [] Psych       [] SNF       [] Long Term Care Facility       [] Other-    Condition at Discharge: Stable    Code Status:  Prior     Patient Instructions:    Discharge lab work: cbc and BMP  Activity: activity as tolerated  Diet: No diet orders on file      Follow-up visits:   Elvie Shukla, APRN - CNP  [address]   [place] [address] [phone]    Go on 8/14/2024  Hospital follow up appointment, Appt time: 11:45am    Crispin Andujar, JADE  [address] [id number] [phone]    Go on 9/4/2024  Hospital follow up appointment, Appt time:9:15am  Appointment is in [place] [id number]  Phone: [phone]         Discharge Medications:        Medication List        START taking these medications      doxycycline hyclate 100 MG tablet  Commonly known as: VIBRA-TABS  Take 1 tablet by mouth 2 times daily for 10 days            CHANGE how you take these medications      Probiotic Acidophilus Caps  Take 1 capsule by mouth daily  What changed: when to take this             CONTINUE taking these medications      albuterol sulfate  (90 Base) MCG/ACT inhaler  Commonly known as: PROVENTIL;VENTOLIN;PROAIR     busPIRone 10 MG tablet  Commonly known as: BUSPAR     dulaglutide 0.75 MG/0.5ML Sopn SC injection  Commonly known as: TRULICITY     fexofenadine 180 MG tablet  Commonly known as: ALLEGRA     guanFACINE 1 MG tablet  Commonly known as: TENEX     LOSARTAN POTASSIUM PO     metoprolol tartrate 25 MG tablet  Commonly known as: LOPRESSOR     pantoprazole 40 MG tablet  Commonly known as: PROTONIX     thera/beta-carotene Tabs     traMADol 50 MG tablet  Commonly known as: ULTRAM     vilazodone hcl 40 MG Tabs  Generic drug: vilazodone HCl     vitamin D3 125 MCG (5000 UT) Caps            STOP taking these medications      buPROPion 200 MG extended release tablet  Commonly known as: WELLBUTRIN SR     Flovent  MCG/ACT inhaler  Generic drug: fluticasone     furosemide 20 MG tablet  Commonly known as: LASIX     potassium chloride 20 MEQ Tbcr extended release tablet  Commonly known as: K-TAB     prazosin 1 MG capsule  Commonly known as: MINIPRESS            ASK your doctor about these medications      aspirin 81 MG tablet     atorvastatin 40 MG tablet  Commonly known as: LIPITOR     isosorbide mononitrate 60 MG extended release tablet  Commonly known as: IMDUR     vitamin C 1000 MG tablet               Where to Get Your Medications        These medications were sent to [facility] [address] [place] [address], [place] - [address] [facility] - P [phone] - F [phone]  [address] [facility] [address]      Phone: [phone]   doxycycline hyclate 100 MG tablet  Probiotic Acidophilus Caps         Time Spent on discharge is more than 45 minutes in the examination, evaluation, counseling and review of medications and discharge plan.      Signed:    Thank you Elvie Shukla, APRN - LUISITO for the opportunity to be involved in this patient's care.    Electronically signed by Moira

## 2024-08-10 LAB
BACTERIA SPEC AEROBE CULT: ABNORMAL
BACTERIA SPEC AEROBE CULT: ABNORMAL
BACTERIA SPEC ANAEROBE CULT: ABNORMAL
GRAM STN SPEC: ABNORMAL
ORGANISM: ABNORMAL

## 2024-12-15 NOTE — ANESTHESIA POSTPROCEDURE EVALUATION
Department of Anesthesiology  Postprocedure Note    Patient: Cristina Garay  MRN: [id number]  YOB: 1970  Date of evaluation: 7/22/2022      Procedure Summary     Date: 07/22/22 Room / Location: [address] [facility] [place]    Anesthesia Start: 7728 Anesthesia Stop: 9042    Procedure: Right 4th & 5th Partial Amputation (Right: Toes) Diagnosis:       Osteomyelitis of right foot, unspecified type ([address]      (Osteomyelitis of foot, unspecified laterality, unspecified type ([address] [M86.9])    Surgeons: Nora Jackson DPM Responsible Provider: Brenda Fox DO    Anesthesia Type: general ASA Status: 3          Anesthesia Type: No value filed.     Garrett Phase I:      Garrett Phase II:        Anesthesia Post Evaluation    Patient location during evaluation: PACU  Patient participation: complete - patient participated  Level of consciousness: awake and alert  Airway patency: patent  Nausea & Vomiting: no vomiting and no nausea  Complications: no  Cardiovascular status: hemodynamically stable  Respiratory status: acceptable  Hydration status: stable Male

## 2025-01-06 ENCOUNTER — APPOINTMENT (OUTPATIENT)
Dept: GENERAL RADIOLOGY | Age: 55
DRG: 617 | End: 2025-01-06
Payer: MEDICARE

## 2025-01-06 ENCOUNTER — HOSPITAL ENCOUNTER (INPATIENT)
Age: 55
LOS: 2 days | Discharge: HOME | DRG: 617 | End: 2025-01-08
Attending: EMERGENCY MEDICINE | Admitting: HOSPITALIST
Payer: MEDICARE

## 2025-01-06 DIAGNOSIS — L08.9 LEFT FOOT INFECTION: Primary | ICD-10-CM

## 2025-01-06 DIAGNOSIS — L03.032 CELLULITIS OF TOE OF LEFT FOOT: ICD-10-CM

## 2025-01-06 DIAGNOSIS — T87.43 RIGHT BKA INFECTION (HCC): ICD-10-CM

## 2025-01-06 PROBLEM — E87.20 LACTIC ACIDOSIS: Status: ACTIVE | Noted: 2025-01-06

## 2025-01-06 PROBLEM — D72.829 LEUKOCYTOSIS: Status: ACTIVE | Noted: 2025-01-06

## 2025-01-06 PROBLEM — E66.01 MORBID OBESITY: Status: ACTIVE | Noted: 2025-01-06

## 2025-01-06 PROBLEM — J44.1 COPD WITH EXACERBATION (HCC): Status: ACTIVE | Noted: 2025-01-06

## 2025-01-06 LAB
ANION GAP SERPL CALC-SCNC: 17 MEQ/L (ref 8–16)
BASOPHILS ABSOLUTE: 0.1 THOU/MM3 (ref 0–0.1)
BASOPHILS NFR BLD AUTO: 0.4 %
BUN SERPL-MCNC: 9 MG/DL (ref 7–22)
CALCIUM SERPL-MCNC: 9.5 MG/DL (ref 8.5–10.5)
CHLORIDE SERPL-SCNC: 100 MEQ/L (ref 98–111)
CO2 SERPL-SCNC: 22 MEQ/L (ref 23–33)
CREAT SERPL-MCNC: 0.8 MG/DL (ref 0.4–1.2)
CRP SERPL-MCNC: 1.23 MG/DL (ref 0–1)
DEPRECATED RDW RBC AUTO: 40.8 FL (ref 35–45)
EKG ATRIAL RATE: 85 BPM
EKG P AXIS: 51 DEGREES
EKG P-R INTERVAL: 128 MS
EKG Q-T INTERVAL: 356 MS
EKG QRS DURATION: 96 MS
EKG QTC CALCULATION (BAZETT): 423 MS
EKG R AXIS: -46 DEGREES
EKG T AXIS: 60 DEGREES
EKG VENTRICULAR RATE: 85 BPM
EOSINOPHIL NFR BLD AUTO: 1.6 %
EOSINOPHILS ABSOLUTE: 0.2 THOU/MM3 (ref 0–0.4)
ERYTHROCYTE [DISTWIDTH] IN BLOOD BY AUTOMATED COUNT: 13.1 % (ref 11.5–14.5)
ERYTHROCYTE [SEDIMENTATION RATE] IN BLOOD BY WESTERGREN METHOD: 17 MM/HR (ref 0–10)
GFR SERPL CREATININE-BSD FRML MDRD: > 90 ML/MIN/1.73M2
GLUCOSE BLD STRIP.AUTO-MCNC: 116 MG/DL (ref 70–108)
GLUCOSE BLD STRIP.AUTO-MCNC: 174 MG/DL (ref 70–108)
GLUCOSE SERPL-MCNC: 213 MG/DL (ref 70–108)
HCT VFR BLD AUTO: 46.3 % (ref 42–52)
HGB BLD-MCNC: 16 GM/DL (ref 14–18)
IMM GRANULOCYTES # BLD AUTO: 0.06 THOU/MM3 (ref 0–0.07)
IMM GRANULOCYTES NFR BLD AUTO: 0.4 %
LACTIC ACID, SEPSIS: 1.7 MMOL/L (ref 0.5–1.9)
LACTIC ACID, SEPSIS: 2.7 MMOL/L (ref 0.5–1.9)
LYMPHOCYTES ABSOLUTE: 1.9 THOU/MM3 (ref 1–4.8)
LYMPHOCYTES NFR BLD AUTO: 13.5 %
MCH RBC QN AUTO: 29.9 PG (ref 26–33)
MCHC RBC AUTO-ENTMCNC: 34.6 GM/DL (ref 32.2–35.5)
MCV RBC AUTO: 86.4 FL (ref 80–94)
MONOCYTES ABSOLUTE: 0.8 THOU/MM3 (ref 0.4–1.3)
MONOCYTES NFR BLD AUTO: 5.8 %
NEUTROPHILS ABSOLUTE: 11 THOU/MM3 (ref 1.8–7.7)
NEUTROPHILS NFR BLD AUTO: 78.3 %
NRBC BLD AUTO-RTO: 0 /100 WBC
OSMOLALITY SERPL CALC.SUM OF ELEC: 282.6 MOSMOL/KG (ref 275–300)
PLATELET # BLD AUTO: 260 THOU/MM3 (ref 130–400)
PMV BLD AUTO: 10.8 FL (ref 9.4–12.4)
POTASSIUM SERPL-SCNC: 3.9 MEQ/L (ref 3.5–5.2)
RBC # BLD AUTO: 5.36 MILL/MM3 (ref 4.7–6.1)
SODIUM SERPL-SCNC: 139 MEQ/L (ref 135–145)
WBC # BLD AUTO: 14.1 THOU/MM3 (ref 4.8–10.8)

## 2025-01-06 PROCEDURE — 87070 CULTURE OTHR SPECIMN AEROBIC: CPT

## 2025-01-06 PROCEDURE — 99223 1ST HOSP IP/OBS HIGH 75: CPT | Performed by: FAMILY MEDICINE

## 2025-01-06 PROCEDURE — 87147 CULTURE TYPE IMMUNOLOGIC: CPT

## 2025-01-06 PROCEDURE — 99285 EMERGENCY DEPT VISIT HI MDM: CPT

## 2025-01-06 PROCEDURE — 87077 CULTURE AEROBIC IDENTIFY: CPT

## 2025-01-06 PROCEDURE — 93005 ELECTROCARDIOGRAM TRACING: CPT

## 2025-01-06 PROCEDURE — 6360000002 HC RX W HCPCS

## 2025-01-06 PROCEDURE — 93010 ELECTROCARDIOGRAM REPORT: CPT | Performed by: INTERNAL MEDICINE

## 2025-01-06 PROCEDURE — 87205 SMEAR GRAM STAIN: CPT

## 2025-01-06 PROCEDURE — 2580000003 HC RX 258

## 2025-01-06 PROCEDURE — 87641 MR-STAPH DNA AMP PROBE: CPT

## 2025-01-06 PROCEDURE — 82948 REAGENT STRIP/BLOOD GLUCOSE: CPT

## 2025-01-06 PROCEDURE — 96374 THER/PROPH/DIAG INJ IV PUSH: CPT

## 2025-01-06 PROCEDURE — 73630 X-RAY EXAM OF FOOT: CPT

## 2025-01-06 PROCEDURE — 83605 ASSAY OF LACTIC ACID: CPT

## 2025-01-06 PROCEDURE — 87075 CULTR BACTERIA EXCEPT BLOOD: CPT

## 2025-01-06 PROCEDURE — 6370000000 HC RX 637 (ALT 250 FOR IP)

## 2025-01-06 PROCEDURE — 85025 COMPLETE CBC W/AUTO DIFF WBC: CPT

## 2025-01-06 PROCEDURE — 85651 RBC SED RATE NONAUTOMATED: CPT

## 2025-01-06 PROCEDURE — 87040 BLOOD CULTURE FOR BACTERIA: CPT

## 2025-01-06 PROCEDURE — 36415 COLL VENOUS BLD VENIPUNCTURE: CPT

## 2025-01-06 PROCEDURE — 1200000000 HC SEMI PRIVATE

## 2025-01-06 PROCEDURE — 86140 C-REACTIVE PROTEIN: CPT

## 2025-01-06 PROCEDURE — 80048 BASIC METABOLIC PNL TOTAL CA: CPT

## 2025-01-06 RX ORDER — POTASSIUM CHLORIDE 7.45 MG/ML
10 INJECTION INTRAVENOUS PRN
Status: DISCONTINUED | OUTPATIENT
Start: 2025-01-06 | End: 2025-01-08 | Stop reason: HOSPADM

## 2025-01-06 RX ORDER — TAMSULOSIN HYDROCHLORIDE 0.4 MG/1
0.4 CAPSULE ORAL DAILY
COMMUNITY

## 2025-01-06 RX ORDER — ACETAMINOPHEN 650 MG/1
650 SUPPOSITORY RECTAL EVERY 6 HOURS PRN
Status: DISCONTINUED | OUTPATIENT
Start: 2025-01-06 | End: 2025-01-07

## 2025-01-06 RX ORDER — PANTOPRAZOLE SODIUM 40 MG/1
40 TABLET, DELAYED RELEASE ORAL DAILY
Status: DISCONTINUED | OUTPATIENT
Start: 2025-01-07 | End: 2025-01-08 | Stop reason: HOSPADM

## 2025-01-06 RX ORDER — MELOXICAM 15 MG/1
15 TABLET ORAL DAILY
COMMUNITY

## 2025-01-06 RX ORDER — ACETAMINOPHEN 325 MG/1
650 TABLET ORAL EVERY 6 HOURS PRN
Status: DISCONTINUED | OUTPATIENT
Start: 2025-01-06 | End: 2025-01-07

## 2025-01-06 RX ORDER — SODIUM CHLORIDE 0.9 % (FLUSH) 0.9 %
5-40 SYRINGE (ML) INJECTION PRN
Status: DISCONTINUED | OUTPATIENT
Start: 2025-01-06 | End: 2025-01-08 | Stop reason: HOSPADM

## 2025-01-06 RX ORDER — POLYETHYLENE GLYCOL 3350 17 G/17G
17 POWDER, FOR SOLUTION ORAL DAILY PRN
Status: DISCONTINUED | OUTPATIENT
Start: 2025-01-06 | End: 2025-01-08 | Stop reason: HOSPADM

## 2025-01-06 RX ORDER — KETOROLAC TROMETHAMINE 30 MG/ML
15 INJECTION, SOLUTION INTRAMUSCULAR; INTRAVENOUS EVERY 6 HOURS PRN
Status: DISCONTINUED | OUTPATIENT
Start: 2025-01-06 | End: 2025-01-08 | Stop reason: HOSPADM

## 2025-01-06 RX ORDER — INSULIN LISPRO 100 [IU]/ML
0-8 INJECTION, SOLUTION INTRAVENOUS; SUBCUTANEOUS
Status: DISCONTINUED | OUTPATIENT
Start: 2025-01-06 | End: 2025-01-08 | Stop reason: HOSPADM

## 2025-01-06 RX ORDER — ALBUTEROL SULFATE 90 UG/1
2 INHALANT RESPIRATORY (INHALATION) EVERY 4 HOURS PRN
Status: DISCONTINUED | OUTPATIENT
Start: 2025-01-06 | End: 2025-01-08 | Stop reason: HOSPADM

## 2025-01-06 RX ORDER — CYCLOBENZAPRINE HCL 10 MG
5 TABLET ORAL 3 TIMES DAILY PRN
Status: DISCONTINUED | OUTPATIENT
Start: 2025-01-06 | End: 2025-01-08 | Stop reason: HOSPADM

## 2025-01-06 RX ORDER — CETIRIZINE HYDROCHLORIDE 10 MG/1
10 TABLET ORAL DAILY
Status: DISCONTINUED | OUTPATIENT
Start: 2025-01-07 | End: 2025-01-08 | Stop reason: HOSPADM

## 2025-01-06 RX ORDER — ONDANSETRON 4 MG/1
4 TABLET, ORALLY DISINTEGRATING ORAL EVERY 8 HOURS PRN
Status: DISCONTINUED | OUTPATIENT
Start: 2025-01-06 | End: 2025-01-08 | Stop reason: HOSPADM

## 2025-01-06 RX ORDER — ONDANSETRON 2 MG/ML
4 INJECTION INTRAMUSCULAR; INTRAVENOUS EVERY 6 HOURS PRN
Status: DISCONTINUED | OUTPATIENT
Start: 2025-01-06 | End: 2025-01-08 | Stop reason: HOSPADM

## 2025-01-06 RX ORDER — MAGNESIUM SULFATE IN WATER 40 MG/ML
2000 INJECTION, SOLUTION INTRAVENOUS PRN
Status: DISCONTINUED | OUTPATIENT
Start: 2025-01-06 | End: 2025-01-08 | Stop reason: HOSPADM

## 2025-01-06 RX ORDER — KETOROLAC TROMETHAMINE 30 MG/ML
15 INJECTION, SOLUTION INTRAMUSCULAR; INTRAVENOUS EVERY 6 HOURS
Status: DISCONTINUED | OUTPATIENT
Start: 2025-01-06 | End: 2025-01-06

## 2025-01-06 RX ORDER — GABAPENTIN 400 MG/1
400 CAPSULE ORAL 3 TIMES DAILY
COMMUNITY
Start: 2024-11-20

## 2025-01-06 RX ORDER — POTASSIUM CHLORIDE 1500 MG/1
40 TABLET, EXTENDED RELEASE ORAL PRN
Status: DISCONTINUED | OUTPATIENT
Start: 2025-01-06 | End: 2025-01-08 | Stop reason: HOSPADM

## 2025-01-06 RX ORDER — SODIUM CHLORIDE 9 MG/ML
INJECTION, SOLUTION INTRAVENOUS PRN
Status: DISCONTINUED | OUTPATIENT
Start: 2025-01-06 | End: 2025-01-08 | Stop reason: HOSPADM

## 2025-01-06 RX ORDER — GUANFACINE 1 MG/1
1 TABLET ORAL NIGHTLY
Status: DISCONTINUED | OUTPATIENT
Start: 2025-01-06 | End: 2025-01-08 | Stop reason: HOSPADM

## 2025-01-06 RX ORDER — VITAMIN B COMPLEX
5000 TABLET ORAL DAILY
Status: DISCONTINUED | OUTPATIENT
Start: 2025-01-07 | End: 2025-01-08 | Stop reason: HOSPADM

## 2025-01-06 RX ORDER — BUSPIRONE HYDROCHLORIDE 10 MG/1
10 TABLET ORAL 2 TIMES DAILY
Status: DISCONTINUED | OUTPATIENT
Start: 2025-01-06 | End: 2025-01-08 | Stop reason: HOSPADM

## 2025-01-06 RX ORDER — VILAZODONE HYDROCHLORIDE 40 MG/1
40 TABLET ORAL DAILY
Status: DISCONTINUED | OUTPATIENT
Start: 2025-01-06 | End: 2025-01-06

## 2025-01-06 RX ORDER — SODIUM CHLORIDE 0.9 % (FLUSH) 0.9 %
5-40 SYRINGE (ML) INJECTION EVERY 12 HOURS SCHEDULED
Status: DISCONTINUED | OUTPATIENT
Start: 2025-01-06 | End: 2025-01-08 | Stop reason: HOSPADM

## 2025-01-06 RX ADMIN — CEFEPIME 2000 MG: 2 INJECTION, POWDER, FOR SOLUTION INTRAVENOUS at 15:14

## 2025-01-06 RX ADMIN — KETOROLAC TROMETHAMINE 15 MG: 30 INJECTION, SOLUTION INTRAMUSCULAR at 18:31

## 2025-01-06 RX ADMIN — GUANFACINE HYDROCHLORIDE 1 MG: 1 TABLET ORAL at 19:56

## 2025-01-06 RX ADMIN — CYCLOBENZAPRINE 5 MG: 10 TABLET, FILM COATED ORAL at 20:05

## 2025-01-06 RX ADMIN — Medication 2000 MG: at 17:07

## 2025-01-06 RX ADMIN — BUSPIRONE HYDROCHLORIDE 10 MG: 10 TABLET ORAL at 19:56

## 2025-01-06 ASSESSMENT — ENCOUNTER SYMPTOMS
NAUSEA: 1
EYES NEGATIVE: 1
SHORTNESS OF BREATH: 0
RESPIRATORY NEGATIVE: 1
CHEST TIGHTNESS: 0
WHEEZING: 0
COLOR CHANGE: 1
VOMITING: 1
DIARRHEA: 0

## 2025-01-06 ASSESSMENT — PAIN DESCRIPTION - LOCATION
LOCATION: BACK

## 2025-01-06 ASSESSMENT — PAIN DESCRIPTION - ONSET
ONSET: ON-GOING

## 2025-01-06 ASSESSMENT — PAIN - FUNCTIONAL ASSESSMENT
PAIN_FUNCTIONAL_ASSESSMENT: ACTIVITIES ARE NOT PREVENTED
PAIN_FUNCTIONAL_ASSESSMENT: 0-10

## 2025-01-06 ASSESSMENT — PAIN DESCRIPTION - FREQUENCY
FREQUENCY: CONTINUOUS

## 2025-01-06 ASSESSMENT — PAIN SCALES - GENERAL
PAINLEVEL_OUTOF10: 6
PAINLEVEL_OUTOF10: 7
PAINLEVEL_OUTOF10: 7
PAINLEVEL_OUTOF10: 4

## 2025-01-06 ASSESSMENT — PAIN DESCRIPTION - ORIENTATION
ORIENTATION: MID;LOWER

## 2025-01-06 ASSESSMENT — PAIN DESCRIPTION - DESCRIPTORS
DESCRIPTORS: SHARP
DESCRIPTORS: ACHING
DESCRIPTORS: ACHING
DESCRIPTORS: SHARP

## 2025-01-06 ASSESSMENT — PAIN DESCRIPTION - PAIN TYPE
TYPE: ACUTE PAIN
TYPE: CHRONIC PAIN
TYPE: ACUTE PAIN

## 2025-01-06 NOTE — ED NOTES
ED to inpatient nurses report      Chief Complaint:  Chief Complaint   Patient presents with    Foot Pain     Present to ED from: home    MOA:     LOC: alert and orientated to name, place, date  Mobility: Requires assistance * 1  Oxygen Baseline: 98    Current needs required: room air, IV abx     Code Status:   Prior      Mental Status:  Level of Consciousness: Alert (0)    Psych Assessment:        Vitals:  Patient Vitals for the past 24 hrs:   BP Temp Temp src Pulse Resp SpO2 Weight   01/06/25 1659 122/62 -- -- 87 18 97 % --   01/06/25 1543 (!) 113/55 -- -- 90 17 96 % --   01/06/25 1311 135/82 98.7 °F (37.1 °C) Oral 98 17 98 % (!) 155.6 kg (343 lb)        LDAs:   Peripheral IV 01/06/25 Right Antecubital (Active)       Ambulatory Status:  No data recorded    Diagnosis:  DISPOSITION Admitted 01/06/2025 03:58:33 PM   Final diagnoses:   Left foot infection   Cellulitis of toe of left foot        Consults:  IP CONSULT TO HOSPITALIST  IP CONSULT TO PODIATRY     Pain Score:  Pain Assessment  Pain Assessment: 0-10  Pain Level: 4  Patient's Stated Pain Goal: 3, 2  Pain Descriptors: Aching  Pain Type: Acute pain  Pain Frequency: Continuous  Pain Onset: On-going    C-SSRS:   Risk of Suicide: No Risk    Sepsis Screening:       [place] Fall Risk:       Swallow Screening        Preferred Language:   English      ALLERGIES     Metformin and related, Cleocin [clindamycin], Ilosone [erythromycin], Other, Pcn [penicillins], and Sulfa antibiotics    SURGICAL HISTORY       Past Surgical History:   Procedure Laterality Date    BACK SURGERY      x2     CARDIAC CATHETERIZATION      12/2013    CARPAL TUNNEL RELEASE Bilateral     COLONOSCOPY      DENTAL SURGERY      FOOT AMPUTATION Right 3/3/2023    RIGHT LISFRANC FOOT AMPUTATION performed by Crispin Andujar DPM at [facility] OR    FOOT AMPUTATION THROUGH METATARSAL Right     FOOT SURGERY Bilateral 07/2014    FOOT SURGERY      FOOT SURGERY      HERNIA REPAIR      NOSE SURGERY      STIMULATOR

## 2025-01-06 NOTE — ED NOTES
Pt to be transported to [facility] in stable condition. Called floor and spoke to Abby prior to transport.    - - -

## 2025-01-06 NOTE — CONSULTS
Consult received, see plan of care note for details dated 1/6/2025.    Adolph Ponce, JADE  1/6/2025  5:27 PM

## 2025-01-06 NOTE — ED PROVIDER NOTES
PATIENT NAME: Osmani Galeano  MRN: [id number]  : 1970  DELEON: 2025    I performed a history and physical examination of the patient and discussed management with the Resident. I reviewed the Resident's note and agree with the documented findings and plan of care. Any areas of disagreement are noted on the chart. I was personally present for the key portions of any procedures and have documented in the chart those procedures where I was not present during the key portions. I have reviewed the emergency nurses triage note and agree with the chief complaint, past medical history, past surgical history, allergies, medications, social and family history as documented unless otherwise noted below.    MEDICAL DEDISION MAKING (MDM)     Osmani Galeano is a 54 y.o. male who presents to Emergency Department with Foot Pain     Left 4th and 5th foot digit ulcers due to DM. He was concerned about increasing drainage recently.     He sees Dr. Andujar. 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 Andujar on  for distal 4th toe amputation and 5th toe arthroplasty of the left foot.      ED workup (labs and images) are reviewed and dicussed with podiatry on-call.  Podiatry recommends hospital medicine admission for planned left 5th digit amputation.    Vitals:    25 1311 25 1543 25 1659   BP: 135/82 (!) 113/55 122/62   Pulse: 98 90 87   Resp: 17 17 18   Temp: 98.7 °F (37.1 °C)     TempSrc: Oral     SpO2: 98% 96% 97%   Weight: (!) 155.6 kg (343 lb)       Labs Reviewed   BASIC METABOLIC PANEL - Abnormal; Notable for the following components:       Result Value    CO2 22 (*)     Glucose 213 (*)     All other components within normal limits   CBC WITH AUTO DIFFERENTIAL - Abnormal; Notable for the following components:    WBC 14.1 (*)     Neutrophils Absolute 11.0 (*)     All other components within normal limits   LACTATE, SEPSIS - Abnormal; Notable for the following components: 
(has no administration in time range)   ceFEPIme (MAXIPIME) 2,000 mg in sodium chloride 0.9 % 100 mL IVPB (mini-bag) (2,000 mg IntraVENous New Bag 1/6/25 1514)         ED Course as of 01/06/25 1621   Mon Jan 06, 2025   1418 Sedimentation Rate [JW]   1418 Blood Culture 2 [JW]   1418 Lactate, Sepsis [JW]   1418 BMP [JW]   1418 Lactate, Sepsis [JW]   1418 Blood Culture 1 [JW]   1418 CBC with Auto Differential [JW]   1418 C-Reactive Protein [JW]   1441 EKG Emergency [JW]   1620 XR FOOT LEFT (MIN 3 VIEWS) [JW]      ED Course User Index  [JW] Ngozi Francis DPM         PROCEDURES: (None if blank)  Procedures:     CRITICAL CARE:  None    MEDICATION CHANGES     New Prescriptions    No medications on file         FINAL DISPOSITION   Shared Decision-Making was performed, disposition discussed with the patient/family and questions answered.      Outpatient follow up (If applicable):  No follow-up provider specified.  Not applicable              FINAL DIAGNOSES:  Final diagnoses:   Left foot infection   Cellulitis of toe of left foot       Condition: condition: good  Dispo: Admit to med/surg floor  DISPOSITION Admitted 01/06/2025 03:58:33 PM               This transcription was electronically signed. It was dictated by use of voice recognition software and electronically transcribed. The transcription may contain errors not detected in proofreading.    Electronically signed by Ngozi Francis DPM on 1/6/25 at 1:47 PM EST

## 2025-01-06 NOTE — H&P
History & Physical  Internal Medicine Resident         Patient: Osmani Galeano 54 y.o. male      : 1970  Date of Admission: 2025  Date of Service: Pt seen/examined on 25 and Admitted to Inpatient with expected LOS greater than two midnights due to medical therapy.       ASSESSMENT AND PLAN  Left foot infection   -Vancomycin IV, cefepime IV, Daily CBC/BMP RCRI score 1 point with risk of 6% cardiac event if future surgical intervention is pursued.  Based off the scale it would be a low risk, but discussion needs to be had with the patient of notable risks prior to surgery.  > Hyperglycemia (type 2 diabetes mellitus)   -Accu-Chek, A1c, insulin placed on sliding scale  > Peripheral vascular disease   -Arterial Doppler ordered, smoking cessation education  > Hypertension   -Continue home medication guanfacine 1 mg nightly   :     Data reviewed (unless otherwise discussed in assessment/plan)  EKG:  I have reviewed the EKG with the following interpretation normal sinus rhythm:   Imaging: I have reviewed foot x-rays with the following interpretation: No fracture or dislocation, no soft tissue emphysema, noted previous amputation sites at this area phalangeal joint.  Labs: Reviewed, see chart and plan above.       =======================================================================    SUBJECTIVE    Chief Complaint: Left foot infection    History Of Present Illness:  Osmani Galeano is a 54 y.o. male with PMHx of hypertension, hyperlipidemia, coronary artery disease, type 2 diabetes mellitus who presents to [facility] with an infection of the left lower extremity.  Patient stated that he had had a sore on his left foot for the past month and was supposed to go in for a follow-up with Dr. Andujar this Wednesday.  Patient stated that he noticed the wound started draining and smelling and he got a fever with chills.  Patient was worried that he had a severe infection and decided to come to the

## 2025-01-06 NOTE — PROGRESS NOTES
Pt admitted to 6a 5 from ed. Pt alert and oriented x4. Respirations even and unlabored. Iv infusing into r ac. Oriented pt to room, call light within reach, bed in lowest position and wheels locked, bed alarm on, care board updated and went over pt,  Informed of hourly rounding.

## 2025-01-06 NOTE — PLAN OF CARE
Patient was seen by podiatric resident while in the emergency department.  Patient is well-known to Dr. Andujar's service having multiple surgeries to the bilateral lower extremity.  Patient has expressed interest for left fifth toe amputation, he will be scheduled for same tomorrow, 1/7/2025 at or around 3:30 PM with Dr. Andujar.  Will place consent for procedure, hold anticoagulants, n.p.o. midnight.    Adolph Ponce DPM PGY-3  1/6/2025  5:22 PM

## 2025-01-07 ENCOUNTER — APPOINTMENT (OUTPATIENT)
Dept: INTERVENTIONAL RADIOLOGY/VASCULAR | Age: 55
DRG: 617 | End: 2025-01-07
Payer: MEDICARE

## 2025-01-07 ENCOUNTER — ANESTHESIA EVENT (OUTPATIENT)
Dept: OPERATING ROOM | Age: 55
DRG: 617 | End: 2025-01-07
Payer: MEDICARE

## 2025-01-07 ENCOUNTER — ANESTHESIA (OUTPATIENT)
Dept: OPERATING ROOM | Age: 55
DRG: 617 | End: 2025-01-07
Payer: MEDICARE

## 2025-01-07 LAB
ALBUMIN SERPL BCG-MCNC: 4.1 G/DL (ref 3.5–5.1)
ALP SERPL-CCNC: 143 U/L (ref 38–126)
ALT SERPL W/O P-5'-P-CCNC: 43 U/L (ref 11–66)
ANION GAP SERPL CALC-SCNC: 9 MEQ/L (ref 8–16)
AST SERPL-CCNC: 34 U/L (ref 5–40)
BASOPHILS ABSOLUTE: 0.1 THOU/MM3 (ref 0–0.1)
BASOPHILS NFR BLD AUTO: 0.8 %
BILIRUB CONJ SERPL-MCNC: < 0.1 MG/DL (ref 0.1–13.8)
BILIRUB SERPL-MCNC: 0.6 MG/DL (ref 0.3–1.2)
BUN SERPL-MCNC: 13 MG/DL (ref 7–22)
CALCIUM SERPL-MCNC: 9.2 MG/DL (ref 8.5–10.5)
CHLORIDE SERPL-SCNC: 105 MEQ/L (ref 98–111)
CO2 SERPL-SCNC: 27 MEQ/L (ref 23–33)
CREAT SERPL-MCNC: 0.9 MG/DL (ref 0.4–1.2)
DEPRECATED MEAN GLUCOSE BLD GHB EST-ACNC: 108 MG/DL (ref 70–126)
DEPRECATED RDW RBC AUTO: 42.5 FL (ref 35–45)
EOSINOPHIL NFR BLD AUTO: 3.3 %
EOSINOPHILS ABSOLUTE: 0.3 THOU/MM3 (ref 0–0.4)
ERYTHROCYTE [DISTWIDTH] IN BLOOD BY AUTOMATED COUNT: 13 % (ref 11.5–14.5)
GFR SERPL CREATININE-BSD FRML MDRD: > 90 ML/MIN/1.73M2
GLUCOSE BLD STRIP.AUTO-MCNC: 102 MG/DL (ref 70–108)
GLUCOSE BLD STRIP.AUTO-MCNC: 125 MG/DL (ref 70–108)
GLUCOSE BLD STRIP.AUTO-MCNC: 198 MG/DL (ref 70–108)
GLUCOSE SERPL-MCNC: 122 MG/DL (ref 70–108)
HBA1C MFR BLD HPLC: 5.6 % (ref 4.4–6.4)
HCT VFR BLD AUTO: 45.2 % (ref 42–52)
HGB BLD-MCNC: 15.3 GM/DL (ref 14–18)
IMM GRANULOCYTES # BLD AUTO: 0.06 THOU/MM3 (ref 0–0.07)
IMM GRANULOCYTES NFR BLD AUTO: 0.7 %
LYMPHOCYTES ABSOLUTE: 1.7 THOU/MM3 (ref 1–4.8)
LYMPHOCYTES NFR BLD AUTO: 20.5 %
MCH RBC QN AUTO: 30.1 PG (ref 26–33)
MCHC RBC AUTO-ENTMCNC: 33.8 GM/DL (ref 32.2–35.5)
MCV RBC AUTO: 88.8 FL (ref 80–94)
MONOCYTES ABSOLUTE: 0.6 THOU/MM3 (ref 0.4–1.3)
MONOCYTES NFR BLD AUTO: 7.3 %
MRSA DNA SPEC QL NAA+PROBE: NEGATIVE
NEUTROPHILS ABSOLUTE: 5.7 THOU/MM3 (ref 1.8–7.7)
NEUTROPHILS NFR BLD AUTO: 67.4 %
NRBC BLD AUTO-RTO: 0 /100 WBC
PLATELET # BLD AUTO: 231 THOU/MM3 (ref 130–400)
PMV BLD AUTO: 10.9 FL (ref 9.4–12.4)
POTASSIUM SERPL-SCNC: 4.8 MEQ/L (ref 3.5–5.2)
PROT SERPL-MCNC: 6.3 G/DL (ref 6.1–8)
RBC # BLD AUTO: 5.09 MILL/MM3 (ref 4.7–6.1)
SODIUM SERPL-SCNC: 141 MEQ/L (ref 135–145)
WBC # BLD AUTO: 8.5 THOU/MM3 (ref 4.8–10.8)

## 2025-01-07 PROCEDURE — 6370000000 HC RX 637 (ALT 250 FOR IP)

## 2025-01-07 PROCEDURE — 2500000003 HC RX 250 WO HCPCS: Performed by: REGISTERED NURSE

## 2025-01-07 PROCEDURE — 6360000002 HC RX W HCPCS: Performed by: REGISTERED NURSE

## 2025-01-07 PROCEDURE — 99232 SBSQ HOSP IP/OBS MODERATE 35: CPT | Performed by: HOSPITALIST

## 2025-01-07 PROCEDURE — 2580000003 HC RX 258: Performed by: REGISTERED NURSE

## 2025-01-07 PROCEDURE — 80053 COMPREHEN METABOLIC PANEL: CPT

## 2025-01-07 PROCEDURE — 1200000000 HC SEMI PRIVATE

## 2025-01-07 PROCEDURE — 82248 BILIRUBIN DIRECT: CPT

## 2025-01-07 PROCEDURE — 3700000001 HC ADD 15 MINUTES (ANESTHESIA): Performed by: PODIATRIST

## 2025-01-07 PROCEDURE — 87205 SMEAR GRAM STAIN: CPT

## 2025-01-07 PROCEDURE — 83036 HEMOGLOBIN GLYCOSYLATED A1C: CPT

## 2025-01-07 PROCEDURE — 2709999900 HC NON-CHARGEABLE SUPPLY: Performed by: PODIATRIST

## 2025-01-07 PROCEDURE — 6360000002 HC RX W HCPCS: Performed by: PODIATRIST

## 2025-01-07 PROCEDURE — 87075 CULTR BACTERIA EXCEPT BLOOD: CPT

## 2025-01-07 PROCEDURE — 3600000012 HC SURGERY LEVEL 2 ADDTL 15MIN: Performed by: PODIATRIST

## 2025-01-07 PROCEDURE — 85025 COMPLETE CBC W/AUTO DIFF WBC: CPT

## 2025-01-07 PROCEDURE — 94640 AIRWAY INHALATION TREATMENT: CPT

## 2025-01-07 PROCEDURE — 0Y6Y0Z0 DETACHMENT AT LEFT 5TH TOE, COMPLETE, OPEN APPROACH: ICD-10-PCS

## 2025-01-07 PROCEDURE — 93925 LOWER EXTREMITY STUDY: CPT

## 2025-01-07 PROCEDURE — 87070 CULTURE OTHR SPECIMN AEROBIC: CPT

## 2025-01-07 PROCEDURE — 94761 N-INVAS EAR/PLS OXIMETRY MLT: CPT

## 2025-01-07 PROCEDURE — 3700000000 HC ANESTHESIA ATTENDED CARE: Performed by: PODIATRIST

## 2025-01-07 PROCEDURE — 3600000002 HC SURGERY LEVEL 2 BASE: Performed by: PODIATRIST

## 2025-01-07 PROCEDURE — 2500000003 HC RX 250 WO HCPCS

## 2025-01-07 PROCEDURE — 6370000000 HC RX 637 (ALT 250 FOR IP): Performed by: FAMILY MEDICINE

## 2025-01-07 PROCEDURE — 6360000002 HC RX W HCPCS

## 2025-01-07 PROCEDURE — 36415 COLL VENOUS BLD VENIPUNCTURE: CPT

## 2025-01-07 PROCEDURE — 2580000003 HC RX 258

## 2025-01-07 PROCEDURE — 82948 REAGENT STRIP/BLOOD GLUCOSE: CPT

## 2025-01-07 PROCEDURE — 6360000002 HC RX W HCPCS: Performed by: FAMILY MEDICINE

## 2025-01-07 RX ORDER — OXYCODONE AND ACETAMINOPHEN 5; 325 MG/1; MG/1
1 TABLET ORAL EVERY 4 HOURS PRN
Status: DISCONTINUED | OUTPATIENT
Start: 2025-01-07 | End: 2025-01-08 | Stop reason: HOSPADM

## 2025-01-07 RX ORDER — SODIUM CHLORIDE 9 MG/ML
INJECTION, SOLUTION INTRAVENOUS
Status: DISCONTINUED | OUTPATIENT
Start: 2025-01-07 | End: 2025-01-07 | Stop reason: SDUPTHER

## 2025-01-07 RX ORDER — MIDAZOLAM HYDROCHLORIDE 1 MG/ML
INJECTION, SOLUTION INTRAMUSCULAR; INTRAVENOUS
Status: DISCONTINUED | OUTPATIENT
Start: 2025-01-07 | End: 2025-01-07 | Stop reason: SDUPTHER

## 2025-01-07 RX ORDER — LIDOCAINE HYDROCHLORIDE 20 MG/ML
INJECTION, SOLUTION INTRAVENOUS
Status: DISCONTINUED | OUTPATIENT
Start: 2025-01-07 | End: 2025-01-07 | Stop reason: SDUPTHER

## 2025-01-07 RX ORDER — BUPIVACAINE HYDROCHLORIDE 5 MG/ML
INJECTION, SOLUTION EPIDURAL; INTRACAUDAL PRN
Status: DISCONTINUED | OUTPATIENT
Start: 2025-01-07 | End: 2025-01-07 | Stop reason: ALTCHOICE

## 2025-01-07 RX ORDER — PROPOFOL 10 MG/ML
INJECTION, EMULSION INTRAVENOUS
Status: DISCONTINUED | OUTPATIENT
Start: 2025-01-07 | End: 2025-01-07 | Stop reason: SDUPTHER

## 2025-01-07 RX ADMIN — CEFEPIME 2000 MG: 2 INJECTION, POWDER, FOR SOLUTION INTRAVENOUS at 14:39

## 2025-01-07 RX ADMIN — OXYCODONE HYDROCHLORIDE AND ACETAMINOPHEN 1 TABLET: 5; 325 TABLET ORAL at 19:20

## 2025-01-07 RX ADMIN — LIDOCAINE HYDROCHLORIDE 50 MG: 20 INJECTION, SOLUTION INTRAVENOUS at 16:15

## 2025-01-07 RX ADMIN — CETIRIZINE HYDROCHLORIDE 10 MG: 10 TABLET, FILM COATED ORAL at 09:27

## 2025-01-07 RX ADMIN — KETOROLAC TROMETHAMINE 15 MG: 30 INJECTION, SOLUTION INTRAMUSCULAR at 13:07

## 2025-01-07 RX ADMIN — BUSPIRONE HYDROCHLORIDE 10 MG: 10 TABLET ORAL at 09:27

## 2025-01-07 RX ADMIN — PROPOFOL 100 MCG/KG/MIN: 10 INJECTION, EMULSION INTRAVENOUS at 16:15

## 2025-01-07 RX ADMIN — Medication 5000 UNITS: at 09:27

## 2025-01-07 RX ADMIN — SODIUM CHLORIDE, PRESERVATIVE FREE 10 ML: 5 INJECTION INTRAVENOUS at 09:27

## 2025-01-07 RX ADMIN — TIOTROPIUM BROMIDE AND OLODATEROL 2 PUFF: 3.124; 2.736 SPRAY, METERED RESPIRATORY (INHALATION) at 08:19

## 2025-01-07 RX ADMIN — CEFEPIME 2000 MG: 2 INJECTION, POWDER, FOR SOLUTION INTRAVENOUS at 03:21

## 2025-01-07 RX ADMIN — SODIUM CHLORIDE: 9 INJECTION, SOLUTION INTRAVENOUS at 16:10

## 2025-01-07 RX ADMIN — Medication 25 MG: at 16:20

## 2025-01-07 RX ADMIN — CYCLOBENZAPRINE 5 MG: 10 TABLET, FILM COATED ORAL at 17:29

## 2025-01-07 RX ADMIN — KETOROLAC TROMETHAMINE 15 MG: 30 INJECTION, SOLUTION INTRAMUSCULAR at 03:23

## 2025-01-07 RX ADMIN — PANTOPRAZOLE SODIUM 40 MG: 40 TABLET, DELAYED RELEASE ORAL at 09:27

## 2025-01-07 RX ADMIN — MIDAZOLAM 2 MG: 1 INJECTION INTRAMUSCULAR; INTRAVENOUS at 16:13

## 2025-01-07 RX ADMIN — Medication 25 MG: at 16:15

## 2025-01-07 RX ADMIN — INSULIN LISPRO 2 UNITS: 100 INJECTION, SOLUTION INTRAVENOUS; SUBCUTANEOUS at 20:07

## 2025-01-07 RX ADMIN — BUSPIRONE HYDROCHLORIDE 10 MG: 10 TABLET ORAL at 15:10

## 2025-01-07 ASSESSMENT — PAIN DESCRIPTION - ORIENTATION
ORIENTATION: LEFT
ORIENTATION: LEFT
ORIENTATION: RIGHT;LEFT
ORIENTATION: RIGHT;LOWER
ORIENTATION: LOWER
ORIENTATION: RIGHT;LEFT

## 2025-01-07 ASSESSMENT — PAIN SCALES - GENERAL
PAINLEVEL_OUTOF10: 8
PAINLEVEL_OUTOF10: 7
PAINLEVEL_OUTOF10: 8
PAINLEVEL_OUTOF10: 7
PAINLEVEL_OUTOF10: 8

## 2025-01-07 ASSESSMENT — PAIN SCALES - WONG BAKER
WONGBAKER_NUMERICALRESPONSE: HURTS A LITTLE BIT
WONGBAKER_NUMERICALRESPONSE: HURTS A LITTLE BIT

## 2025-01-07 ASSESSMENT — PAIN DESCRIPTION - ONSET
ONSET: ON-GOING
ONSET: ON-GOING

## 2025-01-07 ASSESSMENT — PAIN DESCRIPTION - LOCATION
LOCATION: BACK
LOCATION: TOE (COMMENT WHICH ONE)
LOCATION: BACK;HIP
LOCATION: HIP
LOCATION: FOOT
LOCATION: HIP

## 2025-01-07 ASSESSMENT — PAIN DESCRIPTION - DESCRIPTORS
DESCRIPTORS: ACHING
DESCRIPTORS: ACHING
DESCRIPTORS: ACHING;THROBBING
DESCRIPTORS: ACHING

## 2025-01-07 ASSESSMENT — PAIN - FUNCTIONAL ASSESSMENT
PAIN_FUNCTIONAL_ASSESSMENT: ACTIVITIES ARE NOT PREVENTED
PAIN_FUNCTIONAL_ASSESSMENT: PREVENTS OR INTERFERES SOME ACTIVE ACTIVITIES AND ADLS
PAIN_FUNCTIONAL_ASSESSMENT: ACTIVITIES ARE NOT PREVENTED
PAIN_FUNCTIONAL_ASSESSMENT: ACTIVITIES ARE NOT PREVENTED
PAIN_FUNCTIONAL_ASSESSMENT: PREVENTS OR INTERFERES SOME ACTIVE ACTIVITIES AND ADLS

## 2025-01-07 ASSESSMENT — PAIN DESCRIPTION - PAIN TYPE: TYPE: CHRONIC PAIN

## 2025-01-07 ASSESSMENT — PAIN DESCRIPTION - FREQUENCY
FREQUENCY: CONTINUOUS
FREQUENCY: CONTINUOUS

## 2025-01-07 NOTE — PROGRESS NOTES
Physician Progress Note      PATIENT:               SIMI GAVIN  CSN #:                  [id number]  :                       1970  ADMIT DATE:       2025 1:09 PM  DISCH DATE:  RESPONDING  PROVIDER #:        Laura Mckoy MD          QUERY TEXT:    Pt admitted with Left foot infection. Pt noted to have cellulitis, Pulse 98,   WBC 14, LA 2.7. If possible, please document in the progress notes and   discharge summary if you are evaluating and /or treating any of the following:  The medical record reflects the following:  Risk Factors: Left foot infection  Clinical Indicators: Pulse 98, /55, WBC 14.1, LA 2.7  Treatment: vancomycin, ceFEPIme, X-ray foot  Options provided:  -- Sepsis, present on admission  -- Other - I will add my own diagnosis  -- Disagree - Not applicable / Not valid  -- Disagree - Clinically unable to determine / Unknown  -- Refer to Clinical Documentation Reviewer    PROVIDER RESPONSE TEXT:    Provider disagreed with this query.  Pt does not have sepsis    Query created by: Martina Zapata on 2025 12:22 PM      Electronically signed by:  Laura Mckoy MD 2025 5:39 PM

## 2025-01-07 NOTE — PROGRESS NOTES
Hospitalist Progress Note    Patient:  Osmani Galeano      Unit/Bed:6A-05/005-A    YOB: 1970    MRN: [id number]       Acct: [id number]     PCP: Elvie Shukla APRN - CNP    Date of Admission: 1/6/2025    Assessment/Plan:    Left foot cellulitis with fifth digit infection: Patient follows with podiatry was recently discharged from hospital for left foot cellulitis.  Currently on IV antibiotics.  He is n.p.o. plan for left fifth digit amputation by podiatry later today.  Vascular studies done shows good pulsatility.  COPD without exacerbation: Continue with home bronchodilators  Type 2 diabetes: Patient is on maunjaro at home, continue as needed sliding scale  HTN: continue with guanfacine and monitor bp  GERD: PPI  VTE prophylaxis: Lovenox on hold for surgery.          Subjective (past 24 hours):   Patient seen and examined at bedside, states he is doing well he is eager to have surgery.  No new complaints or acute overnight events      Medications:  Reviewed    Infusion Medications    sodium chloride       Scheduled Medications    cefepime  2,000 mg IntraVENous Q12H    insulin lispro  0-8 Units SubCUTAneous 4x Daily AC & HS    Vitamin D  5,000 Units Oral Daily    busPIRone  10 mg Oral BID    guanFACINE  1 mg Oral Nightly    cetirizine  10 mg Oral Daily    pantoprazole  40 mg Oral Daily    sodium chloride flush  5-40 mL IntraVENous 2 times per day    tiotropium-olodaterol  2 puff Inhalation Daily     PRN Meds: sodium chloride flush, sodium chloride, potassium chloride **OR** potassium alternative oral replacement **OR** potassium chloride, magnesium sulfate, ondansetron **OR** ondansetron, polyethylene glycol, acetaminophen **OR** acetaminophen, cyclobenzaprine, albuterol sulfate HFA, ketorolac      Intake/Output Summary (Last 24 hours) at 1/7/2025 1315  Last data filed at 1/7/2025 0326  Gross per 24 hour   Intake 1083.52 ml   Output 450 ml   Net 633.52 ml       Diet:  Diet NPO Exceptions

## 2025-01-07 NOTE — ACP (ADVANCE CARE PLANNING)
Advance Care Planning     Advance Care Planning Inpatient Note  [facility] Department    Today's Date: 1/7/2025  Unit: STRZ 6A PEDI/MED SURG    Received request from IDT Member.  Upon review of chart and communication with care team, patient's decision making abilities are not in question.. Patient was/were present in the room during visit.    Goals of ACP Conversation:  Discuss advance care planning documents    Health Care Decision Makers:       Primary Decision Maker: Ulysses Galeano - Brother/Sister - [phone]  Summary:  Completed New Documents    Advance Care Planning Documents (Patient Wishes):  Healthcare Power of /Advance Directive Appointment of Health Care Agent  Living Will/Advance Directive     Assessment:  Advanced Directives Consult: It was completed and filed. Prayer appreciated.    Interventions:  Assisted in the completion of documents according to patient's wishes at this time        Outcomes/Plan:  New advance directive completed.    Electronically signed by WILLIAM Zamarripa on 1/7/2025 at 3:58 PM

## 2025-01-07 NOTE — PLAN OF CARE
Problem: Chronic Conditions and Co-morbidities  Goal: Patient's chronic conditions and co-morbidity symptoms are monitored and maintained or improved  Outcome: Progressing  Flowsheets (Taken 1/7/2025 0105 by Tina Linares, RN)  Care Plan - Patient's Chronic Conditions and Co-Morbidity Symptoms are Monitored and Maintained or Improved:   Monitor and assess patient's chronic conditions and comorbid symptoms for stability, deterioration, or improvement   Collaborate with multidisciplinary team to address chronic and comorbid conditions and prevent exacerbation or deterioration   Update acute care plan with appropriate goals if chronic or comorbid symptoms are exacerbated and prevent overall improvement and discharge     Problem: Discharge Planning  Goal: Discharge to home or other facility with appropriate resources  Outcome: Progressing  Flowsheets (Taken 1/7/2025 0105 by Tina Linares, RN)  Discharge to home or other facility with appropriate resources:   Identify barriers to discharge with patient and caregiver   Identify discharge learning needs (meds, wound care, etc)   Arrange for needed discharge resources and transportation as appropriate     Problem: Pain  Goal: Verbalizes/displays adequate comfort level or baseline comfort level  Outcome: Progressing  Flowsheets (Taken 1/7/2025 0105 by Tina Linares, RN)  Verbalizes/displays adequate comfort level or baseline comfort level:   Encourage patient to monitor pain and request assistance   Administer analgesics based on type and severity of pain and evaluate response   Assess pain using appropriate pain scale   Implement non-pharmacological measures as appropriate and evaluate response     Problem: Safety - Adult  Goal: Free from fall injury  Outcome: Progressing  Flowsheets (Taken 1/7/2025 0105 by Tina Linares, RN)  Free From Fall Injury: Instruct family/caregiver on patient safety     Problem: Skin/Tissue Integrity  Goal: Absence of new skin breakdown  Description: 1.

## 2025-01-07 NOTE — ANESTHESIA POSTPROCEDURE EVALUATION
Department of Anesthesiology  Postprocedure Note    Patient: Osmani Galeano  MRN: [id number]  YOB: 1970  Date of evaluation: 1/7/2025    Procedure Summary       Date: 01/07/25 Room / Location: [facility] OR  / [facility] OR    Anesthesia Start: 1610 Anesthesia Stop: 1645    Procedure: LEFT DISTAL TOE AMPUTATION (Left: Toes) Diagnosis:       Left foot infection      (Left foot infection [L08.9])    Surgeons: Crispin Andujar DPM Responsible Provider: Melvin Esqueda MD    Anesthesia Type: general ASA Status: 3            Anesthesia Type: No value filed.    Garrett Phase I:      Garrett Phase II:      Anesthesia Post Evaluation    Comments: [facility]  POST-ANESTHESIA NOTE       Name:  Osmani Galenao                                         Age:  54 y.o.  MRN:  [id number]      Last Vitals:  /85   Pulse 75   Temp 98 °F (36.7 °C) (Oral)   Resp 18   Ht 1.854 m (6' 1\")   Wt (!) 155.6 kg (343 lb)   SpO2 98%   BMI 45.25 kg/m²   Patient Vitals in the past 4 hrs:  01/07/25 1700, Temp:98 °F (36.7 °C), Temp src:Oral, Pulse:75, Resp:18, SpO2:98 %  01/07/25 1517, BP:127/85, Temp:97.6 °F (36.4 °C), Temp src:Oral, Pulse:70, Resp:18, SpO2:100 %    Level of Consciousness:  Awake    Respiratory:  Stable    Oxygen Saturation:  Stable    Cardiovascular:  Stable    Hydration:  Adequate    PONV:  Stable    Post-op Pain:  Adequate analgesia    Post-op Assessment:  No apparent anesthetic complications    Additional Follow-Up / Treatment / Comment:  None    Adolph Rosen DO  January 7, 2025   6:24 PM      No notable events documented.

## 2025-01-07 NOTE — CARE COORDINATION
DISCHARGE PLANNING EVALUATION  1/7/25, 3:41 PM EST    Reason for Referral: current with home health  Decision Maker: makes own decisions  Current Services: current with [facility]  New Services Requested: none, continuing with current services   Family/ Social/ Home environment: Osmani lives at home alone, has been managing care, has limited transportation options  Payment Source:[employer] medicare  Transportation at Discharge: ambulette  Post-acute (PAC) provider list was provided to patient. Patient was informed of their freedom to choose PAC provider. Discussed and offered to show the patient the relevant PAC Providers quality and resource use measures on Medicare Compare web site via computer based on patient's goals of care and treatment preferences. Questions regarding selection process were answered.  Declined list, prefers current HH      Teach Back Method used with patient regarding care plan and discharge plan  Patient  verbalized understanding of the plan of care and contribute to goal setting.       Patient preferences and discharge plan: spoke with patient about discharge plan.  He plans home alone, has some support from his mother.   He is current with [facility], confirmed with agency.   He is requesting ambulette transport home at discharge, has medicaid benefit for transport.     Electronically signed by SONIA Marques on 1/7/2025 at 3:41 PM
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
## 2025-01-07 NOTE — BRIEF OP NOTE
Brief Postoperative Note      Patient: Osmani Galeano  YOB: 1970  MRN: [id number]    Date of Procedure: 1/7/2025    Pre-Op Diagnosis Codes:      * Left foot infection [L08.9]    Post-Op Diagnosis: Same       Procedure(s):  LEFT FIFTH TOE AMPUTATION    Surgeon(s):  Crispin Andujar DPM    Assistant:  Resident: Adolph Ponce DPM    Anesthesia: Monitor Anesthesia Care    Estimated Blood Loss (mL): Minimal    Complications: None    Specimens:   ID Type Source Tests Collected by Time Destination   1 : left fifth toe tissue Tissue Toe CULTURE, ANAEROBIC AND AEROBIC Crispin Andujar DPM 1/7/2025 1631    A : left f ifth toe Tissue Toe SURGICAL PATHOLOGY Con, Crispin SHAFFER DPM 1/7/2025 1630        Implants:  * No implants in log *      Drains: * No LDAs found *    Findings:  Infection Present At Time Of Surgery (PATOS) (choose all levels that have infection present):  - Deep Infection (muscle/fascia) present as evidenced by fluid consistent with infection  Other Findings: Consistent with pre-op      Injectables: 20 cc of 0.5% Marcaine plain     Materials: 3-0 Vicryl, 4-0 Monocryl        Electronically signed by Adolph Ponce DPM on 1/7/2025 at 4:47 PM

## 2025-01-07 NOTE — OP NOTE
Operative Note      Patient: Osmani Galeano  YOB: 1970  MRN: [id number]    Date of Procedure: 1/7/2025    Pre-Op Diagnosis Codes:      * Left foot infection [L08.9]    Post-Op Diagnosis: Same       Procedure(s):  LEFT FIFTH TOE AMPUTATION    Surgeon(s):  Crispin Andujar DPM    Assistant:  Resident: Adolph Ponce DPM    Anesthesia: Monitor Anesthesia Care    Estimated Blood Loss (mL): Minimal    Complications: None    Specimens:   ID Type Source Tests Collected by Time Destination   1 : left fifth toe tissue Tissue Toe CULTURE, ANAEROBIC AND AEROBIC Crispin Andujar DPM 1/7/2025 1631    A : left f ifth toe Tissue Toe SURGICAL PATHOLOGY Con, Crispin SHAFFER DPM 1/7/2025 1630        Implants:  * No implants in log *      Drains: * No LDAs found *    Findings:  Infection Present At Time Of Surgery (PATOS) (choose all levels that have infection present):  - Deep Infection (muscle/fascia) present as evidenced by fluid consistent with infection  Other Findings: Consistent with pre-op      Injectables: 20 cc of 0.5% Marcaine plain     Materials: 3-0 Vicryl, 4-0 Monocryl    Detailed Description of Procedure:   Patient is a 54-year-old male with chief complaint of nonhealing left fifth toe ulceration.  Patient is well-known to Dr. Andujar service having gone to multiple surgical interventions in the form of amputations to the left foot and the right leg.  He most recently had a debridement of that left foot with now new worsening of the ulceration on the fifth toe.  At this time all conservative measures have been exhausted and patient elected to move forward with the surgical intervention in the form of a left fifth toe amputation.  All risk and benefits were explained to patient including infection, need for further surgery, blood clots and/or DVT, acute and/or chronic pain, loss of limb, loss of life.  No guarantees been made in regards surgical outcome.    Patient was seen preoperatively in holding and the

## 2025-01-07 NOTE — CARE COORDINATION
Case Management Assessment Initial Evaluation    Date/Time of Evaluation: 1/7/2025 1:57 PM  Assessment Completed by: Aziza Ledesma RN    If patient is discharged prior to next notation, then this note serves as note for discharge by case management.    Patient Name: Osmani Galeano                   YOB: 1970  Diagnosis: Cellulitis of toe of left foot [L03.032]  Left foot infection [L08.9]                   Date / Time: 1/6/2025  1:09 PM  Location: [facility]05/005     Patient Admission Status: Inpatient   Readmission Risk Low 0-14, Mod 15-19), High > 20: Readmission Risk Score: 11.1    Current PCP: Elvie Shukla APRN - CNP  Health Care Decision Makers:     Additional Case Management Notes: Admit from ER with L foot pain. Podiatry consulted. Planning to take patient to OR today.     Procedures: none    Imaging: no acute findings.     Patient Goals/Plan/Treatment Preferences: Planning to return to his apartment alone. Has DME, see below, including hover round and wheelchair. He states he will need transportation home. Verified address on file. He does not have his wheelchair here.  from [facility] he states helps at times with transportation, but she will not be available later today or tomorrow. Reports he is current with [facility] for wound care. SW consulted. Spiritual care consulted to assist with updating POA.            01/07/25 1276   Service Assessment   Patient Orientation Alert and Oriented   Cognition Alert   History Provided By Patient   Primary Caregiver Self   Accompanied By/Relationship alone   Support Systems Family Members;/   Patient's Healthcare Decision Maker is: Named in Scanned ACP Document  (needs to update from ex wife who is currently named, spiritual care consulted.)   PCP Verified by CM Yes   Prior Functional Level Independent in ADLs/IADLs   Current Functional Level Independent in ADLs/IADLs   Can patient return to

## 2025-01-07 NOTE — H&P
Teaching Note:    I was present with the resident during the visit.  I discussed the case with the resident and agree with the findings and the plan as documented in the residents note.    Crispin Andujar DPM, FACFAS  Podiatric Medicine & Surgery       [facility]

## 2025-01-07 NOTE — PLAN OF CARE
Problem: Chronic Conditions and Co-morbidities  Goal: Patient's chronic conditions and co-morbidity symptoms are monitored and maintained or improved  Outcome: Progressing  Flowsheets (Taken 1/7/2025 0105)  Care Plan - Patient's Chronic Conditions and Co-Morbidity Symptoms are Monitored and Maintained or Improved:   Monitor and assess patient's chronic conditions and comorbid symptoms for stability, deterioration, or improvement   Collaborate with multidisciplinary team to address chronic and comorbid conditions and prevent exacerbation or deterioration   Update acute care plan with appropriate goals if chronic or comorbid symptoms are exacerbated and prevent overall improvement and discharge     Problem: Discharge Planning  Goal: Discharge to home or other facility with appropriate resources  Outcome: Progressing  Flowsheets (Taken 1/7/2025 0105)  Discharge to home or other facility with appropriate resources:   Identify barriers to discharge with patient and caregiver   Identify discharge learning needs (meds, wound care, etc)   Arrange for needed discharge resources and transportation as appropriate     Problem: Pain  Goal: Verbalizes/displays adequate comfort level or baseline comfort level  Outcome: Progressing  Flowsheets (Taken 1/7/2025 0105)  Verbalizes/displays adequate comfort level or baseline comfort level:   Encourage patient to monitor pain and request assistance   Administer analgesics based on type and severity of pain and evaluate response   Assess pain using appropriate pain scale   Implement non-pharmacological measures as appropriate and evaluate response     Problem: Safety - Adult  Goal: Free from fall injury  Outcome: Progressing  Flowsheets (Taken 1/7/2025 0105)  Free From Fall Injury: Instruct family/caregiver on patient safety     Problem: Skin/Tissue Integrity  Goal: Absence of new skin breakdown  Description: 1.  Monitor for areas of redness and/or skin breakdown  2.  Assess vascular

## 2025-01-08 VITALS
HEART RATE: 68 BPM | SYSTOLIC BLOOD PRESSURE: 115 MMHG | BODY MASS INDEX: 41.75 KG/M2 | DIASTOLIC BLOOD PRESSURE: 70 MMHG | TEMPERATURE: 98 F | HEIGHT: 73 IN | WEIGHT: 315 LBS | RESPIRATION RATE: 20 BRPM | OXYGEN SATURATION: 100 %

## 2025-01-08 LAB
ANION GAP SERPL CALC-SCNC: 14 MEQ/L (ref 8–16)
BASOPHILS ABSOLUTE: 0.1 THOU/MM3 (ref 0–0.1)
BASOPHILS NFR BLD AUTO: 0.6 %
BUN SERPL-MCNC: 12 MG/DL (ref 7–22)
CALCIUM SERPL-MCNC: 8.8 MG/DL (ref 8.5–10.5)
CHLORIDE SERPL-SCNC: 106 MEQ/L (ref 98–111)
CO2 SERPL-SCNC: 21 MEQ/L (ref 23–33)
CREAT SERPL-MCNC: 0.8 MG/DL (ref 0.4–1.2)
DEPRECATED RDW RBC AUTO: 41.1 FL (ref 35–45)
EOSINOPHIL NFR BLD AUTO: 4 %
EOSINOPHILS ABSOLUTE: 0.3 THOU/MM3 (ref 0–0.4)
ERYTHROCYTE [DISTWIDTH] IN BLOOD BY AUTOMATED COUNT: 12.8 % (ref 11.5–14.5)
GFR SERPL CREATININE-BSD FRML MDRD: > 90 ML/MIN/1.73M2
GLUCOSE BLD STRIP.AUTO-MCNC: 116 MG/DL (ref 70–108)
GLUCOSE BLD STRIP.AUTO-MCNC: 134 MG/DL (ref 70–108)
GLUCOSE SERPL-MCNC: 139 MG/DL (ref 70–108)
HCT VFR BLD AUTO: 41.3 % (ref 42–52)
HGB BLD-MCNC: 14.1 GM/DL (ref 14–18)
IMM GRANULOCYTES # BLD AUTO: 0.04 THOU/MM3 (ref 0–0.07)
IMM GRANULOCYTES NFR BLD AUTO: 0.5 %
LYMPHOCYTES ABSOLUTE: 2.3 THOU/MM3 (ref 1–4.8)
LYMPHOCYTES NFR BLD AUTO: 27.4 %
MCH RBC QN AUTO: 29.9 PG (ref 26–33)
MCHC RBC AUTO-ENTMCNC: 34.1 GM/DL (ref 32.2–35.5)
MCV RBC AUTO: 87.7 FL (ref 80–94)
MONOCYTES ABSOLUTE: 0.7 THOU/MM3 (ref 0.4–1.3)
MONOCYTES NFR BLD AUTO: 7.9 %
NEUTROPHILS ABSOLUTE: 5 THOU/MM3 (ref 1.8–7.7)
NEUTROPHILS NFR BLD AUTO: 59.6 %
NRBC BLD AUTO-RTO: 0 /100 WBC
PLATELET # BLD AUTO: 201 THOU/MM3 (ref 130–400)
PMV BLD AUTO: 10.5 FL (ref 9.4–12.4)
POTASSIUM SERPL-SCNC: 4.3 MEQ/L (ref 3.5–5.2)
RBC # BLD AUTO: 4.71 MILL/MM3 (ref 4.7–6.1)
SODIUM SERPL-SCNC: 141 MEQ/L (ref 135–145)
WBC # BLD AUTO: 8.4 THOU/MM3 (ref 4.8–10.8)

## 2025-01-08 PROCEDURE — 6370000000 HC RX 637 (ALT 250 FOR IP)

## 2025-01-08 PROCEDURE — 2580000003 HC RX 258

## 2025-01-08 PROCEDURE — 99232 SBSQ HOSP IP/OBS MODERATE 35: CPT | Performed by: HOSPITALIST

## 2025-01-08 PROCEDURE — 80048 BASIC METABOLIC PNL TOTAL CA: CPT

## 2025-01-08 PROCEDURE — 6360000002 HC RX W HCPCS

## 2025-01-08 PROCEDURE — 85025 COMPLETE CBC W/AUTO DIFF WBC: CPT

## 2025-01-08 PROCEDURE — 82948 REAGENT STRIP/BLOOD GLUCOSE: CPT

## 2025-01-08 PROCEDURE — 94640 AIRWAY INHALATION TREATMENT: CPT

## 2025-01-08 PROCEDURE — 36415 COLL VENOUS BLD VENIPUNCTURE: CPT

## 2025-01-08 RX ORDER — OXYCODONE AND ACETAMINOPHEN 5; 325 MG/1; MG/1
1 TABLET ORAL EVERY 6 HOURS PRN
Qty: 10 TABLET | Refills: 0 | Status: SHIPPED | OUTPATIENT
Start: 2025-01-08 | End: 2025-01-11

## 2025-01-08 RX ORDER — DOXYCYCLINE HYCLATE 100 MG
100 TABLET ORAL 2 TIMES DAILY
Qty: 14 TABLET | Refills: 0 | Status: SHIPPED | OUTPATIENT
Start: 2025-01-08 | End: 2025-01-15

## 2025-01-08 RX ADMIN — OXYCODONE HYDROCHLORIDE AND ACETAMINOPHEN 1 TABLET: 5; 325 TABLET ORAL at 12:47

## 2025-01-08 RX ADMIN — CETIRIZINE HYDROCHLORIDE 10 MG: 10 TABLET, FILM COATED ORAL at 07:53

## 2025-01-08 RX ADMIN — Medication 5000 UNITS: at 07:53

## 2025-01-08 RX ADMIN — TIOTROPIUM BROMIDE AND OLODATEROL 2 PUFF: 3.124; 2.736 SPRAY, METERED RESPIRATORY (INHALATION) at 09:27

## 2025-01-08 RX ADMIN — BUSPIRONE HYDROCHLORIDE 10 MG: 10 TABLET ORAL at 07:53

## 2025-01-08 RX ADMIN — PANTOPRAZOLE SODIUM 40 MG: 40 TABLET, DELAYED RELEASE ORAL at 07:54

## 2025-01-08 RX ADMIN — CEFEPIME 2000 MG: 2 INJECTION, POWDER, FOR SOLUTION INTRAVENOUS at 02:01

## 2025-01-08 RX ADMIN — OXYCODONE HYDROCHLORIDE AND ACETAMINOPHEN 1 TABLET: 5; 325 TABLET ORAL at 08:00

## 2025-01-08 RX ADMIN — CEFEPIME 2000 MG: 2 INJECTION, POWDER, FOR SOLUTION INTRAVENOUS at 09:52

## 2025-01-08 RX ADMIN — KETOROLAC TROMETHAMINE 15 MG: 30 INJECTION, SOLUTION INTRAMUSCULAR at 09:53

## 2025-01-08 ASSESSMENT — PAIN SCALES - GENERAL
PAINLEVEL_OUTOF10: 6
PAINLEVEL_OUTOF10: 7
PAINLEVEL_OUTOF10: 7
PAINLEVEL_OUTOF10: 6
PAINLEVEL_OUTOF10: 6
PAINLEVEL_OUTOF10: 7

## 2025-01-08 ASSESSMENT — PAIN DESCRIPTION - FREQUENCY
FREQUENCY: CONTINUOUS
FREQUENCY: CONTINUOUS

## 2025-01-08 ASSESSMENT — PAIN DESCRIPTION - ORIENTATION
ORIENTATION: LOWER
ORIENTATION: LEFT;RIGHT
ORIENTATION: LOWER;RIGHT
ORIENTATION: LOWER

## 2025-01-08 ASSESSMENT — PAIN DESCRIPTION - ONSET
ONSET: ON-GOING
ONSET: ON-GOING

## 2025-01-08 ASSESSMENT — PAIN - FUNCTIONAL ASSESSMENT
PAIN_FUNCTIONAL_ASSESSMENT: ACTIVITIES ARE NOT PREVENTED

## 2025-01-08 ASSESSMENT — PAIN DESCRIPTION - PAIN TYPE
TYPE: CHRONIC PAIN
TYPE: CHRONIC PAIN

## 2025-01-08 ASSESSMENT — PAIN DESCRIPTION - LOCATION
LOCATION: HIP;BACK
LOCATION: BACK
LOCATION: BACK;HIP
LOCATION: BACK

## 2025-01-08 ASSESSMENT — PAIN DESCRIPTION - DESCRIPTORS
DESCRIPTORS: SHARP;ACHING
DESCRIPTORS: SHARP

## 2025-01-08 ASSESSMENT — PAIN SCALES - WONG BAKER: WONGBAKER_NUMERICALRESPONSE: HURTS LITTLE MORE

## 2025-01-08 NOTE — PLAN OF CARE
Problem: Chronic Conditions and Co-morbidities  Goal: Patient's chronic conditions and co-morbidity symptoms are monitored and maintained or improved  1/8/2025 0859 by Vandana Alejandra, RN  Outcome: Progressing  Flowsheets (Taken 1/8/2025 0859)  Care Plan - Patient's Chronic Conditions and Co-Morbidity Symptoms are Monitored and Maintained or Improved:   Monitor and assess patient's chronic conditions and comorbid symptoms for stability, deterioration, or improvement   Collaborate with multidisciplinary team to address chronic and comorbid conditions and prevent exacerbation or deterioration   Update acute care plan with appropriate goals if chronic or comorbid symptoms are exacerbated and prevent overall improvement and discharge     Problem: Discharge Planning  Goal: Discharge to home or other facility with appropriate resources  1/8/2025 0859 by Vandana Alejandra, RN  Outcome: Progressing  Flowsheets (Taken 1/8/2025 0859)  Discharge to home or other facility with appropriate resources:   Identify barriers to discharge with patient and caregiver   Identify discharge learning needs (meds, wound care, etc)   Arrange for needed discharge resources and transportation as appropriate   Arrange for interpreters to assist at discharge as needed     Problem: Pain  Goal: Verbalizes/displays adequate comfort level or baseline comfort level  1/8/2025 0859 by Vandana Alejandra, RN  Outcome: Progressing  Flowsheets (Taken 1/8/2025 0859)  Verbalizes/displays adequate comfort level or baseline comfort level:   Encourage patient to monitor pain and request assistance   Administer analgesics based on type and severity of pain and evaluate response   Assess pain using appropriate pain scale   Implement non-pharmacological measures as appropriate and evaluate response     Problem: Safety - Adult  Goal: Free from fall injury  1/8/2025 0859 by Vandana Alejandra, RN  Outcome: Progressing  Flowsheets (Taken 1/8/2025

## 2025-01-08 NOTE — PROGRESS NOTES
Pharmacy Note - Extended Infusion Beta-Lactam Dose Adjustment    Cefepime 2000 mg q12h intermittent infusion for treatment of Skin and soft tissue infection. Per [facility] Extended Infusion Beta-Lactam Policy, cefepime will be changed to   2000 mg q8h extended infusion    Estimated Creatinine Clearance: Estimated Creatinine Clearance: 163 mL/min (based on SCr of 0.8 mg/dL).    Dialysis Status, ASHLI, CKD: Normal    BMI: Body mass index is 45.25 kg/m².    Rationale for Adjustment: Dose adjusted per [facility] Extended Infusion Policy based on renal function and indication. The above medication is renally eliminated and demonstrates time-dependent effects on bacterial eradication. Extended-infusion dosing strategy aims to enhance microbiologic and clinical efficacy.     Pharmacy will monitor renal function daily and adjust dose as necessary.      Please call with any questions.    Thank you,  Mouna Kapadia PharmD 1/8/2025 8:24 AM

## 2025-01-08 NOTE — PLAN OF CARE
Problem: Chronic Conditions and Co-morbidities  Goal: Patient's chronic conditions and co-morbidity symptoms are monitored and maintained or improved  1/7/2025 2249 by Tina Linares RN  Outcome: Progressing  Flowsheets (Taken 1/7/2025 2249)  Care Plan - Patient's Chronic Conditions and Co-Morbidity Symptoms are Monitored and Maintained or Improved:   Monitor and assess patient's chronic conditions and comorbid symptoms for stability, deterioration, or improvement   Collaborate with multidisciplinary team to address chronic and comorbid conditions and prevent exacerbation or deterioration   Update acute care plan with appropriate goals if chronic or comorbid symptoms are exacerbated and prevent overall improvement and discharge     Problem: Discharge Planning  Goal: Discharge to home or other facility with appropriate resources  1/7/2025 2249 by Tina Linares RN  Outcome: Progressing  Flowsheets (Taken 1/7/2025 2249)  Discharge to home or other facility with appropriate resources:   Identify barriers to discharge with patient and caregiver   Identify discharge learning needs (meds, wound care, etc)   Arrange for needed discharge resources and transportation as appropriate     Problem: Pain  Goal: Verbalizes/displays adequate comfort level or baseline comfort level  1/7/2025 2249 by Tina Linares RN  Outcome: Progressing  Flowsheets (Taken 1/7/2025 2249)  Verbalizes/displays adequate comfort level or baseline comfort level:   Encourage patient to monitor pain and request assistance   Administer analgesics based on type and severity of pain and evaluate response   Assess pain using appropriate pain scale   Implement non-pharmacological measures as appropriate and evaluate response     Problem: Safety - Adult  Goal: Free from fall injury  1/7/2025 2249 by Tina Linares, RN  Outcome: Progressing  Flowsheets (Taken 1/7/2025 2249)  Free From Fall Injury: Instruct family/caregiver on patient safety     Problem: Skin/Tissue

## 2025-01-09 NOTE — CARE COORDINATION
1/9/25, 8:38 AM EST    DISCHARGE PLANNING EVALUATION    Notified Dino Gonzalez  of discharge, AVS faxed.  Agency will see patient today.    1/9/25, 8:39 AM EST    Patient goals/plan/ treatment preferences discussed by  and .  Patient goals/plan/ treatment preferences reviewed with patient/ family.  Patient/ family verbalize understanding of discharge plan and are in agreement with goal/plan/treatment preferences.  Understanding was demonstrated using the teach back method.  AVS provided by RN at time of discharge, which includes all necessary medical information pertaining to the patients current course of illness, treatment, post-discharge goals of care, and treatment preferences.     Services At/After Discharge: Home Health and In ambulette

## 2025-01-09 NOTE — DISCHARGE SUMMARY
Discharge Summary    Patient:  Osmani Galeano  YOB: 1970    MRN: [id number]   Acct: [id number]    Primary Care Physician: Elvie Shukla APRN - CNP    Admit date:  1/6/2025    Discharge date:  1/8/2025       Discharge Diagnoses:   Left foot infection  Principal Problem:    Left foot infection  Active Problems:    Essential hypertension    Type 2 diabetes mellitus with insulin therapy (HCC)    Lactic acidosis    Leukocytosis    COPD with exacerbation (HCC)    Right BKA infection (HCC)    Morbid obesity  Resolved Problems:    * No resolved hospital problems. *        Admitted for: (HPI)   Osmani Galeano is a 54 y.o. male with PMHx of hypertension, hyperlipidemia, coronary artery disease, type 2 diabetes mellitus who presents to [facility] with an infection of the left lower extremity.  Patient stated that he had had a sore on his left foot for the past month and was supposed to go in for a follow-up with Dr. Andujar this Wednesday.  Patient stated that he noticed the wound started draining and smelling and he got a fever with chills.  Patient was worried that he had a severe infection and decided to come to the emergency department.  Patient admitted to feeling nauseous yesterday but currently denies N/V/F/D/SOB     ED course: X-ray foot ordered, vancomycin, cefepime IV ordered    Hospital Course:  54 year old male with history of left foot cellulitis, and wound.  Patient well-known to podiatry was admitted for IV antibiotics and underwent left fifth toe amputation.  Patient is transition to p.o. doxycycline and discharged home.  He will follow-up with Dr. Andujar on Wednesday at the [facility] at 10:15 AM.    Assessment/Plan:     Left foot cellulitis with fifth digit infection: Patie he nt follows with podiatry was recently discharged from hospital for left foot cellulitis.  Status post left fifth toe amputation.  Transition to p.o. doxycycline.  Follow-up with podiatry on

## 2025-01-11 LAB
BACTERIA BLD AEROBE CULT: NORMAL
BACTERIA BLD AEROBE CULT: NORMAL
BACTERIA SPEC AEROBE CULT: ABNORMAL
BACTERIA SPEC AEROBE CULT: ABNORMAL
BACTERIA SPEC ANAEROBE CULT: ABNORMAL
GRAM STN SPEC: ABNORMAL
ORGANISM: ABNORMAL

## 2025-01-12 LAB
BACTERIA SPEC AEROBE CULT: NORMAL
BACTERIA SPEC ANAEROBE CULT: NORMAL
GRAM STN SPEC: NORMAL

## 2025-08-13 ENCOUNTER — HOSPITAL ENCOUNTER (EMERGENCY)
Age: 55
Discharge: HOME | End: 2025-08-14
Payer: MEDICARE

## 2025-08-13 DIAGNOSIS — F10.920 ACUTE ALCOHOLIC INTOXICATION WITHOUT COMPLICATION: ICD-10-CM

## 2025-08-13 DIAGNOSIS — F32.A DEPRESSION, UNSPECIFIED DEPRESSION TYPE: Primary | ICD-10-CM

## 2025-08-13 DIAGNOSIS — F39 MOOD DISORDER: ICD-10-CM

## 2025-08-13 PROCEDURE — 99283 EMERGENCY DEPT VISIT LOW MDM: CPT

## 2025-08-13 ASSESSMENT — PAIN SCALES - GENERAL: PAINLEVEL_OUTOF10: 0

## 2025-08-13 ASSESSMENT — PAIN - FUNCTIONAL ASSESSMENT: PAIN_FUNCTIONAL_ASSESSMENT: 0-10

## 2025-08-14 VITALS
BODY MASS INDEX: 45.25 KG/M2 | SYSTOLIC BLOOD PRESSURE: 110 MMHG | OXYGEN SATURATION: 96 % | TEMPERATURE: 98 F | HEIGHT: 73 IN | DIASTOLIC BLOOD PRESSURE: 67 MMHG | RESPIRATION RATE: 16 BRPM | HEART RATE: 88 BPM

## 2025-08-14 LAB
ALBUMIN SERPL BCG-MCNC: 4.1 G/DL (ref 3.4–4.9)
ALP SERPL-CCNC: 114 U/L (ref 40–129)
ALT SERPL W/O P-5'-P-CCNC: 15 U/L (ref 10–50)
AMPHETAMINES UR QL SCN: NEGATIVE
ANION GAP SERPL CALC-SCNC: 16 MEQ/L (ref 8–16)
APAP SERPL-MCNC: < 5 UG/ML (ref 10–30)
AST SERPL-CCNC: 20 U/L (ref 10–50)
BARBITURATES UR QL SCN: NEGATIVE
BASOPHILS ABSOLUTE: 0.1 THOU/MM3 (ref 0–0.1)
BASOPHILS NFR BLD AUTO: 0.7 %
BENZODIAZ UR QL SCN: NEGATIVE
BILIRUB CONJ SERPL-MCNC: 0.2 MG/DL (ref 0–0.2)
BILIRUB SERPL-MCNC: 0.7 MG/DL (ref 0.3–1.2)
BILIRUB UR QL STRIP.AUTO: NEGATIVE
BUN SERPL-MCNC: 7 MG/DL (ref 8–23)
BUPRENORPHINE URINE: NEGATIVE
BZE UR QL SCN: NEGATIVE
CALCIUM SERPL-MCNC: 8.9 MG/DL (ref 8.5–10.5)
CANNABINOIDS UR QL SCN: NEGATIVE
CHARACTER UR: CLEAR
CHLORIDE SERPL-SCNC: 100 MEQ/L (ref 98–111)
CO2 SERPL-SCNC: 22 MEQ/L (ref 22–29)
COLOR, UA: YELLOW
CREAT SERPL-MCNC: 0.8 MG/DL (ref 0.7–1.2)
DEPRECATED RDW RBC AUTO: 42.2 FL (ref 35–45)
EOSINOPHIL NFR BLD AUTO: 3.1 %
EOSINOPHILS ABSOLUTE: 0.3 THOU/MM3 (ref 0–0.4)
ERYTHROCYTE [DISTWIDTH] IN BLOOD BY AUTOMATED COUNT: 13.1 % (ref 11.5–14.5)
ETHANOL SERPL-MCNC: 0.16 % (ref 0–0.08)
FENTANYL: NEGATIVE
GFR SERPL CREATININE-BSD FRML MDRD: > 90 ML/MIN/1.73M2
GLUCOSE SERPL-MCNC: 126 MG/DL (ref 74–109)
GLUCOSE UR QL STRIP.AUTO: NEGATIVE MG/DL
HCT VFR BLD AUTO: 44.3 % (ref 42–52)
HGB BLD-MCNC: 15.5 GM/DL (ref 14–18)
HGB UR QL STRIP.AUTO: NEGATIVE
IMM GRANULOCYTES # BLD AUTO: 0.04 THOU/MM3 (ref 0–0.07)
IMM GRANULOCYTES NFR BLD AUTO: 0.4 %
KETONES UR QL STRIP.AUTO: NEGATIVE
LYMPHOCYTES ABSOLUTE: 2.5 THOU/MM3 (ref 1–4.8)
LYMPHOCYTES NFR BLD AUTO: 25.1 %
MCH RBC QN AUTO: 30.9 PG (ref 26–33)
MCHC RBC AUTO-ENTMCNC: 35 GM/DL (ref 32.2–35.5)
MCV RBC AUTO: 88.2 FL (ref 80–94)
MONOCYTES ABSOLUTE: 0.6 THOU/MM3 (ref 0.4–1.3)
MONOCYTES NFR BLD AUTO: 6 %
NEUTROPHILS ABSOLUTE: 6.5 THOU/MM3 (ref 1.8–7.7)
NEUTROPHILS NFR BLD AUTO: 64.7 %
NITRITE UR QL STRIP: NEGATIVE
NRBC BLD AUTO-RTO: 0 /100 WBC
OPIATES UR QL SCN: NEGATIVE
OSMOLALITY SERPL CALC.SUM OF ELEC: 275.2 MOSMOL/KG (ref 275–300)
OXYCODONE: NEGATIVE
PCP UR QL SCN: NEGATIVE
PH UR STRIP.AUTO: 5.5 [PH] (ref 5–9)
PLATELET # BLD AUTO: 301 THOU/MM3 (ref 130–400)
PMV BLD AUTO: 10.3 FL (ref 9.4–12.4)
POTASSIUM SERPL-SCNC: 3.4 MEQ/L (ref 3.5–5.2)
PROT SERPL-MCNC: 6.6 G/DL (ref 6.4–8.3)
PROT UR STRIP.AUTO-MCNC: NEGATIVE MG/DL
RBC # BLD AUTO: 5.02 MILL/MM3 (ref 4.7–6.1)
SALICYLATES SERPL-MCNC: < 0.5 MG/DL (ref 2–10)
SODIUM SERPL-SCNC: 138 MEQ/L (ref 135–145)
SP GR UR REFRACT.AUTO: < 1.005 (ref 1–1.03)
TSH SERPL DL<=0.05 MIU/L-ACNC: 1.67 UIU/ML (ref 0.27–4.2)
UROBILINOGEN, URINE: 0.2 EU/DL (ref 0–1)
WBC # BLD AUTO: 10 THOU/MM3 (ref 4.8–10.8)
WBC #/AREA URNS HPF: NEGATIVE /[HPF]

## 2025-08-14 PROCEDURE — 80143 DRUG ASSAY ACETAMINOPHEN: CPT

## 2025-08-14 PROCEDURE — 36415 COLL VENOUS BLD VENIPUNCTURE: CPT

## 2025-08-14 PROCEDURE — 85025 COMPLETE CBC W/AUTO DIFF WBC: CPT

## 2025-08-14 PROCEDURE — 82248 BILIRUBIN DIRECT: CPT

## 2025-08-14 PROCEDURE — 81003 URINALYSIS AUTO W/O SCOPE: CPT

## 2025-08-14 PROCEDURE — 80179 DRUG ASSAY SALICYLATE: CPT

## 2025-08-14 PROCEDURE — 80307 DRUG TEST PRSMV CHEM ANLYZR: CPT

## 2025-08-14 PROCEDURE — 82077 ASSAY SPEC XCP UR&BREATH IA: CPT

## 2025-08-14 PROCEDURE — 80053 COMPREHEN METABOLIC PANEL: CPT

## 2025-08-14 PROCEDURE — 84443 ASSAY THYROID STIM HORMONE: CPT

## 2025-08-14 ASSESSMENT — PATIENT HEALTH QUESTIONNAIRE - PHQ9
9. THOUGHTS THAT YOU WOULD BE BETTER OFF DEAD, OR OF HURTING YOURSELF: NOT AT ALL
8. MOVING OR SPEAKING SO SLOWLY THAT OTHER PEOPLE COULD HAVE NOTICED. OR THE OPPOSITE, BEING SO FIGETY OR RESTLESS THAT YOU HAVE BEEN MOVING AROUND A LOT MORE THAN USUAL: NEARLY EVERY DAY
SUM OF ALL RESPONSES TO PHQ QUESTIONS 1-9: 22
7. TROUBLE CONCENTRATING ON THINGS, SUCH AS READING THE NEWSPAPER OR WATCHING TELEVISION: NEARLY EVERY DAY
6. FEELING BAD ABOUT YOURSELF - OR THAT YOU ARE A FAILURE OR HAVE LET YOURSELF OR YOUR FAMILY DOWN: NEARLY EVERY DAY
10. IF YOU CHECKED OFF ANY PROBLEMS, HOW DIFFICULT HAVE THESE PROBLEMS MADE IT FOR YOU TO DO YOUR WORK, TAKE CARE OF THINGS AT HOME, OR GET ALONG WITH OTHER PEOPLE: VERY DIFFICULT
1. LITTLE INTEREST OR PLEASURE IN DOING THINGS: NEARLY EVERY DAY
5. POOR APPETITE OR OVEREATING: SEVERAL DAYS
SUM OF ALL RESPONSES TO PHQ QUESTIONS 1-9: 22
2. FEELING DOWN, DEPRESSED OR HOPELESS: NEARLY EVERY DAY
4. FEELING TIRED OR HAVING LITTLE ENERGY: NEARLY EVERY DAY
3. TROUBLE FALLING OR STAYING ASLEEP: NEARLY EVERY DAY

## 2025-08-14 ASSESSMENT — LIFESTYLE VARIABLES
HOW OFTEN DO YOU HAVE A DRINK CONTAINING ALCOHOL: 2-4 TIMES A MONTH
HOW MANY STANDARD DRINKS CONTAINING ALCOHOL DO YOU HAVE ON A TYPICAL DAY: 3 OR 4

## 2025-08-14 ASSESSMENT — SLEEP AND FATIGUE QUESTIONNAIRES
DO YOU HAVE DIFFICULTY SLEEPING: YES
AVERAGE NUMBER OF SLEEP HOURS: 6
SLEEP PATTERN: DISTURBED/INTERRUPTED SLEEP;DIFFICULTY FALLING ASLEEP
DO YOU USE A SLEEP AID: YES

## (undated) DEVICE — DRESSING ALG W3XL4IN TRNSPAR ANTIMIC WND CNTCT LAYR W/

## (undated) DEVICE — SUTURE PROL SZ 2-0 L18IN NONABSORBABLE BLU FS L26MM 3/8 CIR 8685H

## (undated) DEVICE — DRAPE,U/SHT,SPLIT,FILM,60X84,STERILE: Brand: MEDLINE

## (undated) DEVICE — SYRINGE IRRIG 60ML SFT PLIABLE BLB EZ TO GRP 1 HND USE W/

## (undated) DEVICE — BANDAGE,GAUZE,4.5"X4.1YD,STERILE,LF: Brand: MEDLINE

## (undated) DEVICE — PENCIL SMK EVAC ALL IN 1 DSGN ENH VISIBILITY IMPROVED AIR

## (undated) DEVICE — PREP SOL PVP IODINE 4%  4 OZ/BTL

## (undated) DEVICE — PACK-MAJOR

## (undated) DEVICE — TOWEL,OR,DSP,ST,BLUE,STD,4/PK,20PK/CS: Brand: MEDLINE

## (undated) DEVICE — AGENT HEMSTAT W3XL4IN OXIDIZED REGENERATED CELOS ABSRB FOR

## (undated) DEVICE — SUTURE PROL SZ 3-0 L18IN NONABSORBABLE BLU L30MM FS-1 3/8 8663G

## (undated) DEVICE — DRAPE,EXTREMITY,89X128,STERILE: Brand: MEDLINE

## (undated) DEVICE — Device

## (undated) DEVICE — 450 ML BOTTLE OF 0.05% CHLORHEXIDINE GLUCONATE IN 99.95% STERILE WATER FOR IRRIGATION, USP AND APPLICATOR.: Brand: IRRISEPT ANTIMICROBIAL WOUND LAVAGE

## (undated) DEVICE — BASIC SINGLE BASIN BTC-LF: Brand: MEDLINE INDUSTRIES, INC.

## (undated) DEVICE — SUTURE MCRYL + SZ 3-0 L27IN ABSRB UD PS1 L24MM 3/8 CIR REV MCP936H

## (undated) DEVICE — GLOVE ORANGE PI 8   MSG9080

## (undated) DEVICE — HYPODERMIC SAFETY NEEDLE: Brand: MAGELLAN

## (undated) DEVICE — SPONGE GZ W4XL4IN COT 12 PLY TYP VII WVN C FLD DSGN STERILE

## (undated) DEVICE — SUTURE MCRYL SZ 4-0 L27IN ABSRB UD L19MM PS-2 1/2 CIR PRIM Y426H

## (undated) DEVICE — SUTURE VCRL + SZ 2-0 L27IN ABSRB WHT SH 1/2 CIR TAPERCUT VCP417H

## (undated) DEVICE — SUTURE PERMA-HAND SZ 2-0 L30IN NONABSORBABLE BLK L26MM SH K833H

## (undated) DEVICE — BINDER ABD UNISX 12IN 85IN 3XL UNIV

## (undated) DEVICE — DR SALMA'S SPINE: Brand: MEDLINE INDUSTRIES, INC.

## (undated) DEVICE — SHEET, ORTHO, SPLIT, STERILE: Brand: MEDLINE

## (undated) DEVICE — SPONGE LAP W18XL18IN WHT COT 4 PLY FLD STRUNG RADPQ DISP ST

## (undated) DEVICE — CLEANSER WND CLN DEB SYS IRRISEPT

## (undated) DEVICE — SYRINGE MED 10ML LUERLOCK TIP W/O SFTY DISP

## (undated) DEVICE — BAG,BANDED,W/RUBBERBAND,STERILE,30X36: Brand: MEDLINE

## (undated) DEVICE — SOLUTION SCRB 4OZ 7.5% POVIDONE IOD ANTIMIC BTL

## (undated) DEVICE — BANDAGE COMPR E SGL LAYERED CLSR BGE W/ CLP W4INXL15FT

## (undated) DEVICE — SOLUTION IV IRRIG WATER 1000ML POUR BRL 2F7114

## (undated) DEVICE — TUNNELING TOOL KIT, 35CM: Brand: NEVRO®

## (undated) DEVICE — AGENT HEMOSTATIC SURGIFLOW MATRIX KIT W/THROMBIN

## (undated) DEVICE — GOWN,SIRUS,NONRNF,SETINSLV,XL,20/CS: Brand: MEDLINE

## (undated) DEVICE — SUTURE MONOCRYL SZ 4-0 L27IN ABSRB UD L19MM PS-2 1/2 CIR PRIM Y426H

## (undated) DEVICE — GAUZE,SPONGE,8"X4",12PLY,XRAY,STRL,LF: Brand: MEDLINE

## (undated) DEVICE — ELECTRODE PT RET AD L9FT HI MOIST COND ADH HYDRGEL CORDED

## (undated) DEVICE — SUTURE VCRL + SZ 3-0 L27IN ABSRB UD L26MM SH 1/2 CIR VCP416H

## (undated) DEVICE — SOLUTION SURG PREP POV IOD 7.5% 4 OZ

## (undated) DEVICE — C-ARMOR C-ARM EQUIPMENT COVERS CLEAR STERILE UNIVERSAL FIT 12 PER CASE: Brand: C-ARMOR

## (undated) DEVICE — PACK PROCEDURE SURG SET UP SRMC

## (undated) DEVICE — BANDAGE COMPR W4INXL12FT E DISP ESMARCH EVEN

## (undated) DEVICE — IMPREGNATED GAUZE DRESSING: Brand: CUTICERIN 7.5X7.5CM CTN 50

## (undated) DEVICE — TUBING, SUCTION, 1/4" X 20', STRAIGHT: Brand: MEDLINE INDUSTRIES, INC.

## (undated) DEVICE — SET UP: Brand: MEDLINE INDUSTRIES, INC.

## (undated) DEVICE — DRESSING TRNSPAR W4XL10IN FLM MIC POR SURESITE 123

## (undated) DEVICE — GLOVE SURG SZ 8 L11.77IN FNGR THK9.8MIL STRW LTX POLYMER

## (undated) DEVICE — SURGIFOAM SPNG SZ 12-7

## (undated) DEVICE — PAD,NON-ADHERENT,3X8,STERILE,LF,1/PK: Brand: MEDLINE

## (undated) DEVICE — PREMIUM DRY TRAY LF: Brand: MEDLINE INDUSTRIES, INC.

## (undated) DEVICE — SUTURE VICRYL + SZ 3-0 L27IN ABSRB UD FS2 3/8 CIR REV CUT

## (undated) DEVICE — 1010 S-DRAPE TOWEL DRAPE 10/BX: Brand: STERI-DRAPE™

## (undated) DEVICE — NEEDLE SPNL L3.5IN PNK HUB S STL REG WALL FIT STYL W/ QNCKE

## (undated) DEVICE — PAD,ABDOMINAL,5"X9",ST,LF,25/BX: Brand: MEDLINE INDUSTRIES, INC.

## (undated) DEVICE — SOLUTION PREP PAINT POV IOD FOR SKIN MUCOUS MEM

## (undated) DEVICE — SPLINT QUICK STEP SCOTCHCAST 5 X 30

## (undated) DEVICE — BAND RUBBER ST